# Patient Record
Sex: FEMALE | Race: BLACK OR AFRICAN AMERICAN | NOT HISPANIC OR LATINO | Employment: OTHER | URBAN - METROPOLITAN AREA
[De-identification: names, ages, dates, MRNs, and addresses within clinical notes are randomized per-mention and may not be internally consistent; named-entity substitution may affect disease eponyms.]

---

## 2017-02-01 ENCOUNTER — OFFICE VISIT (OUTPATIENT)
Dept: INTERNAL MEDICINE | Facility: CLINIC | Age: 82
End: 2017-02-01
Payer: MEDICARE

## 2017-02-01 VITALS
BODY MASS INDEX: 23.93 KG/M2 | HEART RATE: 72 BPM | SYSTOLIC BLOOD PRESSURE: 126 MMHG | RESPIRATION RATE: 16 BRPM | HEIGHT: 64 IN | WEIGHT: 140.19 LBS | DIASTOLIC BLOOD PRESSURE: 64 MMHG

## 2017-02-01 DIAGNOSIS — I10 ESSENTIAL HYPERTENSION: ICD-10-CM

## 2017-02-01 DIAGNOSIS — E11.3293 TYPE 2 DIABETES MELLITUS WITH BOTH EYES AFFECTED BY MILD NONPROLIFERATIVE RETINOPATHY WITHOUT MACULAR EDEMA, WITHOUT LONG-TERM CURRENT USE OF INSULIN: ICD-10-CM

## 2017-02-01 DIAGNOSIS — E11.3299 NONPROLIFERATIVE DIABETIC RETINOPATHY: ICD-10-CM

## 2017-02-01 DIAGNOSIS — M85.80 OSTEOPENIA: ICD-10-CM

## 2017-02-01 DIAGNOSIS — I77.1 TORTUOUS AORTA: ICD-10-CM

## 2017-02-01 DIAGNOSIS — F32.5 MAJOR DEPRESSIVE DISORDER, SINGLE EPISODE, IN FULL REMISSION: ICD-10-CM

## 2017-02-01 DIAGNOSIS — Z00.00 ENCOUNTER FOR PREVENTIVE HEALTH EXAMINATION: Primary | ICD-10-CM

## 2017-02-01 DIAGNOSIS — E78.5 HYPERLIPIDEMIA, UNSPECIFIED HYPERLIPIDEMIA TYPE: Chronic | ICD-10-CM

## 2017-02-01 PROCEDURE — G0009 ADMIN PNEUMOCOCCAL VACCINE: HCPCS | Mod: S$GLB,,, | Performed by: NURSE PRACTITIONER

## 2017-02-01 PROCEDURE — 99999 PR PBB SHADOW E&M-EST. PATIENT-LVL III: CPT | Mod: PBBFAC,,, | Performed by: NURSE PRACTITIONER

## 2017-02-01 PROCEDURE — G0439 PPPS, SUBSEQ VISIT: HCPCS | Mod: 25,S$GLB,, | Performed by: NURSE PRACTITIONER

## 2017-02-01 PROCEDURE — 99499 UNLISTED E&M SERVICE: CPT | Mod: S$GLB,,, | Performed by: NURSE PRACTITIONER

## 2017-02-01 PROCEDURE — 3078F DIAST BP <80 MM HG: CPT | Mod: S$GLB,,, | Performed by: NURSE PRACTITIONER

## 2017-02-01 PROCEDURE — 3074F SYST BP LT 130 MM HG: CPT | Mod: S$GLB,,, | Performed by: NURSE PRACTITIONER

## 2017-02-01 PROCEDURE — 90732 PPSV23 VACC 2 YRS+ SUBQ/IM: CPT | Mod: S$GLB,,, | Performed by: NURSE PRACTITIONER

## 2017-02-01 NOTE — MR AVS SNAPSHOT
[facility] - Internal Medicine  [address]  [facility] [address]  Phone: [phone]  Fax: [phone]                  Cary Middleton   2017 9:00 AM   Office Visit    Description:  Female : 1935   Provider:  CODIE CALDERON   Department:  [facility] Medicine           Reason for Visit     Health Risk Assessment           Diagnoses this Visit        Comments    Encounter for preventive health examination    -  Primary     Type 2 diabetes mellitus with both eyes affected by mild nonproliferative retinopathy without macular edema, without long-term current use of insulin                To Do List           Goals (5 Years of Data)     None      Follow-Up and Disposition     Return in about 4 months (around 2017) for Follow up with PCP, SOONER IF NEEDED, HRA VISIT IN 1 YEAR.      [facility]     [facility] -  Assistance  Registered nurses in the [facility] provide clinical advisement, health education, appointment booking, and other advisory services.  Call for this free service at [phone].             Medications           Message regarding Medications     Verify the changes and/or additions to your medication regime listed below are the same as discussed with your clinician today.  If any of these changes or additions are incorrect, please notify your healthcare provider.             Verify that the below list of medications is an accurate representation of the medications you are currently taking.  If none reported, the list may be blank. If incorrect, please contact your healthcare provider. Carry this list with you in case of emergency.           Current Medications     amlodipine (NORVASC) 10 MG tablet Take 1 tablet (10 mg total) by mouth once daily.    aspirin (ECOTRIN) 81 MG EC tablet Take 81 mg by mouth once daily.    atorvastatin (LIPITOR) 20 MG tablet Take 1 tablet (20 mg total) by mouth once daily.    cetirizine  "(ZYRTEC) 10 MG tablet Take 1 tablet (10 mg total) by mouth once daily.    cholecalciferol, vitamin D3, 1,000 unit capsule Take 1,000 Units by mouth once daily.    escitalopram oxalate (LEXAPRO) 10 MG tablet Take 1 tablet (10 mg total) by mouth once daily.    glipiZIDE (GLUCOTROL) 5 MG tablet TAKE 2 TABLETS BY MOUTH EVERY MORNING AND 1 IN THE EVENING    metformin (GLUCOPHAGE) 500 MG tablet Take 2 tablets (1,000 mg total) by mouth 2 (two) times daily.    methylcellulose (ARTIFICIAL TEARS) 1 % ophthalmic solution Place 1 drop into both eyes as needed.      CONTOUR TEST STRIPS Strp TEST twice a day           Clinical Reference Information           Vital Signs - Last Recorded  Most recent update: 2/1/2017  8:58 AM by MOOKIE Estrada    BP Pulse Resp Ht Wt BMI    126/64 (BP Location: Right arm, Patient Position: Sitting, BP Method: Manual) 72 16 5' 4" (1.626 m) 63.6 kg (140 lb 3.4 oz) 24.07 kg/m2      Blood Pressure          Most Recent Value    BP  126/64      Allergies as of 2/1/2017     Zoster Vaccine Live    Lisinopril      Immunizations Administered on Date of Encounter - 2/1/2017     Name Date Dose VIS Date Route    Pneumococcal Polysaccharide - 23 Valent  Incomplete 0.5 mL 4/24/2015 Intramuscular      Orders Placed During Today's Visit      Normal Orders This Visit    Ambulatory consult to Diabetic Education     Pneumococcal Polysaccharide Vaccine (23 Valent) (SQ/IM)       [facility] Sign-Up     Activating your [facility] account is as easy as 1-2-3!     1) Visit [email or web address], select Sign Up Now, enter this activation code and your date of birth, then select Next.  [email or web address]  Expires: 3/18/2017  9:32 AM      2) Create a username and password to use when you visit [facility] in the future and select a security question in case you lose your password and select Next.    3) Enter your e-mail address and click Sign Up!    Additional Information  If you have questions, please e-mail [email or web address] " or call [phone] to talk to our [facility] staff. Remember, [facility] is NOT to be used for urgent needs. For medical emergencies, dial 911.         Instructions      Counseling and Referral of Other Preventative  (Italic type indicates deductible and co-insurance are waived)    Patient Name: Cary Middleton  Today's Date: 2/1/2017      SERVICE LIMITATIONS RECOMMENDATION    Vaccines    · Pneumococcal (once after 65)    · Influenza (annually)    · Hepatitis B (if medium/high risk)    · Prevnar 13      Hepatitis B medium/high risk factors:       - End-stage renal disease       - Hemophiliacs who received Factor VII or         IX concentrates       - Clients of institutions for the mentally             retarded       - Persons who live in the same house as          a HepB carrier       - Homosexual men       - Illicit injectable drug abusers     Pneumococcal: Done, no repeat necessary     02/01/2017     Influenza: Done, repeat in one year     11/14/2016     Hepatitis B: N/A DEFER TO PCP RECOMMENDATIONS     Prevnar 13: 10/28/2015; DONE NO REPEAT IS NECESSARY    Mammogram (biennial age 50-74)  Annually (age 40 or over)  Last done 10/28/2015, recommend to repeat every 2  years    Pap (up to age 70 and after 70 if unknown history or abnormal study last 10 years)    N/A     The USPSTF recommends against screening for cervical cancer in women who have had a hysterectomy with removal of the cervix and who do not have a history of a high-grade precancerous lesion (cervical intraepithelial neoplasia [NOEMY] grade 2 or 3) or cervical cancer.     Colorectal cancer screening (to age 75)    · Fecal occult blood test (annual)  · Flexible sigmoidoscopy (5y)  · Screening colonoscopy (10y)  · Barium enema   N/A PATIENT DECLINES     Diabetes self-management training (no USPSTF recommendations)  Requires referral by treating physician for patient with diabetes or renal disease. 10 hours of initial DSMT sessions of no less than 30 minutes  each in a continuous 12-month period. 2 hours of follow-up DSMT in subsequent years.  Scheduled, see appointments    Bone mass measurements (age 65 & older, biennial)  Requires diagnosis related to osteoporosis or estrogen deficiency. Biennial benefit unless patient has history of long-term glucocorticoid  Last done 12/04/2015, recommend to repeat every 2-4 YEARS  years    Glaucoma screening (no USPSTF recommendation)  Diabetes mellitus, family history   , age 50 or over    American, age 65 or over  Last done 11/29/2016, recommend to repeat every 1  years    Medical nutrition therapy for diabetes or renal disease (no recommended schedule)  Requires referral by treating physician for patient with diabetes or renal disease or kidney transplant within the past 3 years.  Can be provided in same year as diabetes self-management training (DSMT), and CMS recommends medical nutrition therapy take place after DSMT. Up to 3 hours for initial year and 2 hours in subsequent years.  Scheduled, see appointments    Cardiovascular screening blood tests (every 5 years)  · Fasting lipid panel  Order as a panel if possible  Last done 03/07/2016, recommend to repeat every 1  years    Diabetes screening tests (at least every 3 years, Medicare covers annually or at 6-month intervals for prediabetic patients)  · Fasting blood sugar (FBS) or glucose tolerance test (GTT)  Patient must be diagnosed with one of the following:       - Hypertension       - Dyslipidemia       - Obesity (BMI 30kg/m2)       - Previous elevated impaired FBS or GTT       ... or any two of the following:       - Overweight (BMI 25 but <30)       - Family history of diabetes       - Age 65 or older       - History of gestational diabetes or birth of baby weighing more than 9 pounds  Last done 11/14/2016, recommend to repeat every 6  months    Abdominal aortic aneurysm screening (once)  · Sonogram   Limited to patients who meet one of the  following criteria:       - Men who are 65-75 years old and have smoked more than 100 cigarette in their lifetime       - Anyone with a family history of abdominal aortic aneurysm       - Anyone recommended for screening by the USPSTF  N/A     HIV screening (annually for increased risk patients)  · HIV-1 and HIV-2 by EIA, or SHELLEY, rapid antibody test or oral mucosa transudate  Patients must be at increased risk for HIV infection per USPSTF guidelines or pregnant. Tests covered annually for patient at increased risk or as requested by the patient. Pregnant patients may receive up to 3 tests during pregnancy.  Risks discussed, screening is not recommended    Smoking cessation counseling (up to 8 sessions per year)  Patients must be asymptomatic of tobacco-related conditions to receive as a preventative service.  NA    Subsequent annual wellness visit  At least 12 months since last AWV  Return in one year     The following information is provided to all patients.  This information is to help you find resources for any of the problems found today that may be affecting your health:                Living healthy guide: www.[facility].[place].[facility]      Understanding Diabetes: www.diabetes.org      Eating healthy: www.cdc.gov/healthyweight      CDC home safety checklist: www.cdc.gov/steadi/patient.html      Agency on Aging: www.goea.[place]      Alcoholics anonymous (AA): www.aa.org      Physical Activity: www.nevaeh.nih.gov/br2qnhu      Tobacco use: www.quitwithusla.org

## 2017-02-01 NOTE — PROGRESS NOTES
"Cary Middleton presented for a  Medicare AWV and comprehensive Health Risk Assessment today. The following components were reviewed and updated:    · Medical history  · Family History  · Social history  · Allergies and Current Medications  · Health Risk Assessment  · Health Maintenance  · Care Team     ** See Completed Assessments for Annual Wellness Visit within the encounter summary.**       The following assessments were completed:  · Living Situation    · Depression Screening  · Timed Get Up and Go  · Whisper Test  · Cognitive Function Screening      · Nutrition Screening  · ADL Screening  · PAQ Screening    Vitals:    02/01/17 0855   BP: 126/64   BP Location: Right arm   Patient Position: Sitting   BP Method: Manual   Pulse: 72   Resp: 16   Weight: 63.6 kg (140 lb 3.4 oz)   Height: 5' 4" (1.626 m)     Body mass index is 24.07 kg/(m^2).  Physical Exam   Constitutional: She is oriented to person, place, and time. She appears well-developed and well-nourished.   HENT:   Head: Normocephalic and atraumatic.   Mouth/Throat: Oropharynx is clear and moist. No oropharyngeal exudate.   Eyes: Pupils are equal, round, and reactive to light.   Neck: Normal range of motion. Neck supple. No tracheal deviation present.   Cardiovascular: Normal rate, regular rhythm, normal heart sounds and intact distal pulses.  Exam reveals no gallop and no friction rub.    No murmur heard.  Pulses:       Dorsalis pedis pulses are 2+ on the right side, and 2+ on the left side.        Posterior tibial pulses are 2+ on the right side, and 2+ on the left side.   Pulmonary/Chest: Effort normal and breath sounds normal. No respiratory distress. She has no wheezes.   Abdominal: Soft. Bowel sounds are normal. She exhibits no distension. There is no tenderness.   Musculoskeletal: Normal range of motion.        Right foot: There is normal range of motion. Deformity: small bunion noted.        Left foot: There is normal range of motion. Deformity: " bunion.   X 4 extremties   Feet:   Right Foot:   Protective Sensation: 6 sites tested. 6 sites sensed.   Skin Integrity: Negative for skin breakdown.   Left Foot:   Protective Sensation: 6 sites tested. 6 sites sensed.   Skin Integrity: Negative for skin breakdown.   Neurological: She is alert and oriented to person, place, and time.   Skin: Skin is warm and dry.   Psychiatric: She has a normal mood and affect. Her behavior is normal.   Nursing note and vitals reviewed.        Diagnoses and health risks identified today and associated recommendations/orders:    1. Encounter for preventive health examination    - Pneumococcal Polysaccharide Vaccine (23 Valent) (SQ/IM)    2. Type 2 diabetes mellitus with both eyes affected by mild nonproliferative retinopathy without macular edema, without long-term current use of insulin    - Ambulatory consult to Diabetic Education    3. Uncontrolled type 2 diabetes mellitus with complication, without long-term current use of insulin  Stable. Continue current treatment plan as previously prescribed by PCP.       4. Nonproliferative diabetic retinopathy  Stable. Continue current treatment plan as previously prescribed by PCP.       5. Tortuous aorta  Stable and controlled.   Noted on CXR imaging dated 11/19/2013.  Continue current treatment plan as previously prescribed by PCP.       6. Major depressive disorder, single episode, in full remission  Stable and controlled. Continue current treatment plan as previously prescribed by PCP.       7. Essential hypertension  Stable and controlled. Continue current treatment plan as previously prescribed by PCP.       8. Hyperlipidemia, unspecified hyperlipidemia type  Stable and controlled. Continue current treatment plan as previously prescribed by PCP.       9. Osteopenia  Stable and controlled. Continue current treatment plan as previously prescribed by PCP.         Provided Cary with a 5-10 year written screening schedule and personal  prevention plan. Recommendations were developed using the USPSTF age appropriate recommendations. Education, counseling, and referrals were provided as needed. After Visit Summary printed and given to patient which includes a list of additional screenings\tests needed.    Return in about 4 months (around 6/1/2017) for Follow up with PCP, SOONER IF NEEDED, HRA VISIT IN 1 YEAR.    Abbey Dickens, ULISESC

## 2017-02-01 NOTE — PATIENT INSTRUCTIONS
Counseling and Referral of Other Preventative  (Italic type indicates deductible and co-insurance are waived)    Patient Name: Cary Middleton  Today's Date: 2/1/2017      SERVICE LIMITATIONS RECOMMENDATION    Vaccines    · Pneumococcal (once after 65)    · Influenza (annually)    · Hepatitis B (if medium/high risk)    · Prevnar 13      Hepatitis B medium/high risk factors:       - End-stage renal disease       - Hemophiliacs who received Factor VII or         IX concentrates       - Clients of institutions for the mentally             retarded       - Persons who live in the same house as          a HepB carrier       - Homosexual men       - Illicit injectable drug abusers     Pneumococcal: Done, no repeat necessary     02/01/2017     Influenza: Done, repeat in one year     11/14/2016     Hepatitis B: N/A DEFER TO PCP RECOMMENDATIONS     Prevnar 13: 10/28/2015; DONE NO REPEAT IS NECESSARY    Mammogram (biennial age 50-74)  Annually (age 40 or over)  Last done 10/28/2015, recommend to repeat every 2  years    Pap (up to age 70 and after 70 if unknown history or abnormal study last 10 years)    N/A     The USPSTF recommends against screening for cervical cancer in women who have had a hysterectomy with removal of the cervix and who do not have a history of a high-grade precancerous lesion (cervical intraepithelial neoplasia [NOEMY] grade 2 or 3) or cervical cancer.     Colorectal cancer screening (to age 75)    · Fecal occult blood test (annual)  · Flexible sigmoidoscopy (5y)  · Screening colonoscopy (10y)  · Barium enema   N/A PATIENT DECLINES     Diabetes self-management training (no USPSTF recommendations)  Requires referral by treating physician for patient with diabetes or renal disease. 10 hours of initial DSMT sessions of no less than 30 minutes each in a continuous 12-month period. 2 hours of follow-up DSMT in subsequent years.  Scheduled, see appointments    Bone mass measurements (age 65 & older, biennial)   Requires diagnosis related to osteoporosis or estrogen deficiency. Biennial benefit unless patient has history of long-term glucocorticoid  Last done 12/04/2015, recommend to repeat every 2-4 YEARS  years    Glaucoma screening (no USPSTF recommendation)  Diabetes mellitus, family history   , age 50 or over    American, age 65 or over  Last done 11/29/2016, recommend to repeat every 1  years    Medical nutrition therapy for diabetes or renal disease (no recommended schedule)  Requires referral by treating physician for patient with diabetes or renal disease or kidney transplant within the past 3 years.  Can be provided in same year as diabetes self-management training (DSMT), and CMS recommends medical nutrition therapy take place after DSMT. Up to 3 hours for initial year and 2 hours in subsequent years.  Scheduled, see appointments    Cardiovascular screening blood tests (every 5 years)  · Fasting lipid panel  Order as a panel if possible  Last done 03/07/2016, recommend to repeat every 1  years    Diabetes screening tests (at least every 3 years, Medicare covers annually or at 6-month intervals for prediabetic patients)  · Fasting blood sugar (FBS) or glucose tolerance test (GTT)  Patient must be diagnosed with one of the following:       - Hypertension       - Dyslipidemia       - Obesity (BMI 30kg/m2)       - Previous elevated impaired FBS or GTT       ... or any two of the following:       - Overweight (BMI 25 but <30)       - Family history of diabetes       - Age 65 or older       - History of gestational diabetes or birth of baby weighing more than 9 pounds  Last done 11/14/2016, recommend to repeat every 6  months    Abdominal aortic aneurysm screening (once)  · Sonogram   Limited to patients who meet one of the following criteria:       - Men who are 65-75 years old and have smoked more than 100 cigarette in their lifetime       - Anyone with a family history of abdominal aortic  aneurysm       - Anyone recommended for screening by the USPSTF  N/A     HIV screening (annually for increased risk patients)  · HIV-1 and HIV-2 by EIA, or SHELLEY, rapid antibody test or oral mucosa transudate  Patients must be at increased risk for HIV infection per USPSTF guidelines or pregnant. Tests covered annually for patient at increased risk or as requested by the patient. Pregnant patients may receive up to 3 tests during pregnancy.  Risks discussed, screening is not recommended    Smoking cessation counseling (up to 8 sessions per year)  Patients must be asymptomatic of tobacco-related conditions to receive as a preventative service.  NA    Subsequent annual wellness visit  At least 12 months since last AWV  Return in one year     The following information is provided to all patients.  This information is to help you find resources for any of the problems found today that may be affecting your health:                Living healthy guide: www.[facility].[place]gov      Understanding Diabetes: www.diabetes.org      Eating healthy: www.cdc.gov/healthyweight      CDC home safety checklist: www.cdc.gov/steadi/patient.html      Agency on Aging: www.goea.[place].[facility]      Alcoholics anonymous (AA): www.aa.org      Physical Activity: www.nevaeh.nih.gov/lz5ynmu      Tobacco use: www.quitwithusla.org

## 2017-03-06 ENCOUNTER — LAB VISIT (OUTPATIENT)
Dept: LAB | Facility: HOSPITAL | Age: 82
End: 2017-03-06
Attending: INTERNAL MEDICINE
Payer: MEDICARE

## 2017-03-06 ENCOUNTER — CLINICAL SUPPORT (OUTPATIENT)
Dept: DIABETES | Facility: CLINIC | Age: 82
End: 2017-03-06
Payer: MEDICARE

## 2017-03-06 DIAGNOSIS — E11.3293 TYPE 2 DIABETES MELLITUS WITH BOTH EYES AFFECTED BY MILD NONPROLIFERATIVE RETINOPATHY WITHOUT MACULAR EDEMA, WITHOUT LONG-TERM CURRENT USE OF INSULIN: ICD-10-CM

## 2017-03-06 PROCEDURE — 83036 HEMOGLOBIN GLYCOSYLATED A1C: CPT

## 2017-03-06 PROCEDURE — G0109 DIAB MANAGE TRN IND/GROUP: HCPCS | Mod: S$GLB,,, | Performed by: DIETITIAN, REGISTERED

## 2017-03-06 PROCEDURE — 36415 COLL VENOUS BLD VENIPUNCTURE: CPT

## 2017-03-06 NOTE — PROGRESS NOTES
Diabetes Education  Author: Yamile Kahn RD  Date: 3/6/2017    Diabetes Education Visit  Diabetes Education Record Assessment/Progress: Initial    Diabetes Type  Diabetes Type : Type II    Diabetes History  Diabetes Diagnosis: >10 years    Nutrition  Meal Planning: 3 meals per day, artificial sweeteners, water, diet drinks (evening snack)    Monitoring   Monitoring: Contour Next EZ  Self Monitoring : SMBG BID  Blood Glucose Logs: No    Exercise   Exercise Type:  (senior citizen exercise class)  Frequency: 3-5 Times per week  Duration: 1 hour    Current Diabetes Treatment   Current Treatment: Oral Medication (Metformin 1000mg BID, Glipizide 10mg with breakfast and 5mg with dinner. Reports sometimes misses medication doses.)    Social History  Primary Support: Spouse  Educational Level: College Graduate  Occupation: retired  Smoking Status: Never a Smoker                     Barriers to Change  Barriers to Change: None  Learning Challenges : None    Readiness to Learn   Readiness to Learn : Acceptance (Verbal pre and post test completed)    Cultural Influences  Cultural Influences: No    Diabetes Education Assessment/Progress    Acute Complications (preventing, detecting, and treating acute complications): Discussion, Instructed, Class, Written Materials Provided    Chronic Complications (preventing, detecting, and treating chronic complications): Discussion, Instructed, Class, Written Materials Provided    Diabetes Disease Process (diabetes disease process and treatment options): Instructed, Discussion, Class, Written Materials Provided    Nutrition (Incorporating nutritional management into one's lifestyle): Discussion, Instructed, Class, Written Materials Provided    Physical Activity (incorporating physical activity into one's lifestyle): Discussion, Instructed, Class, Written Materials Provided    Medications (states correct name, dose, onset, peak, duration, side effects & timing of meds): Discussion,  Instructed, Class, Written Materials Provided    Monitoring (monitoring blood glucose/other parameters & using results): Discussion, Class, Instructed, Written Materials Provided    Goal Setting and Problem Solving (verbalizes behavior change strategies & sets realistic goals): Discussion, Class    Behavior Change (developing personal strategies to health & behavior change): Discussion, Class    Psychosocial Issues (developing personal srategies to address psychosocial concerns): Discussion, Class    Goals  Other:  (See attached goal sheet.)         Diabetes Care Plan/Intervention  Education Plan/Intervention: Other (Pt to get A1C after class today. Pending A1C result- if >8%, will refer pt to Empowerment. If <8%, will schedule for 3 month DE follow-up.)    Diabetes Meal Plan  Carbohydrate Per Meal: 30-45g  Carbohydrate Per Snack : 15-20g    Education Units of Time   Time Spent: 90 min              Health Maintenance Due   Topic Date Due    Urine Microalbumin  02/28/2017

## 2017-03-07 LAB
ESTIMATED AVG GLUCOSE: 177 MG/DL
HBA1C MFR BLD HPLC: 7.8 %

## 2017-03-08 ENCOUNTER — TELEPHONE (OUTPATIENT)
Dept: DIABETES | Facility: CLINIC | Age: 82
End: 2017-03-08

## 2017-03-13 DIAGNOSIS — E11.9 TYPE 2 DIABETES MELLITUS WITHOUT COMPLICATION: ICD-10-CM

## 2017-05-30 DIAGNOSIS — E11.3299 TYPE 2 DIABETES MELLITUS WITH MILD NONPROLIFERATIVE DIABETIC RETINOPATHY WITHOUT MACULAR EDEMA: ICD-10-CM

## 2017-05-31 RX ORDER — GLIPIZIDE 5 MG/1
TABLET ORAL
Qty: 270 TABLET | Refills: 1 | Status: SHIPPED | OUTPATIENT
Start: 2017-05-31 | End: 2017-11-14 | Stop reason: DRUGHIGH

## 2017-08-14 ENCOUNTER — OFFICE VISIT (OUTPATIENT)
Dept: INTERNAL MEDICINE | Facility: CLINIC | Age: 82
End: 2017-08-14
Payer: MEDICARE

## 2017-08-14 VITALS
DIASTOLIC BLOOD PRESSURE: 70 MMHG | TEMPERATURE: 99 F | SYSTOLIC BLOOD PRESSURE: 128 MMHG | HEIGHT: 64 IN | WEIGHT: 138 LBS | HEART RATE: 77 BPM | BODY MASS INDEX: 23.56 KG/M2

## 2017-08-14 DIAGNOSIS — S76.212A GROIN STRAIN, LEFT, INITIAL ENCOUNTER: Primary | ICD-10-CM

## 2017-08-14 DIAGNOSIS — E78.5 HYPERLIPIDEMIA, UNSPECIFIED HYPERLIPIDEMIA TYPE: ICD-10-CM

## 2017-08-14 DIAGNOSIS — I10 ESSENTIAL HYPERTENSION: ICD-10-CM

## 2017-08-14 DIAGNOSIS — M85.80 OSTEOPENIA, UNSPECIFIED LOCATION: ICD-10-CM

## 2017-08-14 DIAGNOSIS — E11.3293 TYPE 2 DIABETES MELLITUS WITH BOTH EYES AFFECTED BY MILD NONPROLIFERATIVE RETINOPATHY WITHOUT MACULAR EDEMA, WITHOUT LONG-TERM CURRENT USE OF INSULIN: ICD-10-CM

## 2017-08-14 PROCEDURE — 99214 OFFICE O/P EST MOD 30 MIN: CPT | Mod: S$GLB,,, | Performed by: INTERNAL MEDICINE

## 2017-08-14 PROCEDURE — 99499 UNLISTED E&M SERVICE: CPT | Mod: S$GLB,,, | Performed by: INTERNAL MEDICINE

## 2017-08-14 PROCEDURE — 1159F MED LIST DOCD IN RCRD: CPT | Mod: S$GLB,,, | Performed by: INTERNAL MEDICINE

## 2017-08-14 PROCEDURE — 3074F SYST BP LT 130 MM HG: CPT | Mod: S$GLB,,, | Performed by: INTERNAL MEDICINE

## 2017-08-14 PROCEDURE — 3078F DIAST BP <80 MM HG: CPT | Mod: S$GLB,,, | Performed by: INTERNAL MEDICINE

## 2017-08-14 PROCEDURE — 99999 PR PBB SHADOW E&M-EST. PATIENT-LVL III: CPT | Mod: PBBFAC,,, | Performed by: INTERNAL MEDICINE

## 2017-08-14 PROCEDURE — 3008F BODY MASS INDEX DOCD: CPT | Mod: S$GLB,,, | Performed by: INTERNAL MEDICINE

## 2017-08-14 PROCEDURE — 1125F AMNT PAIN NOTED PAIN PRSNT: CPT | Mod: S$GLB,,, | Performed by: INTERNAL MEDICINE

## 2017-08-15 NOTE — PROGRESS NOTES
Subjective:       Patient ID: Cary Middleton is a 81 y.o. female.    Chief Complaint: Leg Pain (left upper thigh pain for a few days)    Last seen 9 months ago. Returns urgently c/o non-traumatic left leg pain in upper inner thigh, groin area. Onset few days ago, she denies any strenuous physical activity preceding. The pain is worse with activity, with sudden stabbing pains upon movement, and relieved with rest. No pain in the hip joint, no rash or swollen glands in groin area. No radiation of pain down the leg, no leg weakness, numbness or tingling. No change in bowel or bladder function. Using Aleve 1-2 tabs once daily with relief. It is beginning to subside.     Home Glucose was 148 fasting yesterday per history, no log for review.    Past medical history: .  Hypertension. Negative stress test , EF 55%.   Hyperlipidemia. TChol 151, TG 61, HDL 52, LDL 87 .  Diabetes type 2 without complications. HbA1c 7.8% .  Osteoporosis, did not tolerate oral bisphosphonate.  Depression/anxiety.  DJD left hip.  Nephrolithiasis.  Chronic Microcytosis without anemia, H/H , MCV 66.    Past surgical history: Partial hysterectomy. Cyst removed from left wrist. Bilateral Blepharoplasty and Left Cataract extraction.     Mammogram normal 10/15. BMD stable 12/15. Eye exam 10/15. Pelvic exam 2006. Colonoscopy outside  - normal, no polyps. Flu shot . Prevnar 10/15. Pneumovax . Zostavax .      Social history: Nonsmoker, no alcohol. . 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 Retired teacher.    Family medical history: Heart disease. Mother had colon cancer. Sisters with thoracic aneurysm, CAD and pancreatic cancer.     Allergies: NKDA.    Medications: list reviewed and reconciled.                    Review of Systems   Constitutional: Negative for activity change, appetite change, chills, diaphoresis, fever and unexpected weight change.   Respiratory: Negative for cough and  shortness of breath.    Cardiovascular: Negative for chest pain, palpitations and leg swelling.   Neurological: Negative for dizziness, weakness and headaches.        Numbness in both great toes, not painful.       Objective:    /70, Pulse 77, Temp 98.5  Physical Exam   Constitutional: She appears well-developed and well-nourished.   Ambulatory with a normal gait in no distress.    HENT:   Nose: Nose normal.   Mouth/Throat: Oropharynx is clear and moist.   Cardiovascular: Normal rate, regular rhythm and normal heart sounds.    Pulmonary/Chest: Effort normal and breath sounds normal. No respiratory distress.   Musculoskeletal: Normal range of motion. She exhibits no edema or deformity.   Left hip joint is not tender, no joint pain on range of motion. Left thigh adductor muscle is spastic and tender with pain on leg movements including stretching it by abduction and rotation.   Protective Sensation (w/ 10 gram monofilament):  Right: Intact  Left: Intact    Visual Inspection:  Normal -  Bilateral    Pedal Pulses:   Right: Present  Left: Present    Posterior tibialis:   Right:Present  Left: Present         Assessment:       1. Groin strain, left, initial encounter    2. Type 2 diabetes mellitus with both eyes affected by mild nonproliferative retinopathy without macular edema, without long-term current use of insulin    3. Essential hypertension    4. Hyperlipidemia, unspecified hyperlipidemia type    5. Osteopenia, unspecified location        Plan:       Groin strain, left, initial encounter        -     She feels it is easing up, and is comfortable taking Aleve as needed.     Type 2 diabetes mellitus with both eyes affected by mild nonproliferative retinopathy without macular edema, without long-term current use of insulin  -     CBC auto differential; Future; Expected date: 08/14/2017  -     Comprehensive metabolic panel; Future; Expected date: 08/14/2017  -     Hemoglobin A1c; Future; Expected date:  08/14/2017    Essential hypertension controlled, continue same.     Hyperlipidemia, unspecified hyperlipidemia type  -     Lipid panel; Future; Expected date: 08/14/2017    Osteopenia, unspecified location  -     Vitamin D; Future; Expected date: 08/14/2017  -     Has been off bisphosphonate therapy for two years, exam does not suggest atypical hip fracture associated with these meds.

## 2017-08-29 DIAGNOSIS — I10 ESSENTIAL HYPERTENSION: ICD-10-CM

## 2017-08-29 DIAGNOSIS — E78.5 HYPERLIPIDEMIA LDL GOAL <100: ICD-10-CM

## 2017-08-29 DIAGNOSIS — F41.8 DEPRESSION WITH ANXIETY: ICD-10-CM

## 2017-08-29 DIAGNOSIS — E11.3299 TYPE 2 DIABETES MELLITUS WITH MILD NONPROLIFERATIVE DIABETIC RETINOPATHY WITHOUT MACULAR EDEMA: ICD-10-CM

## 2017-08-30 RX ORDER — ESCITALOPRAM OXALATE 10 MG/1
TABLET ORAL
Qty: 90 TABLET | Refills: 1 | Status: SHIPPED | OUTPATIENT
Start: 2017-08-30 | End: 2018-02-19 | Stop reason: SDUPTHER

## 2017-08-30 RX ORDER — AMLODIPINE BESYLATE 10 MG/1
TABLET ORAL
Qty: 90 TABLET | Refills: 1 | Status: SHIPPED | OUTPATIENT
Start: 2017-08-30 | End: 2018-02-19 | Stop reason: SDUPTHER

## 2017-08-30 RX ORDER — METFORMIN HYDROCHLORIDE 500 MG/1
TABLET ORAL
Qty: 360 TABLET | Refills: 1 | Status: SHIPPED | OUTPATIENT
Start: 2017-08-30 | End: 2018-02-19 | Stop reason: SDUPTHER

## 2017-08-30 RX ORDER — ATORVASTATIN CALCIUM 20 MG/1
TABLET, FILM COATED ORAL
Qty: 90 TABLET | Refills: 1 | Status: SHIPPED | OUTPATIENT
Start: 2017-08-30 | End: 2018-02-19 | Stop reason: SDUPTHER

## 2017-09-05 ENCOUNTER — LAB VISIT (OUTPATIENT)
Dept: LAB | Facility: HOSPITAL | Age: 82
End: 2017-09-05
Attending: INTERNAL MEDICINE
Payer: MEDICARE

## 2017-09-05 DIAGNOSIS — E11.3293 TYPE 2 DIABETES MELLITUS WITH BOTH EYES AFFECTED BY MILD NONPROLIFERATIVE RETINOPATHY WITHOUT MACULAR EDEMA, WITHOUT LONG-TERM CURRENT USE OF INSULIN: ICD-10-CM

## 2017-09-05 DIAGNOSIS — M85.80 OSTEOPENIA, UNSPECIFIED LOCATION: ICD-10-CM

## 2017-09-05 DIAGNOSIS — E78.5 HYPERLIPIDEMIA, UNSPECIFIED HYPERLIPIDEMIA TYPE: ICD-10-CM

## 2017-09-05 LAB
25(OH)D3+25(OH)D2 SERPL-MCNC: 34 NG/ML
ALBUMIN SERPL BCP-MCNC: 3.8 G/DL
ALP SERPL-CCNC: 73 U/L
ALT SERPL W/O P-5'-P-CCNC: 10 U/L
ANION GAP SERPL CALC-SCNC: 12 MMOL/L
ANISOCYTOSIS BLD QL SMEAR: SLIGHT
AST SERPL-CCNC: 14 U/L
BASOPHILS NFR BLD: 0 %
BILIRUB SERPL-MCNC: 0.6 MG/DL
BUN SERPL-MCNC: 10 MG/DL
BURR CELLS BLD QL SMEAR: ABNORMAL
CALCIUM SERPL-MCNC: 9.5 MG/DL
CHLORIDE SERPL-SCNC: 105 MMOL/L
CHOLEST SERPL-MCNC: 151 MG/DL
CHOLEST/HDLC SERPL: 2.8 {RATIO}
CO2 SERPL-SCNC: 25 MMOL/L
CREAT SERPL-MCNC: 0.7 MG/DL
DACRYOCYTES BLD QL SMEAR: ABNORMAL
DIFFERENTIAL METHOD: ABNORMAL
EOSINOPHIL NFR BLD: 1 %
ERYTHROCYTE [DISTWIDTH] IN BLOOD BY AUTOMATED COUNT: 15.5 %
EST. GFR  (AFRICAN AMERICAN): >60 ML/MIN/1.73 M^2
EST. GFR  (NON AFRICAN AMERICAN): >60 ML/MIN/1.73 M^2
ESTIMATED AVG GLUCOSE: 189 MG/DL
GLUCOSE SERPL-MCNC: 156 MG/DL
HBA1C MFR BLD HPLC: 8.2 %
HCT VFR BLD AUTO: 36.5 %
HDLC SERPL-MCNC: 53 MG/DL
HDLC SERPL: 35.1 %
HGB BLD-MCNC: 12.4 G/DL
LDLC SERPL CALC-MCNC: 82.6 MG/DL
LYMPHOCYTES NFR BLD: 52 %
MCH RBC QN AUTO: 22 PG
MCHC RBC AUTO-ENTMCNC: 34 G/DL
MCV RBC AUTO: 65 FL
MONOCYTES NFR BLD: 4 %
NEUTROPHILS NFR BLD: 43 %
NONHDLC SERPL-MCNC: 98 MG/DL
PLATELET # BLD AUTO: 230 K/UL
PLATELET BLD QL SMEAR: ABNORMAL
PMV BLD AUTO: 10.3 FL
POIKILOCYTOSIS BLD QL SMEAR: SLIGHT
POTASSIUM SERPL-SCNC: 4.2 MMOL/L
PROT SERPL-MCNC: 7.3 G/DL
RBC # BLD AUTO: 5.63 M/UL
SCHISTOCYTES BLD QL SMEAR: ABNORMAL
SODIUM SERPL-SCNC: 142 MMOL/L
TARGETS BLD QL SMEAR: ABNORMAL
TRIGL SERPL-MCNC: 77 MG/DL
WBC # BLD AUTO: 11.38 K/UL

## 2017-09-05 PROCEDURE — 85007 BL SMEAR W/DIFF WBC COUNT: CPT

## 2017-09-05 PROCEDURE — 80053 COMPREHEN METABOLIC PANEL: CPT

## 2017-09-05 PROCEDURE — 82306 VITAMIN D 25 HYDROXY: CPT

## 2017-09-05 PROCEDURE — 83036 HEMOGLOBIN GLYCOSYLATED A1C: CPT

## 2017-09-05 PROCEDURE — 36415 COLL VENOUS BLD VENIPUNCTURE: CPT

## 2017-09-05 PROCEDURE — 85027 COMPLETE CBC AUTOMATED: CPT

## 2017-09-05 PROCEDURE — 80061 LIPID PANEL: CPT

## 2017-11-14 ENCOUNTER — OFFICE VISIT (OUTPATIENT)
Dept: INTERNAL MEDICINE | Facility: CLINIC | Age: 82
End: 2017-11-14
Payer: MEDICARE

## 2017-11-14 ENCOUNTER — IMMUNIZATION (OUTPATIENT)
Dept: INTERNAL MEDICINE | Facility: CLINIC | Age: 82
End: 2017-11-14
Payer: MEDICARE

## 2017-11-14 VITALS
SYSTOLIC BLOOD PRESSURE: 138 MMHG | HEIGHT: 64 IN | DIASTOLIC BLOOD PRESSURE: 76 MMHG | BODY MASS INDEX: 23.71 KG/M2 | WEIGHT: 138.88 LBS | HEART RATE: 74 BPM

## 2017-11-14 DIAGNOSIS — Z23 INFLUENZA VACCINE NEEDED: ICD-10-CM

## 2017-11-14 DIAGNOSIS — E11.3293 TYPE 2 DIABETES MELLITUS WITH BOTH EYES AFFECTED BY MILD NONPROLIFERATIVE RETINOPATHY WITHOUT MACULAR EDEMA, WITHOUT LONG-TERM CURRENT USE OF INSULIN: Primary | ICD-10-CM

## 2017-11-14 DIAGNOSIS — I10 ESSENTIAL HYPERTENSION: ICD-10-CM

## 2017-11-14 DIAGNOSIS — R42 VERTIGO: ICD-10-CM

## 2017-11-14 DIAGNOSIS — E78.5 HYPERLIPIDEMIA, UNSPECIFIED HYPERLIPIDEMIA TYPE: ICD-10-CM

## 2017-11-14 LAB
BILIRUB UR QL STRIP: NEGATIVE
CLARITY UR REFRACT.AUTO: ABNORMAL
COLOR UR AUTO: ABNORMAL
CREAT UR-MCNC: 200 MG/DL
GLUCOSE UR QL STRIP: NEGATIVE
HGB UR QL STRIP: NEGATIVE
KETONES UR QL STRIP: NEGATIVE
LEUKOCYTE ESTERASE UR QL STRIP: NEGATIVE
MICROALBUMIN UR DL<=1MG/L-MCNC: 39 UG/ML
MICROALBUMIN/CREATININE RATIO: 19.5 UG/MG
NITRITE UR QL STRIP: NEGATIVE
PH UR STRIP: 5 [PH] (ref 5–8)
PROT UR QL STRIP: NEGATIVE
SP GR UR STRIP: 1.02 (ref 1–1.03)
URN SPEC COLLECT METH UR: ABNORMAL
UROBILINOGEN UR STRIP-ACNC: 2 EU/DL

## 2017-11-14 PROCEDURE — 99999 PR PBB SHADOW E&M-EST. PATIENT-LVL III: CPT | Mod: PBBFAC,,, | Performed by: INTERNAL MEDICINE

## 2017-11-14 PROCEDURE — 82570 ASSAY OF URINE CREATININE: CPT

## 2017-11-14 PROCEDURE — G0008 ADMIN INFLUENZA VIRUS VAC: HCPCS | Mod: S$GLB,,, | Performed by: INTERNAL MEDICINE

## 2017-11-14 PROCEDURE — 81003 URINALYSIS AUTO W/O SCOPE: CPT

## 2017-11-14 PROCEDURE — 99214 OFFICE O/P EST MOD 30 MIN: CPT | Mod: S$GLB,,, | Performed by: INTERNAL MEDICINE

## 2017-11-14 PROCEDURE — 90662 IIV NO PRSV INCREASED AG IM: CPT | Mod: S$GLB,,, | Performed by: INTERNAL MEDICINE

## 2017-11-14 PROCEDURE — 99499 UNLISTED E&M SERVICE: CPT | Mod: S$GLB,,, | Performed by: INTERNAL MEDICINE

## 2017-11-14 RX ORDER — GLIPIZIDE 5 MG/1
10 TABLET ORAL 2 TIMES DAILY WITH MEALS
Qty: 360 TABLET | Refills: 1 | Status: SHIPPED | OUTPATIENT
Start: 2017-11-14 | End: 2018-02-19 | Stop reason: SDUPTHER

## 2017-11-14 RX ORDER — MECLIZINE HCL 12.5 MG 12.5 MG/1
12.5 TABLET ORAL 2 TIMES DAILY PRN
Qty: 30 TABLET | Refills: 0 | Status: SHIPPED | OUTPATIENT
Start: 2017-11-14 | End: 2019-07-15

## 2017-11-14 NOTE — PROGRESS NOTES
Subjective:       Patient ID: Cary Middleton is a 81 y.o. female.    Chief Complaint: Diabetes    Last seen 3 months ago. Returns for f/u chronic medical conditions. C/O occasional dizziness upon rising in the morning. Thinks it's sinus-related, but has NO nasal symptoms. Left ear feels stopped up at times, no ear pain or hearing loss. Does have a history of vertigo, currently out of med.   Recent labs indicate blood sugar on the rise. Admits she has attended a lot of social events in recent months and diet is not her norm. Compliant with meds as prescribed, Glipizide 15mg daily, Metformin 2,000mg daily. Log of BID home glucose monitoring ranges near 150 fasting, near 200 before dinner over past two months, previously better. No hypoglycemia. No home BP monitoring lately.    Past medical history: .  Hypertension. Negative stress test , EF 55%.   Hyperlipidemia. TChol 151, TG 61, HDL 52, LDL 87 .  Diabetes type 2 without complications. HbA1c 7.8% .  Osteoporosis, did not tolerate oral bisphosphonate.  Depression/anxiety.  DJD left hip.  Nephrolithiasis.  Chronic Microcytosis without anemia, H/H , MCV 66.    Past surgical history: Partial hysterectomy. Cyst removed from left wrist. Bilateral Blepharoplasty and Left Cataract extraction.     Mammogram normal 10/15. BMD stable 12/15. Eye exam . Pelvic exam 2006. Colonoscopy outside  - normal, no polyps. Flu shot . Prevnar 10/15. Pneumovax . Zostavax .      Social history: Nonsmoker, no alcohol. . 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 Retired teacher.    Family medical history: Heart disease. Mother had colon cancer. Sisters with thoracic aneurysm, CAD and pancreatic cancer.     Allergies: NKDA.    Medications: list reviewed and reconciled.                    Review of Systems   Constitutional: Negative for activity change, appetite change, fatigue, fever and unexpected weight change.   HENT: Negative  for congestion, ear pain, hearing loss, rhinorrhea, sinus pressure, sneezing, sore throat, tinnitus, trouble swallowing and voice change.    Eyes: Negative for pain and visual disturbance.   Respiratory: Negative for cough, chest tightness, shortness of breath and wheezing.    Cardiovascular: Negative for chest pain, palpitations and leg swelling.   Gastrointestinal: Negative for abdominal pain, blood in stool, constipation, diarrhea, nausea and vomiting.   Genitourinary: Negative for difficulty urinating, dysuria, flank pain, frequency, hematuria, urgency, vaginal bleeding and vaginal discharge.   Musculoskeletal: Negative for arthralgias, back pain, joint swelling, myalgias and neck pain.   Skin: Negative for color change and rash.   Neurological: Positive for dizziness. Negative for syncope, facial asymmetry, speech difficulty, weakness, numbness and headaches.   Hematological: Negative for adenopathy. Does not bruise/bleed easily.   Psychiatric/Behavioral: Negative for confusion, decreased concentration, dysphoric mood and sleep disturbance. The patient is not nervous/anxious.        Objective:    /80, repeat by me 138/76, Pulse 74, Wt 139 lbs (stable), BMI=23.8  Physical Exam   Constitutional: She is oriented to person, place, and time. She appears well-developed and well-nourished. No distress.   HENT:   Head: Normocephalic and atraumatic.   Right Ear: External ear normal.   Left Ear: External ear normal.   Nose: Nose normal.   Mouth/Throat: Oropharynx is clear and moist. No oropharyngeal exudate.   Eyes: Conjunctivae and EOM are normal. Pupils are equal, round, and reactive to light. Right conjunctiva is not injected. Left conjunctiva is not injected. No scleral icterus.   Neck: Normal range of motion. Neck supple. No JVD present. Carotid bruit is not present. No thyromegaly present.   Cardiovascular: Normal rate, regular rhythm, normal heart sounds and intact distal pulses.  Exam reveals no gallop and  no friction rub.    No murmur heard.  Pulmonary/Chest: Effort normal and breath sounds normal. No respiratory distress. She has no wheezes. She has no rhonchi. She has no rales.   Abdominal: Soft. Bowel sounds are normal. She exhibits no distension and no mass. There is no hepatosplenomegaly. There is no tenderness. There is no CVA tenderness.   Musculoskeletal: Normal range of motion. She exhibits no edema, tenderness or deformity.   Lymphadenopathy:     She has no cervical adenopathy.   Neurological: She is alert and oriented to person, place, and time. She has normal strength and normal reflexes. No cranial nerve deficit. She exhibits normal muscle tone. Coordination and gait normal.   Skin: Skin is warm and dry. No lesion and no rash noted. She is not diaphoretic. No cyanosis or erythema. No pallor. Nails show no clubbing.   Psychiatric: She has a normal mood and affect. Her behavior is normal. Judgment and thought content normal.   Vitals reviewed.      9/5/17: CBC stable, H/H 12/36.5, CMP normal except Fasting Glucose 156, HbA1c 8.2%, Vit D 34, TChol 151, TG 77, HDL 53, LDL 83.     Assessment:       1. Type 2 diabetes mellitus with both eyes affected by mild nonproliferative retinopathy without macular edema, without long-term current use of insulin    2. Essential hypertension    3. Hyperlipidemia, unspecified hyperlipidemia type    4. Vertigo    5. Influenza vaccine needed        Plan:       Type 2 diabetes mellitus with both eyes affected by mild nonproliferative retinopathy without macular edema, without long-term current use of insulin  -     Urinalysis  -     Microalbumin/creatinine urine ratio  -     Increase GlipiZIDE (GLUCOTROL) 5 MG tablet; Take 2 tablets (10 mg total) by mouth 2 (two) times daily with meals.  Dispense: 360 tablet; Refill: 1  Continue Metformin the same.  Eye exam to be scheduled soon.    Essential hypertension controlled, continue same.     Hyperlipidemia, unspecified hyperlipidemia  type at goal, continue same.     Vertigo  -     meclizine (ANTIVERT) 12.5 mg tablet; Take 1 tablet (12.5 mg total) by mouth 2 (two) times daily as needed for Dizziness.  Dispense: 30 tablet; Refill: 0    Influenza vaccine needed - Flu shot today.     Return in 3 months with home glucose log.

## 2017-11-14 NOTE — PROGRESS NOTES
Two patient identifiers and allergies reviewed . High Dose Flu Vaccine ordered per Igor Benjamin ,  verified and administered to left deltoid. Pt tolerated injection well; no swelling, redness, or bruising noted at injection site. Pt advised to remain in clinic 15 minutes following injection for observation , verbalizes understanding .

## 2017-12-05 RX ORDER — BLOOD SUGAR DIAGNOSTIC
STRIP MISCELLANEOUS
Qty: 100 STRIP | Refills: 11 | Status: SHIPPED | OUTPATIENT
Start: 2017-12-05 | End: 2017-12-08 | Stop reason: SDUPTHER

## 2017-12-08 RX ORDER — CALCIUM CITRATE/VITAMIN D3 200MG-6.25
TABLET ORAL
Qty: 100 STRIP | Refills: 11 | Status: SHIPPED | OUTPATIENT
Start: 2017-12-08 | End: 2018-02-22

## 2018-01-25 ENCOUNTER — PES CALL (OUTPATIENT)
Dept: ADMINISTRATIVE | Facility: CLINIC | Age: 83
End: 2018-01-25

## 2018-02-19 ENCOUNTER — OFFICE VISIT (OUTPATIENT)
Dept: INTERNAL MEDICINE | Facility: CLINIC | Age: 83
End: 2018-02-19
Payer: MEDICARE

## 2018-02-19 ENCOUNTER — LAB VISIT (OUTPATIENT)
Dept: LAB | Facility: HOSPITAL | Age: 83
End: 2018-02-19
Attending: INTERNAL MEDICINE
Payer: MEDICARE

## 2018-02-19 ENCOUNTER — TELEPHONE (OUTPATIENT)
Dept: INTERNAL MEDICINE | Facility: CLINIC | Age: 83
End: 2018-02-19

## 2018-02-19 VITALS
HEART RATE: 82 BPM | DIASTOLIC BLOOD PRESSURE: 70 MMHG | HEIGHT: 64 IN | WEIGHT: 138.88 LBS | SYSTOLIC BLOOD PRESSURE: 130 MMHG | BODY MASS INDEX: 23.71 KG/M2

## 2018-02-19 DIAGNOSIS — E11.3293 TYPE 2 DIABETES MELLITUS WITH BOTH EYES AFFECTED BY MILD NONPROLIFERATIVE RETINOPATHY WITHOUT MACULAR EDEMA, WITHOUT LONG-TERM CURRENT USE OF INSULIN: Primary | ICD-10-CM

## 2018-02-19 DIAGNOSIS — F32.5 MAJOR DEPRESSIVE DISORDER, SINGLE EPISODE, IN FULL REMISSION: ICD-10-CM

## 2018-02-19 DIAGNOSIS — I10 ESSENTIAL HYPERTENSION: ICD-10-CM

## 2018-02-19 DIAGNOSIS — Z23 NEED FOR DIPHTHERIA-TETANUS-PERTUSSIS (TDAP) VACCINE: ICD-10-CM

## 2018-02-19 DIAGNOSIS — M85.80 OSTEOPENIA, UNSPECIFIED LOCATION: ICD-10-CM

## 2018-02-19 DIAGNOSIS — E78.5 HYPERLIPIDEMIA, UNSPECIFIED HYPERLIPIDEMIA TYPE: ICD-10-CM

## 2018-02-19 DIAGNOSIS — I77.1 TORTUOUS AORTA: ICD-10-CM

## 2018-02-19 DIAGNOSIS — E11.3293 TYPE 2 DIABETES MELLITUS WITH BOTH EYES AFFECTED BY MILD NONPROLIFERATIVE RETINOPATHY WITHOUT MACULAR EDEMA, WITHOUT LONG-TERM CURRENT USE OF INSULIN: ICD-10-CM

## 2018-02-19 LAB
ANION GAP SERPL CALC-SCNC: 11 MMOL/L
BUN SERPL-MCNC: 13 MG/DL
CALCIUM SERPL-MCNC: 9.9 MG/DL
CHLORIDE SERPL-SCNC: 103 MMOL/L
CO2 SERPL-SCNC: 25 MMOL/L
CREAT SERPL-MCNC: 0.7 MG/DL
EST. GFR  (AFRICAN AMERICAN): >60 ML/MIN/1.73 M^2
EST. GFR  (NON AFRICAN AMERICAN): >60 ML/MIN/1.73 M^2
ESTIMATED AVG GLUCOSE: 206 MG/DL
GLUCOSE SERPL-MCNC: 277 MG/DL
HBA1C MFR BLD HPLC: 8.8 %
POTASSIUM SERPL-SCNC: 4.3 MMOL/L
SODIUM SERPL-SCNC: 139 MMOL/L

## 2018-02-19 PROCEDURE — 80048 BASIC METABOLIC PNL TOTAL CA: CPT

## 2018-02-19 PROCEDURE — 3008F BODY MASS INDEX DOCD: CPT | Mod: S$GLB,,, | Performed by: INTERNAL MEDICINE

## 2018-02-19 PROCEDURE — 99499 UNLISTED E&M SERVICE: CPT | Mod: S$GLB,,, | Performed by: INTERNAL MEDICINE

## 2018-02-19 PROCEDURE — 1159F MED LIST DOCD IN RCRD: CPT | Mod: S$GLB,,, | Performed by: INTERNAL MEDICINE

## 2018-02-19 PROCEDURE — 36415 COLL VENOUS BLD VENIPUNCTURE: CPT

## 2018-02-19 PROCEDURE — 99999 PR PBB SHADOW E&M-EST. PATIENT-LVL III: CPT | Mod: PBBFAC,,, | Performed by: INTERNAL MEDICINE

## 2018-02-19 PROCEDURE — 83036 HEMOGLOBIN GLYCOSYLATED A1C: CPT

## 2018-02-19 PROCEDURE — 99214 OFFICE O/P EST MOD 30 MIN: CPT | Mod: S$GLB,,, | Performed by: INTERNAL MEDICINE

## 2018-02-19 PROCEDURE — 1126F AMNT PAIN NOTED NONE PRSNT: CPT | Mod: S$GLB,,, | Performed by: INTERNAL MEDICINE

## 2018-02-19 RX ORDER — ESCITALOPRAM OXALATE 10 MG/1
10 TABLET ORAL DAILY
Qty: 90 TABLET | Refills: 1 | Status: SHIPPED | OUTPATIENT
Start: 2018-02-19 | End: 2018-08-16 | Stop reason: SDUPTHER

## 2018-02-19 RX ORDER — GLIPIZIDE 5 MG/1
10 TABLET ORAL 2 TIMES DAILY WITH MEALS
Qty: 360 TABLET | Refills: 1 | Status: SHIPPED | OUTPATIENT
Start: 2018-02-19 | End: 2018-02-22

## 2018-02-19 RX ORDER — ATORVASTATIN CALCIUM 20 MG/1
20 TABLET, FILM COATED ORAL DAILY
Qty: 90 TABLET | Refills: 1 | Status: SHIPPED | OUTPATIENT
Start: 2018-02-19 | End: 2018-05-23 | Stop reason: SDUPTHER

## 2018-02-19 RX ORDER — METFORMIN HYDROCHLORIDE 500 MG/1
1000 TABLET ORAL 2 TIMES DAILY
Qty: 360 TABLET | Refills: 1 | Status: SHIPPED | OUTPATIENT
Start: 2018-02-19 | End: 2018-05-23 | Stop reason: SDUPTHER

## 2018-02-19 RX ORDER — AMLODIPINE BESYLATE 10 MG/1
10 TABLET ORAL DAILY
Qty: 90 TABLET | Refills: 1 | Status: SHIPPED | OUTPATIENT
Start: 2018-02-19 | End: 2018-08-16 | Stop reason: SDUPTHER

## 2018-02-19 NOTE — PROGRESS NOTES
Subjective:       Patient ID: Cary Middleton is a 82 y.o. female.    Chief Complaint: Diabetes    Last seen 3 months ago. Diabetes was not well controlled. Increased Glipizide. She returns for f/u compliant with all meds as prescribed, no adverse effects. Fasting glucose ranges 140-160 per history, no written log for review. No hypoglycemia. Treated at the [facility] early this month for Influenza, improved rapidly with Tamiflu.     Past medical history: .  Hypertension. Negative stress test , EF 55%.   Hyperlipidemia. TChol 151, TG 77, HDL 53, LDL 82.6 Sep. '17.  Diabetes type 2 without complications. HbA1c 8.2% Sep. '17.  Osteoporosis, did not tolerate oral bisphosphonate.  Depression/anxiety.  DJD left hip.  Nephrolithiasis.  Chronic Microcytosis without anemia, H/H , MCV 66.    Past surgical history: Partial hysterectomy. Cyst removed from left wrist. Bilateral Blepharoplasty and Left Cataract extraction.     Mammogram normal 10/15. BMD stable 12/15. Eye exam . Pelvic exam 2006. Colonoscopy outside  - normal, no polyps. Flu shot . Prevnar 10/15. Pneumovax . Zostavax .      Social history: Nonsmoker, no alcohol. . 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 Retired teacher.    Family medical history: Heart disease. Mother had colon cancer. Sisters with thoracic aneurysm, CAD and pancreatic cancer.     Allergies: NKDA.    Medications: list reviewed and reconciled.                    Review of Systems   Constitutional: Negative for chills, diaphoresis, fatigue, fever and unexpected weight change.   HENT: Negative for congestion, ear pain, rhinorrhea, sore throat and trouble swallowing.    Eyes: Negative for pain and visual disturbance.   Respiratory: Negative for cough, chest tightness and shortness of breath.    Cardiovascular: Negative for chest pain, palpitations and leg swelling.   Gastrointestinal: Negative for abdominal pain, diarrhea, nausea and  "vomiting.   Genitourinary: Negative for dysuria and frequency.   Musculoskeletal: Negative for arthralgias and myalgias.   Skin: Negative for rash and wound.   Neurological: Negative for dizziness, syncope, weakness and headaches.   Psychiatric/Behavioral: Negative for dysphoric mood. The patient is not nervous/anxious.        Objective:    /70, Pulse 82, Ht 5' 4", Wt 139 lbs (unchanged), BMI=23.8  Physical Exam   Constitutional: She is oriented to person, place, and time. She appears well-developed and well-nourished. No distress.   HENT:   Nose: Nose normal.   Mouth/Throat: Oropharynx is clear and moist.   Eyes: Conjunctivae are normal. No scleral icterus.   Neck: Normal range of motion. Neck supple. No JVD present.   Cardiovascular: Normal rate, regular rhythm and normal heart sounds.    Pulmonary/Chest: Effort normal and breath sounds normal. No respiratory distress. She has no wheezes. She has no rales.   Musculoskeletal: Normal range of motion. She exhibits no edema.   Lymphadenopathy:     She has no cervical adenopathy.   Neurological: She is alert and oriented to person, place, and time.   Skin: Skin is warm and dry. She is not diaphoretic.   Psychiatric: She has a normal mood and affect. Her behavior is normal.       Assessment:       1. Type 2 diabetes mellitus with both eyes affected by mild nonproliferative retinopathy without macular edema, without long-term current use of insulin    2. Essential hypertension    3. Hyperlipidemia, unspecified hyperlipidemia type    4. Tortuous aorta    5. Osteopenia, unspecified location    6. Major depressive disorder, single episode, in full remission    7. Need for diphtheria-tetanus-pertussis (Tdap) vaccine        Plan:       Type 2 diabetes mellitus with both eyes affected by mild nonproliferative retinopathy without macular edema, without long-term current use of insulin  -     Hemoglobin A1c; Future; Expected date: 02/19/2018  -     glipiZIDE (GLUCOTROL) 5 " MG tablet; Take 2 tablets (10 mg total) by mouth 2 (two) times daily with meals.  Dispense: 360 tablet; Refill: 1  -     metFORMIN (GLUCOPHAGE) 500 MG tablet; Take 2 tablets (1,000 mg total) by mouth 2 (two) times daily.  Dispense: 360 tablet; Refill: 1    Essential hypertension controlled, continue same.   -     Basic metabolic panel; Future; Expected date: 02/19/2018  -     amLODIPine (NORVASC) 10 MG tablet; Take 1 tablet (10 mg total) by mouth once daily.  Dispense: 90 tablet; Refill: 1    Hyperlipidemia, unspecified hyperlipidemia type  -     atorvastatin (LIPITOR) 20 MG tablet; Take 1 tablet (20 mg total) by mouth once daily.  Dispense: 90 tablet; Refill: 1    Tortuous aorta - medical management as above.     Osteopenia, unspecified location - recheck 12/18.    Major depressive disorder, single episode, in full remission  -     escitalopram oxalate (LEXAPRO) 10 MG tablet; Take 1 tablet (10 mg total) by mouth once daily.  Dispense: 90 tablet; Refill: 1    Need for diphtheria-tetanus-pertussis (Tdap) vaccine  -     diphth,pertus,acell,,tetanus (BOOSTRIX) 2.5-8-5 Lf-mcg-Lf/0.5mL Susp; Inject 0.5 mLs into the muscle once.  Dispense: 0.5 mL; Refill: 0

## 2018-02-20 NOTE — TELEPHONE ENCOUNTER
Labs show blood sugar is higher, averaging near 200 on maximum doses of Glipizide and Metformin. Diabetes Education recommended.

## 2018-02-22 ENCOUNTER — CLINICAL SUPPORT (OUTPATIENT)
Dept: DIABETES | Facility: CLINIC | Age: 83
End: 2018-02-22
Payer: MEDICARE

## 2018-02-22 ENCOUNTER — OFFICE VISIT (OUTPATIENT)
Dept: ENDOCRINOLOGY | Facility: CLINIC | Age: 83
End: 2018-02-22
Payer: MEDICARE

## 2018-02-22 VITALS
BODY MASS INDEX: 23.86 KG/M2 | RESPIRATION RATE: 18 BRPM | DIASTOLIC BLOOD PRESSURE: 78 MMHG | HEART RATE: 82 BPM | HEIGHT: 64 IN | SYSTOLIC BLOOD PRESSURE: 132 MMHG | WEIGHT: 139.75 LBS

## 2018-02-22 DIAGNOSIS — M85.80 OSTEOPENIA, UNSPECIFIED LOCATION: ICD-10-CM

## 2018-02-22 DIAGNOSIS — I10 ESSENTIAL HYPERTENSION: ICD-10-CM

## 2018-02-22 DIAGNOSIS — E11.3293 TYPE 2 DIABETES MELLITUS WITH BOTH EYES AFFECTED BY MILD NONPROLIFERATIVE RETINOPATHY WITHOUT MACULAR EDEMA, WITHOUT LONG-TERM CURRENT USE OF INSULIN: Primary | ICD-10-CM

## 2018-02-22 DIAGNOSIS — F32.5 MAJOR DEPRESSIVE DISORDER, SINGLE EPISODE, IN FULL REMISSION: ICD-10-CM

## 2018-02-22 PROCEDURE — 99999 PR PBB SHADOW E&M-EST. PATIENT-LVL IV: CPT | Mod: PBBFAC,,, | Performed by: NURSE PRACTITIONER

## 2018-02-22 PROCEDURE — G0108 DIAB MANAGE TRN  PER INDIV: HCPCS | Mod: S$GLB,,, | Performed by: INTERNAL MEDICINE

## 2018-02-22 PROCEDURE — 3008F BODY MASS INDEX DOCD: CPT | Mod: S$GLB,,, | Performed by: NURSE PRACTITIONER

## 2018-02-22 PROCEDURE — 1126F AMNT PAIN NOTED NONE PRSNT: CPT | Mod: S$GLB,,, | Performed by: NURSE PRACTITIONER

## 2018-02-22 PROCEDURE — 1159F MED LIST DOCD IN RCRD: CPT | Mod: S$GLB,,, | Performed by: NURSE PRACTITIONER

## 2018-02-22 PROCEDURE — 99204 OFFICE O/P NEW MOD 45 MIN: CPT | Mod: S$GLB,,, | Performed by: NURSE PRACTITIONER

## 2018-02-22 PROCEDURE — 99999 PR PBB SHADOW E&M-EST. PATIENT-LVL II: CPT | Mod: PBBFAC,,,

## 2018-02-22 RX ORDER — INSULIN PUMP SYRINGE, 3 ML
EACH MISCELLANEOUS
Qty: 1 EACH | Refills: 0 | Status: SHIPPED | OUTPATIENT
Start: 2018-02-22 | End: 2021-01-01

## 2018-02-22 RX ORDER — LANCETS
EACH MISCELLANEOUS
Qty: 100 EACH | Refills: 12 | Status: SHIPPED | OUTPATIENT
Start: 2018-02-22 | End: 2020-01-28 | Stop reason: SDUPTHER

## 2018-02-22 RX ORDER — GLIMEPIRIDE 4 MG/1
4 TABLET ORAL
Qty: 90 TABLET | Refills: 3 | Status: SHIPPED | OUTPATIENT
Start: 2018-02-22 | End: 2018-11-26 | Stop reason: SDUPTHER

## 2018-02-22 NOTE — PROGRESS NOTES
Diabetes Education  Author: Char Welsh RD, CDE  Date: 2/22/2018    Diabetes Education Visit  Diabetes Education Record Assessment/Progress: Initial (Last seen for DE March 2017)    Diabetes Type  Diabetes Type : Type II    Nutrition  Meal Planning: 3 meals per day, snacks between meal, water, eats out seldom, artificial sweeteners, diet drinks  What type of sweetener do you use?: Splenda  What type of beverages do you drink?: other (see comments) (Coffee)    Monitoring   Self Monitoring :  (Checks 2 times a day)  Blood Glucose Logs: No ( this AM)  In the last month, how often have you had a low blood sugar reaction?: never  What are your symptoms of low blood sugar?:  (Never had a low BG)  Can you tell when your blood sugar is too high?: no  How do you treat high blood sugar?:  (Drinks water)    Exercise   Exercise Type:  (2 times a week attends a senior citizen exercise class - 1 hour class)    Current Diabetes Treatment   Current Treatment: Oral Medication    Social History  Preferred Learning Method: Face to Face  Primary Support: Self  Smoking Status: Never a Smoker  Alcohol Use: Never    DDS-2 Score  ( > 3 = SIGNIFICANT DISTRESS): 2    Barriers to Change  Barriers to Change: None  Learning Challenges : None    Readiness to Learn   Readiness to Learn : Acceptance    Cultural Influences  Cultural Influences: No    Diabetes Education Assessment/Progress  Diabetes Disease Process (diabetes disease process and treatment options): Instructed, Discussion, Individual Session, Written Materials Provided  Nutrition (Incorporating nutritional management into one's lifestyle): Instructed, Discussion, Individual Session, Written Materials Provided (Reviewed nutrition guidelines; plate method reviewed)  Physical Activity (incorporating physical activity into one's lifestyle): Instructed, Discussion, Individual Session, Written Materials Provided (Reviewed goals and benefits)  Medications (states correct name, dose,  onset, peak, duration, side effects & timing of meds): Instructed, Discussion, Individual Session, Written Materials Provided (Reviewed medication regimen)  Monitoring (monitoring blood glucose/other parameters & using results): Instructed, Discussion, Individual Session, Written Materials Provided (Reviewed SMBG schedule and BG goals; advised to bring meter to ENDO visit)  Acute Complications (preventing, detecting, and treating acute complications): Instructed, Discussion, Individual Session, Written Materials Provided (Reviewed s/s and treatment of hypoglycemia)  Chronic Complications (preventing, detecting, and treating chronic complications): Instructed, Discussion, Individual Session, Written Materials Provided (Patient due for eye exam - scheduled for 3/8)  Clinical (diabetes, other pertinent medical history, and relevant comorbidities reviewed during visit): Instructed, Discussion, Individual Session  Cognitive (knowledge of self-management skills, functional health literacy): Instructed, Discussion, Individual Session  Psychosocial (emotional response to diabetes): Instructed, Discussion, Individual Session  Diabetes Distress and Support Systems: Instructed, Discussion, Individual Session  Behavioral (readiness for change, lifestyle practices, self-care behaviors): Instructed, Discussion, Individual Session    Goals  Patient has selected/evaluated goals during today's session: Yes, selected  Reducing Risks: Set (Keep eye exam appt)    Diabetes Care Plan/Intervention  Education Plan/Intervention: Individual Follow-Up DSMT    Education Units of Time   Time Spent: 45 min    Health Maintenance was reviewed today with patient. Discussed with patient importance of routine eye exams, foot exams/foot care, blood work (i.e.: A1c, microalbumin, and lipid), dental visits, yearly flu vaccine, and pneumonia vaccine as indicated by PCP. Patient verbalized understanding.     Health Maintenance Topics with due status: Not  Due       Topic Last Completion Date    DEXA SCAN 12/04/2015    Foot Exam 08/14/2017    Lipid Panel 09/05/2017    Urine Microalbumin 11/14/2017    TETANUS VACCINE 02/19/2018    Hemoglobin A1c 02/19/2018     Health Maintenance Due   Topic Date Due    Eye Exam  11/29/2017

## 2018-02-22 NOTE — PROGRESS NOTES
CC: This 82 y.o. female presents for management of diabetes mellitus  and chronic conditions pending review including HTN, HLP    HPI: She was diagnosed with T2DM in since age 65. Has never been hospitalized r/t DM.  Family hx of DM: father  Denies missing doses of DM medication.   hypoglycemia at home- rare bg 86 today- missed her snack - did not feel well- ate lunch and felt fine    monitoring BG at home: fasting mostly but not daily  Brings meter to clinic for review          Diet: Eats 3 Meals a day, snacks occasionally on fruit- ie apple  Exercise: twice a week 1hr [employer]  Also works in her yard and flower beds  CURRENT DM MEDS: metformin 1000 mg bid; glipizide 10 mg bid  Glucometer type:  TeleDNA Contour Next > 3 yrs old    Standards of Care:  Eye exam: > 1 yr; she has an appt o 3/8/18 +retinopathy- no treatment required    ROS:   Gen: Appetite good, no weight gain or loss, denies fatigue and weakness.  Skin: Skin is intact and heals well, no rashes, no hair changes  Eyes: Denies visual disturbances  Resp: no SOB or MORELOS, no cough  Cardiac: No palpitations, chest pain, no edema   GI: No nausea or vomiting, diarrhea, constipation, or abdominal pain.  /GYN: No nocturia, burning or pain.   MS/Neuro: + numbness/of big toes occassionally; Gait steady, speech clear  Psych: Denies drug/ETOH abuse,+ depression.  Other systems: negative.    PE:  GENERAL: Well developed, well nourished.  PSYCH: AAOx3, appropriate mood and affect, pleasant expression, conversant, appears relaxed, well groomed.   EYES: Conjunctiva, corneas clear  NECK: Supple, trachea midline   NEURO: Gait steady  SKIN: Skin warm and dry no acanthosis nigracans.        Hemoglobin A1C   Date Value Ref Range Status   02/19/2018 8.8 (H) 4.0 - 5.6 % Final     Comment:     According to ADA guidelines, hemoglobin A1c <7.0% represents  optimal control in non-pregnant diabetic patients. Different  metrics may apply to specific patient populations.    Standards of Medical Care in Diabetes-2016.  For the purpose of screening for the presence of diabetes:  <5.7%     Consistent with the absence of diabetes  5.7-6.4%  Consistent with increasing risk for diabetes   (prediabetes)  >or=6.5%  Consistent with diabetes  Currently, no consensus exists for use of hemoglobin A1c  for diagnosis of diabetes for children.  This Hemoglobin A1c assay has significant interference with fetal   hemoglobin   (HbF). The results are invalid for patients with abnormal amounts of   HbF,   including those with known Hereditary Persistence   of Fetal Hemoglobin. Heterozygous hemoglobin variants (HbAS, HbAC,   HbAD, HbAE, HbA2) do not significantly interfere with this assay;   however, presence of multiple variants in a sample may impact the %   interference.     09/05/2017 8.2 (H) 4.0 - 5.6 % Final     Comment:     According to ADA guidelines, hemoglobin A1c <7.0% represents  optimal control in non-pregnant diabetic patients. Different  metrics may apply to specific patient populations.   Standards of Medical Care in Diabetes-2016.  For the purpose of screening for the presence of diabetes:  <5.7%     Consistent with the absence of diabetes  5.7-6.4%  Consistent with increasing risk for diabetes   (prediabetes)  >or=6.5%  Consistent with diabetes  Currently, no consensus exists for use of hemoglobin A1c  for diagnosis of diabetes for children.  This Hemoglobin A1c assay has significant interference with fetal   hemoglobin   (HbF). The results are invalid for patients with abnormal amounts of   HbF,   including those with known Hereditary Persistence   of Fetal Hemoglobin. Heterozygous hemoglobin variants (HbAS, HbAC,   HbAD, HbAE, HbA2) do not significantly interfere with this assay;   however, presence of multiple variants in a sample may impact the %   interference.     03/06/2017 7.8 (H) 4.5 - 6.2 % Final     Comment:     According to ADA guidelines, hemoglobin A1C <7.0%  represents  optimal control in non-pregnant diabetic patients.  Different  metrics may apply to specific populations.   Standards of Medical Care in Diabetes - 2016.  For the purpose of screening for the presence of diabetes:  <5.7%     Consistent with the absence of diabetes  5.7-6.4%  Consistent with increasing risk for diabetes   (prediabetes)  >or=6.5%  Consistent with diabetes  Currently no consensus exists for use of hemoglobin A1C  for diagnosis of diabetes for children.          ASSESSMENT and PLAN:    1. T2DM with hyperglycemia, polyneuropathy      Discussed A1c and BG goals. A1C 7.5% ideal for age Bg 100-140 fasting, 140-180 pre-meal  D/c glipizde  Start januvia 100 mg qam, Glimiperide 4 mg qam  Continue metformin 1000 mg bid  Check bg bid and send in log in 1-2 weeks for review or sooner for issues     - takes ASA,  statin    2. HTN - controlled, continue meds as previously prescribed and monitor.     3. HLP - at goal, on statin therapy, LFTs WNL    4. Depression- stable, chronic on lexapro      Follow-up: in 3 months with lab prior

## 2018-02-28 ENCOUNTER — OFFICE VISIT (OUTPATIENT)
Dept: INTERNAL MEDICINE | Facility: CLINIC | Age: 83
End: 2018-02-28
Payer: MEDICARE

## 2018-02-28 VITALS
DIASTOLIC BLOOD PRESSURE: 70 MMHG | SYSTOLIC BLOOD PRESSURE: 110 MMHG | HEIGHT: 64 IN | WEIGHT: 137.38 LBS | HEART RATE: 80 BPM | OXYGEN SATURATION: 96 % | BODY MASS INDEX: 23.45 KG/M2 | TEMPERATURE: 98 F

## 2018-02-28 DIAGNOSIS — Z00.00 ENCOUNTER FOR PREVENTIVE HEALTH EXAMINATION: Primary | ICD-10-CM

## 2018-02-28 DIAGNOSIS — E11.3299 NONPROLIFERATIVE DIABETIC RETINOPATHY: ICD-10-CM

## 2018-02-28 DIAGNOSIS — E78.5 HYPERLIPIDEMIA, UNSPECIFIED HYPERLIPIDEMIA TYPE: Chronic | ICD-10-CM

## 2018-02-28 DIAGNOSIS — I10 ESSENTIAL HYPERTENSION: ICD-10-CM

## 2018-02-28 DIAGNOSIS — E11.3293 TYPE 2 DIABETES MELLITUS WITH BOTH EYES AFFECTED BY MILD NONPROLIFERATIVE RETINOPATHY WITHOUT MACULAR EDEMA, WITHOUT LONG-TERM CURRENT USE OF INSULIN: ICD-10-CM

## 2018-02-28 DIAGNOSIS — M85.80 OSTEOPENIA, UNSPECIFIED LOCATION: ICD-10-CM

## 2018-02-28 DIAGNOSIS — I77.1 TORTUOUS AORTA: ICD-10-CM

## 2018-02-28 DIAGNOSIS — F32.5 MAJOR DEPRESSIVE DISORDER, SINGLE EPISODE, IN FULL REMISSION: ICD-10-CM

## 2018-02-28 PROCEDURE — 99499 UNLISTED E&M SERVICE: CPT | Mod: S$GLB,,, | Performed by: NURSE PRACTITIONER

## 2018-02-28 PROCEDURE — 99999 PR PBB SHADOW E&M-EST. PATIENT-LVL V: CPT | Mod: PBBFAC,,, | Performed by: NURSE PRACTITIONER

## 2018-02-28 PROCEDURE — G0439 PPPS, SUBSEQ VISIT: HCPCS | Mod: S$GLB,,, | Performed by: NURSE PRACTITIONER

## 2018-02-28 NOTE — PROGRESS NOTES
"Cary Middleton presented for a  Medicare AWV and comprehensive Health Risk Assessment today. The following components were reviewed and updated:    · Medical history  · Family History  · Social history  · Allergies and Current Medications  · Health Risk Assessment  · Health Maintenance  · Care Team     ** See Completed Assessments for Annual Wellness Visit within the encounter summary.**       The following assessments were completed:  · Living Situation  · CAGE  · Depression Screening  · Timed Get Up and Go  · Whisper Test  · Cognitive Function Screening  ·   ·   · Nutrition Screening  · ADL Screening  · PAQ Screening    Vitals:    02/28/18 1105   BP: 110/70   Pulse: 80   Temp: 98.1 °F (36.7 °C)   TempSrc: Oral   SpO2: 96%   Weight: 62.3 kg (137 lb 5.6 oz)   Height: 5' 4" (1.626 m)     Body mass index is 23.58 kg/m².  Physical Exam   Constitutional: She is oriented to person, place, and time. She appears well-developed and well-nourished.   HENT:   Head: Normocephalic and atraumatic.   Nose: Nose normal.   Eyes: Conjunctivae and EOM are normal.   Neck: Normal range of motion. Neck supple.   Cardiovascular: Normal rate, regular rhythm, normal heart sounds and intact distal pulses.    No murmur heard.  Pulmonary/Chest: Effort normal and breath sounds normal.   Neurological: She is alert and oriented to person, place, and time.   Skin: Skin is warm and dry.   Psychiatric: She has a normal mood and affect. Her behavior is normal. Judgment and thought content normal.   Nursing note and vitals reviewed.        Diagnoses and health risks identified today and associated recommendations/orders:    1. Encounter for preventive health examination  Assessment performed. Health maintenance updated. Chart review completed.    2. Major depressive disorder, single episode, in full remission  Stable with current regimen. Chronic. Followed by PCP.    3. Tortuous aorta  Stable on imaging. Controlled with statin therapy. Followed by " PCP.    4. Type 2 diabetes mellitus with both eyes affected by mild nonproliferative retinopathy without macular edema, without long-term current use of insulin  Last A1C 8.8.   Not at goal. Followed by Diabetes Management    5. Nonproliferative diabetic retinopathy  Chronic. A1C not at goal. Followed by Opthalmology.    6. Essential hypertension  Chronic. Stable on current regimen. Followed by PCP.    7. Hyperlipidemia, unspecified hyperlipidemia type  Chronic. Stable on current regimen. Followed by PCP.    8. Osteopenia, unspecified location  Chronic. Stable on current regimen. Followed by PCP.      Provided Cary with a 5-10 year written screening schedule and personal prevention plan. Recommendations were developed using the USPSTF age appropriate recommendations. Education, counseling, and referrals were provided as needed. After Visit Summary printed and given to patient which includes a list of additional screenings\tests needed.    Follow-up for follow up with Primary Care Provider as instructed, ;sooner if problems, HRA in 1 year.    ANNAMARIA Sainz

## 2018-02-28 NOTE — PATIENT INSTRUCTIONS
Counseling and Referral of Other Preventative  (Italic type indicates deductible and co-insurance are waived)    Patient Name: Cary Middleton  Today's Date: 2/28/2018    Health Maintenance       Date Due Completion Date    Eye Exam 03/08/2018 (Originally 11/29/2017) 11/29/2016    Foot Exam 08/14/2018 8/14/2017    Override on 2/1/2017: Done    Hemoglobin A1c 08/19/2018 2/19/2018    Lipid Panel 09/05/2018 9/5/2017    Urine Microalbumin 11/14/2018 11/14/2017    DEXA SCAN 12/04/2018 12/4/2015    TETANUS VACCINE 02/19/2028 2/19/2018        No orders of the defined types were placed in this encounter.    The following information is provided to all patients.  This information is to help you find resources for any of the problems found today that may be affecting your health:                Living healthy guide: www.[facility].[place].gov      Understanding Diabetes: www.diabetes.org      Eating healthy: www.cdc.gov/healthyweight      CDC home safety checklist: www.cdc.gov/steadi/patient.html      Agency on Aging: www.goea.[place].[facility]      Alcoholics anonymous (AA): www.aa.org      Physical Activity: www.nevaeh.nih.gov/zi5qkjz      Tobacco use: www.quitwithusla.org

## 2018-03-08 ENCOUNTER — OFFICE VISIT (OUTPATIENT)
Dept: OPTOMETRY | Facility: CLINIC | Age: 83
End: 2018-03-08
Payer: COMMERCIAL

## 2018-03-08 DIAGNOSIS — E11.3293 TYPE 2 DIABETES MELLITUS WITH BOTH EYES AFFECTED BY MILD NONPROLIFERATIVE RETINOPATHY WITHOUT MACULAR EDEMA, WITHOUT LONG-TERM CURRENT USE OF INSULIN: ICD-10-CM

## 2018-03-08 DIAGNOSIS — H52.13 MYOPIA OF BOTH EYES: ICD-10-CM

## 2018-03-08 DIAGNOSIS — Z01.00 EYE EXAM, ROUTINE: Primary | ICD-10-CM

## 2018-03-08 PROCEDURE — 99999 PR PBB SHADOW E&M-EST. PATIENT-LVL II: CPT | Mod: PBBFAC,,, | Performed by: OPTOMETRIST

## 2018-03-08 PROCEDURE — 92014 COMPRE OPH EXAM EST PT 1/>: CPT | Mod: S$GLB,,, | Performed by: OPTOMETRIST

## 2018-03-08 PROCEDURE — 92015 DETERMINE REFRACTIVE STATE: CPT | Mod: S$GLB,,, | Performed by: OPTOMETRIST

## 2018-03-08 PROCEDURE — 99499 UNLISTED E&M SERVICE: CPT | Mod: S$GLB,,, | Performed by: OPTOMETRIST

## 2018-03-08 NOTE — LETTER
March 8, 2018      Caity Hawkins MD  [address]  [facility] [address]           [facility]  [address]  [facility] [address]  Phone: [phone]  Fax: [phone]          Patient: Cary Middleton   MR Number: [id number]   YOB: 1935   Date of Visit: 3/8/2018       Dear Dr. Caity Hawkins:    Thank you for referring Cary Middleton to me for evaluation. Attached you will find relevant portions of my assessment and plan of care.    If you have questions, please do not hesitate to call me. I look forward to following Cary Middleton along with you.    Sincerely,    Lawrence Finch, OD    Enclosure  CC:  No Recipients    If you would like to receive this communication electronically, please contact [email or web address] or [phone] to request more information on [employer] access.    For providers and/or their staff who would like to refer a patient to [facility], please contact us through our one-stop-shop provider referral line, [facility], at [phone].    If you feel you have received this communication in error or would no longer like to receive these types of communications, please e-mail [email or web address]

## 2018-03-08 NOTE — PROGRESS NOTES
HPI     Ms. Cary Middleton for annual eyemed vision exam  She reports satisfactory distance vision without glasses and near vision   with +2.50 OTC readers.    Would patient like a refraction today? declines    (+)drops Systane BID+  (-)flashes  (-)floaters  (-)diplopia    Diabetic 138 am  Hemoglobin A1C       Date                     Value               Ref Range             Status                02/19/2018               8.8 (H)             4.0 - 5.6 %           Final                 09/05/2017               8.2 (H)             4.0 - 5.6 %           Final                 OCULAR HISTORY  Last Eye Exam 2016  (+)eye surgery   (+)diagnosed or treated for any eye conditions or diseases Cataract OD     FAMILY HISTORY  (-)Glaucoma -        Last edited by Lawrence Finch, OD on 3/8/2018 10:07 AM. (History)            Assessment /Plan     For exam results, see Encounter Report.    Eye exam, routine  -Eyemed vision exam    Myopia of both eyes  -OTC +2.25 Ok  Eyeglass Final Rx     Eyeglass Final Rx       Sphere Cylinder Dist VA Add    Right -0.25 Sphere 20/25- +2.50    Left -0.25 Sphere 20/25- +2.50    Expiration Date:  3/9/2019                Type 2 diabetes mellitus with both eyes affected by mild nonproliferative retinopathy without macular edema, without long-term current use of insulin  -Retinopathy noted today, no ocular treatment necessary.  Continued blood sugar control with primary care physician and monitor at 12 mo dilated fundus exam.      RTC 12 mo

## 2018-04-18 ENCOUNTER — TELEPHONE (OUTPATIENT)
Dept: ENDOCRINOLOGY | Facility: CLINIC | Age: 83
End: 2018-04-18

## 2018-05-16 ENCOUNTER — LAB VISIT (OUTPATIENT)
Dept: LAB | Facility: HOSPITAL | Age: 83
End: 2018-05-16
Attending: NURSE PRACTITIONER
Payer: MEDICARE

## 2018-05-16 DIAGNOSIS — E11.3293 TYPE 2 DIABETES MELLITUS WITH BOTH EYES AFFECTED BY MILD NONPROLIFERATIVE RETINOPATHY WITHOUT MACULAR EDEMA, WITHOUT LONG-TERM CURRENT USE OF INSULIN: ICD-10-CM

## 2018-05-16 LAB
ESTIMATED AVG GLUCOSE: 148 MG/DL
HBA1C MFR BLD HPLC: 6.8 %
TSH SERPL DL<=0.005 MIU/L-ACNC: 1.19 UIU/ML

## 2018-05-16 PROCEDURE — 84443 ASSAY THYROID STIM HORMONE: CPT

## 2018-05-16 PROCEDURE — 83036 HEMOGLOBIN GLYCOSYLATED A1C: CPT

## 2018-05-16 PROCEDURE — 36415 COLL VENOUS BLD VENIPUNCTURE: CPT | Mod: PO

## 2018-05-22 NOTE — PROGRESS NOTES
CC: This 82 y.o. female presents for management of diabetes mellitus  and chronic conditions pending review including HTN, HLP    HPI: She was diagnosed with T2DM in since age 65. Has never been hospitalized r/t DM.  Family hx of DM: father  Denies missing doses of DM medication.   hypoglycemia at home-none    monitoring BG at home: fasting   Brings meter to clinic for review        Diet: Eats 3 Meals a day, snacks occasionally on fruit- ie apple  Exercise: twice a week 1hr [employer], Also works in her yard and flower beds  CURRENT DM MEDS: metformin 1000 mg bid; glimepiride 4mg qam, januvia 100 mg qday  Glucometer type:  True Metrix 2018    Standards of Care:  Eye exam:   3/8/18 +retinopathy- no treatment required    ROS:   Gen: Appetite good, no weight gain or loss, denies fatigue and weakness.  Skin: Skin is intact and heals well, no rashes, no hair changes  Eyes: Denies visual disturbances  Resp: no SOB or MORELOS, no cough  Cardiac: No palpitations, chest pain, no edema   GI: No nausea or vomiting, diarrhea, constipation, or abdominal pain.  /GYN: No nocturia, burning or pain.   MS/Neuro:no numbness/buring/tingling in BLE; Gait steady, speech clear  Psych: Denies drug/ETOH abuse,+ depression.  Other systems: negative.    PE:  GENERAL: Well developed, well nourished.  PSYCH: AAOx3, appropriate mood and affect, pleasant expression, conversant, appears relaxed, well groomed.   EYES: Conjunctiva, corneas clear  NECK: Supple, trachea midline   NEURO: Gait steady  SKIN: Skin warm and dry no acanthosis nigracans.        Hemoglobin A1C   Date Value Ref Range Status   05/16/2018 6.8 (H) 4.0 - 5.6 % Final     Comment:     According to ADA guidelines, hemoglobin A1c <7.0% represents  optimal control in non-pregnant diabetic patients. Different  metrics may apply to specific patient populations.   Standards of Medical Care in Diabetes-2016.  For the purpose of screening for the presence of diabetes:  <5.7%     Consistent  with the absence of diabetes  5.7-6.4%  Consistent with increasing risk for diabetes   (prediabetes)  >or=6.5%  Consistent with diabetes  Currently, no consensus exists for use of hemoglobin A1c  for diagnosis of diabetes for children.  This Hemoglobin A1c assay has significant interference with fetal   hemoglobin   (HbF). The results are invalid for patients with abnormal amounts of   HbF,   including those with known Hereditary Persistence   of Fetal Hemoglobin. Heterozygous hemoglobin variants (HbAS, HbAC,   HbAD, HbAE, HbA2) do not significantly interfere with this assay;   however, presence of multiple variants in a sample may impact the %   interference.     02/19/2018 8.8 (H) 4.0 - 5.6 % Final     Comment:     According to ADA guidelines, hemoglobin A1c <7.0% represents  optimal control in non-pregnant diabetic patients. Different  metrics may apply to specific patient populations.   Standards of Medical Care in Diabetes-2016.  For the purpose of screening for the presence of diabetes:  <5.7%     Consistent with the absence of diabetes  5.7-6.4%  Consistent with increasing risk for diabetes   (prediabetes)  >or=6.5%  Consistent with diabetes  Currently, no consensus exists for use of hemoglobin A1c  for diagnosis of diabetes for children.  This Hemoglobin A1c assay has significant interference with fetal   hemoglobin   (HbF). The results are invalid for patients with abnormal amounts of   HbF,   including those with known Hereditary Persistence   of Fetal Hemoglobin. Heterozygous hemoglobin variants (HbAS, HbAC,   HbAD, HbAE, HbA2) do not significantly interfere with this assay;   however, presence of multiple variants in a sample may impact the %   interference.     09/05/2017 8.2 (H) 4.0 - 5.6 % Final     Comment:     According to ADA guidelines, hemoglobin A1c <7.0% represents  optimal control in non-pregnant diabetic patients. Different  metrics may apply to specific patient populations.   Standards of  Medical Care in Diabetes-2016.  For the purpose of screening for the presence of diabetes:  <5.7%     Consistent with the absence of diabetes  5.7-6.4%  Consistent with increasing risk for diabetes   (prediabetes)  >or=6.5%  Consistent with diabetes  Currently, no consensus exists for use of hemoglobin A1c  for diagnosis of diabetes for children.  This Hemoglobin A1c assay has significant interference with fetal   hemoglobin   (HbF). The results are invalid for patients with abnormal amounts of   HbF,   including those with known Hereditary Persistence   of Fetal Hemoglobin. Heterozygous hemoglobin variants (HbAS, HbAC,   HbAD, HbAE, HbA2) do not significantly interfere with this assay;   however, presence of multiple variants in a sample may impact the %   interference.          ASSESSMENT and PLAN:    1. T2DM with hyperglycemia, polyneuropathy      Discussed A1c and BG goals  Continue januvia 100 mg qam, Glimiperide 4 mg qam, metformin 1000 mg bid  Check bg bid       - takes ASA,  statin    2. HTN - controlled, continue meds as previously prescribed and monitor.     3. HLP - on statin therapy, LFTs WNL. Recheck w RTC    4. Depression- stable, chronic on lexapro      Follow-up: in 6 months with lab prior

## 2018-05-23 ENCOUNTER — OFFICE VISIT (OUTPATIENT)
Dept: ENDOCRINOLOGY | Facility: CLINIC | Age: 83
End: 2018-05-23
Payer: MEDICARE

## 2018-05-23 VITALS
HEIGHT: 64 IN | DIASTOLIC BLOOD PRESSURE: 74 MMHG | WEIGHT: 137.81 LBS | SYSTOLIC BLOOD PRESSURE: 137 MMHG | BODY MASS INDEX: 23.53 KG/M2 | HEART RATE: 78 BPM

## 2018-05-23 DIAGNOSIS — I10 ESSENTIAL HYPERTENSION: ICD-10-CM

## 2018-05-23 DIAGNOSIS — E78.5 HYPERLIPIDEMIA, UNSPECIFIED HYPERLIPIDEMIA TYPE: Chronic | ICD-10-CM

## 2018-05-23 DIAGNOSIS — M85.80 OSTEOPENIA, UNSPECIFIED LOCATION: ICD-10-CM

## 2018-05-23 DIAGNOSIS — E11.3293 TYPE 2 DIABETES MELLITUS WITH BOTH EYES AFFECTED BY MILD NONPROLIFERATIVE RETINOPATHY WITHOUT MACULAR EDEMA, WITHOUT LONG-TERM CURRENT USE OF INSULIN: Primary | ICD-10-CM

## 2018-05-23 DIAGNOSIS — F32.5 MAJOR DEPRESSIVE DISORDER, SINGLE EPISODE, IN FULL REMISSION: ICD-10-CM

## 2018-05-23 PROCEDURE — 3075F SYST BP GE 130 - 139MM HG: CPT | Mod: CPTII,S$GLB,, | Performed by: NURSE PRACTITIONER

## 2018-05-23 PROCEDURE — 99213 OFFICE O/P EST LOW 20 MIN: CPT | Mod: S$GLB,,, | Performed by: NURSE PRACTITIONER

## 2018-05-23 PROCEDURE — 3078F DIAST BP <80 MM HG: CPT | Mod: CPTII,S$GLB,, | Performed by: NURSE PRACTITIONER

## 2018-05-23 PROCEDURE — 99999 PR PBB SHADOW E&M-EST. PATIENT-LVL II: CPT | Mod: PBBFAC,,, | Performed by: NURSE PRACTITIONER

## 2018-05-23 RX ORDER — ATORVASTATIN CALCIUM 20 MG/1
20 TABLET, FILM COATED ORAL DAILY
Qty: 90 TABLET | Refills: 3 | Status: SHIPPED | OUTPATIENT
Start: 2018-05-23 | End: 2019-03-30 | Stop reason: SDUPTHER

## 2018-05-23 RX ORDER — METFORMIN HYDROCHLORIDE 500 MG/1
1000 TABLET ORAL 2 TIMES DAILY WITH MEALS
Qty: 360 TABLET | Refills: 3 | Status: SHIPPED | OUTPATIENT
Start: 2018-05-23 | End: 2018-11-26 | Stop reason: SDUPTHER

## 2018-07-05 ENCOUNTER — TELEPHONE (OUTPATIENT)
Dept: INTERNAL MEDICINE | Facility: CLINIC | Age: 83
End: 2018-07-05

## 2018-07-05 NOTE — TELEPHONE ENCOUNTER
----- Message from Susan Giraldo sent at 7/5/2018 11:05 AM CDT -----  Contact: Fazal with [employer]   Like to follow up on fax sent over 05/03 &07/3 to verified pt has hearing loss     Please advise     Fax

## 2018-08-11 ENCOUNTER — HOSPITAL ENCOUNTER (EMERGENCY)
Facility: HOSPITAL | Age: 83
Discharge: HOME | End: 2018-08-11
Attending: EMERGENCY MEDICINE
Payer: MEDICARE

## 2018-08-11 VITALS
WEIGHT: 137 LBS | RESPIRATION RATE: 16 BRPM | OXYGEN SATURATION: 98 % | BODY MASS INDEX: 23.52 KG/M2 | TEMPERATURE: 98 F | DIASTOLIC BLOOD PRESSURE: 83 MMHG | SYSTOLIC BLOOD PRESSURE: 187 MMHG | HEART RATE: 66 BPM

## 2018-08-11 DIAGNOSIS — K59.00 CONSTIPATION, UNSPECIFIED CONSTIPATION TYPE: ICD-10-CM

## 2018-08-11 DIAGNOSIS — R10.9 ACUTE LEFT FLANK PAIN: Primary | ICD-10-CM

## 2018-08-11 LAB
ANION GAP SERPL CALC-SCNC: 11 MMOL/L
BACTERIA #/AREA URNS AUTO: ABNORMAL /HPF
BASOPHILS # BLD AUTO: 0.07 K/UL
BASOPHILS NFR BLD: 0.4 %
BILIRUB UR QL STRIP: ABNORMAL
BUN SERPL-MCNC: 12 MG/DL
CALCIUM SERPL-MCNC: 10 MG/DL
CHLORIDE SERPL-SCNC: 102 MMOL/L
CLARITY UR REFRACT.AUTO: ABNORMAL
CO2 SERPL-SCNC: 25 MMOL/L
COLOR UR AUTO: ABNORMAL
CREAT SERPL-MCNC: 0.6 MG/DL
DIFFERENTIAL METHOD: ABNORMAL
EOSINOPHIL # BLD AUTO: 0.1 K/UL
EOSINOPHIL NFR BLD: 0.8 %
ERYTHROCYTE [DISTWIDTH] IN BLOOD BY AUTOMATED COUNT: 16 %
EST. GFR  (AFRICAN AMERICAN): >60 ML/MIN/1.73 M^2
EST. GFR  (NON AFRICAN AMERICAN): >60 ML/MIN/1.73 M^2
GLUCOSE SERPL-MCNC: 193 MG/DL
GLUCOSE UR QL STRIP: ABNORMAL
HCT VFR BLD AUTO: 38.4 %
HGB BLD-MCNC: 12.5 G/DL
HGB UR QL STRIP: ABNORMAL
HYALINE CASTS UR QL AUTO: 0 /LPF
IMM GRANULOCYTES # BLD AUTO: 0.07 K/UL
IMM GRANULOCYTES NFR BLD AUTO: 0.4 %
KETONES UR QL STRIP: ABNORMAL
LEUKOCYTE ESTERASE UR QL STRIP: ABNORMAL
LYMPHOCYTES # BLD AUTO: 6.9 K/UL
LYMPHOCYTES NFR BLD: 42.2 %
MCH RBC QN AUTO: 22.2 PG
MCHC RBC AUTO-ENTMCNC: 32.6 G/DL
MCV RBC AUTO: 68 FL
MICROSCOPIC COMMENT: ABNORMAL
MONOCYTES # BLD AUTO: 0.7 K/UL
MONOCYTES NFR BLD: 4.1 %
NEUTROPHILS # BLD AUTO: 8.6 K/UL
NEUTROPHILS NFR BLD: 52.1 %
NITRITE UR QL STRIP: ABNORMAL
NON-SQ EPI CELLS #/AREA URNS AUTO: <1 /HPF
NRBC BLD-RTO: 0 /100 WBC
PH UR STRIP: 5 [PH] (ref 5–8)
PLATELET # BLD AUTO: 218 K/UL
PMV BLD AUTO: 11 FL
POTASSIUM SERPL-SCNC: 3.8 MMOL/L
PROT UR QL STRIP: ABNORMAL
RBC # BLD AUTO: 5.62 M/UL
RBC #/AREA URNS AUTO: 2 /HPF (ref 0–4)
SODIUM SERPL-SCNC: 138 MMOL/L
SP GR UR STRIP: 1.02 (ref 1–1.03)
SQUAMOUS #/AREA URNS AUTO: 22 /HPF
URN SPEC COLLECT METH UR: ABNORMAL
UROBILINOGEN UR STRIP-ACNC: ABNORMAL EU/DL
WBC # BLD AUTO: 16.43 K/UL
WBC #/AREA URNS AUTO: 5 /HPF (ref 0–5)

## 2018-08-11 PROCEDURE — 85025 COMPLETE CBC W/AUTO DIFF WBC: CPT

## 2018-08-11 PROCEDURE — 99284 EMERGENCY DEPT VISIT MOD MDM: CPT | Mod: 25

## 2018-08-11 PROCEDURE — 96374 THER/PROPH/DIAG INJ IV PUSH: CPT

## 2018-08-11 PROCEDURE — 25000003 PHARM REV CODE 250: Performed by: PHYSICIAN ASSISTANT

## 2018-08-11 PROCEDURE — 96361 HYDRATE IV INFUSION ADD-ON: CPT

## 2018-08-11 PROCEDURE — 63600175 PHARM REV CODE 636 W HCPCS: Performed by: PHYSICIAN ASSISTANT

## 2018-08-11 PROCEDURE — 80048 BASIC METABOLIC PNL TOTAL CA: CPT

## 2018-08-11 PROCEDURE — 81001 URINALYSIS AUTO W/SCOPE: CPT

## 2018-08-11 PROCEDURE — 96372 THER/PROPH/DIAG INJ SC/IM: CPT | Mod: 59

## 2018-08-11 PROCEDURE — 99284 EMERGENCY DEPT VISIT MOD MDM: CPT | Mod: ,,, | Performed by: PHYSICIAN ASSISTANT

## 2018-08-11 RX ORDER — NAPROXEN 500 MG/1
500 TABLET ORAL 2 TIMES DAILY WITH MEALS
Qty: 14 TABLET | Refills: 0 | Status: SHIPPED | OUTPATIENT
Start: 2018-08-11 | End: 2019-06-06

## 2018-08-11 RX ORDER — HYDROCODONE BITARTRATE AND ACETAMINOPHEN 5; 325 MG/1; MG/1
1 TABLET ORAL
Status: COMPLETED | OUTPATIENT
Start: 2018-08-11 | End: 2018-08-11

## 2018-08-11 RX ORDER — METHOCARBAMOL 500 MG/1
1000 TABLET, FILM COATED ORAL 3 TIMES DAILY
Qty: 30 TABLET | Refills: 0 | Status: SHIPPED | OUTPATIENT
Start: 2018-08-11 | End: 2018-08-16

## 2018-08-11 RX ORDER — ONDANSETRON 4 MG/1
4 TABLET, ORALLY DISINTEGRATING ORAL
Status: COMPLETED | OUTPATIENT
Start: 2018-08-11 | End: 2018-08-11

## 2018-08-11 RX ORDER — DOCUSATE SODIUM 100 MG/1
100 CAPSULE, LIQUID FILLED ORAL 2 TIMES DAILY
Qty: 60 CAPSULE | Refills: 0 | Status: SHIPPED | OUTPATIENT
Start: 2018-08-11 | End: 2019-01-16

## 2018-08-11 RX ORDER — METOCLOPRAMIDE HYDROCHLORIDE 5 MG/ML
10 INJECTION INTRAMUSCULAR; INTRAVENOUS
Status: COMPLETED | OUTPATIENT
Start: 2018-08-11 | End: 2018-08-11

## 2018-08-11 RX ORDER — ONDANSETRON 4 MG/1
4 TABLET, ORALLY DISINTEGRATING ORAL EVERY 8 HOURS PRN
Qty: 12 TABLET | Refills: 0 | Status: SHIPPED | OUTPATIENT
Start: 2018-08-11 | End: 2018-08-14

## 2018-08-11 RX ORDER — KETOROLAC TROMETHAMINE 30 MG/ML
10 INJECTION, SOLUTION INTRAMUSCULAR; INTRAVENOUS
Status: COMPLETED | OUTPATIENT
Start: 2018-08-11 | End: 2018-08-11

## 2018-08-11 RX ADMIN — METOCLOPRAMIDE 10 MG: 5 INJECTION, SOLUTION INTRAMUSCULAR; INTRAVENOUS at 01:08

## 2018-08-11 RX ADMIN — KETOROLAC TROMETHAMINE 10 MG: 30 INJECTION, SOLUTION INTRAMUSCULAR at 11:08

## 2018-08-11 RX ADMIN — SODIUM CHLORIDE 1000 ML: 0.9 INJECTION, SOLUTION INTRAVENOUS at 11:08

## 2018-08-11 RX ADMIN — ONDANSETRON 4 MG: 4 TABLET, ORALLY DISINTEGRATING ORAL at 11:08

## 2018-08-11 RX ADMIN — HYDROCODONE BITARTRATE AND ACETAMINOPHEN 1 TABLET: 5; 325 TABLET ORAL at 02:08

## 2018-08-11 NOTE — ED TRIAGE NOTES
Patient states pain in left lower back x 1 week, states currently pain to left groin onset last night, emesis 7-8 times this am after taking medications. States pain in back worse with lifting left leg, Denies fever, denies nausea. Bm this am, diarrhea. Advil 1 at 0700

## 2018-08-11 NOTE — ED PROVIDER NOTES
Encounter Date: 8/11/2018       History     Chief Complaint   Patient presents with    Flank Pain     left sided- 1 week, thinks she has kidney stones. Pain severe last night with nausea and vomiting. Hx of stones     82-year-old female multiple medical comorbidities significant for HTN, DM, HLD, osteoarthritis of the left hip, recurrent nephrolithiasis presents to the ED with a chief complaint of flank pain. Patient reports mild, intermittent pain to the left flank over the past week.  Pain became severe and constant this morning.  Pain radiates down to the left groin.  She reports exacerbation of the pain with flexion of the left hip.  She reports associated nausea and vomiting. Patient reports that her symptoms are similar to previous kidney stones.  Patient denies fever or chills, dysuria, hematuria.          Review of patient's allergies indicates:   Allergen Reactions    Zoster vaccine live Rash    Lisinopril Swelling     Past Medical History:   Diagnosis Date    Anemia     Anxiety     Cataract     Depression     Diabetes mellitus     Hyperlipidemia     Hypertension     Kidney stone     Macular degeneration     Osteoarthritis of left hip     Osteoporosis     Recurrent nephrolithiasis     Type 2 diabetes mellitus     Type 2 diabetes mellitus with ophthalmic manifestations      Past Surgical History:   Procedure Laterality Date    BLEPHAROPLASTY, QUAD      GANGLION CYST EXCISION      HYSTERECTOMY      INTRACAPSULAR CATARACT EXTRACTION      left eye     Family History   Problem Relation Age of Onset    Colon cancer Mother     Cancer Mother     Heart disease Father     Cataracts Father     Diabetes Father     Pancreatic cancer Sister     Strabismus Daughter     Hypertension Daughter     Transient ischemic attack Daughter         x 3    Diabetes Brother     Hypertension Brother     Cancer Son         lung cancer    Hypertension Daughter     Arthritis Daughter     No Known  Problems Daughter     Aneurysm Sister     Hypertension Brother     Glaucoma Neg Hx     Blindness Neg Hx      Social History   Substance Use Topics    Smoking status: Never Smoker    Smokeless tobacco: Never Used    Alcohol use No     Review of Systems   Constitutional: Negative for chills and fever.   HENT: Negative for sore throat.    Respiratory: Negative for shortness of breath.    Cardiovascular: Negative for chest pain.   Gastrointestinal: Positive for nausea and vomiting.   Genitourinary: Positive for flank pain. Negative for dysuria.   Musculoskeletal: Negative for back pain.        L hip pain   Skin: Negative for rash.   Neurological: Negative for weakness.   Hematological: Does not bruise/bleed easily.       Physical Exam     Initial Vitals [08/11/18 1057]   BP Pulse Resp Temp SpO2   (!) 175/78 69 18 98.3 °F (36.8 °C) 96 %      MAP       --         Physical Exam    Nursing note and vitals reviewed.  Constitutional: She appears well-developed and well-nourished. She is not diaphoretic.  Non-toxic appearance. She does not appear ill. No distress.   Patient appears uncomfortable secondary to pain.   HENT:   Head: Normocephalic and atraumatic.   Neck: Neck supple.   Cardiovascular: Normal rate and regular rhythm. Exam reveals no gallop and no friction rub.    No murmur heard.  Pulmonary/Chest: Effort normal and breath sounds normal. No accessory muscle usage. No tachypnea. No respiratory distress. She has no decreased breath sounds. She has no wheezes. She has no rhonchi. She has no rales.   Abdominal: Soft. Normal appearance. She exhibits no distension. There is tenderness in the left lower quadrant. There is CVA tenderness (on L).   TTP of the L flank area.Tenderness with deep palpation of the lower left pelvic area.  No bony tenderness to the pelvis or left hip.  There is pain with flexion and ABduction of the L hip. DP pulse intact.    Musculoskeletal: Normal range of motion.   Neurological: She is  alert.   Skin: Skin is warm and dry. No rash noted. No pallor.   Psychiatric: She has a normal mood and affect. Her behavior is normal.         ED Course   Procedures  Labs Reviewed   CBC W/ AUTO DIFFERENTIAL - Abnormal; Notable for the following:        Result Value    WBC 16.43 (*)     RBC 5.62 (*)     MCV 68 (*)     MCH 22.2 (*)     RDW 16.0 (*)     Gran # (ANC) 8.6 (*)     Immature Grans (Abs) 0.07 (*)     Lymph # 6.9 (*)     All other components within normal limits   URINALYSIS, REFLEX TO URINE CULTURE - Abnormal; Notable for the following:     Color, UA Orange (*)     Appearance, UA Hazy (*)     Glucose, UA 1+ (*)     Occult Blood UA 1+ (*)     All other components within normal limits    Narrative:     Preferred Collection Type->Urine, Clean Catch   BASIC METABOLIC PANEL - Abnormal; Notable for the following:     Glucose 193 (*)     All other components within normal limits   URINALYSIS MICROSCOPIC - Abnormal; Notable for the following:     Bacteria, UA Moderate (*)     All other components within normal limits    Narrative:     Preferred Collection Type->Urine, Clean Catch          Imaging Results          CT Renal Stone Study ABD Pelvis WO (Final result)  Result time 08/11/18 13:06:44    Final result by Micha Leung MD (08/11/18 13:06:44)                 Impression:      1. No findings to suggest obstructive uropathy.  2. Gaseous distention of the rectum, without wall thickening.  3. Several additional findings above.      Electronically signed by: Micha Leung MD  Date:    08/11/2018  Time:    13:06             Narrative:    EXAMINATION:  CT RENAL STONE STUDY ABD PELVIS WO    CLINICAL HISTORY:  Flank pain, stone disease suspected;    TECHNIQUE:  Low dose axial images, sagittal and coronal reformations were obtained from the lung bases to the pubic symphysis.  Contrast was not administered.    COMPARISON:  02/26/2004    FINDINGS:  Images of the lower thorax are remarkable for dependent  atelectasis.    The liver, spleen, gallbladder, and adrenal glands are grossly unremarkable.  There are scattered calcifications of the pancreas, could reflect calcification related to previous pancreatitis or vascular in nature.  There is no biliary dilation.  The pancreatic duct is not dilated.  There is a small hiatal hernia.  There are scattered shotty periaortic and paracaval lymph nodes.    The kidneys have a grossly unremarkable noncontrast appearance without hydronephrosis or nephrolithiasis.  The bilateral ureters are unremarkable without calculi seen.  The urinary bladder is grossly unremarkable.  The uterus is absent the adnexa is grossly unremarkable.  There is a high attenuating focus involving the cranial aspect of the vaginal cuff, nonspecific, unchanged since the previous examination.    There is gaseous distention of the rectum without wall thickening.  There is moderate stool in the distal colon.  No significant colonic wall thickening allowing for segmental decompression.  The terminal ileum is grossly unremarkable.  The appendix is not identified.  The small bowel is grossly unremarkable.  No focal organized pelvic fluid collection.  There is atherosclerotic calcification of the aorta and its branches.  There are scattered shotty mesenteric lymph nodes.    There is osteopenia.  No focal osseous destructive process.  No significant inguinal lymphadenopathy.                                 Medical Decision Making:   History:   Old Medical Records: I decided to obtain old medical records.  Differential Diagnosis:   My differential diagnosis includes but is not limited to:  Renal stone, UTI, pyelonephritis, muscle strain, arthritis, malignancy  Clinical Tests:   Lab Tests: Ordered and Reviewed  Radiological Study: Ordered and Reviewed       APC / Resident Notes:   80-year-old female with a history of kidney stones presents with left flank and groin pain over the past week that worsened today.   Associated nausea and vomiting today.  She is hypertensive at 175/78.  Afebrile.  Nontoxic appearing.  Appears uncomfortable secondary to pain. Other physical exam findings noted above.    CBC reveals leukocytosis of 16.43.  UA reveals moderate bacteria, however the specimen was not a clean catch.  Five WBCs noted in the urine.  CT renal stone study reveals no evidence of obstructive uropathy.  There is a moderate amount of stool in the colon.  Patient was given Toradol for her pain and antiemetics in the ED.  Patient reports persistent pain and nausea. I will give a dose of Reglan and hydrocodone.  I have advised patient to take stool softener and laxatives for constipation.  I will discharge with NSAIDs and muscle relaxant for pain. Patient advised to follow up with the primary care doctor on Monday.  Return precautions given. I have reviewed the patient's records and discussed this case with my supervising physician.                         Clinical Impression:   The primary encounter diagnosis was Acute left flank pain. A diagnosis of Constipation, unspecified constipation type was also pertinent to this visit.      Disposition:   Disposition: Discharged  Condition: Stable                        Shobha Wong PA-C  08/11/18 5741

## 2018-08-11 NOTE — ED NOTES
Patient identifiers verified and correct for Ms Middleton  C/C: Left lower back apin currently in left lower pelvis, emesis, diarrhea  APPEARANCE: awake and alert in NAD.  SKIN: warm, dry and intact. No breakdown or bruising.  MUSCULOSKELETAL: Patient moving all extremities spontaneously, no obvious swelling or deformities noted. Ambulates independently.  RESPIRATORY: Denies shortness of breath.Respirations unlabored.   CARDIAC: Denies CP, 2+ distal pulses; no peripheral edema  ABDOMEN: Abdomen soft, pain to left lower groin/pelvis. Positive nausea, emesis x 7 today.  : voids spontaneously, denies difficulty, no fever  Neurologic: AAO x 4; follows commands equal strength in all extremities; denies numbness/tingling. Denies dizziness Positive gen weakness

## 2018-08-16 ENCOUNTER — HOSPITAL ENCOUNTER (OUTPATIENT)
Dept: RADIOLOGY | Facility: HOSPITAL | Age: 83
Discharge: HOME | End: 2018-08-16
Attending: INTERNAL MEDICINE
Payer: MEDICARE

## 2018-08-16 ENCOUNTER — OFFICE VISIT (OUTPATIENT)
Dept: INTERNAL MEDICINE | Facility: CLINIC | Age: 83
End: 2018-08-16
Payer: MEDICARE

## 2018-08-16 VITALS
DIASTOLIC BLOOD PRESSURE: 70 MMHG | HEART RATE: 77 BPM | BODY MASS INDEX: 22.59 KG/M2 | HEIGHT: 64 IN | WEIGHT: 132.31 LBS | SYSTOLIC BLOOD PRESSURE: 122 MMHG

## 2018-08-16 DIAGNOSIS — M54.42 ACUTE LEFT-SIDED LOW BACK PAIN WITH LEFT-SIDED SCIATICA: ICD-10-CM

## 2018-08-16 DIAGNOSIS — M54.50 LOW BACK PAIN, NON-SPECIFIC: ICD-10-CM

## 2018-08-16 DIAGNOSIS — E11.3293 TYPE 2 DIABETES MELLITUS WITH BOTH EYES AFFECTED BY MILD NONPROLIFERATIVE RETINOPATHY WITHOUT MACULAR EDEMA, WITHOUT LONG-TERM CURRENT USE OF INSULIN: ICD-10-CM

## 2018-08-16 DIAGNOSIS — M25.552 ACUTE HIP PAIN, LEFT: ICD-10-CM

## 2018-08-16 DIAGNOSIS — D72.829 LEUKOCYTOSIS, UNSPECIFIED TYPE: ICD-10-CM

## 2018-08-16 DIAGNOSIS — M54.42 ACUTE LEFT-SIDED LOW BACK PAIN WITH LEFT-SIDED SCIATICA: Primary | ICD-10-CM

## 2018-08-16 DIAGNOSIS — F32.5 MAJOR DEPRESSIVE DISORDER, SINGLE EPISODE, IN FULL REMISSION: ICD-10-CM

## 2018-08-16 DIAGNOSIS — I10 ESSENTIAL HYPERTENSION: ICD-10-CM

## 2018-08-16 PROCEDURE — 99214 OFFICE O/P EST MOD 30 MIN: CPT | Mod: S$GLB,,, | Performed by: INTERNAL MEDICINE

## 2018-08-16 PROCEDURE — 72100 X-RAY EXAM L-S SPINE 2/3 VWS: CPT | Mod: 26,,, | Performed by: RADIOLOGY

## 2018-08-16 PROCEDURE — 99999 PR PBB SHADOW E&M-EST. PATIENT-LVL III: CPT | Mod: PBBFAC,,, | Performed by: INTERNAL MEDICINE

## 2018-08-16 PROCEDURE — 72100 X-RAY EXAM L-S SPINE 2/3 VWS: CPT | Mod: TC

## 2018-08-16 PROCEDURE — 3074F SYST BP LT 130 MM HG: CPT | Mod: CPTII,S$GLB,, | Performed by: INTERNAL MEDICINE

## 2018-08-16 PROCEDURE — 73502 X-RAY EXAM HIP UNI 2-3 VIEWS: CPT | Mod: TC,LT

## 2018-08-16 PROCEDURE — 73502 X-RAY EXAM HIP UNI 2-3 VIEWS: CPT | Mod: 26,LT,, | Performed by: RADIOLOGY

## 2018-08-16 PROCEDURE — 3078F DIAST BP <80 MM HG: CPT | Mod: CPTII,S$GLB,, | Performed by: INTERNAL MEDICINE

## 2018-08-16 RX ORDER — ESCITALOPRAM OXALATE 10 MG/1
10 TABLET ORAL DAILY
Qty: 90 TABLET | Refills: 1 | Status: SHIPPED | OUTPATIENT
Start: 2018-08-16 | End: 2019-01-04 | Stop reason: SDUPTHER

## 2018-08-16 RX ORDER — AMLODIPINE BESYLATE 10 MG/1
10 TABLET ORAL DAILY
Qty: 90 TABLET | Refills: 1 | Status: SHIPPED | OUTPATIENT
Start: 2018-08-16 | End: 2019-01-04 | Stop reason: SDUPTHER

## 2018-08-16 RX ORDER — HYDROCODONE BITARTRATE AND ACETAMINOPHEN 5; 325 MG/1; MG/1
1 TABLET ORAL EVERY 8 HOURS PRN
Qty: 24 TABLET | Refills: 0 | Status: SHIPPED | OUTPATIENT
Start: 2018-08-16 | End: 2018-08-22 | Stop reason: SDUPTHER

## 2018-08-16 NOTE — PROGRESS NOTES
Subjective:       Patient ID: Cary Middleton is a 82 y.o. female.    Chief Complaint: Hospital Follow Up    Last seen 6 months ago. Diabetes was not well controlled. She was referred to Endocrinology, meds adjusted - addition of Januvia and changed Glipizide to Glimepiride with improvement. Returns urgently today for f/u ER visit five days ago for left flank pain radiating to left groin. It had been going on for a week and was escalating, unrelieved with two tabs of Aleve. She was afebrile, /78, vital signs otherwise normal. Tender left flank on exam. Urine showed mod bacteria but only 2 RBC and 5 WBC. CBC normal except WBC 16K. BMP normal except Glucose 193. CT scan Renal Stone protocol showed no evidence of current or recent nephrolithiasis. Just a moderate amount of stool in the distal colon and aortic atherosclerosis. She was discharged with meds for musculoskeletal pain including Naproxen 500mg and Methocarbamol 500mg with Zofran and Docusate, none of which has helped her. She still has pain which is now radiating down the left leg with leg numbness. No leg weakness, bowel or bladder incontinence. She still has nausea and appetite is poor. Glucose is ranging 110-115 fasting at home.     Past medical history: .  Hypertension. Negative stress test , EF 55%.   Hyperlipidemia. TChol 151, TG 77, HDL 53, LDL 82.6 Sep. '17.  Diabetes type 2 without complications. HbA1c 6.8% May '18.  Osteoporosis, did not tolerate oral bisphosphonate.  Depression/anxiety.  DJD left hip.  Nephrolithiasis.  Chronic Microcytosis without anemia, H/H 12/37, MCV 66.    Past surgical history: Partial hysterectomy. Cyst removed from left wrist. Bilateral Blepharoplasty and Left Cataract extraction.     Mammogram normal 10/15. BMD stable 12/15. Eye exam 3/18. Pelvic exam 2006. Colonoscopy outside  - normal, no polyps. Flu shot . Prevnar 10/15. Pneumovax . Zostavax .      Social history: Nonsmoker, no  "alcohol. . 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 Retired teacher.    Family medical history: Heart disease. Mother had colon cancer. Sisters with thoracic aneurysm, CAD and pancreatic cancer.     Allergies: NKDA.    Medications: list reviewed and reconciled.                    Review of Systems   Constitutional: Negative for chills, diaphoresis and fever.   Respiratory: Negative for cough and shortness of breath.    Cardiovascular: Negative for chest pain, palpitations and leg swelling.   Gastrointestinal: Negative for abdominal pain, blood in stool, diarrhea and vomiting.   Genitourinary: Negative for dysuria, frequency and hematuria.   Skin: Negative for color change and rash.   Neurological: Negative for dizziness, syncope and headaches.       Objective:    /70, Pulse 77, Ht 5' 4", Wt 132 lbs, BMI=22.7  Physical Exam   Constitutional:   Mildly ill-appearing, in exam room alone.    HENT:   Nose: Nose normal.   Mouth/Throat: Oropharynx is clear and moist.   Eyes: Conjunctivae are normal. No scleral icterus.   Cardiovascular: Normal rate, regular rhythm and normal heart sounds.   Pulmonary/Chest: Effort normal and breath sounds normal. No respiratory distress. She has no decreased breath sounds. She has no wheezes. She has no rhonchi. She has no rales.   Abdominal: Soft. Bowel sounds are normal. She exhibits no distension. There is no tenderness.   Musculoskeletal:   No point tenderness along the spine, no spinal deformity, no muscle spasm in the back. Not tender over left acetabular joint, but left hip movements increase her pain in left gluteus and thigh. No soft tissue tenderness in the leg, no edema or ischemia - distal pulses intact. No rash in the area of her pain.       Assessment:       1. Acute left-sided low back pain with left-sided sciatica    2. Acute hip pain, left    3. Leukocytosis, unspecified type    4. Essential hypertension    5. 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 Low back pain, non-specific        Plan:       Acute left-sided low back pain with left-sided sciatica  -     X-Ray Lumbar Spine Ap And Lateral; Future; Expected date: 08/16/2018  -     HYDROcodone-acetaminophen (NORCO) 5-325 mg per tablet; Take 1 tablet by mouth every 8 (eight) hours as needed for Pain.  Dispense: 24 tablet; Refill: 0    Acute hip pain, left  -     X-Ray Hip 2 View Left; Future; Expected date: 08/16/2018  -     HYDROcodone-acetaminophen (NORCO) 5-325 mg per tablet; Take 1 tablet by mouth every 8 (eight) hours as needed for Pain.  Dispense: 24 tablet; Refill: 0    Leukocytosis, unspecified type  -     CBC auto differential; Future; Expected date: 08/16/2018    Essential hypertension  -     Basic metabolic panel; Future; Expected date: 08/16/2018    Type 2 diabetes mellitus with both eyes affected by mild nonproliferative retinopathy without macular edema, without long-term current use of insulin - controlled.

## 2018-08-17 ENCOUNTER — TELEPHONE (OUTPATIENT)
Dept: INTERNAL MEDICINE | Facility: CLINIC | Age: 83
End: 2018-08-17

## 2018-08-17 NOTE — TELEPHONE ENCOUNTER
Spoke with patient and informed labs all stable and x-rays show only arthritis. She feels much better with the pain medication - discomfort is consistent with musculoskeletal problem. Continue present care and call if any new developments. Patient verbalizes understanding and agrees.

## 2018-08-17 NOTE — TELEPHONE ENCOUNTER
----- Message from Rosa Garber sent at 8/17/2018  2:03 PM CDT -----  Contact: self / [phone]  Pt is calling to speak with someone in the office to get the results from her non-fasting labs as well as her urine labs. Pt states that she was told that if she didn't hear back from anyone today, that she is to give the doctor a call. Please advise.      Thanks

## 2018-08-22 ENCOUNTER — TELEPHONE (OUTPATIENT)
Dept: INTERNAL MEDICINE | Facility: CLINIC | Age: 83
End: 2018-08-22

## 2018-08-22 DIAGNOSIS — M25.552 ACUTE HIP PAIN, LEFT: ICD-10-CM

## 2018-08-22 DIAGNOSIS — M54.16 LEFT LUMBAR RADICULITIS: Primary | ICD-10-CM

## 2018-08-22 DIAGNOSIS — M54.42 ACUTE LEFT-SIDED LOW BACK PAIN WITH LEFT-SIDED SCIATICA: ICD-10-CM

## 2018-08-22 DIAGNOSIS — M54.50 LOW BACK PAIN, NON-SPECIFIC: ICD-10-CM

## 2018-08-22 RX ORDER — HYDROCODONE BITARTRATE AND ACETAMINOPHEN 5; 325 MG/1; MG/1
1 TABLET ORAL EVERY 8 HOURS PRN
Qty: 30 TABLET | Refills: 0 | Status: SHIPPED | OUTPATIENT
Start: 2018-08-22 | End: 2018-09-04 | Stop reason: SDUPTHER

## 2018-08-22 RX ORDER — GABAPENTIN 300 MG/1
CAPSULE ORAL
Qty: 60 CAPSULE | Refills: 1 | Status: SHIPPED | OUTPATIENT
Start: 2018-08-22 | End: 2018-09-19 | Stop reason: SDUPTHER

## 2018-08-22 NOTE — TELEPHONE ENCOUNTER
----- Message from India Merlos sent at 8/22/2018  2:03 PM CDT -----  Contact: Self/[phone]  Patient spoke to nurse regarding nerve pain, and states she is still experiencing symptoms and would like to know what next steps should be. Please advise.

## 2018-08-22 NOTE — TELEPHONE ENCOUNTER
Spoke to patient, still with pain in area of left hip radiating down left leg, severe, only temporary relief with Hydrocodone.   Start Gabapentin - explained to patient.   May  refill Hydrocodone.   Schedule Lumbar spine MRI, and [facility] consultation for possible epidural injection.

## 2018-08-22 NOTE — TELEPHONE ENCOUNTER
Pt called to report the hip pain started again she is taking the pain med can something else be done to relieve the pain

## 2018-08-23 NOTE — TELEPHONE ENCOUNTER
Spoke with pt, MRI scheduled for August 27th and back and spine consult scheduled for September 7th.

## 2018-08-27 ENCOUNTER — HOSPITAL ENCOUNTER (OUTPATIENT)
Dept: RADIOLOGY | Facility: HOSPITAL | Age: 83
Discharge: HOME | End: 2018-08-27
Attending: INTERNAL MEDICINE
Payer: MEDICARE

## 2018-08-27 DIAGNOSIS — M54.50 LOW BACK PAIN, NON-SPECIFIC: ICD-10-CM

## 2018-08-27 DIAGNOSIS — M54.16 LEFT LUMBAR RADICULITIS: ICD-10-CM

## 2018-08-27 PROCEDURE — 72148 MRI LUMBAR SPINE W/O DYE: CPT | Mod: 26,,, | Performed by: RADIOLOGY

## 2018-08-27 PROCEDURE — 72148 MRI LUMBAR SPINE W/O DYE: CPT | Mod: TC

## 2018-08-28 ENCOUNTER — TELEPHONE (OUTPATIENT)
Dept: INTERNAL MEDICINE | Facility: CLINIC | Age: 83
End: 2018-08-28

## 2018-08-28 NOTE — TELEPHONE ENCOUNTER
Lumbar spine shows a large herniated disc protruding to the left side where her symptoms are. DO NOT MISS the Spine Clinic appointment scheduled on Friday 9/7/18. May continue pain medication, call if refill needed.

## 2018-09-04 DIAGNOSIS — M54.16 LEFT LUMBAR RADICULITIS: ICD-10-CM

## 2018-09-04 RX ORDER — HYDROCODONE BITARTRATE AND ACETAMINOPHEN 5; 325 MG/1; MG/1
1 TABLET ORAL EVERY 8 HOURS PRN
Qty: 40 TABLET | Refills: 0 | Status: SHIPPED | OUTPATIENT
Start: 2018-09-04 | End: 2019-07-15

## 2018-09-04 NOTE — TELEPHONE ENCOUNTER
----- Message from Josette Leon sent at 9/4/2018 10:55 AM CDT -----  Contact: patient  Pt needs a refill on HYDROcodone-acetaminophen (NORCO) 5-325 mg per tablet     Pt can be reached at [phone]    Thanks  KB

## 2018-09-06 ENCOUNTER — TELEPHONE (OUTPATIENT)
Dept: SPINE | Facility: CLINIC | Age: 83
End: 2018-09-06

## 2018-09-07 ENCOUNTER — OFFICE VISIT (OUTPATIENT)
Dept: SPINE | Facility: CLINIC | Age: 83
End: 2018-09-07
Payer: MEDICARE

## 2018-09-07 VITALS
HEIGHT: 64 IN | WEIGHT: 136 LBS | HEART RATE: 76 BPM | SYSTOLIC BLOOD PRESSURE: 121 MMHG | BODY MASS INDEX: 23.22 KG/M2 | DIASTOLIC BLOOD PRESSURE: 62 MMHG

## 2018-09-07 DIAGNOSIS — M47.26 OTHER SPONDYLOSIS WITH RADICULOPATHY, LUMBAR REGION: ICD-10-CM

## 2018-09-07 DIAGNOSIS — M51.26 HNP (HERNIATED NUCLEUS PULPOSUS), LUMBAR: ICD-10-CM

## 2018-09-07 DIAGNOSIS — M54.42 ACUTE BILATERAL LOW BACK PAIN WITH LEFT-SIDED SCIATICA: Primary | ICD-10-CM

## 2018-09-07 DIAGNOSIS — M51.36 DDD (DEGENERATIVE DISC DISEASE), LUMBAR: ICD-10-CM

## 2018-09-07 PROCEDURE — 3078F DIAST BP <80 MM HG: CPT | Mod: CPTII,,, | Performed by: PHYSICIAN ASSISTANT

## 2018-09-07 PROCEDURE — 99214 OFFICE O/P EST MOD 30 MIN: CPT | Mod: PBBFAC | Performed by: PHYSICIAN ASSISTANT

## 2018-09-07 PROCEDURE — 1101F PT FALLS ASSESS-DOCD LE1/YR: CPT | Mod: CPTII,,, | Performed by: PHYSICIAN ASSISTANT

## 2018-09-07 PROCEDURE — 3074F SYST BP LT 130 MM HG: CPT | Mod: CPTII,,, | Performed by: PHYSICIAN ASSISTANT

## 2018-09-07 PROCEDURE — 99204 OFFICE O/P NEW MOD 45 MIN: CPT | Mod: S$PBB,,, | Performed by: PHYSICIAN ASSISTANT

## 2018-09-07 PROCEDURE — 99999 PR PBB SHADOW E&M-EST. PATIENT-LVL IV: CPT | Mod: PBBFAC,,, | Performed by: PHYSICIAN ASSISTANT

## 2018-09-07 NOTE — LETTER
September 7, 2018      Arlene Costa MD  [address]  [facility] [address]           [facility]  [address], [address]  [facility] [address]  Phone: [phone]  Fax: [phone]          Patient: Cary Middleton   MR Number: [id number]   YOB: 1935   Date of Visit: 9/7/2018       Dear Dr. Arlene Costa:    Thank you for referring Cary Middleton to me for evaluation. Attached you will find relevant portions of my assessment and plan of care.    If you have questions, please do not hesitate to call me. I look forward to following Cary Middleton along with you.    Sincerely,    Isidra Morales PA-C    Enclosure  CC:  No Recipients    If you would like to receive this communication electronically, please contact [email or web address] or [phone] to request more information on [facility] access.    For providers and/or their staff who would like to refer a patient to [facility], please contact us through our one-stop-shop provider referral line, [facility] , at [phone].    If you feel you have received this communication in error or would no longer like to receive these types of communications, please e-mail [email or web address]

## 2018-09-07 NOTE — PROGRESS NOTES
Subjective:     Patient ID:  Cary Middleton is a 82 y.o. female.    [facility]    Chief Complaint: Acute back and left groin and leg pain    HPI    Cary Middleton is a 82 y.o. female who presents with the above CC.  Patient states about 3 weeks she started to have severe pain in the left low back with radiation to the left groin and some numbness and tingling in the left anterior and lateral leg to the knee.  Pain is worse with walking and better with laying on his right side.  No right leg pain.    Patient has not had PT or ESIs.  No spine surgery.  Patient is currently taking Neurontin BID, Norco PRN, and Naproxen.    Patient denies any recent accidents or trauma, no saddle anesthesias, and no bowel or bladder incontinence.      Review of Systems:    Please review to page three of the spine center intake form for a complete review of systems.    ROS  Past Medical History:   Diagnosis Date    Anemia     Anxiety     Cataract     Depression     Diabetes mellitus     Hyperlipidemia     Hypertension     Kidney stone     Macular degeneration     Osteoarthritis of left hip     Osteoporosis     Recurrent nephrolithiasis     Type 2 diabetes mellitus     Type 2 diabetes mellitus with ophthalmic manifestations      Past Surgical History:   Procedure Laterality Date    BLEPHAROPLASTY, QUAD      GANGLION CYST EXCISION      HYSTERECTOMY      INTRACAPSULAR CATARACT EXTRACTION      left eye     Current Outpatient Medications on File Prior to Visit   Medication Sig Dispense Refill    amLODIPine (NORVASC) 10 MG tablet Take 1 tablet (10 mg total) by mouth once daily. 90 tablet 1    aspirin (ECOTRIN) 81 MG EC tablet Take 81 mg by mouth once daily.      atorvastatin (LIPITOR) 20 MG tablet Take 1 tablet (20 mg total) by mouth once daily. 90 tablet 3    blood sugar diagnostic Strp To check BG 2 times daily, to use with True Metrix meter 100 strip 12    blood-glucose meter kit To check BG 1 times daily,  to use with insurance preferred meter 1 each 0    cholecalciferol, vitamin D3, 1,000 unit capsule Take 1,000 Units by mouth once daily.      escitalopram oxalate (LEXAPRO) 10 MG tablet Take 1 tablet (10 mg total) by mouth once daily. 90 tablet 1    gabapentin (NEURONTIN) 300 MG capsule Take one capsule po nightly for three days, then one capsule po BID. 60 capsule 1    glimepiride (AMARYL) 4 MG tablet Take 1 tablet (4 mg total) by mouth before breakfast. 90 tablet 3    HYDROcodone-acetaminophen (NORCO) 5-325 mg per tablet Take 1 tablet by mouth every 8 (eight) hours as needed for Pain. 40 tablet 0    lancets Misc To check BG 2 times daily, to use with insurance preferred meter 100 each 12    meclizine (ANTIVERT) 12.5 mg tablet Take 1 tablet (12.5 mg total) by mouth 2 (two) times daily as needed for Dizziness. 30 tablet 0    metFORMIN (GLUCOPHAGE) 500 MG tablet Take 2 tablets (1,000 mg total) by mouth 2 (two) times daily with meals. 360 tablet 3    methylcellulose (ARTIFICIAL TEARS) 1 % ophthalmic solution Place 1 drop into both eyes as needed.        naproxen (NAPROSYN) 500 MG tablet Take 1 tablet (500 mg total) by mouth 2 (two) times daily with meals. Take with food 14 tablet 0    SITagliptin (JANUVIA) 100 MG Tab Take 1 tablet (100 mg total) by mouth once daily. 30 tablet 12    docusate sodium (COLACE) 100 MG capsule Take 1 capsule (100 mg total) by mouth 2 (two) times daily. 60 capsule 0     No current facility-administered medications on file prior to visit.      Review of patient's allergies indicates:   Allergen Reactions    Zoster vaccine live Rash    Lisinopril Swelling     Social History     Socioeconomic History    Marital status:      Spouse name: Not on file    Number of children: Not on file    Years of education: Not on file    Highest education level: Not on file   Social Needs    Financial resource strain: Not on file    Food insecurity - worry: Not on file    Food  "insecurity - inability: Not on file    Transportation needs - medical: Not on file    Transportation needs - non-medical: Not on file   Occupational History    Not on file   Tobacco Use    Smoking status: Never Smoker    Smokeless tobacco: Never Used   Substance and Sexual Activity    Alcohol use: No    Drug use: No    Sexual activity: No     Partners: Male   Other Topics Concern    Not on file   Social History Narrative    Not on file     Family History   Problem Relation Age of Onset    Colon cancer Mother     Cancer Mother     Heart disease Father     Cataracts Father     Diabetes Father     Pancreatic cancer Sister     Strabismus Daughter     Hypertension Daughter     Transient ischemic attack Daughter         x 3    Diabetes Brother     Hypertension Brother     Cancer Son         lung cancer    Hypertension Daughter     Arthritis Daughter     No Known Problems Daughter     Aneurysm Sister     Hypertension Brother     Glaucoma Neg Hx     Blindness Neg Hx        Objective:      Vitals:    09/07/18 0823   BP: 121/62   Pulse: 76   Weight: 61.7 kg (136 lb)   Height: 5' 4" (1.626 m)   PainSc:   7   PainLoc: Back         Physical Exam:    General:  Cary Middleton is well-developed, well-nourished, appears stated age, in no acute distress, alert and oriented to person, place, and time.    Pulmonary/Chest:  Respiratory effort normal  Abdominal: Exhibits no distension  Psychiatric:  Normal mood and affect.  Behavior is normal.  Judgement and thought content normal    Musculoskeletal:    Patient arises from a sitting to standing position without difficulty.  Patient walks to the door without evidence of limp, pain, or abnormality of gait. Patient is able to walk on heels and toes without difficulty.    Lumbar ROM:   Mild pain in lumbar flexion, extension, right lateral bending, and left lateral bending.    Lumbar Spine Inspection:  Normal with no surgical scars and no visible " rashes.    Lumbar Spine Palpation:  No tenderness to low back palpation.    SI Joint Palpation:  No tenderness to SI Joint palpation.    Straight Leg Raise:  Negative right and positive left SLR.    Neurological: Alert and oriented to person, place, and time    Muscle strength against resistance:     Right Left   Hip flexion  5 / 5 5 / 5   Hip extension 5 / 5 5 / 5   Hip abduction 5 / 5 5 / 5   Hip adduction  5 / 5 5 / 5   Knee extension  5 / 5 5 / 5   Knee flexion 5 / 5 5 / 5   Dorsiflexion  5 / 5 5 / 5   EHL  5 / 5 5 / 5   Plantar flexion  5 / 5 5 / 5   Inversion of the feet 5 / 5 5 / 5   Eversion of the feet  5 / 5 5 / 5     Reflexes:     Right Left   Patellar 1+ 2+   Achilles 2+ 2+     Clonus:  Negative bilaterally  Decreased sensation to light touch in the left L2/3 distribution    On gross examination of the bilateral upper extremities, patient has full painfree ROM with no signs of clubbing, cyanosis, edema, or weakness.     XRAY/MRI Interpretation:     Lumbar spine ap/lateral xrays was personally reviewed.  No fractures.    Lumbar spine MRI was personally reviewed today.  Multilevel DDD and spondylosis.  Probable left L1-2 HNP in the NFS.      Assessment:          1. Acute bilateral low back pain with left-sided sciatica    2. DDD (degenerative disc disease), lumbar    3. Other spondylosis with radiculopathy, lumbar region    4. HNP (herniated nucleus pulposus), lumbar            Plan:          Orders Placed This Encounter    Procedure Order to [facility]    MRI Lumbar Spine W WO Cont     Left L1-2 NFS most likely from HNP but will get MRI lumbar spine with contrast to evaluate this further for potentially mass versus nerve tumor.    I will review this with Dr. Alvarez once it has been done.    Continue Neurontin, Norco, and Naproxen from another provider.      Follow-Up:  Follow-up if symptoms worsen or fail to improve. If there are any questions prior to this, the patient was instructed to  contact the office.       ABIMAEL Holden PA-C  Neurosurgery  [facility]  [facility]    Addendum:  09/17/18    I reviewed everything with Dr. Mariscal.  Most likely a far lateral disc herniation to the left at L1-2.  He recommends RONDA and another MRI with and without contrast of the lumbar spine in three months.    Left L1 TESI ordered with the pain clinic.    FU in two months    ABIMAEL Holden, JUSTIN  Neurosurgery  [facility]  [facility]

## 2018-09-07 NOTE — H&P (VIEW-ONLY)
Subjective:     Patient ID:  Cary Middleton is a 82 y.o. female.    [facility]    Chief Complaint: Acute back and left groin and leg pain    HPI    Cary Middleton is a 82 y.o. female who presents with the above CC.  Patient states about 3 weeks she started to have severe pain in the left low back with radiation to the left groin and some numbness and tingling in the left anterior and lateral leg to the knee.  Pain is worse with walking and better with laying on his right side.  No right leg pain.    Patient has not had PT or ESIs.  No spine surgery.  Patient is currently taking Neurontin BID, Norco PRN, and Naproxen.    Patient denies any recent accidents or trauma, no saddle anesthesias, and no bowel or bladder incontinence.      Review of Systems:    Please review to page three of the spine center intake form for a complete review of systems.    ROS  Past Medical History:   Diagnosis Date    Anemia     Anxiety     Cataract     Depression     Diabetes mellitus     Hyperlipidemia     Hypertension     Kidney stone     Macular degeneration     Osteoarthritis of left hip     Osteoporosis     Recurrent nephrolithiasis     Type 2 diabetes mellitus     Type 2 diabetes mellitus with ophthalmic manifestations      Past Surgical History:   Procedure Laterality Date    BLEPHAROPLASTY, QUAD      GANGLION CYST EXCISION      HYSTERECTOMY      INTRACAPSULAR CATARACT EXTRACTION      left eye     Current Outpatient Medications on File Prior to Visit   Medication Sig Dispense Refill    amLODIPine (NORVASC) 10 MG tablet Take 1 tablet (10 mg total) by mouth once daily. 90 tablet 1    aspirin (ECOTRIN) 81 MG EC tablet Take 81 mg by mouth once daily.      atorvastatin (LIPITOR) 20 MG tablet Take 1 tablet (20 mg total) by mouth once daily. 90 tablet 3    blood sugar diagnostic Strp To check BG 2 times daily, to use with True Metrix meter 100 strip 12    blood-glucose meter kit To check BG 1 times daily,  to use with insurance preferred meter 1 each 0    cholecalciferol, vitamin D3, 1,000 unit capsule Take 1,000 Units by mouth once daily.      escitalopram oxalate (LEXAPRO) 10 MG tablet Take 1 tablet (10 mg total) by mouth once daily. 90 tablet 1    gabapentin (NEURONTIN) 300 MG capsule Take one capsule po nightly for three days, then one capsule po BID. 60 capsule 1    glimepiride (AMARYL) 4 MG tablet Take 1 tablet (4 mg total) by mouth before breakfast. 90 tablet 3    HYDROcodone-acetaminophen (NORCO) 5-325 mg per tablet Take 1 tablet by mouth every 8 (eight) hours as needed for Pain. 40 tablet 0    lancets Misc To check BG 2 times daily, to use with insurance preferred meter 100 each 12    meclizine (ANTIVERT) 12.5 mg tablet Take 1 tablet (12.5 mg total) by mouth 2 (two) times daily as needed for Dizziness. 30 tablet 0    metFORMIN (GLUCOPHAGE) 500 MG tablet Take 2 tablets (1,000 mg total) by mouth 2 (two) times daily with meals. 360 tablet 3    methylcellulose (ARTIFICIAL TEARS) 1 % ophthalmic solution Place 1 drop into both eyes as needed.        naproxen (NAPROSYN) 500 MG tablet Take 1 tablet (500 mg total) by mouth 2 (two) times daily with meals. Take with food 14 tablet 0    SITagliptin (JANUVIA) 100 MG Tab Take 1 tablet (100 mg total) by mouth once daily. 30 tablet 12    docusate sodium (COLACE) 100 MG capsule Take 1 capsule (100 mg total) by mouth 2 (two) times daily. 60 capsule 0     No current facility-administered medications on file prior to visit.      Review of patient's allergies indicates:   Allergen Reactions    Zoster vaccine live Rash    Lisinopril Swelling     Social History     Socioeconomic History    Marital status:      Spouse name: Not on file    Number of children: Not on file    Years of education: Not on file    Highest education level: Not on file   Social Needs    Financial resource strain: Not on file    Food insecurity - worry: Not on file    Food  "insecurity - inability: Not on file    Transportation needs - medical: Not on file    Transportation needs - non-medical: Not on file   Occupational History    Not on file   Tobacco Use    Smoking status: Never Smoker    Smokeless tobacco: Never Used   Substance and Sexual Activity    Alcohol use: No    Drug use: No    Sexual activity: No     Partners: Male   Other Topics Concern    Not on file   Social History Narrative    Not on file     Family History   Problem Relation Age of Onset    Colon cancer Mother     Cancer Mother     Heart disease Father     Cataracts Father     Diabetes Father     Pancreatic cancer Sister     Strabismus Daughter     Hypertension Daughter     Transient ischemic attack Daughter         x 3    Diabetes Brother     Hypertension Brother     Cancer Son         lung cancer    Hypertension Daughter     Arthritis Daughter     No Known Problems Daughter     Aneurysm Sister     Hypertension Brother     Glaucoma Neg Hx     Blindness Neg Hx        Objective:      Vitals:    09/07/18 0823   BP: 121/62   Pulse: 76   Weight: 61.7 kg (136 lb)   Height: 5' 4" (1.626 m)   PainSc:   7   PainLoc: Back         Physical Exam:    General:  Cary Middleton is well-developed, well-nourished, appears stated age, in no acute distress, alert and oriented to person, place, and time.    Pulmonary/Chest:  Respiratory effort normal  Abdominal: Exhibits no distension  Psychiatric:  Normal mood and affect.  Behavior is normal.  Judgement and thought content normal    Musculoskeletal:    Patient arises from a sitting to standing position without difficulty.  Patient walks to the door without evidence of limp, pain, or abnormality of gait. Patient is able to walk on heels and toes without difficulty.    Lumbar ROM:   Mild pain in lumbar flexion, extension, right lateral bending, and left lateral bending.    Lumbar Spine Inspection:  Normal with no surgical scars and no visible " rashes.    Lumbar Spine Palpation:  No tenderness to low back palpation.    SI Joint Palpation:  No tenderness to SI Joint palpation.    Straight Leg Raise:  Negative right and positive left SLR.    Neurological: Alert and oriented to person, place, and time    Muscle strength against resistance:     Right Left   Hip flexion  5 / 5 5 / 5   Hip extension 5 / 5 5 / 5   Hip abduction 5 / 5 5 / 5   Hip adduction  5 / 5 5 / 5   Knee extension  5 / 5 5 / 5   Knee flexion 5 / 5 5 / 5   Dorsiflexion  5 / 5 5 / 5   EHL  5 / 5 5 / 5   Plantar flexion  5 / 5 5 / 5   Inversion of the feet 5 / 5 5 / 5   Eversion of the feet  5 / 5 5 / 5     Reflexes:     Right Left   Patellar 1+ 2+   Achilles 2+ 2+     Clonus:  Negative bilaterally  Decreased sensation to light touch in the left L2/3 distribution    On gross examination of the bilateral upper extremities, patient has full painfree ROM with no signs of clubbing, cyanosis, edema, or weakness.     XRAY/MRI Interpretation:     Lumbar spine ap/lateral xrays was personally reviewed.  No fractures.    Lumbar spine MRI was personally reviewed today.  Multilevel DDD and spondylosis.  Probable left L1-2 HNP in the NFS.      Assessment:          1. Acute bilateral low back pain with left-sided sciatica    2. DDD (degenerative disc disease), lumbar    3. Other spondylosis with radiculopathy, lumbar region    4. HNP (herniated nucleus pulposus), lumbar            Plan:          Orders Placed This Encounter    Procedure Order to [facility]    MRI Lumbar Spine W WO Cont     Left L1-2 NFS most likely from HNP but will get MRI lumbar spine with contrast to evaluate this further for potentially mass versus nerve tumor.    I will review this with Dr. Alvarez once it has been done.    Continue Neurontin, Norco, and Naproxen from another provider.      Follow-Up:  Follow-up if symptoms worsen or fail to improve. If there are any questions prior to this, the patient was instructed to  contact the office.       ABIMAEL Holden PA-C  Neurosurgery  [facility]  [facility]    Addendum:  09/17/18    I reviewed everything with Dr. Mariscal.  Most likely a far lateral disc herniation to the left at L1-2.  He recommends RONDA and another MRI with and without contrast of the lumbar spine in three months.    Left L1 TESI ordered with the pain clinic.    FU in two months    ABIMAEL Holden, JUSTIN  Neurosurgery  [facility]  [facility]

## 2018-09-12 ENCOUNTER — HOSPITAL ENCOUNTER (OUTPATIENT)
Dept: RADIOLOGY | Facility: HOSPITAL | Age: 83
Discharge: HOME | End: 2018-09-12
Attending: PHYSICIAN ASSISTANT
Payer: MEDICARE

## 2018-09-12 DIAGNOSIS — M51.26 HNP (HERNIATED NUCLEUS PULPOSUS), LUMBAR: ICD-10-CM

## 2018-09-12 DIAGNOSIS — M54.42 ACUTE BILATERAL LOW BACK PAIN WITH LEFT-SIDED SCIATICA: ICD-10-CM

## 2018-09-12 DIAGNOSIS — M47.26 OTHER SPONDYLOSIS WITH RADICULOPATHY, LUMBAR REGION: ICD-10-CM

## 2018-09-12 DIAGNOSIS — M51.36 DDD (DEGENERATIVE DISC DISEASE), LUMBAR: ICD-10-CM

## 2018-09-12 PROCEDURE — 25500020 PHARM REV CODE 255: Performed by: PHYSICIAN ASSISTANT

## 2018-09-12 PROCEDURE — A9585 GADOBUTROL INJECTION: HCPCS | Performed by: PHYSICIAN ASSISTANT

## 2018-09-12 PROCEDURE — 72158 MRI LUMBAR SPINE W/O & W/DYE: CPT | Mod: 26,,, | Performed by: RADIOLOGY

## 2018-09-12 PROCEDURE — 72158 MRI LUMBAR SPINE W/O & W/DYE: CPT | Mod: TC

## 2018-09-12 RX ORDER — GADOBUTROL 604.72 MG/ML
7 INJECTION INTRAVENOUS
Status: COMPLETED | OUTPATIENT
Start: 2018-09-12 | End: 2018-09-12

## 2018-09-12 RX ADMIN — GADOBUTROL 7 ML: 604.72 INJECTION INTRAVENOUS at 10:09

## 2018-09-17 ENCOUNTER — TELEPHONE (OUTPATIENT)
Dept: SPINE | Facility: CLINIC | Age: 83
End: 2018-09-17

## 2018-09-17 NOTE — TELEPHONE ENCOUNTER
----- Message from Jake Mariscal MD sent at 9/17/2018  9:16 AM CDT -----  Probably a far lateral disc. I would try injection and repeat MRI +/- jelani in 3 months    ----- Message -----  From: Isidra Morales PA-C  Sent: 9/14/2018   1:16 PM  To: Jake Mariscal MD    Hey Dr. Mariscal!    Can you look at her MRI lumbar spine.  She has acute left L1/2 radiculopathy and her first MRI shows mass in the left L1-2 NF so I got MRI with contrast and the radiologist is saying most likely disc but may be nerve tumor or soft tissue neoplasm.    What do you think?    Should I try an epidural or no?    Thanks,  Daphney

## 2018-09-17 NOTE — TELEPHONE ENCOUNTER
Please call patient.    Tell her that I reviewed her MRI with Dr. Mariscal and he says it is most likely a disc herniation and to try an RONDA and to get another MRI lumbar spine with and without contrast in three months.    Let me know if she wants to do the injection.    Isidra Morales, [facility], PA-C  Neurosurgery  [facility]  [facility]

## 2018-09-17 NOTE — TELEPHONE ENCOUNTER
Left L1 TESI ordered with Dr. Polanco.    Make her a fu in 2 months.    Isidra Morales, ABIMAEL, PA-C  Neurosurgery  [facility]  [facility]

## 2018-09-18 ENCOUNTER — TELEPHONE (OUTPATIENT)
Dept: SPINE | Facility: CLINIC | Age: 83
End: 2018-09-18

## 2018-09-18 DIAGNOSIS — M47.26 OTHER SPONDYLOSIS WITH RADICULOPATHY, LUMBAR REGION: ICD-10-CM

## 2018-09-18 DIAGNOSIS — M54.42 ACUTE BILATERAL LOW BACK PAIN WITH LEFT-SIDED SCIATICA: ICD-10-CM

## 2018-09-18 DIAGNOSIS — M51.36 DDD (DEGENERATIVE DISC DISEASE), LUMBAR: Primary | ICD-10-CM

## 2018-09-18 DIAGNOSIS — M51.26 HNP (HERNIATED NUCLEUS PULPOSUS), LUMBAR: ICD-10-CM

## 2018-09-18 NOTE — TELEPHONE ENCOUNTER
----- Message from Lila Gill LPN sent at 9/18/2018  8:59 AM CDT -----  Patient has been scheduled for Left L1 TF RONDA on 9-26-18 with Dr. Polanco.          Thank You,  Lila

## 2018-09-19 DIAGNOSIS — M54.16 LEFT LUMBAR RADICULITIS: ICD-10-CM

## 2018-09-19 RX ORDER — GABAPENTIN 300 MG/1
300 CAPSULE ORAL 2 TIMES DAILY
Qty: 60 CAPSULE | Refills: 2 | Status: SHIPPED | OUTPATIENT
Start: 2018-09-19 | End: 2019-07-15

## 2018-09-26 ENCOUNTER — HOSPITAL ENCOUNTER (OUTPATIENT)
Facility: OTHER | Age: 83
Discharge: HOME | End: 2018-09-26
Attending: ANESTHESIOLOGY | Admitting: ANESTHESIOLOGY
Payer: MEDICARE

## 2018-09-26 VITALS
TEMPERATURE: 98 F | HEART RATE: 64 BPM | BODY MASS INDEX: 23.22 KG/M2 | DIASTOLIC BLOOD PRESSURE: 70 MMHG | HEIGHT: 64 IN | OXYGEN SATURATION: 99 % | WEIGHT: 136 LBS | SYSTOLIC BLOOD PRESSURE: 145 MMHG | RESPIRATION RATE: 18 BRPM

## 2018-09-26 DIAGNOSIS — M54.16 LUMBAR RADICULOPATHY: Primary | ICD-10-CM

## 2018-09-26 DIAGNOSIS — G89.29 CHRONIC PAIN: ICD-10-CM

## 2018-09-26 DIAGNOSIS — M51.36 DDD (DEGENERATIVE DISC DISEASE), LUMBAR: ICD-10-CM

## 2018-09-26 LAB — POCT GLUCOSE: 150 MG/DL (ref 70–110)

## 2018-09-26 PROCEDURE — 64483 NJX AA&/STRD TFRM EPI L/S 1: CPT | Performed by: ANESTHESIOLOGY

## 2018-09-26 PROCEDURE — 64483 NJX AA&/STRD TFRM EPI L/S 1: CPT | Mod: LT,,, | Performed by: ANESTHESIOLOGY

## 2018-09-26 PROCEDURE — 25000003 PHARM REV CODE 250: Performed by: ANESTHESIOLOGY

## 2018-09-26 PROCEDURE — 25500020 PHARM REV CODE 255: Performed by: ANESTHESIOLOGY

## 2018-09-26 PROCEDURE — 99152 MOD SED SAME PHYS/QHP 5/>YRS: CPT | Mod: ,,, | Performed by: ANESTHESIOLOGY

## 2018-09-26 PROCEDURE — 63600175 PHARM REV CODE 636 W HCPCS: Performed by: ANESTHESIOLOGY

## 2018-09-26 RX ORDER — LIDOCAINE HYDROCHLORIDE 10 MG/ML
INJECTION INFILTRATION; PERINEURAL
Status: DISCONTINUED | OUTPATIENT
Start: 2018-09-26 | End: 2018-09-26 | Stop reason: HOSPADM

## 2018-09-26 RX ORDER — LIDOCAINE HYDROCHLORIDE 5 MG/ML
INJECTION, SOLUTION INFILTRATION; INTRAVENOUS
Status: DISCONTINUED | OUTPATIENT
Start: 2018-09-26 | End: 2018-09-26 | Stop reason: HOSPADM

## 2018-09-26 RX ORDER — SODIUM CHLORIDE 9 MG/ML
500 INJECTION, SOLUTION INTRAVENOUS CONTINUOUS
Status: ACTIVE | OUTPATIENT
Start: 2018-09-26

## 2018-09-26 RX ORDER — MIDAZOLAM HYDROCHLORIDE 1 MG/ML
INJECTION INTRAMUSCULAR; INTRAVENOUS
Status: DISCONTINUED | OUTPATIENT
Start: 2018-09-26 | End: 2018-09-26 | Stop reason: HOSPADM

## 2018-09-26 RX ORDER — DEXAMETHASONE SODIUM PHOSPHATE 10 MG/ML
INJECTION INTRAMUSCULAR; INTRAVENOUS
Status: DISCONTINUED | OUTPATIENT
Start: 2018-09-26 | End: 2018-09-26 | Stop reason: HOSPADM

## 2018-09-26 RX ORDER — FENTANYL CITRATE 50 UG/ML
INJECTION, SOLUTION INTRAMUSCULAR; INTRAVENOUS
Status: DISCONTINUED | OUTPATIENT
Start: 2018-09-26 | End: 2018-09-26 | Stop reason: HOSPADM

## 2018-09-26 NOTE — DISCHARGE INSTRUCTIONS
Adult Procedural Sedation Instructions    Recovery After Procedural Sedation (Adult)  You have been given medicine by vein to make you sleep during your surgery. This may have included both a pain medicine and sleeping medicine. Most of the effects have worn off. But you may still have some drowsiness for the next 6 to 8 hours.  Home care  Follow these guidelines when you get home:  · For the next 8 hours, you should be watched by a responsible adult. This person should make sure your condition is not getting worse.  · Don't drink any alcohol for the next 24 hours.  · Don't drive, operate dangerous machinery, or make important business or personal decisions during the next 24 hours.  Note: Your healthcare provider may tell you not to take any medicine by mouth for pain or sleep in the next 4 hours. These medicines may react with the medicines you were given in the hospital. This could cause a much stronger response than usual.  Follow-up care  Follow up with your healthcare provider if you are not alert and back to your usual level of activity within 12 hours.  When to seek medical advice  Call your healthcare provider right away if any of these occur:  · Drowsiness gets worse  · Weakness or dizziness gets worse  · Repeated vomiting  · You can't be awakened   Date Last Reviewed: 10/18/2016  © 9398-6393 [employer]. [address] [place] [address], [place] [address]. All rights reserved. This information is not intended as a substitute for professional medical care. Always follow your healthcare professional's instructions.       Thank you for allowing us to care for you today. You may receive a survey about the care we provided. Your feedback is valuable and helps us provide excellent care throughout the community.     Home Care Instructions for Pain Management:    1. DIET:   You may resume your normal diet today.   2. BATHING:   You may shower with luke warm water. No tub baths or anything that will soak  injection sites under water for the next 24 hours.  3. DRESSING:   You may remove your bandage today.   4. ACTIVITY LEVEL:   You may resume your normal activities 24 hrs after your procedure. Nothing strenuous today.  5. MEDICATIONS:   You may resume your normal medications today. To restart blood thinners, ask your doctor.  6. DRIVING    If you have received any sedatives by mouth today, you may not drive for 12 hours.    If you have received any sedation through your IV, you may not drive for 24 hrs.   7. SPECIAL INSTRUCTIONS:   No heat to the injection site for 24 hrs including, hot bath or shower, heating pad, moist heat, or hot tubs.    Use ice pack to injection site for any pain or discomfort.  Apply ice packs for 20 minute intervals as needed.    IF you have diabetes, be sure to monitor your blood sugar more closely. IF your injection contained steroids your blood sugar levels may become higher than normal.    If you are still having pain upon discharge:  Your pain may improve over the next 48 hours. The anesthetic (numbing medication) works immediately to 48 hours. IF your injection contained a steroid (anti-inflammatory medication), it takes approximately 3 days to start feeling relief and 7-10 days to see your greatest results from the medication. It is possible you may need subsequent injections. This would be discussed at your follow up appointment with pain management or your referring doctor.      PLEASE CALL YOUR DOCTOR IF:  1. Redness or swelling around the injection site.  2. Fever of 101 degrees or more  3. Drainage (pus) from the injection site.  4. For any continuous bleeding (some dried blood over the incision is normal.)    FOR EMERGENCIES:   If any unusual problems or difficulties occur during clinic hours, call [phone] or [phone].

## 2018-09-26 NOTE — OP NOTE
Patient Name: Cary Middleton  MRN: [id number]    INFORMED CONSENT: The procedure, risks, benefits and options were discussed with patient. There are no contraindications to the procedure. The patient expressed understanding and agreed to proceed. The personnel performing the procedure was discussed. I verify that I personally obtained Cary's consent prior to the start of the procedure and the signed consent can be found on the patient's chart.    Procedure Date: 09/26/2018    Anesthesia: Topical    Pre Procedure diagnosis: DDD (degenerative disc disease), lumbar [M51.36]  HNP (herniated nucleus pulposus), lumbar [M51.26]  Other spondylosis with radiculopathy, lumbar region [M47.26]  Acute bilateral low back pain with left-sided sciatica [M54.42]  1. Lumbar radiculopathy    2. DDD (degenerative disc disease), lumbar    3. Chronic pain      Post-Procedure diagnosis: SAME      Sedation: Yes - Fentanyl 50 mcg and Midazolam 2 mg    PROCEDURE:Left L1-2   TRANSFORAMINAL EPIDURAL STEROID INJECTION        DESCRIPTION OF PROCEDURE: The patient was brought to the procedure room. After performing time out IV access was obtained prior to the procedure. The patient was positioned prone on the fluoroscopy table. Continuous hemodynamic monitoring was initiated including blood pressure, EKG, and pulse oximetry. . The skin was prepped with chlorhexidine three times and draped in a sterile fashion. Skin anesthesia was achieved using 3 mL of lidocaine 1% over the respective injection site.     An oblique fluoroscopic view was obtained, with the superior articular process of the inferior vertebral body aligned with the pedicle. The tip of a 22-gauge 3.5-inch Quincke-type spinal needle was advanced toward the 6 oclock position of the pedicle under intermittent fluoroscopic guidance. Confirmation of proper needle position was made with AP, oblique, and lateral fluoroscopic views. Negative aspiration for blood or CSF was confirmed. 2  mL of Omnipaque 300 was injected. Live fluoroscopic imaging revealed a clear outline of the spinal nerve with proximal spread of agent through the neural foramen into the anterior epidural space. A total combination of 3 mL of Lidocaine 0.5% and 10 mg decadron was injected at each level. Contrast spread was noted from L2 to T11 level. There was no pain on injection. The needle was removed and bleeding was nil.  A sterile dressing was applied. Cary was taken back to the recovery room for further observation.     Blood Loss: Nill  Specimen: None  Elif Lares MD PGY-5  [facility]   Dr. Bhavesh Polanco MD was present during the entire procedure.        Elif Lares MD

## 2018-09-26 NOTE — INTERVAL H&P NOTE
The patient has been examined and the H&P has been reviewed:    I concur with the findings and no changes have occurred since H&P was written. Ms. Middleton presents for a left trasnforaminal epidural for left lower extremity radiculopathy and lumbar DDD. She denies changes in her health history.     Anesthesia/Surgery risks, benefits and alternative options discussed and understood by patient/family.          Active Hospital Problems    Diagnosis  POA    Chronic pain [G89.29]  Yes      Resolved Hospital Problems   No resolved problems to display.

## 2018-09-26 NOTE — DISCHARGE SUMMARY
Discharge Note  Short Stay      SUMMARY     Admit Date: 9/26/2018    Attending Physician: Elif Lares      Discharge Physician: Elif Lares      Discharge Date: 9/26/2018 2:06 PM    Procedure(s) (LRB):  Injection,steroid,epidural,transforaminal approach  Left L1 (Left)    Final Diagnosis: DDD (degenerative disc disease), lumbar [M51.36]  HNP (herniated nucleus pulposus), lumbar [M51.26]  Other spondylosis with radiculopathy, lumbar region [M47.26]  Acute bilateral low back pain with left-sided sciatica [M54.42]    Disposition: Home or self care    Patient Instructions:   Current Discharge Medication List      CONTINUE these medications which have NOT CHANGED    Details   amLODIPine (NORVASC) 10 MG tablet Take 1 tablet (10 mg total) by mouth once daily.  Qty: 90 tablet, Refills: 1    Associated Diagnoses: Essential hypertension      aspirin (ECOTRIN) 81 MG EC tablet Take 81 mg by mouth once daily.      atorvastatin (LIPITOR) 20 MG tablet Take 1 tablet (20 mg total) by mouth once daily.  Qty: 90 tablet, Refills: 3    Associated Diagnoses: Hyperlipidemia, unspecified hyperlipidemia type      blood sugar diagnostic Strp To check BG 2 times daily, to use with True Metrix meter  Qty: 100 strip, Refills: 12      blood-glucose meter kit To check BG 1 times daily, to use with insurance preferred meter  Qty: 1 each, Refills: 0      cholecalciferol, vitamin D3, 1,000 unit capsule Take 1,000 Units by mouth once daily.      docusate sodium (COLACE) 100 MG capsule Take 1 capsule (100 mg total) by mouth 2 (two) times daily.  Qty: 60 capsule, Refills: 0      escitalopram oxalate (LEXAPRO) 10 MG tablet Take 1 tablet (10 mg total) by mouth once daily.  Qty: 90 tablet, Refills: 1    Associated Diagnoses: Major depressive disorder, single episode, in full remission      gabapentin (NEURONTIN) 300 MG capsule Take 1 capsule (300 mg total) by mouth 2 (two) times daily.  Qty: 60 capsule, Refills: 2    Associated Diagnoses: Left lumbar  radiculitis      glimepiride (AMARYL) 4 MG tablet Take 1 tablet (4 mg total) by mouth before breakfast.  Qty: 90 tablet, Refills: 3      HYDROcodone-acetaminophen (NORCO) 5-325 mg per tablet Take 1 tablet by mouth every 8 (eight) hours as needed for Pain.  Qty: 40 tablet, Refills: 0    Associated Diagnoses: Left lumbar radiculitis      lancets Misc To check BG 2 times daily, to use with insurance preferred meter  Qty: 100 each, Refills: 12      meclizine (ANTIVERT) 12.5 mg tablet Take 1 tablet (12.5 mg total) by mouth 2 (two) times daily as needed for Dizziness.  Qty: 30 tablet, Refills: 0    Associated Diagnoses: Vertigo      metFORMIN (GLUCOPHAGE) 500 MG tablet Take 2 tablets (1,000 mg total) by mouth 2 (two) times daily with meals.  Qty: 360 tablet, Refills: 3    Associated Diagnoses: Type 2 diabetes mellitus with both eyes affected by mild nonproliferative retinopathy without macular edema, without long-term current use of insulin      methylcellulose (ARTIFICIAL TEARS) 1 % ophthalmic solution Place 1 drop into both eyes as needed.        naproxen (NAPROSYN) 500 MG tablet Take 1 tablet (500 mg total) by mouth 2 (two) times daily with meals. Take with food  Qty: 14 tablet, Refills: 0      SITagliptin (JANUVIA) 100 MG Tab Take 1 tablet (100 mg total) by mouth once daily.  Qty: 30 tablet, Refills: 12                 Discharge Diagnosis: DDD (degenerative disc disease), lumbar [M51.36]  HNP (herniated nucleus pulposus), lumbar [M51.26]  Other spondylosis with radiculopathy, lumbar region [M47.26]  Acute bilateral low back pain with left-sided sciatica [M54.42]  Condition on Discharge: Stable with no complications to procedure   Diet on Discharge: Same as before.  Activity: as per instruction sheet.  Discharge to: Home with a responsible adult.  Follow up: 2-4 weeks

## 2018-11-16 ENCOUNTER — OFFICE VISIT (OUTPATIENT)
Dept: SPINE | Facility: CLINIC | Age: 83
End: 2018-11-16
Payer: MEDICARE

## 2018-11-16 VITALS
HEART RATE: 73 BPM | BODY MASS INDEX: 22.88 KG/M2 | DIASTOLIC BLOOD PRESSURE: 64 MMHG | HEIGHT: 64 IN | WEIGHT: 134 LBS | SYSTOLIC BLOOD PRESSURE: 134 MMHG

## 2018-11-16 DIAGNOSIS — M51.26 HNP (HERNIATED NUCLEUS PULPOSUS), LUMBAR: Primary | ICD-10-CM

## 2018-11-16 PROCEDURE — 99214 OFFICE O/P EST MOD 30 MIN: CPT | Mod: HCWC,S$GLB,, | Performed by: PHYSICIAN ASSISTANT

## 2018-11-16 PROCEDURE — 3075F SYST BP GE 130 - 139MM HG: CPT | Mod: CPTII,HCWC,S$GLB, | Performed by: PHYSICIAN ASSISTANT

## 2018-11-16 PROCEDURE — 1101F PT FALLS ASSESS-DOCD LE1/YR: CPT | Mod: CPTII,HCWC,S$GLB, | Performed by: PHYSICIAN ASSISTANT

## 2018-11-16 PROCEDURE — 99999 PR PBB SHADOW E&M-EST. PATIENT-LVL IV: CPT | Mod: PBBFAC,HCWC,, | Performed by: PHYSICIAN ASSISTANT

## 2018-11-16 PROCEDURE — 3078F DIAST BP <80 MM HG: CPT | Mod: CPTII,HCWC,S$GLB, | Performed by: PHYSICIAN ASSISTANT

## 2018-11-16 NOTE — PROGRESS NOTES
"Subjective:     Patient ID:  Cary Middleton is a 82 y.o. female.    [facility]    Chief Complaint: Left low back pain and left anterior thigh paresthesias    HPI    Cary Middleton is a 82 y.o. female who presents for follow up.  She had left L1 TESI which has helped the pain 100% that she was having in her left low back and left groin.  She occasionally has some left low back pain if she is lifting something heavy.  The left anterior thigh paresthesias comes and goes.  No right leg pain or paresthesias.    Patient denies any recent accidents or trauma, no saddle anesthesias, and no bowel or bladder incontinence.      Review of Systems:  Constitution: Negative for chills, fever, night sweats and weight loss.   Musculoskeletal: Negative for falls.   Gastrointestinal: Negative for bowel incontinence, nausea and vomiting.   Genitourinary: Negative for bladder incontinence.   Neurological: Negative for disturbances in coordination and loss of balance.      Objective:      Vitals:    11/16/18 1456   BP: 134/64   Pulse: 73   Weight: 60.8 kg (134 lb)   Height: 5' 4" (1.626 m)   PainSc:   1   PainLoc: Back         Physical Exam:    General:  Cary Middleton is well-developed, well-nourished, appears stated age, in no acute distress, alert and oriented to person, place, and time.    Patient sits comfortably in the exam room and answers questions appropriately. Grossly patient is able to move bilateral lower extremities without difficulty.     MRI Interpretation:     Lumbar spine MRI was personally reviewed today.  Far lateral disc herniation to the left at L1-2.    Assessment:          1. HNP (herniated nucleus pulposus), lumbar            Plan:          Orders Placed This Encounter    MRI Lumbar Spine W WO Cont    Creatinine, serum    BUN     Left L1-2 far lateral disc herniation    -She has had complete relief after the left L1-2 TESI  -Would consider repeat left L1-2 TESI if symptoms reoccur  -Will get " repeat MRI lumbar spine with and without contrast to make sure the left L1-2 HNP is not something else like a mass.  I had previously reviewed this with Dr. Mariscal and he said it was most likely HNP but to get fu MRI with contrast in three months to make sure  -BUN and creatinine two weeks before the MRI      Follow-Up:  Follow-up in about 2 months (around 1/16/2019). If there are any questions prior to this, the patient was instructed to contact the office.       ABIMAEL Holden, PA-C  Neurosurgery  [facility]  [facility]

## 2018-11-19 ENCOUNTER — TELEPHONE (OUTPATIENT)
Dept: ENDOCRINOLOGY | Facility: CLINIC | Age: 83
End: 2018-11-19

## 2018-11-19 ENCOUNTER — LAB VISIT (OUTPATIENT)
Dept: LAB | Facility: HOSPITAL | Age: 83
End: 2018-11-19
Attending: INTERNAL MEDICINE
Payer: MEDICARE

## 2018-11-19 DIAGNOSIS — E11.3293 TYPE 2 DIABETES MELLITUS WITH BOTH EYES AFFECTED BY MILD NONPROLIFERATIVE RETINOPATHY WITHOUT MACULAR EDEMA, WITHOUT LONG-TERM CURRENT USE OF INSULIN: Primary | ICD-10-CM

## 2018-11-19 DIAGNOSIS — M85.80 OSTEOPENIA, UNSPECIFIED LOCATION: ICD-10-CM

## 2018-11-19 DIAGNOSIS — E11.3293 TYPE 2 DIABETES MELLITUS WITH BOTH EYES AFFECTED BY MILD NONPROLIFERATIVE RETINOPATHY WITHOUT MACULAR EDEMA, WITHOUT LONG-TERM CURRENT USE OF INSULIN: ICD-10-CM

## 2018-11-19 LAB
25(OH)D3+25(OH)D2 SERPL-MCNC: 43 NG/ML
ALBUMIN SERPL BCP-MCNC: 4 G/DL
ALP SERPL-CCNC: 71 U/L
ALT SERPL W/O P-5'-P-CCNC: 8 U/L
ANION GAP SERPL CALC-SCNC: 14 MMOL/L
AST SERPL-CCNC: 20 U/L
BILIRUB SERPL-MCNC: 0.4 MG/DL
BUN SERPL-MCNC: 10 MG/DL
CALCIUM SERPL-MCNC: 10.3 MG/DL
CHLORIDE SERPL-SCNC: 104 MMOL/L
CHOLEST SERPL-MCNC: 140 MG/DL
CHOLEST/HDLC SERPL: 2.6 {RATIO}
CO2 SERPL-SCNC: 24 MMOL/L
CREAT SERPL-MCNC: 0.6 MG/DL
EST. GFR  (AFRICAN AMERICAN): >60 ML/MIN/1.73 M^2
EST. GFR  (NON AFRICAN AMERICAN): >60 ML/MIN/1.73 M^2
ESTIMATED AVG GLUCOSE: 157 MG/DL
GLUCOSE SERPL-MCNC: 51 MG/DL
HBA1C MFR BLD HPLC: 7.1 %
HDLC SERPL-MCNC: 54 MG/DL
HDLC SERPL: 38.6 %
LDLC SERPL CALC-MCNC: 68.8 MG/DL
NONHDLC SERPL-MCNC: 86 MG/DL
POTASSIUM SERPL-SCNC: 4 MMOL/L
PROT SERPL-MCNC: 7.6 G/DL
SODIUM SERPL-SCNC: 142 MMOL/L
TRIGL SERPL-MCNC: 86 MG/DL
TSH SERPL DL<=0.005 MIU/L-ACNC: 1.17 UIU/ML

## 2018-11-19 PROCEDURE — 36415 COLL VENOUS BLD VENIPUNCTURE: CPT | Mod: HCWC,PO

## 2018-11-19 PROCEDURE — 83036 HEMOGLOBIN GLYCOSYLATED A1C: CPT | Mod: HCWC

## 2018-11-19 PROCEDURE — 82306 VITAMIN D 25 HYDROXY: CPT | Mod: HCWC

## 2018-11-19 PROCEDURE — 84443 ASSAY THYROID STIM HORMONE: CPT | Mod: HCWC

## 2018-11-19 PROCEDURE — 80061 LIPID PANEL: CPT | Mod: HCWC

## 2018-11-19 PROCEDURE — 80053 COMPREHEN METABOLIC PANEL: CPT | Mod: HCWC

## 2018-11-20 ENCOUNTER — TELEPHONE (OUTPATIENT)
Dept: INTERNAL MEDICINE | Facility: CLINIC | Age: 83
End: 2018-11-20

## 2018-11-20 NOTE — TELEPHONE ENCOUNTER
Okay, not changing meds today. Follow up with Endocrinology as scheduled on Monday 11/26 and bring your list of home glucose measurements.

## 2018-11-20 NOTE — TELEPHONE ENCOUNTER
Labs yesterday were all normal except Glucose low at 51. Please ask if she has had frequent low blood sugar so I can adjust her medication.

## 2018-11-26 ENCOUNTER — OFFICE VISIT (OUTPATIENT)
Dept: ENDOCRINOLOGY | Facility: CLINIC | Age: 83
End: 2018-11-26
Payer: MEDICARE

## 2018-11-26 VITALS
DIASTOLIC BLOOD PRESSURE: 70 MMHG | SYSTOLIC BLOOD PRESSURE: 124 MMHG | BODY MASS INDEX: 23.73 KG/M2 | HEIGHT: 64 IN | RESPIRATION RATE: 18 BRPM | HEART RATE: 80 BPM | WEIGHT: 139 LBS

## 2018-11-26 DIAGNOSIS — F32.5 MAJOR DEPRESSIVE DISORDER, SINGLE EPISODE, IN FULL REMISSION: ICD-10-CM

## 2018-11-26 DIAGNOSIS — E11.42 TYPE 2 DIABETES MELLITUS WITH DIABETIC POLYNEUROPATHY, WITHOUT LONG-TERM CURRENT USE OF INSULIN: ICD-10-CM

## 2018-11-26 DIAGNOSIS — E78.5 HYPERLIPIDEMIA, UNSPECIFIED HYPERLIPIDEMIA TYPE: Chronic | ICD-10-CM

## 2018-11-26 DIAGNOSIS — E11.3293 TYPE 2 DIABETES MELLITUS WITH BOTH EYES AFFECTED BY MILD NONPROLIFERATIVE RETINOPATHY WITHOUT MACULAR EDEMA, WITHOUT LONG-TERM CURRENT USE OF INSULIN: Primary | ICD-10-CM

## 2018-11-26 DIAGNOSIS — I10 ESSENTIAL HYPERTENSION: ICD-10-CM

## 2018-11-26 PROCEDURE — 99999 PR PBB SHADOW E&M-EST. PATIENT-LVL IV: CPT | Mod: PBBFAC,HCWC,, | Performed by: NURSE PRACTITIONER

## 2018-11-26 PROCEDURE — 99213 OFFICE O/P EST LOW 20 MIN: CPT | Mod: HCWC,S$GLB,, | Performed by: NURSE PRACTITIONER

## 2018-11-26 RX ORDER — GLIMEPIRIDE 4 MG/1
4 TABLET ORAL
Qty: 90 TABLET | Refills: 3 | Status: SHIPPED | OUTPATIENT
Start: 2018-11-26 | End: 2019-05-29

## 2018-11-26 RX ORDER — METFORMIN HYDROCHLORIDE 500 MG/1
1000 TABLET ORAL 2 TIMES DAILY WITH MEALS
Qty: 360 TABLET | Refills: 3 | Status: SHIPPED | OUTPATIENT
Start: 2018-11-26 | End: 2019-11-20 | Stop reason: SDUPTHER

## 2018-11-26 NOTE — PROGRESS NOTES
CC: This 82 y.o. female presents for management of diabetes mellitus  and chronic conditions pending review including HTN, HLP    HPI: She was diagnosed with T2DM in since age 65. Has never been hospitalized r/t DM.  Family hx of DM: father  Denies missing doses of DM medication.   hypoglycemia at home-has had a few episodes when she does not eat enough breakfast, is more active and then feels bad- treats with coke    Since her last visit she was having back and groin pain- received steroid injection in October 2018    monitoring BG at home: fasting   Brings meter to clinic for review  77-140s mostly  BG was in low 200s after steroid injection, now resolved    Diet: Eats 3 Meals a day, snacks occasionally on fruit- ie apple  Exercise: twice a week [facility]   CURRENT DM MEDS: metformin 1000 mg bid; glimepiride 4mg qam, januvia 100 mg qday  Glucometer type:  True Metrix 2018    Standards of Care:  Eye exam:   3/8/18 +retinopathy- no treatment required    ROS:   Gen: Appetite good, no weight gain or loss, denies fatigue and weakness.  Skin: Skin is intact and heals well, no rashes, no hair changes  Eyes: Denies visual disturbances  Resp: no SOB or MORELOS, no cough  Cardiac: No palpitations, chest pain, no edema   GI: No nausea or vomiting, diarrhea, constipation, or abdominal pain.  /GYN: 2+ nocturia,no burning or pain.   MS/Neuro:no numbness/buring/tingling in BLE; Gait steady, speech clear  Psych: Denies drug/ETOH abuse,+ depression.  Other systems: negative.    PE:  GENERAL: Well developed, well nourished.  PSYCH: AAOx3, appropriate mood and affect, pleasant expression, conversant, appears relaxed, well groomed.   EYES: Conjunctiva, corneas clear  NECK: Supple, trachea midline   NEURO: Gait steady  SKIN: Skin warm and dry no acanthosis nigracans.   FOOT EXAMINATION: 11/26/18  No foot deformity, corns or callus formation,  nails in good condiiton and well trimmed, no interspace maceration or ulceration noted.   Decreased hair growth present over toes/feet.  Protective sensation intact with 10 gram monofilament.  +2 dorsalis pedis and posterior pulses noted.    Hemoglobin A1C   Date Value Ref Range Status   11/19/2018 7.1 (H) 4.0 - 5.6 % Final     Comment:     ADA Screening Guidelines:  5.7-6.4%  Consistent with prediabetes  >or=6.5%  Consistent with diabetes  High levels of fetal hemoglobin interfere with the HbA1C  assay. Heterozygous hemoglobin variants (HbS, HgC, etc)do  not significantly interfere with this assay.   However, presence of multiple variants may affect accuracy.     08/16/2018 6.6 (H) 4.0 - 5.6 % Final     Comment:     ADA Screening Guidelines:  5.7-6.4%  Consistent with prediabetes  >or=6.5%  Consistent with diabetes  High levels of fetal hemoglobin interfere with the HbA1C  assay. Heterozygous hemoglobin variants (HbS, HgC, etc)do  not significantly interfere with this assay.   However, presence of multiple variants may affect accuracy.     05/16/2018 6.8 (H) 4.0 - 5.6 % Final     Comment:     According to ADA guidelines, hemoglobin A1c <7.0% represents  optimal control in non-pregnant diabetic patients. Different  metrics may apply to specific patient populations.   Standards of Medical Care in Diabetes-2016.  For the purpose of screening for the presence of diabetes:  <5.7%     Consistent with the absence of diabetes  5.7-6.4%  Consistent with increasing risk for diabetes   (prediabetes)  >or=6.5%  Consistent with diabetes  Currently, no consensus exists for use of hemoglobin A1c  for diagnosis of diabetes for children.  This Hemoglobin A1c assay has significant interference with fetal   hemoglobin   (HbF). The results are invalid for patients with abnormal amounts of   HbF,   including those with known Hereditary Persistence   of Fetal Hemoglobin. Heterozygous hemoglobin variants (HbAS, HbAC,   HbAD, HbAE, HbA2) do not significantly interfere with this assay;   however, presence of multiple variants  in a sample may impact the %   interference.          ASSESSMENT and PLAN:    1. T2DM with hyperglycemia, retinopathy      Discussed A1c and BG goals   Continue januvia 100 mg qam, Glimiperide 4 mg qam, metformin 1000 mg bid  Check bg bid       - takes ASA, statin    2. HTN - controlled, continue meds as previously prescribed and monitor.     3. HLP - on statin therapy, LFTs WNL. Recheck w RTC    4. Depression- stable, chronic on lexapro     5. Osteopenia- Vitamin d level at goal- continue D3 1000 mg qd, Dexa due - schedule prior to RTC     Follow-up: in 6 months with lab prior

## 2019-01-03 ENCOUNTER — LAB VISIT (OUTPATIENT)
Dept: LAB | Facility: HOSPITAL | Age: 84
End: 2019-01-03
Payer: MEDICARE

## 2019-01-03 DIAGNOSIS — M51.26 HNP (HERNIATED NUCLEUS PULPOSUS), LUMBAR: ICD-10-CM

## 2019-01-04 DIAGNOSIS — F32.5 MAJOR DEPRESSIVE DISORDER, SINGLE EPISODE, IN FULL REMISSION: ICD-10-CM

## 2019-01-04 DIAGNOSIS — I10 ESSENTIAL HYPERTENSION: ICD-10-CM

## 2019-01-04 RX ORDER — AMLODIPINE BESYLATE 10 MG/1
TABLET ORAL
Qty: 90 TABLET | Refills: 1 | Status: SHIPPED | OUTPATIENT
Start: 2019-01-04 | End: 2019-07-03 | Stop reason: SDUPTHER

## 2019-01-04 RX ORDER — ESCITALOPRAM OXALATE 10 MG/1
TABLET ORAL
Qty: 90 TABLET | Refills: 1 | Status: SHIPPED | OUTPATIENT
Start: 2019-01-04 | End: 2019-07-03 | Stop reason: SDUPTHER

## 2019-01-09 ENCOUNTER — HOSPITAL ENCOUNTER (OUTPATIENT)
Dept: RADIOLOGY | Facility: HOSPITAL | Age: 84
Discharge: HOME | End: 2019-01-09
Attending: PHYSICIAN ASSISTANT
Payer: MEDICARE

## 2019-01-09 DIAGNOSIS — M51.26 HNP (HERNIATED NUCLEUS PULPOSUS), LUMBAR: ICD-10-CM

## 2019-01-09 PROCEDURE — A9585 GADOBUTROL INJECTION: HCPCS | Performed by: PHYSICIAN ASSISTANT

## 2019-01-09 PROCEDURE — 25500020 PHARM REV CODE 255: Performed by: PHYSICIAN ASSISTANT

## 2019-01-09 PROCEDURE — 72158 MRI LUMBAR SPINE W/O & W/DYE: CPT | Mod: 26,,, | Performed by: RADIOLOGY

## 2019-01-09 PROCEDURE — 72158 MRI LUMBAR SPINE W/O & W/DYE: CPT | Mod: TC

## 2019-01-09 PROCEDURE — 72158 MRI LUMBAR SPINE W WO CONTRAST: ICD-10-PCS | Mod: 26,,, | Performed by: RADIOLOGY

## 2019-01-09 RX ORDER — GADOBUTROL 604.72 MG/ML
7 INJECTION INTRAVENOUS
Status: COMPLETED | OUTPATIENT
Start: 2019-01-09 | End: 2019-01-09

## 2019-01-09 RX ADMIN — GADOBUTROL 7 ML: 604.72 INJECTION INTRAVENOUS at 11:01

## 2019-01-10 LAB
CREAT SERPL-MCNC: 0.7 MG/DL (ref 0.5–1.4)
SAMPLE: NORMAL

## 2019-01-16 ENCOUNTER — OFFICE VISIT (OUTPATIENT)
Dept: SPINE | Facility: CLINIC | Age: 84
End: 2019-01-16
Payer: MEDICARE

## 2019-01-16 VITALS
HEIGHT: 64 IN | BODY MASS INDEX: 23.73 KG/M2 | WEIGHT: 139 LBS | SYSTOLIC BLOOD PRESSURE: 114 MMHG | HEART RATE: 75 BPM | DIASTOLIC BLOOD PRESSURE: 56 MMHG

## 2019-01-16 DIAGNOSIS — M51.26 HNP (HERNIATED NUCLEUS PULPOSUS), LUMBAR: Primary | ICD-10-CM

## 2019-01-16 PROCEDURE — 99999 PR PBB SHADOW E&M-EST. PATIENT-LVL IV: ICD-10-PCS | Mod: PBBFAC,,, | Performed by: PHYSICIAN ASSISTANT

## 2019-01-16 PROCEDURE — 3078F PR MOST RECENT DIASTOLIC BLOOD PRESSURE < 80 MM HG: ICD-10-PCS | Mod: CPTII,S$GLB,, | Performed by: PHYSICIAN ASSISTANT

## 2019-01-16 PROCEDURE — 99214 PR OFFICE/OUTPT VISIT, EST, LEVL IV, 30-39 MIN: ICD-10-PCS | Mod: S$GLB,,, | Performed by: PHYSICIAN ASSISTANT

## 2019-01-16 PROCEDURE — 3078F DIAST BP <80 MM HG: CPT | Mod: CPTII,S$GLB,, | Performed by: PHYSICIAN ASSISTANT

## 2019-01-16 PROCEDURE — 3074F SYST BP LT 130 MM HG: CPT | Mod: CPTII,S$GLB,, | Performed by: PHYSICIAN ASSISTANT

## 2019-01-16 PROCEDURE — 3074F PR MOST RECENT SYSTOLIC BLOOD PRESSURE < 130 MM HG: ICD-10-PCS | Mod: CPTII,S$GLB,, | Performed by: PHYSICIAN ASSISTANT

## 2019-01-16 PROCEDURE — 99999 PR PBB SHADOW E&M-EST. PATIENT-LVL IV: CPT | Mod: PBBFAC,,, | Performed by: PHYSICIAN ASSISTANT

## 2019-01-16 PROCEDURE — 99214 OFFICE O/P EST MOD 30 MIN: CPT | Mod: S$GLB,,, | Performed by: PHYSICIAN ASSISTANT

## 2019-01-16 PROCEDURE — 1101F PR PT FALLS ASSESS DOC 0-1 FALLS W/OUT INJ PAST YR: ICD-10-PCS | Mod: CPTII,S$GLB,, | Performed by: PHYSICIAN ASSISTANT

## 2019-01-16 PROCEDURE — 1101F PT FALLS ASSESS-DOCD LE1/YR: CPT | Mod: CPTII,S$GLB,, | Performed by: PHYSICIAN ASSISTANT

## 2019-01-16 NOTE — PROGRESS NOTES
"Subjective:     Patient ID:  Cary Middleton is a 83 y.o. female.    [facility]    Chief Complaint: Low back pain    HPI    Cary Middleton is a 83 y.o. female who presents for MRI follow up.  She is here with an updated MRI to assess if the HNP is a HNP or mass.  She has some low back pain when doing laundry in the middle low back but overall no chronic low back pain.  No groin pain.  No leg pain.    Patient denies any recent accidents or trauma, no saddle anesthesias, and no bowel or bladder incontinence.      Review of Systems:  Constitution: Negative for chills, fever, night sweats and weight loss.   Musculoskeletal: Negative for falls.   Gastrointestinal: Negative for bowel incontinence, nausea and vomiting.   Genitourinary: Negative for bladder incontinence.   Neurological: Negative for disturbances in coordination and loss of balance.      Objective:      Vitals:    01/16/19 1104   BP: (!) 114/56   Pulse: 75   Weight: 63 kg (139 lb)   Height: 5' 4" (1.626 m)   PainSc: 0-No pain         Physical Exam:    General:  Cary Middleton is well-developed, well-nourished, appears stated age, in no acute distress, alert and oriented to person, place, and time.    Patient sits comfortably in the exam room and answers questions appropriately. Grossly patient is able to move bilateral lower extremities without difficulty.     MRI Interpretation:     Lumbar spine MRI was personally reviewed today.  Mild lumbar DDD and spondylosis.  I do not see the left L1-2 HNP that was present before.    Assessment:          1. HNP (herniated nucleus pulposus), lumbar            Plan:               Left L1-2 HNP has resolved.  It is not a mass or tumor.      She has some mild low back pain that comes and goes with housework.    No further follow up needed.    Follow-Up:  Follow-up if symptoms worsen or fail to improve. If there are any questions prior to this, the patient was instructed to contact the office.       Isidra ANTONIO" ABIMAEL Morales, PA-C  Neurosurgery  [facility]  [facility]

## 2019-02-27 NOTE — TELEPHONE ENCOUNTER
Called patient to notify her of A1c results. She has requested a 3 month follow up with repeat A1c.  
No significant past surgical history

## 2019-03-30 DIAGNOSIS — E78.5 HYPERLIPIDEMIA, UNSPECIFIED HYPERLIPIDEMIA TYPE: Chronic | ICD-10-CM

## 2019-04-01 RX ORDER — ATORVASTATIN CALCIUM 20 MG/1
TABLET, FILM COATED ORAL
Qty: 90 TABLET | Refills: 3 | Status: SHIPPED | OUTPATIENT
Start: 2019-04-01 | End: 2020-04-20

## 2019-05-20 ENCOUNTER — LAB VISIT (OUTPATIENT)
Dept: LAB | Facility: HOSPITAL | Age: 84
End: 2019-05-20
Payer: MEDICARE

## 2019-05-20 DIAGNOSIS — E11.3293 TYPE 2 DIABETES MELLITUS WITH BOTH EYES AFFECTED BY MILD NONPROLIFERATIVE RETINOPATHY WITHOUT MACULAR EDEMA, WITHOUT LONG-TERM CURRENT USE OF INSULIN: ICD-10-CM

## 2019-05-20 LAB
ALBUMIN SERPL BCP-MCNC: 3.7 G/DL (ref 3.5–5.2)
ALP SERPL-CCNC: 70 U/L (ref 55–135)
ALT SERPL W/O P-5'-P-CCNC: 8 U/L (ref 10–44)
ANION GAP SERPL CALC-SCNC: 10 MMOL/L (ref 8–16)
AST SERPL-CCNC: 15 U/L (ref 10–40)
BILIRUB SERPL-MCNC: 0.4 MG/DL (ref 0.1–1)
BUN SERPL-MCNC: 13 MG/DL (ref 8–23)
CALCIUM SERPL-MCNC: 9.5 MG/DL (ref 8.7–10.5)
CHLORIDE SERPL-SCNC: 107 MMOL/L (ref 95–110)
CO2 SERPL-SCNC: 24 MMOL/L (ref 23–29)
CREAT SERPL-MCNC: 0.7 MG/DL (ref 0.5–1.4)
EST. GFR  (AFRICAN AMERICAN): >60 ML/MIN/1.73 M^2
EST. GFR  (NON AFRICAN AMERICAN): >60 ML/MIN/1.73 M^2
ESTIMATED AVG GLUCOSE: 143 MG/DL (ref 68–131)
GLUCOSE SERPL-MCNC: 116 MG/DL (ref 70–110)
HBA1C MFR BLD HPLC: 6.6 % (ref 4–5.6)
POTASSIUM SERPL-SCNC: 3.7 MMOL/L (ref 3.5–5.1)
PROT SERPL-MCNC: 7.1 G/DL (ref 6–8.4)
SODIUM SERPL-SCNC: 141 MMOL/L (ref 136–145)

## 2019-05-20 PROCEDURE — 83036 HEMOGLOBIN GLYCOSYLATED A1C: CPT | Mod: HCNC

## 2019-05-20 PROCEDURE — 36415 COLL VENOUS BLD VENIPUNCTURE: CPT | Mod: HCNC

## 2019-05-20 PROCEDURE — 80053 COMPREHEN METABOLIC PANEL: CPT | Mod: HCNC

## 2019-05-28 NOTE — PROGRESS NOTES
CC: This 83 y.o. female presents for management of diabetes mellitus  and chronic conditions pending review including HTN, HLP, vitamin d deficiency, osteopenia, depression     HPI: She was diagnosed with T2DM in since age 65. Has never been hospitalized r/t DM.  Family hx of DM: father  Denies missing doses of DM medication.   Just returned from [place] 2 granddaughters graduated      Her sister is also currently in the hospital w PNA  hypoglycemia at home- none    monitoring BG at home: fasting           Diet: Eats 3 Meals a day, snacks occasionally on fruit- ie apple  Exercise: twice a week 1hr [facility]   CURRENT DM MEDS: metformin 1000 mg bid; glimepiride 4mg qam, januvia 100 mg qday  Glucometer type:  True Metrix 2018    Standards of Care:  Eye exam:   3/8/18 +retinopathy- no treatment required- due- she's aware Dr Escobar    ROS:   Gen: Appetite good, no weight gain or loss, denies fatigue and weakness.  Skin: Skin is intact and heals well, no rashes, no hair changes  Eyes: Denies visual disturbances  Resp: no SOB or MORELOS, no cough  Cardiac: No palpitations, chest pain, no edema   GI: + nausea, no vomiting, diarrhea, constipation, or abdominal pain.  /GYN: 2+ nocturia,no burning or pain.   MS/Neuro:no numbness/buring/tingling in BLE; Gait steady, speech clear  Psych: Denies drug/ETOH abuse,+ depression.  Other systems: negative.    PE:  GENERAL: Well developed, well nourished.  PSYCH: AAOx3, appropriate mood and affect, pleasant expression, conversant, appears relaxed, well groomed.   EYES: Conjunctiva, corneas clear  NECK: Supple, trachea midline   NEURO: Gait steady  SKIN: Skin warm and dry no acanthosis nigracans.   FOOT EXAMINATION: 11/26/18  No foot deformity, corns or callus formation,  nails in good condiiton and well trimmed, no interspace maceration or ulceration noted.  Decreased hair growth present over toes/feet.  Protective sensation intact with 10 gram monofilament.  +2 dorsalis pedis and  posterior pulses noted.    Hemoglobin A1C   Date Value Ref Range Status   05/20/2019 6.6 (H) 4.0 - 5.6 % Final     Comment:     ADA Screening Guidelines:  5.7-6.4%  Consistent with prediabetes  >or=6.5%  Consistent with diabetes  High levels of fetal hemoglobin interfere with the HbA1C  assay. Heterozygous hemoglobin variants (HbS, HgC, etc)do  not significantly interfere with this assay.   However, presence of multiple variants may affect accuracy.     11/19/2018 7.1 (H) 4.0 - 5.6 % Final     Comment:     ADA Screening Guidelines:  5.7-6.4%  Consistent with prediabetes  >or=6.5%  Consistent with diabetes  High levels of fetal hemoglobin interfere with the HbA1C  assay. Heterozygous hemoglobin variants (HbS, HgC, etc)do  not significantly interfere with this assay.   However, presence of multiple variants may affect accuracy.     08/16/2018 6.6 (H) 4.0 - 5.6 % Final     Comment:     ADA Screening Guidelines:  5.7-6.4%  Consistent with prediabetes  >or=6.5%  Consistent with diabetes  High levels of fetal hemoglobin interfere with the HbA1C  assay. Heterozygous hemoglobin variants (HbS, HgC, etc)do  not significantly interfere with this assay.   However, presence of multiple variants may affect accuracy.          ASSESSMENT and PLAN:    1. T2DM with hyperglycemia, retinopathy  - A1c below goal for age, will drink glucenera mid day to avoid hypo event if lunch will be late   Decrease Glimiperide 2 mg qam, Continue januvia 100 mg qam,  , metformin 1000 mg bid  Discussed A1c and BG goals (7-7.5%)  Notify me if bg > 180 consistently    Check bg bid       - takes ASA, statin    2. HTN - controlled, continue meds as previously prescribed and monitor.     3. HLP - on statin therapy, LFTs WNL. Recheck w RTC    4. Depression- stable, chronic on lexapro     5. Osteopenia- Vitamin d level at goal- continue D3 1000 IU qd, managed by PCP      Follow-up: in 6 months with lab prior

## 2019-05-29 ENCOUNTER — OFFICE VISIT (OUTPATIENT)
Dept: ENDOCRINOLOGY | Facility: CLINIC | Age: 84
End: 2019-05-29
Payer: MEDICARE

## 2019-05-29 VITALS
SYSTOLIC BLOOD PRESSURE: 120 MMHG | BODY MASS INDEX: 22.81 KG/M2 | HEART RATE: 102 BPM | HEIGHT: 64 IN | DIASTOLIC BLOOD PRESSURE: 74 MMHG | RESPIRATION RATE: 16 BRPM | WEIGHT: 133.63 LBS

## 2019-05-29 DIAGNOSIS — E11.3299 NONPROLIFERATIVE DIABETIC RETINOPATHY: ICD-10-CM

## 2019-05-29 DIAGNOSIS — E55.9 VITAMIN D DEFICIENCY: ICD-10-CM

## 2019-05-29 DIAGNOSIS — E78.5 HYPERLIPIDEMIA, UNSPECIFIED HYPERLIPIDEMIA TYPE: Chronic | ICD-10-CM

## 2019-05-29 DIAGNOSIS — E11.3293 TYPE 2 DIABETES MELLITUS WITH BOTH EYES AFFECTED BY MILD NONPROLIFERATIVE RETINOPATHY WITHOUT MACULAR EDEMA, WITHOUT LONG-TERM CURRENT USE OF INSULIN: Primary | ICD-10-CM

## 2019-05-29 DIAGNOSIS — F32.5 MAJOR DEPRESSIVE DISORDER, SINGLE EPISODE, IN FULL REMISSION: ICD-10-CM

## 2019-05-29 DIAGNOSIS — I10 ESSENTIAL HYPERTENSION: ICD-10-CM

## 2019-05-29 PROCEDURE — 99999 PR PBB SHADOW E&M-EST. PATIENT-LVL IV: CPT | Mod: PBBFAC,HCNC,, | Performed by: NURSE PRACTITIONER

## 2019-05-29 PROCEDURE — 99214 OFFICE O/P EST MOD 30 MIN: CPT | Mod: HCNC,S$GLB,, | Performed by: NURSE PRACTITIONER

## 2019-05-29 PROCEDURE — 99999 PR PBB SHADOW E&M-EST. PATIENT-LVL IV: ICD-10-PCS | Mod: PBBFAC,HCNC,, | Performed by: NURSE PRACTITIONER

## 2019-05-29 PROCEDURE — 99214 PR OFFICE/OUTPT VISIT, EST, LEVL IV, 30-39 MIN: ICD-10-PCS | Mod: HCNC,S$GLB,, | Performed by: NURSE PRACTITIONER

## 2019-05-29 RX ORDER — GLIMEPIRIDE 2 MG/1
2 TABLET ORAL
Qty: 90 TABLET | Refills: 3 | Status: SHIPPED | OUTPATIENT
Start: 2019-05-29 | End: 2019-11-20 | Stop reason: SDUPTHER

## 2019-06-06 ENCOUNTER — CLINICAL SUPPORT (OUTPATIENT)
Dept: OPTOMETRY | Facility: CLINIC | Age: 84
End: 2019-06-06
Attending: NURSE PRACTITIONER
Payer: MEDICARE

## 2019-06-06 ENCOUNTER — OFFICE VISIT (OUTPATIENT)
Dept: INTERNAL MEDICINE | Facility: CLINIC | Age: 84
End: 2019-06-06
Payer: MEDICARE

## 2019-06-06 VITALS
DIASTOLIC BLOOD PRESSURE: 66 MMHG | SYSTOLIC BLOOD PRESSURE: 118 MMHG | BODY MASS INDEX: 22.85 KG/M2 | WEIGHT: 133.81 LBS | HEART RATE: 70 BPM | HEIGHT: 64 IN

## 2019-06-06 DIAGNOSIS — F32.5 MAJOR DEPRESSIVE DISORDER, SINGLE EPISODE, IN FULL REMISSION: ICD-10-CM

## 2019-06-06 DIAGNOSIS — I70.0 AORTIC ATHEROSCLEROSIS: ICD-10-CM

## 2019-06-06 DIAGNOSIS — E78.5 HYPERLIPIDEMIA, UNSPECIFIED HYPERLIPIDEMIA TYPE: Chronic | ICD-10-CM

## 2019-06-06 DIAGNOSIS — E11.3293 TYPE 2 DIABETES MELLITUS WITH BOTH EYES AFFECTED BY MILD NONPROLIFERATIVE RETINOPATHY WITHOUT MACULAR EDEMA, WITHOUT LONG-TERM CURRENT USE OF INSULIN: ICD-10-CM

## 2019-06-06 DIAGNOSIS — Z78.0 POST-MENOPAUSAL: ICD-10-CM

## 2019-06-06 DIAGNOSIS — G89.4 CHRONIC PAIN SYNDROME: ICD-10-CM

## 2019-06-06 DIAGNOSIS — I10 ESSENTIAL HYPERTENSION: ICD-10-CM

## 2019-06-06 DIAGNOSIS — Z00.00 ENCOUNTER FOR PREVENTIVE HEALTH EXAMINATION: Primary | ICD-10-CM

## 2019-06-06 DIAGNOSIS — M85.80 OSTEOPENIA, UNSPECIFIED LOCATION: ICD-10-CM

## 2019-06-06 DIAGNOSIS — I77.1 TORTUOUS AORTA: ICD-10-CM

## 2019-06-06 DIAGNOSIS — E11.3292 MILD NONPROLIFERATIVE DIABETIC RETINOPATHY OF LEFT EYE WITHOUT MACULAR EDEMA ASSOCIATED WITH TYPE 2 DIABETES MELLITUS: Primary | ICD-10-CM

## 2019-06-06 PROCEDURE — 99499 RISK ADDL DX/OHS AUDIT: ICD-10-PCS | Mod: HCNC,S$GLB,, | Performed by: NURSE PRACTITIONER

## 2019-06-06 PROCEDURE — 99999 PR PBB SHADOW E&M-EST. PATIENT-LVL V: CPT | Mod: PBBFAC,HCNC,, | Performed by: NURSE PRACTITIONER

## 2019-06-06 PROCEDURE — 3074F PR MOST RECENT SYSTOLIC BLOOD PRESSURE < 130 MM HG: ICD-10-PCS | Mod: HCNC,CPTII,S$GLB, | Performed by: NURSE PRACTITIONER

## 2019-06-06 PROCEDURE — 92250 FUNDUS PHOTOGRAPHY W/I&R: CPT | Mod: HCNC,S$GLB,, | Performed by: OPHTHALMOLOGY

## 2019-06-06 PROCEDURE — 3074F SYST BP LT 130 MM HG: CPT | Mod: HCNC,CPTII,S$GLB, | Performed by: NURSE PRACTITIONER

## 2019-06-06 PROCEDURE — G0439 PPPS, SUBSEQ VISIT: HCPCS | Mod: HCNC,S$GLB,, | Performed by: NURSE PRACTITIONER

## 2019-06-06 PROCEDURE — 92250 DIABETIC EYE SCREENING PHOTO: ICD-10-PCS | Mod: HCNC,S$GLB,, | Performed by: OPHTHALMOLOGY

## 2019-06-06 PROCEDURE — 99999 PR PBB SHADOW E&M-EST. PATIENT-LVL I: CPT | Mod: PBBFAC,HCNC,,

## 2019-06-06 PROCEDURE — 99999 PR PBB SHADOW E&M-EST. PATIENT-LVL I: ICD-10-PCS | Mod: PBBFAC,HCNC,,

## 2019-06-06 PROCEDURE — 3078F PR MOST RECENT DIASTOLIC BLOOD PRESSURE < 80 MM HG: ICD-10-PCS | Mod: HCNC,CPTII,S$GLB, | Performed by: NURSE PRACTITIONER

## 2019-06-06 PROCEDURE — 99999 PR PBB SHADOW E&M-EST. PATIENT-LVL V: ICD-10-PCS | Mod: PBBFAC,HCNC,, | Performed by: NURSE PRACTITIONER

## 2019-06-06 PROCEDURE — 99499 UNLISTED E&M SERVICE: CPT | Mod: HCNC,S$GLB,, | Performed by: NURSE PRACTITIONER

## 2019-06-06 PROCEDURE — G0439 PR MEDICARE ANNUAL WELLNESS SUBSEQUENT VISIT: ICD-10-PCS | Mod: HCNC,S$GLB,, | Performed by: NURSE PRACTITIONER

## 2019-06-06 PROCEDURE — 3078F DIAST BP <80 MM HG: CPT | Mod: HCNC,CPTII,S$GLB, | Performed by: NURSE PRACTITIONER

## 2019-06-06 NOTE — PROGRESS NOTES
HPI     Diabetic Eye Exam      Additional comments: photos              Comments     Screening photos          Last edited by Shavon Echols MA on 6/6/2019 10:23 AM. (History)            Assessment /Plan     For exam results, see Encounter Report.    Type 2 diabetes mellitus with both eyes affected by mild nonproliferative retinopathy without macular edema, without long-term current use of insulin  -     Diabetic Eye Screening Photo      83 y.o. y/o here for screening for Diabetic Renopathy with non-dilated fundus photos per Arlene Costa MD

## 2019-06-06 NOTE — PROGRESS NOTES
"Cary Middleton presented for a  Medicare AWV and comprehensive Health Risk Assessment today. The following components were reviewed and updated:    · Medical history  · Family History  · Social history  · Allergies and Current Medications  · Health Risk Assessment  · Health Maintenance  · Care Team     ** See Completed Assessments for Annual Wellness Visit within the encounter summary.**       The following assessments were completed:  · Living Situation  · CAGE  · Depression Screening  · Timed Get Up and Go  · Whisper Test  · Cognitive Function Screening  ·   ·   ·   · Nutrition Screening  · ADL Screening  · PAQ Screening    Vitals:    06/06/19 0927   BP: 118/66   BP Location: Left arm   Pulse: 70   Weight: 60.7 kg (133 lb 13.1 oz)   Height: 5' 4" (1.626 m)     Body mass index is 22.97 kg/m².  Physical Exam   Constitutional: She is oriented to person, place, and time. She appears well-developed and well-nourished.   HENT:   Head: Normocephalic.   Cardiovascular: Normal rate and regular rhythm.   Pulmonary/Chest: Effort normal and breath sounds normal.   Abdominal: Soft. Bowel sounds are normal.   Musculoskeletal: Normal range of motion. She exhibits no edema.   Neurological: She is alert and oriented to person, place, and time.   Skin: Skin is warm and dry.   Psychiatric: She has a normal mood and affect.   Nursing note and vitals reviewed.        Diagnoses and health risks identified today and associated recommendations/orders:    1. Encounter for preventive health examination  Here for Health Risk Assessment/Annual Wellness Visit.  Health maintenance reviewed and updated. Follow up in one year.    2. Post-menopausal  - DXA Bone Density Spine And Hip; Future    3. Essential hypertension  Chronic, stable on current medications. Followed by PCP.    4. Aortic atherosclerosis  Chronic, stable on current medications. Noted CT Renal Stone Study 8/11/18. Followed by PCP.    5. Tortuous aorta  Chronic, stable on current " medications. Followed by PCP.    6. Hyperlipidemia, unspecified hyperlipidemia type  Chronic, stable on current medication. Followed by PCP.    7. Type 2 diabetes mellitus with both eyes affected by mild nonproliferative retinopathy without macular edema, without long-term current use of insulin  Chronic, stable on current medications. Followed by PCP.  - Diabetic Eye Screening Photo; Future    8. Osteopenia, unspecified location  Chronic, stable on current medications. Followed by PCP.    9. Major depressive disorder, single episode, in full remission  Chronic, stable on current medication. PHQ-2 score 0. Followed by PCP.    10. Chronic pain syndrome  Chronic, stable on current medications. Followed by Pain Management, PCP.      Provided Cary with a 5-10 year written screening schedule and personal prevention plan. Recommendations were developed using the USPSTF age appropriate recommendations. Education, counseling, and referrals were provided as needed. After Visit Summary printed and given to patient which includes a list of additional screenings\tests needed.    Follow up in 6 weeks (on 7/15/2019).with PCP    Elif Unger NP

## 2019-06-06 NOTE — PATIENT INSTRUCTIONS
Counseling and Referral of Other Preventative  (Italic type indicates deductible and co-insurance are waived)    Patient Name: Cary Middleton  Today's Date: 6/6/2019    Health Maintenance       Date Due Completion Date    DEXA SCAN 12/04/2018 12/4/2015 - order placed    Eye Exam 03/08/2019 3/8/2018 - order placed    Shingles Vaccine (2 of 3) 09/30/2020 (Originally 8/24/2016) 6/29/2016    Influenza Vaccine 08/01/2019 11/14/2017    Lipid Panel 11/19/2019 11/19/2018    Hemoglobin A1c 11/20/2019 5/20/2019    Foot Exam 11/26/2019 11/26/2018 (Done)    Override on 11/26/2018: Done    Override on 2/1/2017: Done    Urine Microalbumin 05/20/2020 5/20/2019    TETANUS VACCINE 02/19/2028 2/19/2018        No orders of the defined types were placed in this encounter.    The following information is provided to all patients.  This information is to help you find resources for any of the problems found today that may be affecting your health:                Living healthy guide: www.[facility].[place].gov      Understanding Diabetes: www.diabetes.org      Eating healthy: www.cdc.gov/healthyweight      CDC home safety checklist: www.cdc.gov/steadi/patient.html      Agency on Aging: www.goea.[place].gov      Alcoholics anonymous (AA): www.aa.org      Physical Activity: www.nevaeh.nih.gov/lx6apik      Tobacco use: www.quitwithusla.org

## 2019-07-01 ENCOUNTER — PATIENT OUTREACH (OUTPATIENT)
Dept: ADMINISTRATIVE | Facility: HOSPITAL | Age: 84
End: 2019-07-01

## 2019-07-03 DIAGNOSIS — F32.5 MAJOR DEPRESSIVE DISORDER, SINGLE EPISODE, IN FULL REMISSION: ICD-10-CM

## 2019-07-03 DIAGNOSIS — I10 ESSENTIAL HYPERTENSION: ICD-10-CM

## 2019-07-03 RX ORDER — ESCITALOPRAM OXALATE 10 MG/1
TABLET ORAL
Qty: 90 TABLET | Refills: 1 | Status: SHIPPED | OUTPATIENT
Start: 2019-07-03 | End: 2020-01-30 | Stop reason: SDUPTHER

## 2019-07-03 RX ORDER — AMLODIPINE BESYLATE 10 MG/1
TABLET ORAL
Qty: 90 TABLET | Refills: 1 | Status: SHIPPED | OUTPATIENT
Start: 2019-07-03 | End: 2020-01-30 | Stop reason: SDUPTHER

## 2019-07-15 ENCOUNTER — IMMUNIZATION (OUTPATIENT)
Dept: PHARMACY | Facility: CLINIC | Age: 84
End: 2019-07-15
Payer: MEDICARE

## 2019-07-15 ENCOUNTER — OFFICE VISIT (OUTPATIENT)
Dept: PRIMARY CARE CLINIC | Facility: CLINIC | Age: 84
End: 2019-07-15
Payer: MEDICARE

## 2019-07-15 ENCOUNTER — LAB VISIT (OUTPATIENT)
Dept: LAB | Facility: HOSPITAL | Age: 84
End: 2019-07-15
Attending: INTERNAL MEDICINE
Payer: MEDICARE

## 2019-07-15 VITALS
BODY MASS INDEX: 23.82 KG/M2 | HEIGHT: 64 IN | TEMPERATURE: 98 F | SYSTOLIC BLOOD PRESSURE: 131 MMHG | WEIGHT: 139.56 LBS | HEART RATE: 80 BPM | DIASTOLIC BLOOD PRESSURE: 68 MMHG

## 2019-07-15 DIAGNOSIS — I10 ESSENTIAL HYPERTENSION: Primary | ICD-10-CM

## 2019-07-15 DIAGNOSIS — E11.3293 TYPE 2 DIABETES MELLITUS WITH BOTH EYES AFFECTED BY MILD NONPROLIFERATIVE RETINOPATHY WITHOUT MACULAR EDEMA, WITHOUT LONG-TERM CURRENT USE OF INSULIN: ICD-10-CM

## 2019-07-15 DIAGNOSIS — I70.0 AORTIC ATHEROSCLEROSIS: ICD-10-CM

## 2019-07-15 DIAGNOSIS — E78.5 HYPERLIPIDEMIA, UNSPECIFIED HYPERLIPIDEMIA TYPE: ICD-10-CM

## 2019-07-15 DIAGNOSIS — I10 ESSENTIAL HYPERTENSION: ICD-10-CM

## 2019-07-15 DIAGNOSIS — F32.5 MAJOR DEPRESSIVE DISORDER, SINGLE EPISODE, IN FULL REMISSION: ICD-10-CM

## 2019-07-15 DIAGNOSIS — M81.0 AGE-RELATED OSTEOPOROSIS WITHOUT CURRENT PATHOLOGICAL FRACTURE: ICD-10-CM

## 2019-07-15 LAB
ANION GAP SERPL CALC-SCNC: 13 MMOL/L (ref 8–16)
ANISOCYTOSIS BLD QL SMEAR: SLIGHT
BASOPHILS # BLD AUTO: 0.04 K/UL (ref 0–0.2)
BASOPHILS NFR BLD: 0.3 % (ref 0–1.9)
BUN SERPL-MCNC: 15 MG/DL (ref 8–23)
CALCIUM SERPL-MCNC: 10.5 MG/DL (ref 8.7–10.5)
CHLORIDE SERPL-SCNC: 103 MMOL/L (ref 95–110)
CO2 SERPL-SCNC: 24 MMOL/L (ref 23–29)
CREAT SERPL-MCNC: 0.7 MG/DL (ref 0.5–1.4)
DIFFERENTIAL METHOD: ABNORMAL
EOSINOPHIL # BLD AUTO: 0.5 K/UL (ref 0–0.5)
EOSINOPHIL NFR BLD: 3.7 % (ref 0–8)
ERYTHROCYTE [DISTWIDTH] IN BLOOD BY AUTOMATED COUNT: 16.4 % (ref 11.5–14.5)
EST. GFR  (AFRICAN AMERICAN): >60 ML/MIN/1.73 M^2
EST. GFR  (NON AFRICAN AMERICAN): >60 ML/MIN/1.73 M^2
GLUCOSE SERPL-MCNC: 91 MG/DL (ref 70–110)
HCT VFR BLD AUTO: 36.9 % (ref 37–48.5)
HGB BLD-MCNC: 11.6 G/DL (ref 12–16)
IMM GRANULOCYTES # BLD AUTO: 0.04 K/UL (ref 0–0.04)
IMM GRANULOCYTES NFR BLD AUTO: 0.3 % (ref 0–0.5)
LYMPHOCYTES # BLD AUTO: 5.9 K/UL (ref 1–4.8)
LYMPHOCYTES NFR BLD: 45.9 % (ref 18–48)
MCH RBC QN AUTO: 22.1 PG (ref 27–31)
MCHC RBC AUTO-ENTMCNC: 31.4 G/DL (ref 32–36)
MCV RBC AUTO: 70 FL (ref 82–98)
MONOCYTES # BLD AUTO: 0.6 K/UL (ref 0.3–1)
MONOCYTES NFR BLD: 4.3 % (ref 4–15)
NEUTROPHILS # BLD AUTO: 5.8 K/UL (ref 1.8–7.7)
NEUTROPHILS NFR BLD: 45.5 % (ref 38–73)
NRBC BLD-RTO: 0 /100 WBC
PLATELET # BLD AUTO: 258 K/UL (ref 150–350)
PLATELET BLD QL SMEAR: ABNORMAL
PMV BLD AUTO: 10.6 FL (ref 9.2–12.9)
POIKILOCYTOSIS BLD QL SMEAR: SLIGHT
POTASSIUM SERPL-SCNC: 4 MMOL/L (ref 3.5–5.1)
RBC # BLD AUTO: 5.26 M/UL (ref 4–5.4)
SODIUM SERPL-SCNC: 140 MMOL/L (ref 136–145)
WBC # BLD AUTO: 12.83 K/UL (ref 3.9–12.7)

## 2019-07-15 PROCEDURE — 99499 UNLISTED E&M SERVICE: CPT | Mod: HCNC,S$GLB,, | Performed by: INTERNAL MEDICINE

## 2019-07-15 PROCEDURE — 85025 COMPLETE CBC W/AUTO DIFF WBC: CPT | Mod: HCNC

## 2019-07-15 PROCEDURE — 99214 PR OFFICE/OUTPT VISIT, EST, LEVL IV, 30-39 MIN: ICD-10-PCS | Mod: HCNC,S$GLB,, | Performed by: INTERNAL MEDICINE

## 2019-07-15 PROCEDURE — 99214 OFFICE O/P EST MOD 30 MIN: CPT | Mod: HCNC,S$GLB,, | Performed by: INTERNAL MEDICINE

## 2019-07-15 PROCEDURE — 3078F DIAST BP <80 MM HG: CPT | Mod: HCNC,CPTII,S$GLB, | Performed by: INTERNAL MEDICINE

## 2019-07-15 PROCEDURE — 99999 PR PBB SHADOW E&M-EST. PATIENT-LVL III: CPT | Mod: PBBFAC,HCNC,, | Performed by: INTERNAL MEDICINE

## 2019-07-15 PROCEDURE — 1101F PT FALLS ASSESS-DOCD LE1/YR: CPT | Mod: HCNC,CPTII,S$GLB, | Performed by: INTERNAL MEDICINE

## 2019-07-15 PROCEDURE — 3075F SYST BP GE 130 - 139MM HG: CPT | Mod: HCNC,CPTII,S$GLB, | Performed by: INTERNAL MEDICINE

## 2019-07-15 PROCEDURE — 3075F PR MOST RECENT SYSTOLIC BLOOD PRESS GE 130-139MM HG: ICD-10-PCS | Mod: HCNC,CPTII,S$GLB, | Performed by: INTERNAL MEDICINE

## 2019-07-15 PROCEDURE — 99999 PR PBB SHADOW E&M-EST. PATIENT-LVL III: ICD-10-PCS | Mod: PBBFAC,HCNC,, | Performed by: INTERNAL MEDICINE

## 2019-07-15 PROCEDURE — 99499 RISK ADDL DX/OHS AUDIT: ICD-10-PCS | Mod: HCNC,S$GLB,, | Performed by: INTERNAL MEDICINE

## 2019-07-15 PROCEDURE — 80048 BASIC METABOLIC PNL TOTAL CA: CPT | Mod: HCNC

## 2019-07-15 PROCEDURE — 3078F PR MOST RECENT DIASTOLIC BLOOD PRESSURE < 80 MM HG: ICD-10-PCS | Mod: HCNC,CPTII,S$GLB, | Performed by: INTERNAL MEDICINE

## 2019-07-15 PROCEDURE — 36415 COLL VENOUS BLD VENIPUNCTURE: CPT | Mod: HCNC,PN

## 2019-07-15 PROCEDURE — 1101F PR PT FALLS ASSESS DOC 0-1 FALLS W/OUT INJ PAST YR: ICD-10-PCS | Mod: HCNC,CPTII,S$GLB, | Performed by: INTERNAL MEDICINE

## 2019-07-15 NOTE — PROGRESS NOTES
Subjective:       Patient ID: Cary Middleton is a 83 y.o. female.    Chief Complaint: Annual Exam    Last seen 11 months ago. Has been followed by Endocrinology for Diabetes, doing better. Had [facility] fall for severe back pain - this is resolved. Returns for annual exam with no acute complaints. Appetite is better, gained some weight. Fasting glucose ranges 115, 118 per history. Compliant with daily meds as prescribed, no adverse effects.     Past medical history: .  Hypertension. Negative stress test , EF 55%.   Hyperlipidemia. LDL 69 .  Diabetes type 2 without complications. HbA1c 6.6% May '19, Urine Microalbumin normal.   Osteoporosis, did not tolerate oral bisphosphonate.  Depression/anxiety.  DJD left hip.  Nephrolithiasis.  Chronic Microcytosis without anemia, H/H , MCV 66.  Leukocytosis.    Past surgical history: Partial hysterectomy. Cyst removed from left wrist. Bilateral Blepharoplasty and Left Cataract extraction.     Mammogram normal 10/15. BMD stable 12/15. Eye exam . Pelvic exam 2006. Colonoscopy outside  - normal, no polyps. Flu shot . Prevnar 10/15. Pneumovax . Zostavax . Tdap .    Social history: Nonsmoker, no alcohol. . 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 Retired teacher.    Family medical history: Heart disease. Mother had colon cancer. Sisters with thoracic aneurysm, CAD and pancreatic cancer.     Allergies: NKDA.    Medications: list reviewed and reconciled.                  Review of Systems   Constitutional: Negative for activity change, appetite change, fatigue, fever and unexpected weight change.   HENT: Negative for congestion, hearing loss, rhinorrhea, sneezing, sore throat, trouble swallowing and voice change.    Eyes: Negative for pain and visual disturbance.   Respiratory: Negative for cough, chest tightness, shortness of breath and wheezing.    Cardiovascular: Negative for chest pain,  "palpitations and leg swelling.   Gastrointestinal: Negative for abdominal pain, blood in stool, constipation, diarrhea, nausea and vomiting.   Genitourinary: Negative for difficulty urinating, dysuria, flank pain, frequency, hematuria, urgency and vaginal bleeding.   Musculoskeletal: Negative for arthralgias, back pain, joint swelling, myalgias and neck pain.   Skin: Negative for color change and rash.   Neurological: Negative for dizziness, syncope, facial asymmetry, speech difficulty, weakness, numbness and headaches.   Hematological: Negative for adenopathy. Does not bruise/bleed easily.   Psychiatric/Behavioral: Negative for agitation, dysphoric mood and sleep disturbance. The patient is not nervous/anxious.        Objective:    /68, Pulse 80, Temp 97.7, Ht 5' 4", Wt 139.6 lbs, BMI=23.95  Physical Exam   Constitutional: She is oriented to person, place, and time. She appears well-developed and well-nourished. No distress.   Appropriately groomed, ambulatory with a normal gait using no mobility aid, here alone.    HENT:   Head: Normocephalic and atraumatic.   Right Ear: External ear normal.   Left Ear: External ear normal.   Nose: Nose normal.   Mouth/Throat: Oropharynx is clear and moist. No oropharyngeal exudate.   Eyes: Pupils are equal, round, and reactive to light. Conjunctivae and EOM are normal. Right conjunctiva is not injected. Left conjunctiva is not injected. No scleral icterus.   Neck: Normal range of motion. Neck supple. No JVD present. Carotid bruit is not present. No thyromegaly present.   Cardiovascular: Normal rate, regular rhythm, normal heart sounds and intact distal pulses. Exam reveals no gallop and no friction rub.   No murmur heard.  Pulmonary/Chest: Effort normal and breath sounds normal. No respiratory distress. She has no wheezes. She has no rhonchi. She has no rales.   Abdominal: Soft. Bowel sounds are normal. She exhibits no distension and no mass. There is no hepatosplenomegaly. " There is no tenderness. There is no CVA tenderness.   Musculoskeletal: Normal range of motion. She exhibits no edema, tenderness or deformity.   Lymphadenopathy:     She has no cervical adenopathy.   Neurological: She is alert and oriented to person, place, and time. She has normal strength and normal reflexes. No cranial nerve deficit. She exhibits normal muscle tone. Coordination and gait normal.   Skin: Skin is warm and dry. No lesion and no rash noted. She is not diaphoretic. No cyanosis or erythema. No pallor. Nails show no clubbing.   Psychiatric: She has a normal mood and affect. Her behavior is normal. Judgment and thought content normal.   Vitals reviewed.      Assessment:       1. Essential hypertension    2. Hyperlipidemia, unspecified hyperlipidemia type    3. Type 2 diabetes mellitus with both eyes affected by mild nonproliferative retinopathy without macular edema, without long-term current use of insulin    4. Aortic atherosclerosis    5. Age-related osteoporosis without current pathological fracture    6. Major depressive disorder, single episode, in full remission        Plan:       Essential hypertension controlled, continue same.   -     CBC auto differential  -     Basic metabolic panel today.    Hyperlipidemia, unspecified hyperlipidemia type - controlled, continue Atorvastatin.     Type 2 diabetes mellitus with both eyes affected by mild nonproliferative retinopathy without macular edema, without long-term current use of insulin - controlled.    Aortic atherosclerosis - asymptomatic, continue Atorvastatin.     Age-related osteoporosis without current pathological fracture  -     DXA Bone Density Spine And Hip; Future; Expected date: 07/15/2019    Major depressive disorder, single episode, in full remission - stable on Lexapro.     Shingrix recommended at the pharmacy.

## 2019-09-24 ENCOUNTER — IMMUNIZATION (OUTPATIENT)
Dept: PHARMACY | Facility: CLINIC | Age: 84
End: 2019-09-24

## 2019-09-24 ENCOUNTER — IMMUNIZATION (OUTPATIENT)
Dept: PHARMACY | Facility: CLINIC | Age: 84
End: 2019-09-24
Payer: MEDICARE

## 2019-11-13 ENCOUNTER — LAB VISIT (OUTPATIENT)
Dept: LAB | Facility: HOSPITAL | Age: 84
End: 2019-11-13
Attending: NURSE PRACTITIONER
Payer: MEDICARE

## 2019-11-13 DIAGNOSIS — E55.9 VITAMIN D DEFICIENCY: ICD-10-CM

## 2019-11-13 DIAGNOSIS — E11.3293 TYPE 2 DIABETES MELLITUS WITH BOTH EYES AFFECTED BY MILD NONPROLIFERATIVE RETINOPATHY WITHOUT MACULAR EDEMA, WITHOUT LONG-TERM CURRENT USE OF INSULIN: ICD-10-CM

## 2019-11-13 LAB
25(OH)D3+25(OH)D2 SERPL-MCNC: 42 NG/ML (ref 30–96)
ALBUMIN SERPL BCP-MCNC: 3.9 G/DL (ref 3.5–5.2)
ALP SERPL-CCNC: 76 U/L (ref 55–135)
ALT SERPL W/O P-5'-P-CCNC: 6 U/L (ref 10–44)
ANION GAP SERPL CALC-SCNC: 10 MMOL/L (ref 8–16)
AST SERPL-CCNC: 13 U/L (ref 10–40)
BILIRUB SERPL-MCNC: 0.4 MG/DL (ref 0.1–1)
BUN SERPL-MCNC: 14 MG/DL (ref 8–23)
CALCIUM SERPL-MCNC: 9.4 MG/DL (ref 8.7–10.5)
CHLORIDE SERPL-SCNC: 107 MMOL/L (ref 95–110)
CO2 SERPL-SCNC: 26 MMOL/L (ref 23–29)
CREAT SERPL-MCNC: 0.7 MG/DL (ref 0.5–1.4)
EST. GFR  (AFRICAN AMERICAN): >60 ML/MIN/1.73 M^2
EST. GFR  (NON AFRICAN AMERICAN): >60 ML/MIN/1.73 M^2
ESTIMATED AVG GLUCOSE: 151 MG/DL (ref 68–131)
GLUCOSE SERPL-MCNC: 132 MG/DL (ref 70–110)
HBA1C MFR BLD HPLC: 6.9 % (ref 4–5.6)
POTASSIUM SERPL-SCNC: 4.2 MMOL/L (ref 3.5–5.1)
PROT SERPL-MCNC: 7.4 G/DL (ref 6–8.4)
SODIUM SERPL-SCNC: 143 MMOL/L (ref 136–145)
TSH SERPL DL<=0.005 MIU/L-ACNC: 0.74 UIU/ML (ref 0.4–4)

## 2019-11-13 PROCEDURE — 84443 ASSAY THYROID STIM HORMONE: CPT | Mod: HCNC

## 2019-11-13 PROCEDURE — 36415 COLL VENOUS BLD VENIPUNCTURE: CPT | Mod: HCNC,PO

## 2019-11-13 PROCEDURE — 82306 VITAMIN D 25 HYDROXY: CPT | Mod: HCNC

## 2019-11-13 PROCEDURE — 80053 COMPREHEN METABOLIC PANEL: CPT | Mod: HCNC

## 2019-11-13 PROCEDURE — 83036 HEMOGLOBIN GLYCOSYLATED A1C: CPT | Mod: HCNC

## 2019-11-20 ENCOUNTER — OFFICE VISIT (OUTPATIENT)
Dept: ENDOCRINOLOGY | Facility: CLINIC | Age: 84
End: 2019-11-20
Payer: MEDICARE

## 2019-11-20 VITALS
HEART RATE: 68 BPM | BODY MASS INDEX: 23.14 KG/M2 | DIASTOLIC BLOOD PRESSURE: 74 MMHG | TEMPERATURE: 98 F | WEIGHT: 135.56 LBS | HEIGHT: 64 IN | SYSTOLIC BLOOD PRESSURE: 118 MMHG

## 2019-11-20 DIAGNOSIS — M85.80 OSTEOPENIA, UNSPECIFIED LOCATION: ICD-10-CM

## 2019-11-20 DIAGNOSIS — I10 ESSENTIAL HYPERTENSION: ICD-10-CM

## 2019-11-20 DIAGNOSIS — E78.5 HYPERLIPIDEMIA, UNSPECIFIED HYPERLIPIDEMIA TYPE: Chronic | ICD-10-CM

## 2019-11-20 DIAGNOSIS — E11.3293 TYPE 2 DIABETES MELLITUS WITH BOTH EYES AFFECTED BY MILD NONPROLIFERATIVE RETINOPATHY WITHOUT MACULAR EDEMA, WITHOUT LONG-TERM CURRENT USE OF INSULIN: Primary | ICD-10-CM

## 2019-11-20 PROCEDURE — 99999 PR PBB SHADOW E&M-EST. PATIENT-LVL III: CPT | Mod: PBBFAC,HCNC,, | Performed by: NURSE PRACTITIONER

## 2019-11-20 PROCEDURE — 99999 PR PBB SHADOW E&M-EST. PATIENT-LVL III: ICD-10-PCS | Mod: PBBFAC,HCNC,, | Performed by: NURSE PRACTITIONER

## 2019-11-20 PROCEDURE — 99213 PR OFFICE/OUTPT VISIT, EST, LEVL III, 20-29 MIN: ICD-10-PCS | Mod: HCNC,S$GLB,, | Performed by: NURSE PRACTITIONER

## 2019-11-20 PROCEDURE — 99213 OFFICE O/P EST LOW 20 MIN: CPT | Mod: HCNC,S$GLB,, | Performed by: NURSE PRACTITIONER

## 2019-11-20 RX ORDER — GLIMEPIRIDE 2 MG/1
2 TABLET ORAL
Qty: 90 TABLET | Refills: 3 | Status: SHIPPED | OUTPATIENT
Start: 2019-11-20 | End: 2020-11-04

## 2019-11-20 RX ORDER — METFORMIN HYDROCHLORIDE 500 MG/1
1000 TABLET ORAL 2 TIMES DAILY WITH MEALS
Qty: 360 TABLET | Refills: 3 | Status: SHIPPED | OUTPATIENT
Start: 2019-11-20 | End: 2020-11-04 | Stop reason: SDUPTHER

## 2019-11-20 NOTE — PROGRESS NOTES
CC: This 83 y.o. female presents for management of diabetes mellitus  and chronic conditions pending review including HTN, HLP, vitamin d deficiency, osteopenia, depression     HPI: She was diagnosed with T2DM in since age 65. Has never been hospitalized r/t DM.  Family hx of DM: father  Denies missing doses of DM medication.     Leaving Saturday for a cruise- will return on Thanksgiving day- whole family is going!    hypoglycemia at home- none    monitoring BG at home: fasting           Diet: Eats 3 Meals a day, snacks occasionally on fruit- ie apple  Exercise: twice a week 1hr [facility]   CURRENT DM MEDS: metformin 1000 mg bid; glimepiride 2mg qam, januvia 100 mg qday  Glucometer type:  True Metrix 2018    Standards of Care:  Eye exam:   6/2019 + h/o retinopathy     ROS:   Gen: Appetite good, no weight gain or loss, denies fatigue and weakness.  Skin: Skin is intact and heals well, no rashes, no hair changes  Eyes: Denies visual disturbances  Resp: no SOB or MORELOS, no cough  Cardiac: No palpitations, chest pain, no edema   GI: + nausea, no vomiting, diarrhea, constipation, or abdominal pain.  /GYN: 2+ nocturia,no burning or pain.   MS/Neuro:no numbness/buring/tingling in BLE; Gait steady, speech clear  Psych: Denies drug/ETOH abuse,+ depression.  Other systems: negative.    PE:  GENERAL: Well developed, well nourished.  PSYCH: AAOx3, appropriate mood and affect, pleasant expression, conversant, appears relaxed, well groomed.   EYES: Conjunctiva, corneas clear  NECK: Supple, trachea midline   NEURO: Gait steady  SKIN: Skin warm and dry no acanthosis nigracans.   FOOT EXAMINATION: 11/20/19  No foot deformity, corns or callus formation,  nails in good condiiton and well trimmed, no interspace maceration or ulceration noted.  Decreased hair growth present over toes/feet.  Protective sensation intact with 10 gram monofilament.  +2 dorsalis pedis and posterior pulses noted.    Hemoglobin A1C   Date Value Ref Range  Status   11/13/2019 6.9 (H) 4.0 - 5.6 % Final     Comment:     ADA Screening Guidelines:  5.7-6.4%  Consistent with prediabetes  >or=6.5%  Consistent with diabetes  High levels of fetal hemoglobin interfere with the HbA1C  assay. Heterozygous hemoglobin variants (HbS, HgC, etc)do  not significantly interfere with this assay.   However, presence of multiple variants may affect accuracy.     05/20/2019 6.6 (H) 4.0 - 5.6 % Final     Comment:     ADA Screening Guidelines:  5.7-6.4%  Consistent with prediabetes  >or=6.5%  Consistent with diabetes  High levels of fetal hemoglobin interfere with the HbA1C  assay. Heterozygous hemoglobin variants (HbS, HgC, etc)do  not significantly interfere with this assay.   However, presence of multiple variants may affect accuracy.     11/19/2018 7.1 (H) 4.0 - 5.6 % Final     Comment:     ADA Screening Guidelines:  5.7-6.4%  Consistent with prediabetes  >or=6.5%  Consistent with diabetes  High levels of fetal hemoglobin interfere with the HbA1C  assay. Heterozygous hemoglobin variants (HbS, HgC, etc)do  not significantly interfere with this assay.   However, presence of multiple variants may affect accuracy.          ASSESSMENT and PLAN:    1. T2DM with  retinopathy-   Continue Glimiperide 2 mg qam, januvia 100 mg qam, metformin 1000 mg bid  Discussed A1c and BG goals (7-7.5%)   Notify me if bg > 180 consistently    Check bg bid       - takes ASA, statin    2. HTN - controlled, continue meds as previously prescribed and monitor.     3. HLP - on statin therapy, LFTs WNL. Recheck w RTC    4. Depression- stable, chronic on lexapro     5. Osteopenia- Vitamin d level at goal- continue D3 1000 IU qd, managed by PCP      Follow-up: in 6 months with lab prior

## 2019-12-17 RX ORDER — SITAGLIPTIN 100 MG/1
TABLET, FILM COATED ORAL
Qty: 90 TABLET | Refills: 3 | Status: SHIPPED | OUTPATIENT
Start: 2019-12-17 | End: 2020-11-04 | Stop reason: SDUPTHER

## 2020-01-28 NOTE — TELEPHONE ENCOUNTER
----- Message from Angela Baez sent at 1/28/2020  2:33 PM CST -----  Contact: [facility] [phone]  Pt needs a refill on her test strips and lancets please call back to discuss

## 2020-01-29 RX ORDER — LANCETS
EACH MISCELLANEOUS
Qty: 100 EACH | Refills: 12 | Status: SHIPPED | OUTPATIENT
Start: 2020-01-29

## 2020-01-30 ENCOUNTER — OFFICE VISIT (OUTPATIENT)
Dept: PRIMARY CARE CLINIC | Facility: CLINIC | Age: 85
End: 2020-01-30
Payer: MEDICARE

## 2020-01-30 VITALS
BODY MASS INDEX: 23.22 KG/M2 | DIASTOLIC BLOOD PRESSURE: 65 MMHG | SYSTOLIC BLOOD PRESSURE: 123 MMHG | WEIGHT: 136 LBS | HEART RATE: 85 BPM | RESPIRATION RATE: 19 BRPM | TEMPERATURE: 98 F | HEIGHT: 64 IN | OXYGEN SATURATION: 96 %

## 2020-01-30 DIAGNOSIS — S46.112A STRAIN OF MUSCLE, FASCIA AND TENDON OF LONG HEAD OF BICEPS, LEFT ARM, INITIAL ENCOUNTER: ICD-10-CM

## 2020-01-30 DIAGNOSIS — D50.9 MICROCYTIC ANEMIA: ICD-10-CM

## 2020-01-30 DIAGNOSIS — E11.3293 TYPE 2 DIABETES MELLITUS WITH BOTH EYES AFFECTED BY MILD NONPROLIFERATIVE RETINOPATHY WITHOUT MACULAR EDEMA, WITHOUT LONG-TERM CURRENT USE OF INSULIN: Primary | ICD-10-CM

## 2020-01-30 DIAGNOSIS — F32.5 MAJOR DEPRESSIVE DISORDER, SINGLE EPISODE, IN FULL REMISSION: ICD-10-CM

## 2020-01-30 DIAGNOSIS — I10 ESSENTIAL HYPERTENSION: ICD-10-CM

## 2020-01-30 DIAGNOSIS — M81.0 AGE-RELATED OSTEOPOROSIS WITHOUT CURRENT PATHOLOGICAL FRACTURE: ICD-10-CM

## 2020-01-30 DIAGNOSIS — I70.0 AORTIC ATHEROSCLEROSIS: ICD-10-CM

## 2020-01-30 DIAGNOSIS — E78.5 HYPERLIPIDEMIA, UNSPECIFIED HYPERLIPIDEMIA TYPE: ICD-10-CM

## 2020-01-30 PROCEDURE — 99499 UNLISTED E&M SERVICE: CPT | Mod: HCNC,S$GLB,, | Performed by: INTERNAL MEDICINE

## 2020-01-30 PROCEDURE — 3074F PR MOST RECENT SYSTOLIC BLOOD PRESSURE < 130 MM HG: ICD-10-PCS | Mod: HCNC,CPTII,S$GLB, | Performed by: INTERNAL MEDICINE

## 2020-01-30 PROCEDURE — 1126F AMNT PAIN NOTED NONE PRSNT: CPT | Mod: HCNC,S$GLB,, | Performed by: INTERNAL MEDICINE

## 2020-01-30 PROCEDURE — 3078F PR MOST RECENT DIASTOLIC BLOOD PRESSURE < 80 MM HG: ICD-10-PCS | Mod: HCNC,CPTII,S$GLB, | Performed by: INTERNAL MEDICINE

## 2020-01-30 PROCEDURE — 1126F PR PAIN SEVERITY QUANTIFIED, NO PAIN PRESENT: ICD-10-PCS | Mod: HCNC,S$GLB,, | Performed by: INTERNAL MEDICINE

## 2020-01-30 PROCEDURE — 99999 PR PBB SHADOW E&M-EST. PATIENT-LVL III: CPT | Mod: PBBFAC,HCNC,, | Performed by: INTERNAL MEDICINE

## 2020-01-30 PROCEDURE — 3074F SYST BP LT 130 MM HG: CPT | Mod: HCNC,CPTII,S$GLB, | Performed by: INTERNAL MEDICINE

## 2020-01-30 PROCEDURE — 99499 RISK ADDL DX/OHS AUDIT: ICD-10-PCS | Mod: HCNC,S$GLB,, | Performed by: INTERNAL MEDICINE

## 2020-01-30 PROCEDURE — 1159F MED LIST DOCD IN RCRD: CPT | Mod: HCNC,S$GLB,, | Performed by: INTERNAL MEDICINE

## 2020-01-30 PROCEDURE — 99214 PR OFFICE/OUTPT VISIT, EST, LEVL IV, 30-39 MIN: ICD-10-PCS | Mod: HCNC,S$GLB,, | Performed by: INTERNAL MEDICINE

## 2020-01-30 PROCEDURE — 1159F PR MEDICATION LIST DOCUMENTED IN MEDICAL RECORD: ICD-10-PCS | Mod: HCNC,S$GLB,, | Performed by: INTERNAL MEDICINE

## 2020-01-30 PROCEDURE — 99999 PR PBB SHADOW E&M-EST. PATIENT-LVL III: ICD-10-PCS | Mod: PBBFAC,HCNC,, | Performed by: INTERNAL MEDICINE

## 2020-01-30 PROCEDURE — 1101F PT FALLS ASSESS-DOCD LE1/YR: CPT | Mod: HCNC,CPTII,S$GLB, | Performed by: INTERNAL MEDICINE

## 2020-01-30 PROCEDURE — 3078F DIAST BP <80 MM HG: CPT | Mod: HCNC,CPTII,S$GLB, | Performed by: INTERNAL MEDICINE

## 2020-01-30 PROCEDURE — 1101F PR PT FALLS ASSESS DOC 0-1 FALLS W/OUT INJ PAST YR: ICD-10-PCS | Mod: HCNC,CPTII,S$GLB, | Performed by: INTERNAL MEDICINE

## 2020-01-30 PROCEDURE — 99214 OFFICE O/P EST MOD 30 MIN: CPT | Mod: HCNC,S$GLB,, | Performed by: INTERNAL MEDICINE

## 2020-01-30 RX ORDER — ESCITALOPRAM OXALATE 10 MG/1
10 TABLET ORAL DAILY
Qty: 90 TABLET | Refills: 1 | Status: SHIPPED | OUTPATIENT
Start: 2020-01-30 | End: 2020-10-02 | Stop reason: SDUPTHER

## 2020-01-30 RX ORDER — AMLODIPINE BESYLATE 10 MG/1
10 TABLET ORAL DAILY
Qty: 90 TABLET | Refills: 1 | Status: SHIPPED | OUTPATIENT
Start: 2020-01-30 | End: 2020-10-02 | Stop reason: SDUPTHER

## 2020-01-30 NOTE — PROGRESS NOTES
Subjective:       Patient ID: Cary Middleton is a 84 y.o. female.    Chief Complaint: Follow-up    Last seen 6 months ago. Has had f/u with Endo since then, Glimepiride decreased from 4 to 2mg daily. Diabetes is adequately controlled. No home monitoring reported today.     PMH: .  Hypertension. Negative stress test , EF 55%.   Hyperlipidemia. LDL 69 .  Diabetes type 2 without complications. HbA1c 6.9% .   Osteoporosis, did not tolerate oral bisphosphonate.  Depression/anxiety.  DJD left hip.  Nephrolithiasis.  Chronic Microcytosis without anemia, H/H , MCV 66.  Leukocytosis.    PSH: Partial hysterectomy. Cyst removed from left wrist. Bilateral Blepharoplasty and Left Cataract extraction.     Mammogram normal 10/15. BMD stable 12/15. Eye exam 3/18, Eye screening photo . Pelvic exam 2006. Colonoscopy outside  - normal, no polyps. Prevnar 10/15. Pneumovax . Zostavax . Tdap . Shingrix , . Flu shot .     Social history: Nonsmoker, no alcohol. . 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 Retired teacher. Still drives.     FMH: Heart disease. Mother had colon cancer. Sisters with thoracic aneurysm, CAD and pancreatic cancer. Leukemia in a cousin as an adult.     Allergies: NKDA.    Medications: list reviewed and reconciled.                  Review of Systems   Constitutional: Negative for activity change, appetite change, fatigue, fever and unexpected weight change.   HENT: Negative for congestion, hearing loss, rhinorrhea, sneezing, sore throat, trouble swallowing and voice change.    Eyes: Negative for pain and visual disturbance.   Respiratory: Negative for cough, chest tightness, shortness of breath and wheezing.    Cardiovascular: Negative for chest pain, palpitations and leg swelling.   Gastrointestinal: Negative for abdominal pain, blood in stool, constipation, diarrhea, nausea and vomiting.   Genitourinary: Negative for difficulty urinating,  "dysuria, flank pain, frequency, hematuria, urgency and vaginal bleeding.   Musculoskeletal: Negative for arthralgias, back pain, joint swelling, myalgias and neck pain.        Non-traumatic pain in left upper arm - at biceps near deltoid. No arm swelling, mass, discoloration, weakness.    Skin: Negative for color change and rash.   Neurological: Negative for dizziness, syncope, facial asymmetry, speech difficulty, weakness, numbness and headaches.   Hematological: Negative for adenopathy. Does not bruise/bleed easily.   Psychiatric/Behavioral: Negative for agitation, dysphoric mood and sleep disturbance. The patient is not nervous/anxious.        Objective:    /65, Pulse 85, Temp 98.2, O2 Sat 96%, Ht 5' 4", Wt 136 lbs (from 139), BMI=23.35  Physical Exam   Constitutional: She is oriented to person, place, and time. She appears well-developed and well-nourished. No distress.   HENT:   Head: Normocephalic and atraumatic.   Right Ear: External ear normal.   Left Ear: External ear normal.   Nose: Nose normal.   Mouth/Throat: Oropharynx is clear and moist. No oropharyngeal exudate.   Eyes: Pupils are equal, round, and reactive to light. Conjunctivae and EOM are normal. Right conjunctiva is not injected. Left conjunctiva is not injected. No scleral icterus.   Neck: Normal range of motion. Neck supple. No JVD present. Carotid bruit is not present. No thyromegaly present.   Cardiovascular: Normal rate, regular rhythm, normal heart sounds and intact distal pulses. Exam reveals no gallop and no friction rub.   No murmur heard.  Pulmonary/Chest: Effort normal and breath sounds normal. No respiratory distress. She has no wheezes. She has no rhonchi. She has no rales.   Abdominal: Soft. Bowel sounds are normal. She exhibits no distension and no mass. There is no hepatosplenomegaly. There is no tenderness. There is no CVA tenderness.   Musculoskeletal: Normal range of motion. She exhibits no edema, tenderness or deformity. "   Left arm grossly normal in appearance with no edema or ischemia, no deformity, mass, joint effusion, non-tender, pain in biceps reproduced with movement.    Lymphadenopathy:     She has no cervical adenopathy.   Neurological: She is alert and oriented to person, place, and time. She has normal strength and normal reflexes. No cranial nerve deficit. She exhibits normal muscle tone. Coordination and gait normal.   Skin: Skin is warm and dry. No lesion and no rash noted. She is not diaphoretic. No cyanosis or erythema. No pallor. Nails show no clubbing.   Psychiatric: She has a normal mood and affect. Her behavior is normal. Judgment and thought content normal.   Vitals reviewed.      Assessment:       1. Type 2 diabetes mellitus with both eyes affected by mild nonproliferative retinopathy without macular edema, without long-term current use of insulin    2. Essential hypertension    3. Hyperlipidemia, unspecified hyperlipidemia type    4. Aortic atherosclerosis    5. Major depressive disorder, single episode, in full remission    6. Age-related osteoporosis without current pathological fracture    7. Microcytic anemia        Plan:       Type 2 diabetes mellitus with both eyes affected by mild nonproliferative retinopathy without macular edema, without long-term current use of insulin        -     Controlled, continue same. Eye exam to be scheduled.     Essential hypertension controlled, continue same.   -     amLODIPine (NORVASC) 10 MG tablet; Take 1 tablet (10 mg total) by mouth once daily.  Dispense: 90 tablet; Refill: 1    Hyperlipidemia, unspecified hyperlipidemia type  -     Lipid panel; Future; Expected date: 01/30/2020    Aortic atherosclerosis - continue Atorvastatin.     Major depressive disorder, single episode, in full remission  -     escitalopram oxalate (LEXAPRO) 10 MG tablet; Take 1 tablet (10 mg total) by mouth once daily.  Dispense: 90 tablet; Refill: 1    Age-related osteoporosis without current  pathological fracture        -     Bone Density ordered last visit to be scheduled.    Microcytic anemia  -     CBC auto differential; Future; Expected date: 01/30/2020  -     Iron and TIBC; Future; Expected date: 01/30/2020  -     Ferritin; Future; Expected date: 01/30/2020    Left upper arm pain consistent with muscle strain - Tylenol prn.

## 2020-02-06 ENCOUNTER — LAB VISIT (OUTPATIENT)
Dept: LAB | Facility: HOSPITAL | Age: 85
End: 2020-02-06
Attending: INTERNAL MEDICINE
Payer: MEDICARE

## 2020-02-06 DIAGNOSIS — D50.9 MICROCYTIC ANEMIA: ICD-10-CM

## 2020-02-06 DIAGNOSIS — E78.5 HYPERLIPIDEMIA, UNSPECIFIED HYPERLIPIDEMIA TYPE: ICD-10-CM

## 2020-02-06 LAB
BASOPHILS # BLD AUTO: 0.05 K/UL (ref 0–0.2)
BASOPHILS NFR BLD: 0.5 % (ref 0–1.9)
CHOLEST SERPL-MCNC: 133 MG/DL (ref 120–199)
CHOLEST/HDLC SERPL: 2.6 {RATIO} (ref 2–5)
DIFFERENTIAL METHOD: ABNORMAL
EOSINOPHIL # BLD AUTO: 0.2 K/UL (ref 0–0.5)
EOSINOPHIL NFR BLD: 1.4 % (ref 0–8)
ERYTHROCYTE [DISTWIDTH] IN BLOOD BY AUTOMATED COUNT: 16.4 % (ref 11.5–14.5)
FERRITIN SERPL-MCNC: 54 NG/ML (ref 20–300)
HCT VFR BLD AUTO: 38.7 % (ref 37–48.5)
HDLC SERPL-MCNC: 52 MG/DL (ref 40–75)
HDLC SERPL: 39.1 % (ref 20–50)
HGB BLD-MCNC: 11.7 G/DL (ref 12–16)
IMM GRANULOCYTES # BLD AUTO: 0.02 K/UL (ref 0–0.04)
IMM GRANULOCYTES NFR BLD AUTO: 0.2 % (ref 0–0.5)
IRON SERPL-MCNC: 64 UG/DL (ref 30–160)
LDLC SERPL CALC-MCNC: 69.4 MG/DL (ref 63–159)
LYMPHOCYTES # BLD AUTO: 5.9 K/UL (ref 1–4.8)
LYMPHOCYTES NFR BLD: 53.2 % (ref 18–48)
MCH RBC QN AUTO: 21.7 PG (ref 27–31)
MCHC RBC AUTO-ENTMCNC: 30.2 G/DL (ref 32–36)
MCV RBC AUTO: 72 FL (ref 82–98)
MONOCYTES # BLD AUTO: 0.5 K/UL (ref 0.3–1)
MONOCYTES NFR BLD: 4.4 % (ref 4–15)
NEUTROPHILS # BLD AUTO: 4.5 K/UL (ref 1.8–7.7)
NEUTROPHILS NFR BLD: 40.3 % (ref 38–73)
NONHDLC SERPL-MCNC: 81 MG/DL
NRBC BLD-RTO: 0 /100 WBC
PLATELET # BLD AUTO: 215 K/UL (ref 150–350)
PMV BLD AUTO: 10.9 FL (ref 9.2–12.9)
RBC # BLD AUTO: 5.39 M/UL (ref 4–5.4)
SATURATED IRON: 16 % (ref 20–50)
TOTAL IRON BINDING CAPACITY: 406 UG/DL (ref 250–450)
TRANSFERRIN SERPL-MCNC: 274 MG/DL (ref 200–375)
TRIGL SERPL-MCNC: 58 MG/DL (ref 30–150)
WBC # BLD AUTO: 11.1 K/UL (ref 3.9–12.7)

## 2020-02-06 PROCEDURE — 36415 COLL VENOUS BLD VENIPUNCTURE: CPT | Mod: HCNC

## 2020-02-06 PROCEDURE — 82728 ASSAY OF FERRITIN: CPT | Mod: HCNC

## 2020-02-06 PROCEDURE — 80061 LIPID PANEL: CPT | Mod: HCNC

## 2020-02-06 PROCEDURE — 85025 COMPLETE CBC W/AUTO DIFF WBC: CPT | Mod: HCNC

## 2020-02-06 PROCEDURE — 83540 ASSAY OF IRON: CPT | Mod: HCNC

## 2020-02-28 ENCOUNTER — HOSPITAL ENCOUNTER (OUTPATIENT)
Dept: RADIOLOGY | Facility: CLINIC | Age: 85
Discharge: HOME | End: 2020-02-28
Attending: INTERNAL MEDICINE
Payer: MEDICARE

## 2020-02-28 ENCOUNTER — PES CALL (OUTPATIENT)
Dept: ADMINISTRATIVE | Facility: CLINIC | Age: 85
End: 2020-02-28

## 2020-02-28 DIAGNOSIS — M81.0 AGE-RELATED OSTEOPOROSIS WITHOUT CURRENT PATHOLOGICAL FRACTURE: ICD-10-CM

## 2020-02-28 PROCEDURE — 77080 DXA BONE DENSITY AXIAL: CPT | Mod: TC,HCNC

## 2020-02-28 PROCEDURE — 77080 DXA BONE DENSITY AXIAL: CPT | Mod: 26,HCNC,, | Performed by: INTERNAL MEDICINE

## 2020-02-28 PROCEDURE — 77080 DEXA BONE DENSITY SPINE HIP: ICD-10-PCS | Mod: 26,HCNC,, | Performed by: INTERNAL MEDICINE

## 2020-04-20 DIAGNOSIS — E78.5 HYPERLIPIDEMIA, UNSPECIFIED HYPERLIPIDEMIA TYPE: Chronic | ICD-10-CM

## 2020-04-20 RX ORDER — ATORVASTATIN CALCIUM 20 MG/1
20 TABLET, FILM COATED ORAL DAILY
Qty: 90 TABLET | Refills: 3 | Status: SHIPPED | OUTPATIENT
Start: 2020-04-20 | End: 2021-04-05 | Stop reason: SDUPTHER

## 2020-04-20 RX ORDER — ATORVASTATIN CALCIUM 20 MG/1
TABLET, FILM COATED ORAL
Qty: 90 TABLET | Refills: 3 | Status: SHIPPED | OUTPATIENT
Start: 2020-04-20 | End: 2020-04-20 | Stop reason: SDUPTHER

## 2020-04-20 NOTE — TELEPHONE ENCOUNTER
----- Message from Micha Simpson sent at 4/20/2020  3:29 PM CDT -----  Contact[facility]  Type:  RX Refill Request    Who Called:  [facility]  Refill or New Rx:  refill  RX Name and Strength:  atorvastatin (LIPITOR) 20 MG tablet  How is the patient currently taking it? (ex. 1XDay):  1 x day  Is this a 30 day or 90 day RX:  90  Preferred Pharmacy with phone number:      [facility] - [place], [place] - [address] [facility]  [address]  [facility] [address]  Phone: [phone] Fax: [phone]    Local or Mail Order:    Ordering Provider:    Best Call Back Number:  [phone]  Additional Information:

## 2020-04-29 ENCOUNTER — OFFICE VISIT (OUTPATIENT)
Dept: ENDOCRINOLOGY | Facility: CLINIC | Age: 85
End: 2020-04-29
Payer: MEDICARE

## 2020-04-29 DIAGNOSIS — I10 ESSENTIAL HYPERTENSION: ICD-10-CM

## 2020-04-29 DIAGNOSIS — E78.5 HYPERLIPIDEMIA, UNSPECIFIED HYPERLIPIDEMIA TYPE: Chronic | ICD-10-CM

## 2020-04-29 DIAGNOSIS — E11.3293 TYPE 2 DIABETES MELLITUS WITH BOTH EYES AFFECTED BY MILD NONPROLIFERATIVE RETINOPATHY WITHOUT MACULAR EDEMA, WITHOUT LONG-TERM CURRENT USE OF INSULIN: Primary | ICD-10-CM

## 2020-04-29 DIAGNOSIS — M85.80 OSTEOPENIA, UNSPECIFIED LOCATION: ICD-10-CM

## 2020-04-29 PROCEDURE — 99442 PR PHYSICIAN TELEPHONE EVALUATION 11-20 MIN: CPT | Mod: HCNC,95,, | Performed by: NURSE PRACTITIONER

## 2020-04-29 PROCEDURE — 99442 PR PHYSICIAN TELEPHONE EVALUATION 11-20 MIN: ICD-10-PCS | Mod: HCNC,95,, | Performed by: NURSE PRACTITIONER

## 2020-04-29 NOTE — PROGRESS NOTES
Established Patient - Audio Only Telehealth Visit     The patient location is: home  The chief complaint leading to consultation is: 15 minutes  Visit type: Virtual visit with audio only (telephone)     The reason for the audio only service rather than synchronous audio and video virtual visit was related to technical difficulties or patient preference/necessity.     Each patient to whom I provide medical services by telemedicine is:  (1) informed of the relationship between the physician and patient and the respective role of any other health care provider with respect to management of the patient; and (2) notified that they may decline to receive medical services by telemedicine and may withdraw from such care at any time. Patient verbally consented to receive this service via voice-only telephone call.     CC: This 84 y.o. female presents for management of diabetes mellitus  and chronic conditions pending review including HTN, HLP, vitamin d deficiency, osteopenia, depression     HPI: She was diagnosed with T2DM in since age 65. Has never been hospitalized r/t DM.  Family hx of DM: father  Denies missing doses of DM medication.   Staying home since March 18th. Her family has been bringing her food and groceries        hypoglycemia at home- none    monitoring BG at home: fasting   116-140s    Diet: Eats 3 Meals a day, snacks occasionally on fruit- ie apple  Exercise: doing stretches at home ( has not been able to participate in her [employer] exercise)  CURRENT DM MEDS: metformin 1000 mg bid; glimepiride 2mg qam, januvia 100 mg qday  Glucometer type:  True Metrix 2018    Standards of Care:  Eye exam:   6/2019 + h/o retinopathy     ROS:   Gen: Appetite good, no weight gain or loss, denies fatigue and weakness.  Skin: Skin is intact and heals well, no rashes, no hair changes  Eyes: Denies visual disturbances  Resp: no SOB or MORELOS, no cough  Cardiac: No palpitations, chest pain, no edema   GI: + nausea- on and off-  after taking meds, no vomiting, diarrhea, constipation, or abdominal pain.  /GYN: 1-2+ nocturia,no burning or pain.   MS/Neuro:no numbness/buring/tingling in BLE; mspeech clear  Psych: Denies drug/ETOH abuse,+  depression.  Other systems: negative.    PE: deferred   FOOT EXAMINATION: 11/20/19  No foot deformity, corns or callus formation,  nails in good condiiton and well trimmed, no interspace maceration or ulceration noted.  Decreased hair growth present over toes/feet.  Protective sensation intact with 10 gram monofilament.  +2 dorsalis pedis and posterior pulses noted.    Hemoglobin A1C   Date Value Ref Range Status   11/13/2019 6.9 (H) 4.0 - 5.6 % Final     Comment:     ADA Screening Guidelines:  5.7-6.4%  Consistent with prediabetes  >or=6.5%  Consistent with diabetes  High levels of fetal hemoglobin interfere with the HbA1C  assay. Heterozygous hemoglobin variants (HbS, HgC, etc)do  not significantly interfere with this assay.   However, presence of multiple variants may affect accuracy.     05/20/2019 6.6 (H) 4.0 - 5.6 % Final     Comment:     ADA Screening Guidelines:  5.7-6.4%  Consistent with prediabetes  >or=6.5%  Consistent with diabetes  High levels of fetal hemoglobin interfere with the HbA1C  assay. Heterozygous hemoglobin variants (HbS, HgC, etc)do  not significantly interfere with this assay.   However, presence of multiple variants may affect accuracy.     11/19/2018 7.1 (H) 4.0 - 5.6 % Final     Comment:     ADA Screening Guidelines:  5.7-6.4%  Consistent with prediabetes  >or=6.5%  Consistent with diabetes  High levels of fetal hemoglobin interfere with the HbA1C  assay. Heterozygous hemoglobin variants (HbS, HgC, etc)do  not significantly interfere with this assay.   However, presence of multiple variants may affect accuracy.          ASSESSMENT and PLAN:    1. T2DM with retinopathy-   Continue Glimiperide 2 mg qam, januvia 100 mg qam, metformin 1000 mg bid  Discussed A1c and BG goals  (7-7.5%)   Notify me if bg > 180 consistently    Check bg qd      - takes ASA, statin    2. HTN -  continue meds as previously prescribed and monitor.     3. HLP - on statin therapy, LFTs WNL. Recheck w RTC    4. Depression- stable, chronic on lexapro     5. Osteopenia- Vitamin d level at goal- continue D3 1000 IU qd, managed by PCP      Follow-up: in 6 months with lab prior                     This service was not originating from a related E/M service provided within the previous 7 days nor will  to an E/M service or procedure within the next 24 hours or my soonest available appointment.  Prevailing standard of care was able to be met in this audio-only visit.

## 2020-09-04 ENCOUNTER — PES CALL (OUTPATIENT)
Dept: ADMINISTRATIVE | Facility: CLINIC | Age: 85
End: 2020-09-04

## 2020-09-09 ENCOUNTER — IMMUNIZATION (OUTPATIENT)
Dept: INTERNAL MEDICINE | Facility: CLINIC | Age: 85
End: 2020-09-09
Payer: MEDICARE

## 2020-09-09 ENCOUNTER — CLINICAL SUPPORT (OUTPATIENT)
Dept: OPTOMETRY | Facility: CLINIC | Age: 85
End: 2020-09-09
Attending: NURSE PRACTITIONER
Payer: MEDICARE

## 2020-09-09 ENCOUNTER — OFFICE VISIT (OUTPATIENT)
Dept: INTERNAL MEDICINE | Facility: CLINIC | Age: 85
End: 2020-09-09
Payer: MEDICARE

## 2020-09-09 VITALS
WEIGHT: 132 LBS | DIASTOLIC BLOOD PRESSURE: 64 MMHG | HEIGHT: 64 IN | SYSTOLIC BLOOD PRESSURE: 112 MMHG | BODY MASS INDEX: 22.53 KG/M2 | OXYGEN SATURATION: 97 % | HEART RATE: 77 BPM

## 2020-09-09 DIAGNOSIS — E11.3292 MILD NONPROLIFERATIVE DIABETIC RETINOPATHY OF LEFT EYE WITHOUT MACULAR EDEMA ASSOCIATED WITH TYPE 2 DIABETES MELLITUS: Primary | ICD-10-CM

## 2020-09-09 DIAGNOSIS — Z00.00 ENCOUNTER FOR PREVENTIVE HEALTH EXAMINATION: Primary | ICD-10-CM

## 2020-09-09 DIAGNOSIS — Z23 NEED FOR INFLUENZA VACCINATION: ICD-10-CM

## 2020-09-09 DIAGNOSIS — Z01.00 DIABETIC EYE EXAM: ICD-10-CM

## 2020-09-09 DIAGNOSIS — I70.0 AORTIC ATHEROSCLEROSIS: ICD-10-CM

## 2020-09-09 DIAGNOSIS — I77.1 TORTUOUS AORTA: ICD-10-CM

## 2020-09-09 DIAGNOSIS — E11.3293 TYPE 2 DIABETES MELLITUS WITH BOTH EYES AFFECTED BY MILD NONPROLIFERATIVE RETINOPATHY WITHOUT MACULAR EDEMA, WITHOUT LONG-TERM CURRENT USE OF INSULIN: ICD-10-CM

## 2020-09-09 DIAGNOSIS — F32.5 MAJOR DEPRESSIVE DISORDER, SINGLE EPISODE, IN FULL REMISSION: ICD-10-CM

## 2020-09-09 DIAGNOSIS — E11.3292 MILD NONPROLIFERATIVE DIABETIC RETINOPATHY OF LEFT EYE WITHOUT MACULAR EDEMA ASSOCIATED WITH TYPE 2 DIABETES MELLITUS: ICD-10-CM

## 2020-09-09 DIAGNOSIS — E78.2 MIXED HYPERLIPIDEMIA: Chronic | ICD-10-CM

## 2020-09-09 DIAGNOSIS — E11.9 DIABETIC EYE EXAM: ICD-10-CM

## 2020-09-09 DIAGNOSIS — I10 ESSENTIAL HYPERTENSION: ICD-10-CM

## 2020-09-09 PROCEDURE — G0008 ADMIN INFLUENZA VIRUS VAC: HCPCS | Mod: HCNC,S$GLB,, | Performed by: INTERNAL MEDICINE

## 2020-09-09 PROCEDURE — 3078F PR MOST RECENT DIASTOLIC BLOOD PRESSURE < 80 MM HG: ICD-10-PCS | Mod: HCNC,CPTII,S$GLB, | Performed by: NURSE PRACTITIONER

## 2020-09-09 PROCEDURE — 3074F SYST BP LT 130 MM HG: CPT | Mod: HCNC,CPTII,S$GLB, | Performed by: NURSE PRACTITIONER

## 2020-09-09 PROCEDURE — 92250 DIABETIC EYE SCREENING PHOTO: ICD-10-PCS | Mod: 26,HCNC,S$GLB, | Performed by: OPHTHALMOLOGY

## 2020-09-09 PROCEDURE — 99999 PR PBB SHADOW E&M-EST. PATIENT-LVL IV: CPT | Mod: PBBFAC,HCNC,, | Performed by: NURSE PRACTITIONER

## 2020-09-09 PROCEDURE — 3074F PR MOST RECENT SYSTOLIC BLOOD PRESSURE < 130 MM HG: ICD-10-PCS | Mod: HCNC,CPTII,S$GLB, | Performed by: NURSE PRACTITIONER

## 2020-09-09 PROCEDURE — 90694 FLU VACCINE - QUADRIVALENT - ADJUVANTED: ICD-10-PCS | Mod: HCNC,S$GLB,, | Performed by: INTERNAL MEDICINE

## 2020-09-09 PROCEDURE — 92250 FUNDUS PHOTOGRAPHY W/I&R: CPT | Mod: 26,HCNC,S$GLB, | Performed by: OPHTHALMOLOGY

## 2020-09-09 PROCEDURE — G0008 PR ADMIN INFLUENZA VIRUS VAC: ICD-10-PCS | Mod: HCNC,S$GLB,, | Performed by: INTERNAL MEDICINE

## 2020-09-09 PROCEDURE — G0439 PR MEDICARE ANNUAL WELLNESS SUBSEQUENT VISIT: ICD-10-PCS | Mod: HCNC,S$GLB,, | Performed by: NURSE PRACTITIONER

## 2020-09-09 PROCEDURE — 99999 PR PBB SHADOW E&M-EST. PATIENT-LVL I: ICD-10-PCS | Mod: PBBFAC,HCNC,,

## 2020-09-09 PROCEDURE — 90694 VACC AIIV4 NO PRSRV 0.5ML IM: CPT | Mod: HCNC,S$GLB,, | Performed by: INTERNAL MEDICINE

## 2020-09-09 PROCEDURE — 99999 PR PBB SHADOW E&M-EST. PATIENT-LVL IV: ICD-10-PCS | Mod: PBBFAC,HCNC,, | Performed by: NURSE PRACTITIONER

## 2020-09-09 PROCEDURE — 99999 PR PBB SHADOW E&M-EST. PATIENT-LVL I: CPT | Mod: PBBFAC,HCNC,,

## 2020-09-09 PROCEDURE — 3078F DIAST BP <80 MM HG: CPT | Mod: HCNC,CPTII,S$GLB, | Performed by: NURSE PRACTITIONER

## 2020-09-09 PROCEDURE — G0439 PPPS, SUBSEQ VISIT: HCPCS | Mod: HCNC,S$GLB,, | Performed by: NURSE PRACTITIONER

## 2020-09-09 NOTE — PROGRESS NOTES
"  Cary Middleton presented for a  Medicare AWV and comprehensive Health Risk Assessment today. The following components were reviewed and updated:    · Medical history  · Family History  · Social history  · Allergies and Current Medications  · Health Risk Assessment  · Health Maintenance  · Care Team     ** See Completed Assessments for Annual Wellness Visit within the encounter summary.**         The following assessments were completed:  · Living Situation  · CAGE  · Depression Screening  · Timed Get Up and Go  · Whisper Test  · Cognitive Function Screening        · Nutrition Screening  · ADL Screening  · PAQ Screening        Vitals:    09/09/20 1015   BP: 112/64   BP Location: Left arm   Patient Position: Sitting   BP Method: Medium (Manual)   Pulse: 77   SpO2: 97%   Weight: 59.9 kg (132 lb)   Height: 5' 4" (1.626 m)     Body mass index is 22.66 kg/m².  Physical Exam  Vitals signs and nursing note reviewed.   Constitutional:       Appearance: Normal appearance. She is normal weight.   HENT:      Head: Normocephalic.      Right Ear: Tympanic membrane, ear canal and external ear normal.      Left Ear: Tympanic membrane, ear canal and external ear normal.      Nose: Nose normal.      Mouth/Throat:      Mouth: Mucous membranes are moist.      Pharynx: Oropharynx is clear.   Eyes:      Extraocular Movements: Extraocular movements intact.      Conjunctiva/sclera: Conjunctivae normal.      Pupils: Pupils are equal, round, and reactive to light.   Neck:      Musculoskeletal: Normal range of motion.   Cardiovascular:      Rate and Rhythm: Normal rate and regular rhythm.      Pulses: Normal pulses.           Dorsalis pedis pulses are 2+ on the right side and 2+ on the left side.        Posterior tibial pulses are 2+ on the right side and 2+ on the left side.      Heart sounds: Normal heart sounds.   Pulmonary:      Effort: Pulmonary effort is normal.      Breath sounds: Normal breath sounds.   Abdominal:      General: " Abdomen is flat. Bowel sounds are normal.      Palpations: Abdomen is soft.   Musculoskeletal: Normal range of motion.         General: No swelling or tenderness.      Right lower leg: No edema.      Left lower leg: No edema.   Feet:      Right foot:      Protective Sensation: 4 sites tested. 4 sites sensed.      Skin integrity: Skin integrity normal.      Toenail Condition: Right toenails are normal.      Left foot:      Protective Sensation: 4 sites tested. 4 sites sensed.      Skin integrity: Skin integrity normal.      Toenail Condition: Left toenails are normal.   Skin:     General: Skin is warm and dry.      Capillary Refill: Capillary refill takes less than 2 seconds.   Neurological:      General: No focal deficit present.      Mental Status: She is alert and oriented to person, place, and time.      Sensory: No sensory deficit.      Motor: Weakness present.      Gait: Gait normal.      Comments: Generalized weakness   Psychiatric:         Mood and Affect: Mood normal.         Behavior: Behavior normal.         Thought Content: Thought content normal.         Judgment: Judgment normal.               Diagnoses and health risks identified today and associated recommendations/orders:    1. Encounter for preventive health examination  Exam done    Health Maintenance updated    Records reviewed    2. Major depressive disorder, single episode, in full remission  Chronic, followed by PCP    3. Type 2 diabetes mellitus with both eyes affected by mild nonproliferative retinopathy without macular edema, without long-term current use of insulin  Chronic, followed by PCP    4. Mild nonproliferative diabetic retinopathy of left eye without macular edema associated with type 2 diabetes mellitus  Chronic, followed by PCP    5. Tortuous aorta  Chronic, followed by PCP    6. Essential hypertension  Stable, followed by PCP    7. Mixed hyperlipidemia  Chronic, followed by PCP    8. Aortic atherosclerosis  Chronic, followed by  PCP    9. Need for influenza vaccination  Scheduled to be given in pharmacy today    10. Diabetic eye exam    - Diabetic Eye Screening Photo; Future    11. BMI 22.0-22.9, adult  BMI reviewed.          Provided Cary with a 5-10 year written screening schedule and personal prevention plan. Recommendations were developed using the USPSTF age appropriate recommendations. Education, counseling, and referrals were provided as needed. After Visit Summary printed and given to patient which includes a list of additional screenings\tests needed.    Follow up in about 7 weeks (around 10/27/2020) for with PCP Dr. Costa as scheduled.    [employer], ERIS  I offered to discuss end of life issues, including information on how to make advance directives that the patient could use to name someone who would make medical decisions on their behalf if they became too ill to make themselves.    ___Patient declined  _X_Patient is interested, I provided paper work and offered to discuss.    Follow up in about 7 weeks (around 10/27/2020) for with PCP Dr. Costa as scheduled.

## 2020-09-09 NOTE — PATIENT INSTRUCTIONS
Counseling and Referral of Other Preventative  (Italic type indicates deductible and co-insurance are waived)    Patient Name: Cary Middleton  Today's Date: 9/9/2020    Health Maintenance       Date Due Completion Date    Hemoglobin A1c 05/13/2020--ordered for October already by PCP 11/13/2019    Urine Microalbumin 05/20/2020--ordered for October already by PCP 5/20/2019    Eye Exam 06/06/2020--ordered today 6/6/2019    Influenza Vaccine (1) 08/01/2020--ordered today 9/24/2019    Foot Exam 11/20/2020 11/20/2019 (Done)    Override on 11/20/2019: Done    Override on 11/26/2018: Done    Override on 2/1/2017: Done    Lipid Panel 02/06/2021 2/6/2020    DEXA SCAN 02/28/2023 2/28/2020    TETANUS VACCINE 02/19/2028 2/19/2018        No orders of the defined types were placed in this encounter.    The following information is provided to all patients.  This information is to help you find resources for any of the problems found today that may be affecting your health:                Living healthy guide: www.[facility].[place].gov      Understanding Diabetes: www.diabetes.org      Eating healthy: www.cdc.gov/healthyweight      CDC home safety checklist: www.cdc.gov/steadi/patient.html      Agency on Aging: www.goea.[place].[facility]      Alcoholics anonymous (AA): www.aa.org      Physical Activity: www.nevaeh.nih.gov/fl8yhmc      Tobacco use: www.quitwithusla.org

## 2020-09-09 NOTE — PROGRESS NOTES
HPI     Diabetic Eye Exam      Additional comments: photos              Comments     Screening photos           Last edited by Shavon Echols MA on 9/9/2020 11:19 AM. (History)            Assessment /Plan     For exam results, see Encounter Report.    Diabetic eye exam  -     Diabetic Eye Screening Photo      84 y.o. y/o here for screening for Diabetic Renopathy with non-dilated fundus photos per Arlene Costa MD                      Complex Repair And W Plasty Text: The defect edges were debeveled with a #15 scalpel blade.  The primary defect was closed partially with a complex linear closure.  Given the location of the remaining defect, shape of the defect and the proximity to free margins a W plasty was deemed most appropriate for complete closure of the defect.  Using a sterile surgical marker, an appropriate advancement flap was drawn incorporating the defect and placing the expected incisions within the relaxed skin tension lines where possible.    The area thus outlined was incised deep to adipose tissue with a #15 scalpel blade.  The skin margins were undermined to an appropriate distance in all directions utilizing iris scissors.

## 2020-09-16 ENCOUNTER — TELEPHONE (OUTPATIENT)
Dept: INTERNAL MEDICINE | Facility: CLINIC | Age: 85
End: 2020-09-16

## 2020-09-16 DIAGNOSIS — E08.3292: Primary | ICD-10-CM

## 2020-09-16 NOTE — TELEPHONE ENCOUNTER
----- Message from [employer] DNP sent at 9/16/2020  8:17 AM CDT -----  Diabetic eye screening shows Mild Background Diabetic Retinopathy of the left eye. Recommendation is to f/u in 2-4 months with an ophthalmologist. I am placing the referral, have pt scheduled.

## 2020-09-16 NOTE — PROGRESS NOTES
Diabetic eye screening shows Mild Background Diabetic Retinopathy of the left eye. Recommendation is to f/u in 2-4 months with an ophthalmologist. I am placing the referral, have pt scheduled.

## 2020-10-02 ENCOUNTER — OFFICE VISIT (OUTPATIENT)
Dept: PRIMARY CARE CLINIC | Facility: CLINIC | Age: 85
End: 2020-10-02
Payer: MEDICARE

## 2020-10-02 VITALS
RESPIRATION RATE: 18 BRPM | SYSTOLIC BLOOD PRESSURE: 124 MMHG | BODY MASS INDEX: 23.45 KG/M2 | TEMPERATURE: 98 F | DIASTOLIC BLOOD PRESSURE: 64 MMHG | OXYGEN SATURATION: 99 % | WEIGHT: 137.38 LBS | HEART RATE: 75 BPM | HEIGHT: 64 IN

## 2020-10-02 DIAGNOSIS — E78.5 HYPERLIPIDEMIA, UNSPECIFIED HYPERLIPIDEMIA TYPE: ICD-10-CM

## 2020-10-02 DIAGNOSIS — F32.5 MAJOR DEPRESSIVE DISORDER, SINGLE EPISODE, IN FULL REMISSION: ICD-10-CM

## 2020-10-02 DIAGNOSIS — I70.0 AORTIC ATHEROSCLEROSIS: ICD-10-CM

## 2020-10-02 DIAGNOSIS — E11.3293 TYPE 2 DIABETES MELLITUS WITH BOTH EYES AFFECTED BY MILD NONPROLIFERATIVE RETINOPATHY WITHOUT MACULAR EDEMA, WITHOUT LONG-TERM CURRENT USE OF INSULIN: ICD-10-CM

## 2020-10-02 DIAGNOSIS — I10 ESSENTIAL HYPERTENSION: Primary | ICD-10-CM

## 2020-10-02 PROCEDURE — 1159F PR MEDICATION LIST DOCUMENTED IN MEDICAL RECORD: ICD-10-PCS | Mod: HCNC,S$GLB,, | Performed by: INTERNAL MEDICINE

## 2020-10-02 PROCEDURE — 1159F MED LIST DOCD IN RCRD: CPT | Mod: HCNC,S$GLB,, | Performed by: INTERNAL MEDICINE

## 2020-10-02 PROCEDURE — 1101F PT FALLS ASSESS-DOCD LE1/YR: CPT | Mod: HCNC,CPTII,S$GLB, | Performed by: INTERNAL MEDICINE

## 2020-10-02 PROCEDURE — 1101F PR PT FALLS ASSESS DOC 0-1 FALLS W/OUT INJ PAST YR: ICD-10-PCS | Mod: HCNC,CPTII,S$GLB, | Performed by: INTERNAL MEDICINE

## 2020-10-02 PROCEDURE — 3078F PR MOST RECENT DIASTOLIC BLOOD PRESSURE < 80 MM HG: ICD-10-PCS | Mod: HCNC,CPTII,S$GLB, | Performed by: INTERNAL MEDICINE

## 2020-10-02 PROCEDURE — 1126F PR PAIN SEVERITY QUANTIFIED, NO PAIN PRESENT: ICD-10-PCS | Mod: HCNC,S$GLB,, | Performed by: INTERNAL MEDICINE

## 2020-10-02 PROCEDURE — 1126F AMNT PAIN NOTED NONE PRSNT: CPT | Mod: HCNC,S$GLB,, | Performed by: INTERNAL MEDICINE

## 2020-10-02 PROCEDURE — 3074F PR MOST RECENT SYSTOLIC BLOOD PRESSURE < 130 MM HG: ICD-10-PCS | Mod: HCNC,CPTII,S$GLB, | Performed by: INTERNAL MEDICINE

## 2020-10-02 PROCEDURE — 99999 PR PBB SHADOW E&M-EST. PATIENT-LVL IV: ICD-10-PCS | Mod: PBBFAC,HCNC,, | Performed by: INTERNAL MEDICINE

## 2020-10-02 PROCEDURE — 99999 PR PBB SHADOW E&M-EST. PATIENT-LVL IV: CPT | Mod: PBBFAC,HCNC,, | Performed by: INTERNAL MEDICINE

## 2020-10-02 PROCEDURE — 3074F SYST BP LT 130 MM HG: CPT | Mod: HCNC,CPTII,S$GLB, | Performed by: INTERNAL MEDICINE

## 2020-10-02 PROCEDURE — 3078F DIAST BP <80 MM HG: CPT | Mod: HCNC,CPTII,S$GLB, | Performed by: INTERNAL MEDICINE

## 2020-10-02 PROCEDURE — 99214 OFFICE O/P EST MOD 30 MIN: CPT | Mod: HCNC,S$GLB,, | Performed by: INTERNAL MEDICINE

## 2020-10-02 PROCEDURE — 99214 PR OFFICE/OUTPT VISIT, EST, LEVL IV, 30-39 MIN: ICD-10-PCS | Mod: HCNC,S$GLB,, | Performed by: INTERNAL MEDICINE

## 2020-10-02 RX ORDER — ESCITALOPRAM OXALATE 10 MG/1
10 TABLET ORAL DAILY
Qty: 90 TABLET | Refills: 3 | Status: SHIPPED | OUTPATIENT
Start: 2020-10-02 | End: 2021-01-01 | Stop reason: SDUPTHER

## 2020-10-02 RX ORDER — AMLODIPINE BESYLATE 10 MG/1
10 TABLET ORAL DAILY
Qty: 90 TABLET | Refills: 3 | Status: SHIPPED | OUTPATIENT
Start: 2020-10-02 | End: 2021-01-01 | Stop reason: SDUPTHER

## 2020-10-03 NOTE — PROGRESS NOTES
Subjective:       Patient ID: Cary Middleton is a 84 y.o. female.    Chief Complaint: Follow-up    Last seen 8 months ago. Has had f/u with Endo since then. Diabetes is adequately controlled. No home monitoring reported today. Feeling well, no complaints. No COVID concerns.     PMH: .  Hypertension. Negative stress test , EF 55%.   Hyperlipidemia. LDL 69 .  Diabetes type 2 with retinopathy, HbA1c 6.9% , GFR >60..  Osteopenia.  Depression/anxiety.  DJD left hip.  Nephrolithiasis.  Chronic Microcytosis without anemia, H/H 11.7/38.7, MCV 72, adequate iron levels.    PSH: Partial hysterectomy. Cyst removed from left wrist. Bilateral Blepharoplasty and Left Cataract extraction.     Mammogram normal 10/15. BMD stable . Eye exam 3/18, Eye screening photo  abnormal. Pelvic exam 2006. Colonoscopy outside  - normal, no polyps. Prevnar 10/15. Pneumovax . Zostavax . Tdap . Shingrix , . Flu shot .     Social history: Nonsmoker, no alcohol. . 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 Retired teacher. Still drives.     FMH: Heart disease. Mother had colon cancer. Sisters with thoracic aneurysm, CAD and pancreatic cancer. Leukemia in a cousin as an adult.     Allergies: NKDA.    Medications: list reviewed and reconciled.                  Review of Systems   Constitutional: Negative for activity change, appetite change, chills, diaphoresis, fatigue, fever and unexpected weight change.   HENT: Negative for nasal congestion, ear pain, hearing loss, rhinorrhea, sneezing, sore throat, trouble swallowing and voice change.    Eyes: Negative for pain and visual disturbance.   Respiratory: Negative for cough, chest tightness, shortness of breath and wheezing.    Cardiovascular: Negative for chest pain, palpitations and leg swelling.   Gastrointestinal: Negative for abdominal pain, blood in stool, constipation, diarrhea, nausea and vomiting.   Genitourinary: Negative  "for dysuria, frequency, pelvic pain and vaginal bleeding.   Musculoskeletal: Negative for arthralgias, gait problem, joint swelling and myalgias.   Integumentary:  Negative for color change and rash.   Neurological: Negative for dizziness, syncope, facial asymmetry, speech difficulty, weakness, numbness and headaches.   Hematological: Negative for adenopathy. Does not bruise/bleed easily.   Psychiatric/Behavioral: Negative for agitation, confusion, dysphoric mood and sleep disturbance. The patient is not nervous/anxious.          Objective:    /64, Pulse 75, Temp 98, O2 Sat 99%, Ht 5' 4", Wt 137.3 lbs (stable), BMI=23.6  Physical Exam  Vitals signs reviewed.   Constitutional:       General: She is not in acute distress.     Appearance: Normal appearance. She is well-developed. She is not diaphoretic.      Comments: Well groomed, ambulatory with a normal gait, here alone.   HENT:      Head: Normocephalic and atraumatic.      Right Ear: Tympanic membrane and ear canal normal.      Left Ear: Tympanic membrane and ear canal normal.      Nose: Nose normal. No congestion.   Eyes:      General: No scleral icterus.     Extraocular Movements: Extraocular movements intact.      Conjunctiva/sclera: Conjunctivae normal.      Right eye: Right conjunctiva is not injected.      Left eye: Left conjunctiva is not injected.   Neck:      Musculoskeletal: Normal range of motion and neck supple.      Thyroid: No thyromegaly.      Vascular: No carotid bruit or JVD.   Cardiovascular:      Rate and Rhythm: Normal rate and regular rhythm.      Pulses: Normal pulses.      Heart sounds: Normal heart sounds. No murmur. No friction rub. No gallop.    Pulmonary:      Effort: Pulmonary effort is normal. No respiratory distress.      Breath sounds: Normal breath sounds. No wheezing, rhonchi or rales.   Abdominal:      General: Bowel sounds are normal. There is no distension.      Palpations: Abdomen is soft. There is no mass.      " Tenderness: There is no abdominal tenderness.      Hernia: No hernia is present.   Musculoskeletal: Normal range of motion.         General: No tenderness or deformity.      Right lower leg: No edema.      Left lower leg: No edema.      Comments: Protective Sensation:  Right: Intact  Left: Intact    Visual Inspection:  Normal -  Bilateral    Pedal Pulses:   Right: Present  Left: Present    Posterior tibialis:   Right:Present  Left: Present     Lymphadenopathy:      Cervical: No cervical adenopathy.   Skin:     General: Skin is warm and dry.      Coloration: Skin is not pale.      Findings: No erythema or rash.      Nails: There is no clubbing.     Neurological:      General: No focal deficit present.      Mental Status: She is alert and oriented to person, place, and time.      Cranial Nerves: No cranial nerve deficit.      Motor: No abnormal muscle tone.      Coordination: Coordination normal.      Gait: Gait normal.      Deep Tendon Reflexes: Reflexes are normal and symmetric.   Psychiatric:         Mood and Affect: Mood normal.         Behavior: Behavior normal.         Thought Content: Thought content normal.         Judgment: Judgment normal.         Assessment:       1. Essential hypertension    2. Type 2 diabetes mellitus with both eyes affected by mild nonproliferative retinopathy without macular edema, without long-term current use of insulin    3. Hyperlipidemia, unspecified hyperlipidemia type    4. Aortic atherosclerosis    5. Major depressive disorder, single episode, in full remission        Plan:       Essential hypertension  -     amLODIPine (NORVASC) 10 MG tablet; Take 1 tablet (10 mg total) by mouth once daily.  Dispense: 90 tablet; Refill: 3    Type 2 diabetes mellitus with both eyes affected by mild nonproliferative retinopathy without macular edema, without long-term current use of insulin        -     Needs complete Eye exam - schedule with Ophthalmology, referral on chart.         -     Labs  and Endocrinology f/u as scheduled later this month.    Hyperlipidemia, unspecified hyperlipidemia type - at goal.     Aortic atherosclerosis - continue Atorvastatin 20mg daily.     Major depressive disorder, single episode, in full remission  -     escitalopram oxalate (LEXAPRO) 10 MG tablet; Take 1 tablet (10 mg total) by mouth once daily.  Dispense: 90 tablet; Refill: 3

## 2020-10-05 ENCOUNTER — TELEPHONE (OUTPATIENT)
Dept: PRIMARY CARE CLINIC | Facility: CLINIC | Age: 85
End: 2020-10-05

## 2020-10-05 NOTE — TELEPHONE ENCOUNTER
----- Message from Arlene Costa MD sent at 10/3/2020 10:01 AM CDT -----  Please ask referral coordinator to assist in scheduling with Ophthalmology for diabetic eye disease.

## 2020-10-31 ENCOUNTER — LAB VISIT (OUTPATIENT)
Dept: LAB | Facility: HOSPITAL | Age: 85
End: 2020-10-31
Attending: NURSE PRACTITIONER
Payer: MEDICARE

## 2020-10-31 DIAGNOSIS — M85.80 OSTEOPENIA, UNSPECIFIED LOCATION: ICD-10-CM

## 2020-10-31 DIAGNOSIS — E11.3293 TYPE 2 DIABETES MELLITUS WITH BOTH EYES AFFECTED BY MILD NONPROLIFERATIVE RETINOPATHY WITHOUT MACULAR EDEMA, WITHOUT LONG-TERM CURRENT USE OF INSULIN: ICD-10-CM

## 2020-10-31 LAB
25(OH)D3+25(OH)D2 SERPL-MCNC: 44 NG/ML (ref 30–96)
ALBUMIN SERPL BCP-MCNC: 3.9 G/DL (ref 3.5–5.2)
ALP SERPL-CCNC: 77 U/L (ref 55–135)
ALT SERPL W/O P-5'-P-CCNC: 8 U/L (ref 10–44)
ANION GAP SERPL CALC-SCNC: 9 MMOL/L (ref 8–16)
AST SERPL-CCNC: 18 U/L (ref 10–40)
BILIRUB SERPL-MCNC: 0.4 MG/DL (ref 0.1–1)
BUN SERPL-MCNC: 12 MG/DL (ref 8–23)
CALCIUM SERPL-MCNC: 9.4 MG/DL (ref 8.7–10.5)
CHLORIDE SERPL-SCNC: 103 MMOL/L (ref 95–110)
CO2 SERPL-SCNC: 26 MMOL/L (ref 23–29)
CREAT SERPL-MCNC: 0.7 MG/DL (ref 0.5–1.4)
EST. GFR  (AFRICAN AMERICAN): >60 ML/MIN/1.73 M^2
EST. GFR  (NON AFRICAN AMERICAN): >60 ML/MIN/1.73 M^2
ESTIMATED AVG GLUCOSE: 146 MG/DL (ref 68–131)
GLUCOSE SERPL-MCNC: 131 MG/DL (ref 70–110)
HBA1C MFR BLD HPLC: 6.7 % (ref 4–5.6)
POTASSIUM SERPL-SCNC: 4.6 MMOL/L (ref 3.5–5.1)
PROT SERPL-MCNC: 7.5 G/DL (ref 6–8.4)
SODIUM SERPL-SCNC: 138 MMOL/L (ref 136–145)

## 2020-10-31 PROCEDURE — 82306 VITAMIN D 25 HYDROXY: CPT | Mod: HCNC

## 2020-10-31 PROCEDURE — 83036 HEMOGLOBIN GLYCOSYLATED A1C: CPT | Mod: HCNC

## 2020-10-31 PROCEDURE — 80053 COMPREHEN METABOLIC PANEL: CPT | Mod: HCNC

## 2020-10-31 PROCEDURE — 36415 COLL VENOUS BLD VENIPUNCTURE: CPT | Mod: HCNC,PO

## 2020-11-04 ENCOUNTER — OFFICE VISIT (OUTPATIENT)
Dept: ENDOCRINOLOGY | Facility: CLINIC | Age: 85
End: 2020-11-04
Payer: MEDICARE

## 2020-11-04 VITALS
OXYGEN SATURATION: 97 % | DIASTOLIC BLOOD PRESSURE: 70 MMHG | TEMPERATURE: 97 F | WEIGHT: 137.25 LBS | BODY MASS INDEX: 23.43 KG/M2 | HEART RATE: 80 BPM | HEIGHT: 64 IN | SYSTOLIC BLOOD PRESSURE: 124 MMHG

## 2020-11-04 DIAGNOSIS — E11.3293 TYPE 2 DIABETES MELLITUS WITH BOTH EYES AFFECTED BY MILD NONPROLIFERATIVE RETINOPATHY WITHOUT MACULAR EDEMA, WITHOUT LONG-TERM CURRENT USE OF INSULIN: Primary | ICD-10-CM

## 2020-11-04 DIAGNOSIS — I10 ESSENTIAL HYPERTENSION: ICD-10-CM

## 2020-11-04 DIAGNOSIS — E78.2 MIXED HYPERLIPIDEMIA: Chronic | ICD-10-CM

## 2020-11-04 DIAGNOSIS — F32.5 MAJOR DEPRESSIVE DISORDER, SINGLE EPISODE, IN FULL REMISSION: ICD-10-CM

## 2020-11-04 PROCEDURE — 99214 OFFICE O/P EST MOD 30 MIN: CPT | Mod: HCNC,S$GLB,, | Performed by: NURSE PRACTITIONER

## 2020-11-04 PROCEDURE — 99999 PR PBB SHADOW E&M-EST. PATIENT-LVL IV: CPT | Mod: PBBFAC,HCNC,, | Performed by: NURSE PRACTITIONER

## 2020-11-04 PROCEDURE — 99999 PR PBB SHADOW E&M-EST. PATIENT-LVL IV: ICD-10-PCS | Mod: PBBFAC,HCNC,, | Performed by: NURSE PRACTITIONER

## 2020-11-04 PROCEDURE — 99214 PR OFFICE/OUTPT VISIT, EST, LEVL IV, 30-39 MIN: ICD-10-PCS | Mod: HCNC,S$GLB,, | Performed by: NURSE PRACTITIONER

## 2020-11-04 RX ORDER — METFORMIN HYDROCHLORIDE 500 MG/1
1000 TABLET ORAL 2 TIMES DAILY WITH MEALS
Qty: 360 TABLET | Refills: 3 | Status: SHIPPED | OUTPATIENT
Start: 2020-11-04 | End: 2021-04-07

## 2020-11-04 RX ORDER — GLIMEPIRIDE 1 MG/1
1 TABLET ORAL
Qty: 90 TABLET | Refills: 3 | Status: SHIPPED | OUTPATIENT
Start: 2020-11-04 | End: 2021-04-07 | Stop reason: SDUPTHER

## 2020-11-04 NOTE — PROGRESS NOTES
CC: This 84 y.o. female presents for management of diabetes mellitus  and chronic conditions pending review including HTN, HLP, vitamin d deficiency, osteopenia, depression     HPI: She was diagnosed with T2DM in since age 65. Has never been hospitalized r/t DM.  Family hx of DM: father  Denies missing doses of DM medication.   Staying home since March 18th. Her family has been bringing her food and groceries      hypoglycemia at home- none  but feels bad w bg in the 80s and 90s  monitoring BG at home: fasting - brings logs               Diet: Eats 3 Meals a day, snacks- rarely   Exercise: doing stretches at home ( has not been able to participate in her [employer] exercise)  CURRENT DM MEDS: metformin 1000 mg bid; glimepiride 2mg qam, januvia 100 mg qday  Glucometer type:  True Metrix 2018    Standards of Care:  Eye exam:   6/2019 + h/o retinopathy - Has apt next week w Dr Finch    ROS:   Gen: Appetite good, no weight gain or loss, +fatigue   Skin: Skin is intact and heals well, no rashes, no hair changes  Eyes: Denies visual disturbances  Resp: no SOB or MORELOS, no cough  Cardiac: No palpitations, chest pain, no edema   GI: + nausea- on and off- after taking meds, no vomiting, diarrhea, constipation, or abdominal pain.  /GYN: 2-3+ nocturia,no burning or pain.   MS/Neuro: no numbness/buring/tingling in BLE; speech clear  Psych: Denies drug/ETOH abuse,+  depression.  Other systems: negative.      Hemoglobin A1C   Date Value Ref Range Status   10/31/2020 6.7 (H) 4.0 - 5.6 % Final     Comment:     ADA Screening Guidelines:  5.7-6.4%  Consistent with prediabetes  >or=6.5%  Consistent with diabetes  High levels of fetal hemoglobin interfere with the HbA1C  assay. Heterozygous hemoglobin variants (HbS, HgC, etc)do  not significantly interfere with this assay.   However, presence of multiple variants may affect accuracy.     11/13/2019 6.9 (H) 4.0 - 5.6 % Final     Comment:     ADA Screening Guidelines:  5.7-6.4%   Consistent with prediabetes  >or=6.5%  Consistent with diabetes  High levels of fetal hemoglobin interfere with the HbA1C  assay. Heterozygous hemoglobin variants (HbS, HgC, etc)do  not significantly interfere with this assay.   However, presence of multiple variants may affect accuracy.     05/20/2019 6.6 (H) 4.0 - 5.6 % Final     Comment:     ADA Screening Guidelines:  5.7-6.4%  Consistent with prediabetes  >or=6.5%  Consistent with diabetes  High levels of fetal hemoglobin interfere with the HbA1C  assay. Heterozygous hemoglobin variants (HbS, HgC, etc)do  not significantly interfere with this assay.   However, presence of multiple variants may affect accuracy.          ASSESSMENT and PLAN:    1. T2DM with retinopathy-   Decrease Glimepiride to 1 mg qam, januvia 100 mg qam, metformin 1000 mg bid  Discussed A1c and BG goals (7-7.5%)   Notify me if bg > 180 consistently    Check bg qd      - takes ASA, statin    2. HTN -  continue meds as previously prescribed and monitor.     3. HLP - on statin therapy, LFTs WNL. Recheck w RTC    4. Depression- stable, chronic on lexapro     5. Osteopenia- Vitamin d level at goal- continue D3 1000 IU qd, managed by PCP      Follow-up: in 6 months with lab prior

## 2020-11-12 ENCOUNTER — OFFICE VISIT (OUTPATIENT)
Dept: OPTOMETRY | Facility: CLINIC | Age: 85
End: 2020-11-12
Payer: COMMERCIAL

## 2020-11-12 DIAGNOSIS — H52.4 MYOPIA WITH PRESBYOPIA OF BOTH EYES: Primary | ICD-10-CM

## 2020-11-12 DIAGNOSIS — H52.13 MYOPIA WITH PRESBYOPIA OF BOTH EYES: Primary | ICD-10-CM

## 2020-11-12 DIAGNOSIS — Z01.00 ENCOUNTER FOR COMPLETE EYE EXAM: ICD-10-CM

## 2020-11-12 PROCEDURE — 92014 PR EYE EXAM, EST PATIENT,COMPREHESV: ICD-10-PCS | Mod: S$GLB,,, | Performed by: OPTOMETRIST

## 2020-11-12 PROCEDURE — 99999 PR PBB SHADOW E&M-EST. PATIENT-LVL III: CPT | Mod: PBBFAC,,, | Performed by: OPTOMETRIST

## 2020-11-12 PROCEDURE — 99499 RISK ADDL DX/OHS AUDIT: ICD-10-PCS | Mod: S$GLB,,, | Performed by: OPTOMETRIST

## 2020-11-12 PROCEDURE — 99499 UNLISTED E&M SERVICE: CPT | Mod: S$GLB,,, | Performed by: OPTOMETRIST

## 2020-11-12 PROCEDURE — 92014 COMPRE OPH EXAM EST PT 1/>: CPT | Mod: S$GLB,,, | Performed by: OPTOMETRIST

## 2020-11-12 PROCEDURE — 99999 PR PBB SHADOW E&M-EST. PATIENT-LVL III: ICD-10-PCS | Mod: PBBFAC,,, | Performed by: OPTOMETRIST

## 2020-11-12 NOTE — LETTER
November 12, 2020      Arlene Costa MD  [address]  [facility] [address]           [facility] 1st Fl  [address]  [facility] [address]  Phone: [phone]  Fax: [phone]          Patient: Cary Middleton   MR Number: [id number]   YOB: 1935   Date of Visit: 11/12/2020       Dear Dr. Arlene Costa:    Thank you for referring Cary Middleton to me for evaluation. Attached you will find relevant portions of my assessment and plan of care.    If you have questions, please do not hesitate to call me. I look forward to following Cary Middleton along with you.    Sincerely,    Lawrence Finch, OD    Enclosure  CC:  No Recipients    If you would like to receive this communication electronically, please contact [email or web address] or [phone] to request more information on [facility] Link access.    For providers and/or their staff who would like to refer a patient to [facility], please contact us through our one-stop-shop provider referral line, [facility] , at [phone].    If you feel you have received this communication in error or would no longer like to receive these types of communications, please e-mail [email or web address]

## 2020-11-12 NOTE — PROGRESS NOTES
HPI     Annual diabetic eyemed vision exam  Hemoglobin A1C       Date                     Value               Ref Range             Status                10/31/2020               6.7 (H)             4.0 - 5.6 %           Final                   11/13/2019               6.9 (H)             4.0 - 5.6 %           Final                  05/20/2019               6.6 (H)             4.0 - 5.6 %           Final              (-)Flashes (-)Floaters  (-)Itch, (-)tear, (-)burn, (-)Dryness. (-+) OTC Drops   (-)Photophobia  (-)Glare (-)diplopia (-) headaches    Hemoglobin A1C       Date                     Value               Ref Range             Status                10/31/2020               6.7 (H)             4.0 - 5.6 %           Final                  11/13/2019               6.9 (H)             4.0 - 5.6 %           Final                  05/20/2019               6.6 (H)             4.0 - 5.6 %           Final                        Last edited by Lawrence Finch, OD on 11/12/2020  9:56 AM. (History)            Assessment /Plan     For exam results, see Encounter Report.    Myopia with presbyopia of both eyes  -Optional sRx  -Eyemed    Encounter for complete eye exam  -     Ambulatory referral/consult to Ophthalmology  -No retinopathy noted today, continued BS control  -removed NPDR from problem list      RTC 1 yr

## 2020-12-28 RX ORDER — SITAGLIPTIN 100 MG/1
TABLET, FILM COATED ORAL
Qty: 90 TABLET | Refills: 3 | Status: SHIPPED | OUTPATIENT
Start: 2020-12-28 | End: 2021-04-07 | Stop reason: SDUPTHER

## 2021-01-01 ENCOUNTER — OFFICE VISIT (OUTPATIENT)
Dept: ENDOCRINOLOGY | Facility: CLINIC | Age: 86
End: 2021-01-01
Payer: MEDICARE

## 2021-01-01 ENCOUNTER — OFFICE VISIT (OUTPATIENT)
Dept: PRIMARY CARE CLINIC | Facility: CLINIC | Age: 86
End: 2021-01-01
Payer: MEDICARE

## 2021-01-01 ENCOUNTER — OFFICE VISIT (OUTPATIENT)
Dept: INTERNAL MEDICINE | Facility: CLINIC | Age: 86
End: 2021-01-01
Payer: MEDICARE

## 2021-01-01 ENCOUNTER — LAB VISIT (OUTPATIENT)
Dept: LAB | Facility: HOSPITAL | Age: 86
End: 2021-01-01
Attending: NURSE PRACTITIONER
Payer: MEDICARE

## 2021-01-01 ENCOUNTER — PATIENT OUTREACH (OUTPATIENT)
Dept: ADMINISTRATIVE | Facility: OTHER | Age: 86
End: 2021-01-01

## 2021-01-01 VITALS
BODY MASS INDEX: 22.26 KG/M2 | HEIGHT: 65 IN | HEIGHT: 65 IN | OXYGEN SATURATION: 98 % | OXYGEN SATURATION: 99 % | TEMPERATURE: 99 F | SYSTOLIC BLOOD PRESSURE: 118 MMHG | DIASTOLIC BLOOD PRESSURE: 72 MMHG | HEART RATE: 68 BPM | RESPIRATION RATE: 18 BRPM | DIASTOLIC BLOOD PRESSURE: 70 MMHG | HEART RATE: 84 BPM | WEIGHT: 131.81 LBS | SYSTOLIC BLOOD PRESSURE: 110 MMHG | TEMPERATURE: 98 F | WEIGHT: 133.63 LBS | BODY MASS INDEX: 21.96 KG/M2

## 2021-01-01 VITALS
HEIGHT: 65 IN | DIASTOLIC BLOOD PRESSURE: 76 MMHG | SYSTOLIC BLOOD PRESSURE: 136 MMHG | WEIGHT: 134.94 LBS | HEART RATE: 64 BPM | BODY MASS INDEX: 22.48 KG/M2

## 2021-01-01 DIAGNOSIS — Z23 INFLUENZA VACCINE NEEDED: ICD-10-CM

## 2021-01-01 DIAGNOSIS — E55.9 VITAMIN D DEFICIENCY, UNSPECIFIED: ICD-10-CM

## 2021-01-01 DIAGNOSIS — E78.2 MIXED HYPERLIPIDEMIA: Chronic | ICD-10-CM

## 2021-01-01 DIAGNOSIS — E11.9 TYPE 2 DIABETES MELLITUS WITHOUT COMPLICATION, WITHOUT LONG-TERM CURRENT USE OF INSULIN: ICD-10-CM

## 2021-01-01 DIAGNOSIS — E78.5 HYPERLIPIDEMIA, UNSPECIFIED HYPERLIPIDEMIA TYPE: ICD-10-CM

## 2021-01-01 DIAGNOSIS — I10 ESSENTIAL HYPERTENSION: ICD-10-CM

## 2021-01-01 DIAGNOSIS — I70.0 AORTIC ATHEROSCLEROSIS: ICD-10-CM

## 2021-01-01 DIAGNOSIS — M85.80 OSTEOPENIA, UNSPECIFIED LOCATION: ICD-10-CM

## 2021-01-01 DIAGNOSIS — Z23 HIGH PRIORITY FOR COVID-19 VACCINATION: ICD-10-CM

## 2021-01-01 DIAGNOSIS — E11.9 TYPE 2 DIABETES MELLITUS WITHOUT COMPLICATION, WITHOUT LONG-TERM CURRENT USE OF INSULIN: Primary | ICD-10-CM

## 2021-01-01 DIAGNOSIS — F32.5 MAJOR DEPRESSIVE DISORDER, SINGLE EPISODE, IN FULL REMISSION: ICD-10-CM

## 2021-01-01 DIAGNOSIS — I10 ESSENTIAL HYPERTENSION: Primary | ICD-10-CM

## 2021-01-01 DIAGNOSIS — Z00.00 ENCOUNTER FOR PREVENTIVE HEALTH EXAMINATION: Primary | ICD-10-CM

## 2021-01-01 LAB
25(OH)D3+25(OH)D2 SERPL-MCNC: 47 NG/ML (ref 30–96)
ALBUMIN SERPL BCP-MCNC: 4 G/DL (ref 3.5–5.2)
ALP SERPL-CCNC: 74 U/L (ref 55–135)
ALT SERPL W/O P-5'-P-CCNC: 9 U/L (ref 10–44)
ANION GAP SERPL CALC-SCNC: 18 MMOL/L (ref 8–16)
AST SERPL-CCNC: 15 U/L (ref 10–40)
BILIRUB SERPL-MCNC: 0.5 MG/DL (ref 0.1–1)
BUN SERPL-MCNC: 12 MG/DL (ref 8–23)
CALCIUM SERPL-MCNC: 9.8 MG/DL (ref 8.7–10.5)
CHLORIDE SERPL-SCNC: 104 MMOL/L (ref 95–110)
CHOLEST SERPL-MCNC: 146 MG/DL (ref 120–199)
CHOLEST/HDLC SERPL: 2.8 {RATIO} (ref 2–5)
CO2 SERPL-SCNC: 19 MMOL/L (ref 23–29)
CREAT SERPL-MCNC: 0.8 MG/DL (ref 0.5–1.4)
EST. GFR  (AFRICAN AMERICAN): >60 ML/MIN/1.73 M^2
EST. GFR  (NON AFRICAN AMERICAN): >60 ML/MIN/1.73 M^2
ESTIMATED AVG GLUCOSE: 154 MG/DL (ref 68–131)
GLUCOSE SERPL-MCNC: 121 MG/DL (ref 70–110)
HBA1C MFR BLD: 7 % (ref 4–5.6)
HDLC SERPL-MCNC: 52 MG/DL (ref 40–75)
HDLC SERPL: 35.6 % (ref 20–50)
LDLC SERPL CALC-MCNC: 77.8 MG/DL (ref 63–159)
NONHDLC SERPL-MCNC: 94 MG/DL
POTASSIUM SERPL-SCNC: 3.7 MMOL/L (ref 3.5–5.1)
PROT SERPL-MCNC: 7.4 G/DL (ref 6–8.4)
SODIUM SERPL-SCNC: 141 MMOL/L (ref 136–145)
TRIGL SERPL-MCNC: 81 MG/DL (ref 30–150)
TSH SERPL DL<=0.005 MIU/L-ACNC: 1.44 UIU/ML (ref 0.4–4)

## 2021-01-01 PROCEDURE — 3074F PR MOST RECENT SYSTOLIC BLOOD PRESSURE < 130 MM HG: ICD-10-PCS | Mod: HCNC,CPTII,S$GLB, | Performed by: NURSE PRACTITIONER

## 2021-01-01 PROCEDURE — 3078F DIAST BP <80 MM HG: CPT | Mod: HCNC,CPTII,S$GLB, | Performed by: INTERNAL MEDICINE

## 2021-01-01 PROCEDURE — 1101F PT FALLS ASSESS-DOCD LE1/YR: CPT | Mod: HCNC,CPTII,S$GLB, | Performed by: INTERNAL MEDICINE

## 2021-01-01 PROCEDURE — 3072F LOW RISK FOR RETINOPATHY: CPT | Mod: S$GLB,,, | Performed by: NURSE PRACTITIONER

## 2021-01-01 PROCEDURE — 3288F PR FALLS RISK ASSESSMENT DOCUMENTED: ICD-10-PCS | Mod: HCNC,CPTII,S$GLB, | Performed by: INTERNAL MEDICINE

## 2021-01-01 PROCEDURE — 1126F PR PAIN SEVERITY QUANTIFIED, NO PAIN PRESENT: ICD-10-PCS | Mod: S$GLB,,, | Performed by: NURSE PRACTITIONER

## 2021-01-01 PROCEDURE — 3051F HG A1C>EQUAL 7.0%<8.0%: CPT | Mod: HCNC,CPTII,S$GLB, | Performed by: NURSE PRACTITIONER

## 2021-01-01 PROCEDURE — 99213 OFFICE O/P EST LOW 20 MIN: CPT | Mod: HCNC,S$GLB,, | Performed by: NURSE PRACTITIONER

## 2021-01-01 PROCEDURE — 99499 UNLISTED E&M SERVICE: CPT | Mod: HCNC,S$GLB,, | Performed by: INTERNAL MEDICINE

## 2021-01-01 PROCEDURE — 3051F PR MOST RECENT HEMOGLOBIN A1C LEVEL 7.0 - < 8.0%: ICD-10-PCS | Mod: HCNC,CPTII,S$GLB, | Performed by: INTERNAL MEDICINE

## 2021-01-01 PROCEDURE — 1101F PT FALLS ASSESS-DOCD LE1/YR: CPT | Mod: CPTII,S$GLB,, | Performed by: NURSE PRACTITIONER

## 2021-01-01 PROCEDURE — 3078F PR MOST RECENT DIASTOLIC BLOOD PRESSURE < 80 MM HG: ICD-10-PCS | Mod: HCNC,CPTII,S$GLB, | Performed by: INTERNAL MEDICINE

## 2021-01-01 PROCEDURE — 3074F PR MOST RECENT SYSTOLIC BLOOD PRESSURE < 130 MM HG: ICD-10-PCS | Mod: HCNC,CPTII,S$GLB, | Performed by: INTERNAL MEDICINE

## 2021-01-01 PROCEDURE — 1160F RVW MEDS BY RX/DR IN RCRD: CPT | Mod: HCNC,CPTII,S$GLB, | Performed by: INTERNAL MEDICINE

## 2021-01-01 PROCEDURE — 1159F PR MEDICATION LIST DOCUMENTED IN MEDICAL RECORD: ICD-10-PCS | Mod: HCNC,CPTII,S$GLB, | Performed by: NURSE PRACTITIONER

## 2021-01-01 PROCEDURE — 99214 PR OFFICE/OUTPT VISIT, EST, LEVL IV, 30-39 MIN: ICD-10-PCS | Mod: HCNC,S$GLB,, | Performed by: INTERNAL MEDICINE

## 2021-01-01 PROCEDURE — 82306 VITAMIN D 25 HYDROXY: CPT | Mod: HCNC | Performed by: NURSE PRACTITIONER

## 2021-01-01 PROCEDURE — 3051F PR MOST RECENT HEMOGLOBIN A1C LEVEL 7.0 - < 8.0%: ICD-10-PCS | Mod: HCNC,CPTII,S$GLB, | Performed by: NURSE PRACTITIONER

## 2021-01-01 PROCEDURE — 83036 HEMOGLOBIN GLYCOSYLATED A1C: CPT | Mod: HCNC | Performed by: NURSE PRACTITIONER

## 2021-01-01 PROCEDURE — 1159F MED LIST DOCD IN RCRD: CPT | Mod: HCNC,CPTII,S$GLB, | Performed by: INTERNAL MEDICINE

## 2021-01-01 PROCEDURE — 1101F PR PT FALLS ASSESS DOC 0-1 FALLS W/OUT INJ PAST YR: ICD-10-PCS | Mod: HCNC,CPTII,S$GLB, | Performed by: INTERNAL MEDICINE

## 2021-01-01 PROCEDURE — 3072F PR LOW RISK FOR RETINOPATHY: ICD-10-PCS | Mod: HCNC,CPTII,S$GLB, | Performed by: NURSE PRACTITIONER

## 2021-01-01 PROCEDURE — 1159F PR MEDICATION LIST DOCUMENTED IN MEDICAL RECORD: ICD-10-PCS | Mod: HCNC,CPTII,S$GLB, | Performed by: INTERNAL MEDICINE

## 2021-01-01 PROCEDURE — 99213 PR OFFICE/OUTPT VISIT, EST, LEVL III, 20-29 MIN: ICD-10-PCS | Mod: HCNC,S$GLB,, | Performed by: NURSE PRACTITIONER

## 2021-01-01 PROCEDURE — 3072F LOW RISK FOR RETINOPATHY: CPT | Mod: HCNC,CPTII,S$GLB, | Performed by: INTERNAL MEDICINE

## 2021-01-01 PROCEDURE — 99999 PR PBB SHADOW E&M-EST. PATIENT-LVL IV: CPT | Mod: PBBFAC,HCNC,, | Performed by: INTERNAL MEDICINE

## 2021-01-01 PROCEDURE — 1126F PR PAIN SEVERITY QUANTIFIED, NO PAIN PRESENT: ICD-10-PCS | Mod: HCNC,CPTII,S$GLB, | Performed by: INTERNAL MEDICINE

## 2021-01-01 PROCEDURE — 99499 RISK ADDL DX/OHS AUDIT: ICD-10-PCS | Mod: HCNC,S$GLB,, | Performed by: INTERNAL MEDICINE

## 2021-01-01 PROCEDURE — 3288F FALL RISK ASSESSMENT DOCD: CPT | Mod: CPTII,S$GLB,, | Performed by: NURSE PRACTITIONER

## 2021-01-01 PROCEDURE — 1101F PR PT FALLS ASSESS DOC 0-1 FALLS W/OUT INJ PAST YR: ICD-10-PCS | Mod: CPTII,S$GLB,, | Performed by: NURSE PRACTITIONER

## 2021-01-01 PROCEDURE — 3072F LOW RISK FOR RETINOPATHY: CPT | Mod: HCNC,CPTII,S$GLB, | Performed by: NURSE PRACTITIONER

## 2021-01-01 PROCEDURE — 3072F PR LOW RISK FOR RETINOPATHY: ICD-10-PCS | Mod: S$GLB,,, | Performed by: NURSE PRACTITIONER

## 2021-01-01 PROCEDURE — 99999 PR PBB SHADOW E&M-EST. PATIENT-LVL IV: ICD-10-PCS | Mod: PBBFAC,,, | Performed by: NURSE PRACTITIONER

## 2021-01-01 PROCEDURE — 3288F FALL RISK ASSESSMENT DOCD: CPT | Mod: HCNC,CPTII,S$GLB, | Performed by: NURSE PRACTITIONER

## 2021-01-01 PROCEDURE — 80053 COMPREHEN METABOLIC PANEL: CPT | Mod: HCNC | Performed by: NURSE PRACTITIONER

## 2021-01-01 PROCEDURE — 80061 LIPID PANEL: CPT | Mod: HCNC | Performed by: NURSE PRACTITIONER

## 2021-01-01 PROCEDURE — 99999 PR PBB SHADOW E&M-EST. PATIENT-LVL IV: CPT | Mod: PBBFAC,HCNC,, | Performed by: NURSE PRACTITIONER

## 2021-01-01 PROCEDURE — 1126F AMNT PAIN NOTED NONE PRSNT: CPT | Mod: HCNC,CPTII,S$GLB, | Performed by: NURSE PRACTITIONER

## 2021-01-01 PROCEDURE — 99999 PR PBB SHADOW E&M-EST. PATIENT-LVL IV: ICD-10-PCS | Mod: PBBFAC,HCNC,, | Performed by: INTERNAL MEDICINE

## 2021-01-01 PROCEDURE — 1126F PR PAIN SEVERITY QUANTIFIED, NO PAIN PRESENT: ICD-10-PCS | Mod: HCNC,CPTII,S$GLB, | Performed by: NURSE PRACTITIONER

## 2021-01-01 PROCEDURE — 99214 OFFICE O/P EST MOD 30 MIN: CPT | Mod: HCNC,S$GLB,, | Performed by: INTERNAL MEDICINE

## 2021-01-01 PROCEDURE — 3072F PR LOW RISK FOR RETINOPATHY: ICD-10-PCS | Mod: HCNC,CPTII,S$GLB, | Performed by: INTERNAL MEDICINE

## 2021-01-01 PROCEDURE — 84443 ASSAY THYROID STIM HORMONE: CPT | Mod: HCNC | Performed by: NURSE PRACTITIONER

## 2021-01-01 PROCEDURE — 3288F PR FALLS RISK ASSESSMENT DOCUMENTED: ICD-10-PCS | Mod: CPTII,S$GLB,, | Performed by: NURSE PRACTITIONER

## 2021-01-01 PROCEDURE — 99499 RISK ADDL DX/OHS AUDIT: ICD-10-PCS | Mod: S$GLB,,, | Performed by: NURSE PRACTITIONER

## 2021-01-01 PROCEDURE — 1126F AMNT PAIN NOTED NONE PRSNT: CPT | Mod: HCNC,CPTII,S$GLB, | Performed by: INTERNAL MEDICINE

## 2021-01-01 PROCEDURE — 99999 PR PBB SHADOW E&M-EST. PATIENT-LVL IV: CPT | Mod: PBBFAC,,, | Performed by: NURSE PRACTITIONER

## 2021-01-01 PROCEDURE — 3074F SYST BP LT 130 MM HG: CPT | Mod: HCNC,CPTII,S$GLB, | Performed by: NURSE PRACTITIONER

## 2021-01-01 PROCEDURE — 1124F ACP DISCUSS-NO DSCNMKR DOCD: CPT | Mod: S$GLB,,, | Performed by: NURSE PRACTITIONER

## 2021-01-01 PROCEDURE — 1101F PR PT FALLS ASSESS DOC 0-1 FALLS W/OUT INJ PAST YR: ICD-10-PCS | Mod: HCNC,CPTII,S$GLB, | Performed by: NURSE PRACTITIONER

## 2021-01-01 PROCEDURE — 1126F AMNT PAIN NOTED NONE PRSNT: CPT | Mod: S$GLB,,, | Performed by: NURSE PRACTITIONER

## 2021-01-01 PROCEDURE — 1101F PT FALLS ASSESS-DOCD LE1/YR: CPT | Mod: HCNC,CPTII,S$GLB, | Performed by: NURSE PRACTITIONER

## 2021-01-01 PROCEDURE — 99999 PR PBB SHADOW E&M-EST. PATIENT-LVL IV: ICD-10-PCS | Mod: PBBFAC,HCNC,, | Performed by: NURSE PRACTITIONER

## 2021-01-01 PROCEDURE — 3078F DIAST BP <80 MM HG: CPT | Mod: HCNC,CPTII,S$GLB, | Performed by: NURSE PRACTITIONER

## 2021-01-01 PROCEDURE — 3288F FALL RISK ASSESSMENT DOCD: CPT | Mod: HCNC,CPTII,S$GLB, | Performed by: INTERNAL MEDICINE

## 2021-01-01 PROCEDURE — 1124F PR ADV CARE PLAN DISCUSSED, UNABLE/UNWILL DOC PLAN OR SURROGATE: ICD-10-PCS | Mod: S$GLB,,, | Performed by: NURSE PRACTITIONER

## 2021-01-01 PROCEDURE — 3288F PR FALLS RISK ASSESSMENT DOCUMENTED: ICD-10-PCS | Mod: HCNC,CPTII,S$GLB, | Performed by: NURSE PRACTITIONER

## 2021-01-01 PROCEDURE — 1159F MED LIST DOCD IN RCRD: CPT | Mod: HCNC,CPTII,S$GLB, | Performed by: NURSE PRACTITIONER

## 2021-01-01 PROCEDURE — 99499 UNLISTED E&M SERVICE: CPT | Mod: S$GLB,,, | Performed by: NURSE PRACTITIONER

## 2021-01-01 PROCEDURE — G0439 PPPS, SUBSEQ VISIT: HCPCS | Mod: S$GLB,,, | Performed by: NURSE PRACTITIONER

## 2021-01-01 PROCEDURE — 3051F HG A1C>EQUAL 7.0%<8.0%: CPT | Mod: HCNC,CPTII,S$GLB, | Performed by: INTERNAL MEDICINE

## 2021-01-01 PROCEDURE — 3078F PR MOST RECENT DIASTOLIC BLOOD PRESSURE < 80 MM HG: ICD-10-PCS | Mod: HCNC,CPTII,S$GLB, | Performed by: NURSE PRACTITIONER

## 2021-01-01 PROCEDURE — 36415 COLL VENOUS BLD VENIPUNCTURE: CPT | Mod: HCNC,PO | Performed by: NURSE PRACTITIONER

## 2021-01-01 PROCEDURE — 3074F SYST BP LT 130 MM HG: CPT | Mod: HCNC,CPTII,S$GLB, | Performed by: INTERNAL MEDICINE

## 2021-01-01 PROCEDURE — 1160F PR REVIEW ALL MEDS BY PRESCRIBER/CLIN PHARMACIST DOCUMENTED: ICD-10-PCS | Mod: HCNC,CPTII,S$GLB, | Performed by: INTERNAL MEDICINE

## 2021-01-01 PROCEDURE — G0439 PR MEDICARE ANNUAL WELLNESS SUBSEQUENT VISIT: ICD-10-PCS | Mod: S$GLB,,, | Performed by: NURSE PRACTITIONER

## 2021-01-01 RX ORDER — AMLODIPINE BESYLATE 10 MG/1
10 TABLET ORAL DAILY
Qty: 90 TABLET | Refills: 3 | Status: SHIPPED | OUTPATIENT
Start: 2021-01-01

## 2021-01-01 RX ORDER — ESCITALOPRAM OXALATE 10 MG/1
10 TABLET ORAL DAILY
Qty: 90 TABLET | Refills: 3 | Status: SHIPPED | OUTPATIENT
Start: 2021-01-01

## 2021-01-07 ENCOUNTER — TELEPHONE (OUTPATIENT)
Dept: OPHTHALMOLOGY | Facility: CLINIC | Age: 86
End: 2021-01-07

## 2021-03-31 ENCOUNTER — LAB VISIT (OUTPATIENT)
Dept: LAB | Facility: HOSPITAL | Age: 86
End: 2021-03-31
Attending: NURSE PRACTITIONER
Payer: MEDICARE

## 2021-03-31 DIAGNOSIS — E11.3293 TYPE 2 DIABETES MELLITUS WITH BOTH EYES AFFECTED BY MILD NONPROLIFERATIVE RETINOPATHY WITHOUT MACULAR EDEMA, WITHOUT LONG-TERM CURRENT USE OF INSULIN: ICD-10-CM

## 2021-03-31 LAB
ALBUMIN SERPL BCP-MCNC: 3.9 G/DL (ref 3.5–5.2)
ALP SERPL-CCNC: 74 U/L (ref 55–135)
ALT SERPL W/O P-5'-P-CCNC: 5 U/L (ref 10–44)
ANION GAP SERPL CALC-SCNC: 14 MMOL/L (ref 8–16)
AST SERPL-CCNC: 14 U/L (ref 10–40)
BILIRUB SERPL-MCNC: 0.5 MG/DL (ref 0.1–1)
BUN SERPL-MCNC: 13 MG/DL (ref 8–23)
CALCIUM SERPL-MCNC: 9.4 MG/DL (ref 8.7–10.5)
CHLORIDE SERPL-SCNC: 104 MMOL/L (ref 95–110)
CO2 SERPL-SCNC: 22 MMOL/L (ref 23–29)
CREAT SERPL-MCNC: 0.7 MG/DL (ref 0.5–1.4)
EST. GFR  (AFRICAN AMERICAN): >60 ML/MIN/1.73 M^2
EST. GFR  (NON AFRICAN AMERICAN): >60 ML/MIN/1.73 M^2
ESTIMATED AVG GLUCOSE: 146 MG/DL (ref 68–131)
GLUCOSE SERPL-MCNC: 119 MG/DL (ref 70–110)
HBA1C MFR BLD: 6.7 % (ref 4–5.6)
POTASSIUM SERPL-SCNC: 3.8 MMOL/L (ref 3.5–5.1)
PROT SERPL-MCNC: 7.5 G/DL (ref 6–8.4)
SODIUM SERPL-SCNC: 140 MMOL/L (ref 136–145)

## 2021-03-31 PROCEDURE — 83036 HEMOGLOBIN GLYCOSYLATED A1C: CPT | Performed by: NURSE PRACTITIONER

## 2021-03-31 PROCEDURE — 80053 COMPREHEN METABOLIC PANEL: CPT | Performed by: NURSE PRACTITIONER

## 2021-03-31 PROCEDURE — 36415 COLL VENOUS BLD VENIPUNCTURE: CPT | Mod: PO | Performed by: NURSE PRACTITIONER

## 2021-04-05 ENCOUNTER — OFFICE VISIT (OUTPATIENT)
Dept: PRIMARY CARE CLINIC | Facility: CLINIC | Age: 86
End: 2021-04-05
Payer: MEDICARE

## 2021-04-05 VITALS
HEART RATE: 80 BPM | BODY MASS INDEX: 23.37 KG/M2 | HEIGHT: 64 IN | SYSTOLIC BLOOD PRESSURE: 132 MMHG | WEIGHT: 136.88 LBS | OXYGEN SATURATION: 98 % | DIASTOLIC BLOOD PRESSURE: 80 MMHG

## 2021-04-05 DIAGNOSIS — F32.5 MAJOR DEPRESSIVE DISORDER, SINGLE EPISODE, IN FULL REMISSION: ICD-10-CM

## 2021-04-05 DIAGNOSIS — M81.0 AGE-RELATED OSTEOPOROSIS WITHOUT CURRENT PATHOLOGICAL FRACTURE: ICD-10-CM

## 2021-04-05 DIAGNOSIS — E78.5 HYPERLIPIDEMIA, UNSPECIFIED HYPERLIPIDEMIA TYPE: ICD-10-CM

## 2021-04-05 DIAGNOSIS — E11.9 CONTROLLED TYPE 2 DIABETES MELLITUS WITHOUT COMPLICATION, WITHOUT LONG-TERM CURRENT USE OF INSULIN: Primary | ICD-10-CM

## 2021-04-05 DIAGNOSIS — R10.13 DYSPEPSIA: ICD-10-CM

## 2021-04-05 DIAGNOSIS — I70.0 AORTIC ATHEROSCLEROSIS: ICD-10-CM

## 2021-04-05 DIAGNOSIS — I10 ESSENTIAL HYPERTENSION: ICD-10-CM

## 2021-04-05 PROCEDURE — 3079F DIAST BP 80-89 MM HG: CPT | Mod: CPTII,S$GLB,, | Performed by: INTERNAL MEDICINE

## 2021-04-05 PROCEDURE — 1126F PR PAIN SEVERITY QUANTIFIED, NO PAIN PRESENT: ICD-10-PCS | Mod: S$GLB,,, | Performed by: INTERNAL MEDICINE

## 2021-04-05 PROCEDURE — 99214 OFFICE O/P EST MOD 30 MIN: CPT | Mod: S$GLB,,, | Performed by: INTERNAL MEDICINE

## 2021-04-05 PROCEDURE — 99214 PR OFFICE/OUTPT VISIT, EST, LEVL IV, 30-39 MIN: ICD-10-PCS | Mod: S$GLB,,, | Performed by: INTERNAL MEDICINE

## 2021-04-05 PROCEDURE — 3072F LOW RISK FOR RETINOPATHY: CPT | Mod: S$GLB,,, | Performed by: INTERNAL MEDICINE

## 2021-04-05 PROCEDURE — 3079F PR MOST RECENT DIASTOLIC BLOOD PRESSURE 80-89 MM HG: ICD-10-PCS | Mod: CPTII,S$GLB,, | Performed by: INTERNAL MEDICINE

## 2021-04-05 PROCEDURE — 99499 RISK ADDL DX/OHS AUDIT: ICD-10-PCS | Mod: S$GLB,,, | Performed by: INTERNAL MEDICINE

## 2021-04-05 PROCEDURE — 1101F PT FALLS ASSESS-DOCD LE1/YR: CPT | Mod: CPTII,S$GLB,, | Performed by: INTERNAL MEDICINE

## 2021-04-05 PROCEDURE — 1126F AMNT PAIN NOTED NONE PRSNT: CPT | Mod: S$GLB,,, | Performed by: INTERNAL MEDICINE

## 2021-04-05 PROCEDURE — 1101F PR PT FALLS ASSESS DOC 0-1 FALLS W/OUT INJ PAST YR: ICD-10-PCS | Mod: CPTII,S$GLB,, | Performed by: INTERNAL MEDICINE

## 2021-04-05 PROCEDURE — 99999 PR PBB SHADOW E&M-EST. PATIENT-LVL IV: CPT | Mod: PBBFAC,,, | Performed by: INTERNAL MEDICINE

## 2021-04-05 PROCEDURE — 3072F PR LOW RISK FOR RETINOPATHY: ICD-10-PCS | Mod: S$GLB,,, | Performed by: INTERNAL MEDICINE

## 2021-04-05 PROCEDURE — 1159F MED LIST DOCD IN RCRD: CPT | Mod: S$GLB,,, | Performed by: INTERNAL MEDICINE

## 2021-04-05 PROCEDURE — 3288F PR FALLS RISK ASSESSMENT DOCUMENTED: ICD-10-PCS | Mod: CPTII,S$GLB,, | Performed by: INTERNAL MEDICINE

## 2021-04-05 PROCEDURE — 99999 PR PBB SHADOW E&M-EST. PATIENT-LVL IV: ICD-10-PCS | Mod: PBBFAC,,, | Performed by: INTERNAL MEDICINE

## 2021-04-05 PROCEDURE — 3288F FALL RISK ASSESSMENT DOCD: CPT | Mod: CPTII,S$GLB,, | Performed by: INTERNAL MEDICINE

## 2021-04-05 PROCEDURE — 3075F PR MOST RECENT SYSTOLIC BLOOD PRESS GE 130-139MM HG: ICD-10-PCS | Mod: CPTII,S$GLB,, | Performed by: INTERNAL MEDICINE

## 2021-04-05 PROCEDURE — 1159F PR MEDICATION LIST DOCUMENTED IN MEDICAL RECORD: ICD-10-PCS | Mod: S$GLB,,, | Performed by: INTERNAL MEDICINE

## 2021-04-05 PROCEDURE — 99499 UNLISTED E&M SERVICE: CPT | Mod: S$GLB,,, | Performed by: INTERNAL MEDICINE

## 2021-04-05 PROCEDURE — 3075F SYST BP GE 130 - 139MM HG: CPT | Mod: CPTII,S$GLB,, | Performed by: INTERNAL MEDICINE

## 2021-04-05 RX ORDER — FAMOTIDINE 20 MG/1
20 TABLET, FILM COATED ORAL NIGHTLY
Qty: 90 TABLET | Refills: 0 | Status: SHIPPED | OUTPATIENT
Start: 2021-04-05 | End: 2021-01-01 | Stop reason: ALTCHOICE

## 2021-04-05 RX ORDER — ATORVASTATIN CALCIUM 20 MG/1
20 TABLET, FILM COATED ORAL DAILY
Qty: 90 TABLET | Refills: 3 | Status: ON HOLD | OUTPATIENT
Start: 2021-04-05 | End: 2022-01-01 | Stop reason: HOSPADM

## 2021-04-07 ENCOUNTER — OFFICE VISIT (OUTPATIENT)
Dept: ENDOCRINOLOGY | Facility: CLINIC | Age: 86
End: 2021-04-07
Payer: MEDICARE

## 2021-04-07 VITALS
HEIGHT: 64 IN | OXYGEN SATURATION: 98 % | BODY MASS INDEX: 23.04 KG/M2 | HEART RATE: 90 BPM | WEIGHT: 134.94 LBS | DIASTOLIC BLOOD PRESSURE: 70 MMHG | SYSTOLIC BLOOD PRESSURE: 116 MMHG | TEMPERATURE: 97 F

## 2021-04-07 DIAGNOSIS — I10 ESSENTIAL HYPERTENSION: ICD-10-CM

## 2021-04-07 DIAGNOSIS — M85.80 OSTEOPENIA, UNSPECIFIED LOCATION: ICD-10-CM

## 2021-04-07 DIAGNOSIS — E55.9 VITAMIN D DEFICIENCY, UNSPECIFIED: ICD-10-CM

## 2021-04-07 DIAGNOSIS — E78.2 MIXED HYPERLIPIDEMIA: Chronic | ICD-10-CM

## 2021-04-07 DIAGNOSIS — E11.9 TYPE 2 DIABETES MELLITUS WITHOUT COMPLICATION, WITHOUT LONG-TERM CURRENT USE OF INSULIN: Primary | ICD-10-CM

## 2021-04-07 PROCEDURE — 3288F PR FALLS RISK ASSESSMENT DOCUMENTED: ICD-10-PCS | Mod: CPTII,S$GLB,, | Performed by: NURSE PRACTITIONER

## 2021-04-07 PROCEDURE — 3072F LOW RISK FOR RETINOPATHY: CPT | Mod: S$GLB,,, | Performed by: NURSE PRACTITIONER

## 2021-04-07 PROCEDURE — 1126F AMNT PAIN NOTED NONE PRSNT: CPT | Mod: S$GLB,,, | Performed by: NURSE PRACTITIONER

## 2021-04-07 PROCEDURE — 3288F FALL RISK ASSESSMENT DOCD: CPT | Mod: CPTII,S$GLB,, | Performed by: NURSE PRACTITIONER

## 2021-04-07 PROCEDURE — 1101F PT FALLS ASSESS-DOCD LE1/YR: CPT | Mod: CPTII,S$GLB,, | Performed by: NURSE PRACTITIONER

## 2021-04-07 PROCEDURE — 1101F PR PT FALLS ASSESS DOC 0-1 FALLS W/OUT INJ PAST YR: ICD-10-PCS | Mod: CPTII,S$GLB,, | Performed by: NURSE PRACTITIONER

## 2021-04-07 PROCEDURE — 99999 PR PBB SHADOW E&M-EST. PATIENT-LVL IV: ICD-10-PCS | Mod: PBBFAC,,, | Performed by: NURSE PRACTITIONER

## 2021-04-07 PROCEDURE — 99214 OFFICE O/P EST MOD 30 MIN: CPT | Mod: S$GLB,,, | Performed by: NURSE PRACTITIONER

## 2021-04-07 PROCEDURE — 1126F PR PAIN SEVERITY QUANTIFIED, NO PAIN PRESENT: ICD-10-PCS | Mod: S$GLB,,, | Performed by: NURSE PRACTITIONER

## 2021-04-07 PROCEDURE — 3072F PR LOW RISK FOR RETINOPATHY: ICD-10-PCS | Mod: S$GLB,,, | Performed by: NURSE PRACTITIONER

## 2021-04-07 PROCEDURE — 99999 PR PBB SHADOW E&M-EST. PATIENT-LVL IV: CPT | Mod: PBBFAC,,, | Performed by: NURSE PRACTITIONER

## 2021-04-07 PROCEDURE — 99214 PR OFFICE/OUTPT VISIT, EST, LEVL IV, 30-39 MIN: ICD-10-PCS | Mod: S$GLB,,, | Performed by: NURSE PRACTITIONER

## 2021-04-07 RX ORDER — GLIMEPIRIDE 1 MG/1
1 TABLET ORAL
Qty: 90 TABLET | Refills: 3 | Status: ON HOLD | OUTPATIENT
Start: 2021-04-07 | End: 2022-01-01 | Stop reason: HOSPADM

## 2021-04-07 RX ORDER — METFORMIN HYDROCHLORIDE 1000 MG/1
1000 TABLET ORAL 2 TIMES DAILY WITH MEALS
Qty: 180 TABLET | Refills: 3 | Status: ON HOLD | OUTPATIENT
Start: 2021-04-07 | End: 2022-01-01 | Stop reason: HOSPADM

## 2021-04-22 ENCOUNTER — LAB VISIT (OUTPATIENT)
Dept: LAB | Facility: HOSPITAL | Age: 86
End: 2021-04-22
Attending: INTERNAL MEDICINE
Payer: MEDICARE

## 2021-04-22 DIAGNOSIS — E78.5 HYPERLIPIDEMIA, UNSPECIFIED HYPERLIPIDEMIA TYPE: ICD-10-CM

## 2021-04-22 DIAGNOSIS — M81.0 AGE-RELATED OSTEOPOROSIS WITHOUT CURRENT PATHOLOGICAL FRACTURE: ICD-10-CM

## 2021-04-22 DIAGNOSIS — E11.9 CONTROLLED TYPE 2 DIABETES MELLITUS WITHOUT COMPLICATION, WITHOUT LONG-TERM CURRENT USE OF INSULIN: ICD-10-CM

## 2021-04-22 LAB
25(OH)D3+25(OH)D2 SERPL-MCNC: 44 NG/ML (ref 30–96)
CHOLEST SERPL-MCNC: 148 MG/DL (ref 120–199)
CHOLEST/HDLC SERPL: 2.9 {RATIO} (ref 2–5)
ERYTHROCYTE [DISTWIDTH] IN BLOOD BY AUTOMATED COUNT: 18.1 % (ref 11.5–14.5)
HCT VFR BLD AUTO: 38.3 % (ref 37–48.5)
HDLC SERPL-MCNC: 51 MG/DL (ref 40–75)
HDLC SERPL: 34.5 % (ref 20–50)
HGB BLD-MCNC: 12.2 G/DL (ref 12–16)
LDLC SERPL CALC-MCNC: 80 MG/DL (ref 63–159)
MCH RBC QN AUTO: 21.5 PG (ref 27–31)
MCHC RBC AUTO-ENTMCNC: 31.9 G/DL (ref 32–36)
MCV RBC AUTO: 67 FL (ref 82–98)
NONHDLC SERPL-MCNC: 97 MG/DL
PLATELET # BLD AUTO: 272 K/UL (ref 150–450)
PMV BLD AUTO: 10.4 FL (ref 9.2–12.9)
RBC # BLD AUTO: 5.68 M/UL (ref 4–5.4)
TRIGL SERPL-MCNC: 85 MG/DL (ref 30–150)
WBC # BLD AUTO: 13.37 K/UL (ref 3.9–12.7)

## 2021-04-22 PROCEDURE — 85027 COMPLETE CBC AUTOMATED: CPT | Performed by: INTERNAL MEDICINE

## 2021-04-22 PROCEDURE — 82306 VITAMIN D 25 HYDROXY: CPT | Performed by: INTERNAL MEDICINE

## 2021-04-22 PROCEDURE — 80061 LIPID PANEL: CPT | Performed by: INTERNAL MEDICINE

## 2021-04-22 PROCEDURE — 36415 COLL VENOUS BLD VENIPUNCTURE: CPT | Mod: PN | Performed by: INTERNAL MEDICINE

## 2021-06-09 ENCOUNTER — PES CALL (OUTPATIENT)
Dept: ADMINISTRATIVE | Facility: CLINIC | Age: 86
End: 2021-06-09

## 2022-01-01 ENCOUNTER — TELEPHONE (OUTPATIENT)
Dept: ENDOCRINOLOGY | Facility: CLINIC | Age: 87
End: 2022-01-01
Payer: MEDICARE

## 2022-01-01 ENCOUNTER — PATIENT MESSAGE (OUTPATIENT)
Dept: HEMATOLOGY/ONCOLOGY | Facility: CLINIC | Age: 87
End: 2022-01-01
Payer: MEDICARE

## 2022-01-01 ENCOUNTER — HOSPITAL ENCOUNTER (EMERGENCY)
Facility: HOSPITAL | Age: 87
Discharge: HOME | End: 2022-02-16
Attending: EMERGENCY MEDICINE
Payer: MEDICARE

## 2022-01-01 ENCOUNTER — TELEPHONE (OUTPATIENT)
Dept: HEMATOLOGY/ONCOLOGY | Facility: CLINIC | Age: 87
End: 2022-01-01
Payer: MEDICARE

## 2022-01-01 ENCOUNTER — OFFICE VISIT (OUTPATIENT)
Dept: INTERNAL MEDICINE | Facility: CLINIC | Age: 87
End: 2022-01-01
Payer: MEDICARE

## 2022-01-01 ENCOUNTER — PATIENT OUTREACH (OUTPATIENT)
Dept: DIABETES | Facility: CLINIC | Age: 87
End: 2022-01-01
Payer: MEDICARE

## 2022-01-01 ENCOUNTER — SPECIALTY PHARMACY (OUTPATIENT)
Dept: PHARMACY | Facility: CLINIC | Age: 87
End: 2022-01-01
Payer: MEDICARE

## 2022-01-01 ENCOUNTER — PATIENT OUTREACH (OUTPATIENT)
Dept: ADMINISTRATIVE | Facility: CLINIC | Age: 87
End: 2022-01-01
Payer: MEDICARE

## 2022-01-01 ENCOUNTER — HOSPITAL ENCOUNTER (OUTPATIENT)
Dept: RADIOLOGY | Facility: OTHER | Age: 87
Discharge: HOME | End: 2022-01-13
Attending: INTERNAL MEDICINE
Payer: MEDICARE

## 2022-01-01 ENCOUNTER — OFFICE VISIT (OUTPATIENT)
Dept: HEMATOLOGY/ONCOLOGY | Facility: CLINIC | Age: 87
End: 2022-01-01
Payer: MEDICARE

## 2022-01-01 ENCOUNTER — OFFICE VISIT (OUTPATIENT)
Dept: ENDOCRINOLOGY | Facility: CLINIC | Age: 87
End: 2022-01-01
Payer: MEDICARE

## 2022-01-01 ENCOUNTER — TELEPHONE (OUTPATIENT)
Dept: INTERNAL MEDICINE | Facility: CLINIC | Age: 87
End: 2022-01-01
Payer: MEDICARE

## 2022-01-01 ENCOUNTER — HOSPITAL ENCOUNTER (OUTPATIENT)
Dept: RADIOLOGY | Facility: HOSPITAL | Age: 87
Discharge: HOME | End: 2022-04-27
Attending: INTERNAL MEDICINE
Payer: MEDICARE

## 2022-01-01 ENCOUNTER — LAB VISIT (OUTPATIENT)
Dept: LAB | Facility: HOSPITAL | Age: 87
End: 2022-01-01
Attending: INTERNAL MEDICINE
Payer: MEDICARE

## 2022-01-01 ENCOUNTER — HOSPITAL ENCOUNTER (INPATIENT)
Facility: HOSPITAL | Age: 87
LOS: 2 days | Discharge: HOSPICE | DRG: 186 | End: 2022-05-27
Attending: EMERGENCY MEDICINE | Admitting: EMERGENCY MEDICINE
Payer: MEDICARE

## 2022-01-01 ENCOUNTER — PATIENT MESSAGE (OUTPATIENT)
Dept: PRIMARY CARE CLINIC | Facility: CLINIC | Age: 87
End: 2022-01-01
Payer: MEDICARE

## 2022-01-01 ENCOUNTER — OFFICE VISIT (OUTPATIENT)
Dept: PALLIATIVE MEDICINE | Facility: CLINIC | Age: 87
End: 2022-01-01
Payer: MEDICARE

## 2022-01-01 ENCOUNTER — HOSPITAL ENCOUNTER (EMERGENCY)
Facility: HOSPITAL | Age: 87
Discharge: HOME | End: 2022-03-19
Attending: EMERGENCY MEDICINE
Payer: MEDICARE

## 2022-01-01 ENCOUNTER — LAB VISIT (OUTPATIENT)
Dept: LAB | Facility: HOSPITAL | Age: 87
End: 2022-01-01
Payer: MEDICARE

## 2022-01-01 ENCOUNTER — HOSPITAL ENCOUNTER (OUTPATIENT)
Facility: OTHER | Age: 87
Discharge: HOME | End: 2022-05-17
Attending: RADIOLOGY | Admitting: RADIOLOGY
Payer: MEDICARE

## 2022-01-01 ENCOUNTER — TELEPHONE (OUTPATIENT)
Dept: PALLIATIVE MEDICINE | Facility: CLINIC | Age: 87
End: 2022-01-01
Payer: MEDICARE

## 2022-01-01 ENCOUNTER — TELEPHONE (OUTPATIENT)
Dept: PRIMARY CARE CLINIC | Facility: CLINIC | Age: 87
End: 2022-01-01
Payer: MEDICARE

## 2022-01-01 ENCOUNTER — DOCUMENTATION ONLY (OUTPATIENT)
Dept: HEMATOLOGY/ONCOLOGY | Facility: CLINIC | Age: 87
End: 2022-01-01
Payer: MEDICARE

## 2022-01-01 ENCOUNTER — TELEPHONE (OUTPATIENT)
Dept: EMERGENCY MEDICINE | Facility: HOSPITAL | Age: 87
End: 2022-01-01
Payer: MEDICARE

## 2022-01-01 ENCOUNTER — TELEPHONE (OUTPATIENT)
Dept: SURGERY | Facility: CLINIC | Age: 87
End: 2022-01-01
Payer: MEDICARE

## 2022-01-01 ENCOUNTER — PATIENT MESSAGE (OUTPATIENT)
Dept: ADMINISTRATIVE | Facility: OTHER | Age: 87
End: 2022-01-01
Payer: MEDICARE

## 2022-01-01 ENCOUNTER — PATIENT MESSAGE (OUTPATIENT)
Dept: PALLIATIVE MEDICINE | Facility: CLINIC | Age: 87
End: 2022-01-01
Payer: MEDICARE

## 2022-01-01 ENCOUNTER — PATIENT MESSAGE (OUTPATIENT)
Dept: ENDOCRINOLOGY | Facility: CLINIC | Age: 87
End: 2022-01-01

## 2022-01-01 ENCOUNTER — PATIENT MESSAGE (OUTPATIENT)
Dept: HEMATOLOGY/ONCOLOGY | Facility: CLINIC | Age: 87
End: 2022-01-01

## 2022-01-01 ENCOUNTER — INFUSION (OUTPATIENT)
Dept: INFUSION THERAPY | Facility: HOSPITAL | Age: 87
End: 2022-01-01
Payer: MEDICARE

## 2022-01-01 ENCOUNTER — HOSPITAL ENCOUNTER (OUTPATIENT)
Dept: RADIOLOGY | Facility: HOSPITAL | Age: 87
Discharge: HOME | End: 2022-02-10
Attending: INTERNAL MEDICINE
Payer: MEDICARE

## 2022-01-01 ENCOUNTER — TELEPHONE (OUTPATIENT)
Dept: PRIMARY CARE CLINIC | Facility: CLINIC | Age: 87
End: 2022-01-01

## 2022-01-01 ENCOUNTER — CLINICAL SUPPORT (OUTPATIENT)
Dept: INTERVENTIONAL RADIOLOGY/VASCULAR | Facility: CLINIC | Age: 87
End: 2022-01-01
Payer: MEDICARE

## 2022-01-01 ENCOUNTER — PATIENT MESSAGE (OUTPATIENT)
Dept: ENDOCRINOLOGY | Facility: CLINIC | Age: 87
End: 2022-01-01
Payer: MEDICARE

## 2022-01-01 ENCOUNTER — CLINICAL SUPPORT (OUTPATIENT)
Dept: HEMATOLOGY/ONCOLOGY | Facility: CLINIC | Age: 87
End: 2022-01-01
Payer: MEDICARE

## 2022-01-01 ENCOUNTER — NURSE TRIAGE (OUTPATIENT)
Dept: ADMINISTRATIVE | Facility: CLINIC | Age: 87
End: 2022-01-01
Payer: MEDICARE

## 2022-01-01 ENCOUNTER — CLINICAL SUPPORT (OUTPATIENT)
Dept: DIABETES | Facility: CLINIC | Age: 87
End: 2022-01-01
Payer: MEDICARE

## 2022-01-01 ENCOUNTER — TELEPHONE (OUTPATIENT)
Dept: PHARMACY | Facility: CLINIC | Age: 87
End: 2022-01-01
Payer: MEDICARE

## 2022-01-01 ENCOUNTER — TELEPHONE (OUTPATIENT)
Dept: ADMINISTRATIVE | Facility: CLINIC | Age: 87
End: 2022-01-01
Payer: MEDICARE

## 2022-01-01 ENCOUNTER — HOSPITAL ENCOUNTER (OUTPATIENT)
Dept: RADIOLOGY | Facility: OTHER | Age: 87
Discharge: HOME | End: 2022-01-06
Attending: INTERNAL MEDICINE
Payer: MEDICARE

## 2022-01-01 ENCOUNTER — HOSPITAL ENCOUNTER (INPATIENT)
Facility: HOSPITAL | Age: 87
LOS: 2 days | Discharge: HOME | DRG: 948 | End: 2022-02-07
Attending: EMERGENCY MEDICINE | Admitting: HOSPITALIST
Payer: MEDICARE

## 2022-01-01 VITALS
SYSTOLIC BLOOD PRESSURE: 139 MMHG | BODY MASS INDEX: 20.79 KG/M2 | HEART RATE: 92 BPM | TEMPERATURE: 98 F | OXYGEN SATURATION: 94 % | DIASTOLIC BLOOD PRESSURE: 76 MMHG | HEIGHT: 64 IN | WEIGHT: 121.81 LBS | RESPIRATION RATE: 18 BRPM

## 2022-01-01 VITALS
BODY MASS INDEX: 21.46 KG/M2 | SYSTOLIC BLOOD PRESSURE: 135 MMHG | OXYGEN SATURATION: 96 % | DIASTOLIC BLOOD PRESSURE: 68 MMHG | HEART RATE: 84 BPM | TEMPERATURE: 98 F | RESPIRATION RATE: 16 BRPM | WEIGHT: 125 LBS

## 2022-01-01 VITALS
OXYGEN SATURATION: 94 % | WEIGHT: 121.69 LBS | BODY MASS INDEX: 20.78 KG/M2 | RESPIRATION RATE: 16 BRPM | SYSTOLIC BLOOD PRESSURE: 106 MMHG | HEIGHT: 64 IN | DIASTOLIC BLOOD PRESSURE: 50 MMHG | HEART RATE: 99 BPM

## 2022-01-01 VITALS
HEART RATE: 83 BPM | BODY MASS INDEX: 21 KG/M2 | DIASTOLIC BLOOD PRESSURE: 60 MMHG | WEIGHT: 123 LBS | HEIGHT: 64 IN | TEMPERATURE: 98 F | OXYGEN SATURATION: 95 % | SYSTOLIC BLOOD PRESSURE: 100 MMHG

## 2022-01-01 VITALS
OXYGEN SATURATION: 98 % | HEIGHT: 64 IN | BODY MASS INDEX: 21.96 KG/M2 | TEMPERATURE: 98 F | DIASTOLIC BLOOD PRESSURE: 73 MMHG | HEART RATE: 84 BPM | WEIGHT: 128.63 LBS | RESPIRATION RATE: 16 BRPM | SYSTOLIC BLOOD PRESSURE: 142 MMHG

## 2022-01-01 VITALS
BODY MASS INDEX: 21.17 KG/M2 | DIASTOLIC BLOOD PRESSURE: 58 MMHG | RESPIRATION RATE: 16 BRPM | HEART RATE: 87 BPM | HEIGHT: 64 IN | WEIGHT: 124 LBS | SYSTOLIC BLOOD PRESSURE: 105 MMHG | DIASTOLIC BLOOD PRESSURE: 62 MMHG | SYSTOLIC BLOOD PRESSURE: 120 MMHG | OXYGEN SATURATION: 97 % | HEART RATE: 88 BPM | TEMPERATURE: 99 F

## 2022-01-01 VITALS
TEMPERATURE: 99 F | DIASTOLIC BLOOD PRESSURE: 57 MMHG | HEIGHT: 64 IN | HEIGHT: 64 IN | HEART RATE: 100 BPM | TEMPERATURE: 98 F | WEIGHT: 123.44 LBS | OXYGEN SATURATION: 97 % | SYSTOLIC BLOOD PRESSURE: 100 MMHG | RESPIRATION RATE: 16 BRPM | SYSTOLIC BLOOD PRESSURE: 101 MMHG | RESPIRATION RATE: 17 BRPM | BODY MASS INDEX: 21.07 KG/M2 | OXYGEN SATURATION: 97 % | DIASTOLIC BLOOD PRESSURE: 59 MMHG | WEIGHT: 123.56 LBS | BODY MASS INDEX: 21.1 KG/M2 | HEART RATE: 100 BPM

## 2022-01-01 VITALS
OXYGEN SATURATION: 96 % | HEART RATE: 81 BPM | WEIGHT: 130.5 LBS | BODY MASS INDEX: 22.28 KG/M2 | SYSTOLIC BLOOD PRESSURE: 122 MMHG | TEMPERATURE: 99 F | HEIGHT: 64 IN | DIASTOLIC BLOOD PRESSURE: 72 MMHG

## 2022-01-01 VITALS
RESPIRATION RATE: 16 BRPM | TEMPERATURE: 98 F | DIASTOLIC BLOOD PRESSURE: 72 MMHG | SYSTOLIC BLOOD PRESSURE: 136 MMHG | WEIGHT: 128 LBS | BODY MASS INDEX: 21.85 KG/M2 | OXYGEN SATURATION: 99 % | HEIGHT: 64 IN | HEART RATE: 75 BPM

## 2022-01-01 VITALS — SYSTOLIC BLOOD PRESSURE: 131 MMHG | HEART RATE: 80 BPM | RESPIRATION RATE: 18 BRPM | DIASTOLIC BLOOD PRESSURE: 64 MMHG

## 2022-01-01 VITALS
DIASTOLIC BLOOD PRESSURE: 86 MMHG | HEART RATE: 65 BPM | RESPIRATION RATE: 17 BRPM | BODY MASS INDEX: 19.99 KG/M2 | OXYGEN SATURATION: 97 % | TEMPERATURE: 99 F | WEIGHT: 120 LBS | SYSTOLIC BLOOD PRESSURE: 179 MMHG | HEIGHT: 65 IN

## 2022-01-01 VITALS
SYSTOLIC BLOOD PRESSURE: 104 MMHG | BODY MASS INDEX: 21.06 KG/M2 | OXYGEN SATURATION: 100 % | WEIGHT: 123.38 LBS | HEART RATE: 84 BPM | RESPIRATION RATE: 16 BRPM | DIASTOLIC BLOOD PRESSURE: 59 MMHG | HEIGHT: 64 IN

## 2022-01-01 VITALS
HEART RATE: 77 BPM | BODY MASS INDEX: 21 KG/M2 | SYSTOLIC BLOOD PRESSURE: 153 MMHG | WEIGHT: 123 LBS | OXYGEN SATURATION: 96 % | RESPIRATION RATE: 18 BRPM | DIASTOLIC BLOOD PRESSURE: 71 MMHG | TEMPERATURE: 99 F | HEIGHT: 64 IN

## 2022-01-01 VITALS
SYSTOLIC BLOOD PRESSURE: 130 MMHG | BODY MASS INDEX: 21.53 KG/M2 | WEIGHT: 126.13 LBS | HEART RATE: 79 BPM | DIASTOLIC BLOOD PRESSURE: 60 MMHG | HEIGHT: 64 IN | OXYGEN SATURATION: 96 %

## 2022-01-01 VITALS — WEIGHT: 128.06 LBS | HEIGHT: 64 IN | BODY MASS INDEX: 21.86 KG/M2

## 2022-01-01 VITALS
HEIGHT: 64 IN | TEMPERATURE: 99 F | DIASTOLIC BLOOD PRESSURE: 68 MMHG | WEIGHT: 134.25 LBS | BODY MASS INDEX: 22.92 KG/M2 | HEART RATE: 91 BPM | SYSTOLIC BLOOD PRESSURE: 118 MMHG | OXYGEN SATURATION: 98 %

## 2022-01-01 VITALS
DIASTOLIC BLOOD PRESSURE: 59 MMHG | TEMPERATURE: 98 F | WEIGHT: 126.13 LBS | HEIGHT: 64 IN | BODY MASS INDEX: 21.53 KG/M2 | SYSTOLIC BLOOD PRESSURE: 122 MMHG | HEART RATE: 94 BPM | OXYGEN SATURATION: 98 % | RESPIRATION RATE: 19 BRPM

## 2022-01-01 VITALS
TEMPERATURE: 98 F | BODY MASS INDEX: 21.86 KG/M2 | HEIGHT: 64 IN | DIASTOLIC BLOOD PRESSURE: 60 MMHG | SYSTOLIC BLOOD PRESSURE: 114 MMHG | OXYGEN SATURATION: 96 % | WEIGHT: 128.06 LBS | HEART RATE: 92 BPM

## 2022-01-01 VITALS — HEART RATE: 94 BPM | DIASTOLIC BLOOD PRESSURE: 66 MMHG | SYSTOLIC BLOOD PRESSURE: 127 MMHG

## 2022-01-01 DIAGNOSIS — C91.10 CLL (CHRONIC LYMPHOCYTIC LEUKEMIA): Primary | ICD-10-CM

## 2022-01-01 DIAGNOSIS — R10.9 ABDOMINAL PAIN: ICD-10-CM

## 2022-01-01 DIAGNOSIS — C91.10 CLL (CHRONIC LYMPHOCYTIC LEUKEMIA): ICD-10-CM

## 2022-01-01 DIAGNOSIS — M54.50 LOW BACK PAIN WITHOUT SCIATICA, UNSPECIFIED BACK PAIN LATERALITY, UNSPECIFIED CHRONICITY: ICD-10-CM

## 2022-01-01 DIAGNOSIS — R59.1 LYMPHADENOPATHY: ICD-10-CM

## 2022-01-01 DIAGNOSIS — R19.00 RETROPERITONEAL MASS: ICD-10-CM

## 2022-01-01 DIAGNOSIS — Z71.89 ADVANCED CARE PLANNING/COUNSELING DISCUSSION: ICD-10-CM

## 2022-01-01 DIAGNOSIS — R52 PAIN: ICD-10-CM

## 2022-01-01 DIAGNOSIS — N39.0 URINARY TRACT INFECTION WITHOUT HEMATURIA, SITE UNSPECIFIED: ICD-10-CM

## 2022-01-01 DIAGNOSIS — K59.00 CONSTIPATION, UNSPECIFIED CONSTIPATION TYPE: ICD-10-CM

## 2022-01-01 DIAGNOSIS — R59.1 LYMPHADENOPATHY: Primary | ICD-10-CM

## 2022-01-01 DIAGNOSIS — R59.0 ABDOMINAL LYMPHADENOPATHY: ICD-10-CM

## 2022-01-01 DIAGNOSIS — I10 ESSENTIAL HYPERTENSION: ICD-10-CM

## 2022-01-01 DIAGNOSIS — E78.2 MIXED HYPERLIPIDEMIA: Chronic | ICD-10-CM

## 2022-01-01 DIAGNOSIS — R11.0 NAUSEA: ICD-10-CM

## 2022-01-01 DIAGNOSIS — K64.4 INFLAMED EXTERNAL HEMORRHOID: Primary | ICD-10-CM

## 2022-01-01 DIAGNOSIS — E11.65 TYPE 2 DIABETES MELLITUS WITH HYPERGLYCEMIA, WITH LONG-TERM CURRENT USE OF INSULIN: Primary | ICD-10-CM

## 2022-01-01 DIAGNOSIS — N20.0 NEPHROLITHIASIS: ICD-10-CM

## 2022-01-01 DIAGNOSIS — Z51.5 ENCOUNTER FOR PALLIATIVE CARE: ICD-10-CM

## 2022-01-01 DIAGNOSIS — E11.65 TYPE 2 DIABETES MELLITUS WITH HYPERGLYCEMIA, WITH LONG-TERM CURRENT USE OF INSULIN: ICD-10-CM

## 2022-01-01 DIAGNOSIS — R06.00 DYSPNEA, UNSPECIFIED TYPE: ICD-10-CM

## 2022-01-01 DIAGNOSIS — R59.0 LOCALIZED ENLARGED LYMPH NODES: ICD-10-CM

## 2022-01-01 DIAGNOSIS — D72.829 LEUKOCYTOSIS, UNSPECIFIED TYPE: ICD-10-CM

## 2022-01-01 DIAGNOSIS — D63.0 ANEMIA IN NEOPLASTIC DISEASE: ICD-10-CM

## 2022-01-01 DIAGNOSIS — R19.7 DIARRHEA IN ADULT PATIENT: Primary | ICD-10-CM

## 2022-01-01 DIAGNOSIS — J90 RECURRENT LEFT PLEURAL EFFUSION: Primary | ICD-10-CM

## 2022-01-01 DIAGNOSIS — R19.00 RETROPERITONEAL MASS: Primary | ICD-10-CM

## 2022-01-01 DIAGNOSIS — R53.1 WEAKNESS: ICD-10-CM

## 2022-01-01 DIAGNOSIS — I10 ESSENTIAL HYPERTENSION: Primary | ICD-10-CM

## 2022-01-01 DIAGNOSIS — G89.3 CANCER RELATED PAIN: ICD-10-CM

## 2022-01-01 DIAGNOSIS — R53.0 NEOPLASTIC (MALIGNANT) RELATED FATIGUE: ICD-10-CM

## 2022-01-01 DIAGNOSIS — R63.0 ANOREXIA: ICD-10-CM

## 2022-01-01 DIAGNOSIS — Z79.4 TYPE 2 DIABETES MELLITUS WITH HYPERGLYCEMIA, WITH LONG-TERM CURRENT USE OF INSULIN: Primary | ICD-10-CM

## 2022-01-01 DIAGNOSIS — R19.7 DIARRHEA, UNSPECIFIED TYPE: Primary | ICD-10-CM

## 2022-01-01 DIAGNOSIS — Z79.4 TYPE 2 DIABETES MELLITUS WITH HYPERGLYCEMIA, WITH LONG-TERM CURRENT USE OF INSULIN: ICD-10-CM

## 2022-01-01 DIAGNOSIS — R06.02 SOB (SHORTNESS OF BREATH): ICD-10-CM

## 2022-01-01 DIAGNOSIS — R07.9 CHEST PAIN: ICD-10-CM

## 2022-01-01 DIAGNOSIS — D58.2 HEMOGLOBIN C TRAIT: ICD-10-CM

## 2022-01-01 DIAGNOSIS — E11.9 TYPE 2 DIABETES MELLITUS WITHOUT COMPLICATION, WITHOUT LONG-TERM CURRENT USE OF INSULIN: ICD-10-CM

## 2022-01-01 DIAGNOSIS — D84.9 IMMUNOSUPPRESSED STATUS: ICD-10-CM

## 2022-01-01 DIAGNOSIS — D49.9 NEOPLASM: ICD-10-CM

## 2022-01-01 DIAGNOSIS — J96.01 ACUTE HYPOXEMIC RESPIRATORY FAILURE: ICD-10-CM

## 2022-01-01 DIAGNOSIS — R19.7 DIARRHEA IN ADULT PATIENT: ICD-10-CM

## 2022-01-01 DIAGNOSIS — G47.00 INSOMNIA, UNSPECIFIED TYPE: ICD-10-CM

## 2022-01-01 DIAGNOSIS — N20.0 NEPHROLITHIASIS: Primary | ICD-10-CM

## 2022-01-01 DIAGNOSIS — R10.9 ABDOMINAL PAIN, UNSPECIFIED ABDOMINAL LOCATION: ICD-10-CM

## 2022-01-01 DIAGNOSIS — R73.9 HYPERGLYCEMIA: ICD-10-CM

## 2022-01-01 DIAGNOSIS — F43.23 ADJUSTMENT DISORDER WITH MIXED ANXIETY AND DEPRESSED MOOD: ICD-10-CM

## 2022-01-01 DIAGNOSIS — R53.83 FATIGUE, UNSPECIFIED TYPE: ICD-10-CM

## 2022-01-01 DIAGNOSIS — E11.9 TYPE 2 DIABETES MELLITUS WITHOUT COMPLICATION, WITHOUT LONG-TERM CURRENT USE OF INSULIN: Primary | ICD-10-CM

## 2022-01-01 DIAGNOSIS — J90 PLEURAL EFFUSION, LEFT: Primary | ICD-10-CM

## 2022-01-01 DIAGNOSIS — I50.9 HEART FAILURE: ICD-10-CM

## 2022-01-01 DIAGNOSIS — Z09 HOSPITAL DISCHARGE FOLLOW-UP: Primary | ICD-10-CM

## 2022-01-01 DIAGNOSIS — D72.829 LEUKOCYTOSIS, UNSPECIFIED TYPE: Primary | ICD-10-CM

## 2022-01-01 LAB
ABO + RH BLD: NORMAL
ALBUMIN SERPL BCP-MCNC: 2.3 G/DL (ref 3.5–5.2)
ALBUMIN SERPL BCP-MCNC: 2.4 G/DL (ref 3.5–5.2)
ALBUMIN SERPL BCP-MCNC: 2.6 G/DL (ref 3.5–5.2)
ALBUMIN SERPL BCP-MCNC: 2.7 G/DL (ref 3.5–5.2)
ALBUMIN SERPL BCP-MCNC: 2.8 G/DL (ref 3.5–5.2)
ALBUMIN SERPL BCP-MCNC: 2.8 G/DL (ref 3.5–5.2)
ALBUMIN SERPL BCP-MCNC: 3.2 G/DL (ref 3.5–5.2)
ALBUMIN SERPL BCP-MCNC: 3.2 G/DL (ref 3.5–5.2)
ALBUMIN SERPL BCP-MCNC: 3.4 G/DL (ref 3.5–5.2)
ALBUMIN SERPL BCP-MCNC: 3.5 G/DL (ref 3.5–5.2)
ALBUMIN SERPL BCP-MCNC: 3.6 G/DL (ref 3.5–5.2)
ALBUMIN SERPL BCP-MCNC: 4 G/DL (ref 3.5–5.2)
ALLENS TEST: ABNORMAL
ALLENS TEST: ABNORMAL
ALP SERPL-CCNC: 66 U/L (ref 55–135)
ALP SERPL-CCNC: 67 U/L (ref 55–135)
ALP SERPL-CCNC: 67 U/L (ref 55–135)
ALP SERPL-CCNC: 73 U/L (ref 55–135)
ALP SERPL-CCNC: 73 U/L (ref 55–135)
ALP SERPL-CCNC: 74 U/L (ref 55–135)
ALP SERPL-CCNC: 74 U/L (ref 55–135)
ALP SERPL-CCNC: 76 U/L (ref 55–135)
ALP SERPL-CCNC: 78 U/L (ref 55–135)
ALP SERPL-CCNC: 79 U/L (ref 55–135)
ALP SERPL-CCNC: 79 U/L (ref 55–135)
ALP SERPL-CCNC: 82 U/L (ref 55–135)
ALP SERPL-CCNC: 88 U/L (ref 55–135)
ALP SERPL-CCNC: 90 U/L (ref 55–135)
ALT SERPL W/O P-5'-P-CCNC: 12 U/L (ref 10–44)
ALT SERPL W/O P-5'-P-CCNC: 5 U/L (ref 10–44)
ALT SERPL W/O P-5'-P-CCNC: 5 U/L (ref 10–44)
ALT SERPL W/O P-5'-P-CCNC: 6 U/L (ref 10–44)
ALT SERPL W/O P-5'-P-CCNC: 6 U/L (ref 10–44)
ALT SERPL W/O P-5'-P-CCNC: 7 U/L (ref 10–44)
ALT SERPL W/O P-5'-P-CCNC: 8 U/L (ref 10–44)
ALT SERPL W/O P-5'-P-CCNC: 9 U/L (ref 10–44)
ALT SERPL W/O P-5'-P-CCNC: <5 U/L (ref 10–44)
ANION GAP SERPL CALC-SCNC: 10 MMOL/L (ref 8–16)
ANION GAP SERPL CALC-SCNC: 11 MMOL/L (ref 8–16)
ANION GAP SERPL CALC-SCNC: 12 MMOL/L (ref 8–16)
ANION GAP SERPL CALC-SCNC: 13 MMOL/L (ref 8–16)
ANION GAP SERPL CALC-SCNC: 14 MMOL/L (ref 8–16)
ANION GAP SERPL CALC-SCNC: 15 MMOL/L (ref 8–16)
ANION GAP SERPL CALC-SCNC: 15 MMOL/L (ref 8–16)
ANION GAP SERPL CALC-SCNC: 17 MMOL/L (ref 8–16)
ANISOCYTOSIS BLD QL SMEAR: SLIGHT
APPEARANCE FLD: NORMAL
APPEARANCE FLD: NORMAL
ASCENDING AORTA: 2.94 CM
AST SERPL-CCNC: 13 U/L (ref 10–40)
AST SERPL-CCNC: 16 U/L (ref 10–40)
AST SERPL-CCNC: 17 U/L (ref 10–40)
AST SERPL-CCNC: 18 U/L (ref 10–40)
AST SERPL-CCNC: 19 U/L (ref 10–40)
AST SERPL-CCNC: 21 U/L (ref 10–40)
AST SERPL-CCNC: 21 U/L (ref 10–40)
AST SERPL-CCNC: 26 U/L (ref 10–40)
AV INDEX (PROSTH): 0.68
AV MEAN GRADIENT: 4 MMHG
AV PEAK GRADIENT: 8 MMHG
AV VALVE AREA: 2.2 CM2
AV VELOCITY RATIO: 0.68
B-OH-BUTYR BLD STRIP-SCNC: 0.1 MMOL/L (ref 0–0.5)
B-OH-BUTYR BLD STRIP-SCNC: 0.3 MMOL/L (ref 0–0.5)
B2 MICROGLOB SERPL-MCNC: 2.5 UG/ML (ref 0–2.5)
BACTERIA #/AREA URNS AUTO: ABNORMAL /HPF
BACTERIA #/AREA URNS AUTO: NORMAL /HPF
BACTERIA BLD CULT: ABNORMAL
BACTERIA BLD CULT: NORMAL
BACTERIA BLD CULT: NORMAL
BACTERIA SPEC AEROBE CULT: NO GROWTH
BACTERIA SPEC ANAEROBE CULT: NORMAL
BACTERIA UR CULT: ABNORMAL
BACTERIA UR CULT: NO GROWTH
BACTERIA UR CULT: NO GROWTH
BASOPHILS # BLD AUTO: 0.03 K/UL (ref 0–0.2)
BASOPHILS # BLD AUTO: 0.05 K/UL (ref 0–0.2)
BASOPHILS # BLD AUTO: 0.06 K/UL (ref 0–0.2)
BASOPHILS # BLD AUTO: 0.08 K/UL (ref 0–0.2)
BASOPHILS # BLD AUTO: ABNORMAL K/UL (ref 0–0.2)
BASOPHILS # BLD AUTO: ABNORMAL K/UL (ref 0–0.2)
BASOPHILS NFR BLD: 0 % (ref 0–1.9)
BASOPHILS NFR BLD: 0.1 % (ref 0–1.9)
BASOPHILS NFR BLD: 0.3 % (ref 0–1.9)
BASOPHILS NFR BLD: 0.4 % (ref 0–1.9)
BASOPHILS NFR BLD: 0.5 % (ref 0–1.9)
BASOPHILS NFR BLD: 0.6 % (ref 0–1.9)
BASOPHILS NFR BLD: 0.7 % (ref 0–1.9)
BASOPHILS NFR BLD: 1 % (ref 0–1.9)
BASOPHILS NFR FLD MANUAL: 1 %
BILIRUB SERPL-MCNC: 0.3 MG/DL (ref 0.1–1)
BILIRUB SERPL-MCNC: 0.4 MG/DL (ref 0.1–1)
BILIRUB SERPL-MCNC: 0.5 MG/DL (ref 0.1–1)
BILIRUB SERPL-MCNC: 0.6 MG/DL (ref 0.1–1)
BILIRUB SERPL-MCNC: 0.7 MG/DL (ref 0.1–1)
BILIRUB UR QL STRIP: NEGATIVE
BLD GP AB SCN CELLS X3 SERPL QL: NORMAL
BNP SERPL-MCNC: 62 PG/ML (ref 0–99)
BODY FLD TYPE: NORMAL
BODY FLD TYPE: NORMAL
BODY FLUID SOURCE, LDH: NORMAL
BSA FOR ECHO PROCEDURE: 1.62 M2
BUN SERPL-MCNC: 10 MG/DL (ref 8–23)
BUN SERPL-MCNC: 12 MG/DL (ref 8–23)
BUN SERPL-MCNC: 12 MG/DL (ref 8–23)
BUN SERPL-MCNC: 13 MG/DL (ref 8–23)
BUN SERPL-MCNC: 13 MG/DL (ref 8–23)
BUN SERPL-MCNC: 15 MG/DL (ref 8–23)
BUN SERPL-MCNC: 16 MG/DL (ref 8–23)
BUN SERPL-MCNC: 17 MG/DL (ref 8–23)
BUN SERPL-MCNC: 22 MG/DL (ref 8–23)
BUN SERPL-MCNC: 7 MG/DL (ref 8–23)
BURR CELLS BLD QL SMEAR: ABNORMAL
CALCIUM SERPL-MCNC: 10 MG/DL (ref 8.7–10.5)
CALCIUM SERPL-MCNC: 10.1 MG/DL (ref 8.7–10.5)
CALCIUM SERPL-MCNC: 10.2 MG/DL (ref 8.7–10.5)
CALCIUM SERPL-MCNC: 10.3 MG/DL (ref 8.7–10.5)
CALCIUM SERPL-MCNC: 10.3 MG/DL (ref 8.7–10.5)
CALCIUM SERPL-MCNC: 11.4 MG/DL (ref 8.7–10.5)
CALCIUM SERPL-MCNC: 11.6 MG/DL (ref 8.7–10.5)
CALCIUM SERPL-MCNC: 12 MG/DL (ref 8.7–10.5)
CALCIUM SERPL-MCNC: 9 MG/DL (ref 8.7–10.5)
CALCIUM SERPL-MCNC: 9.2 MG/DL (ref 8.7–10.5)
CALCIUM SERPL-MCNC: 9.3 MG/DL (ref 8.7–10.5)
CALCIUM SERPL-MCNC: 9.5 MG/DL (ref 8.7–10.5)
CALCIUM SERPL-MCNC: 9.8 MG/DL (ref 8.7–10.5)
CALCIUM SERPL-MCNC: 9.9 MG/DL (ref 8.7–10.5)
CALCIUM SERPL-MCNC: 9.9 MG/DL (ref 8.7–10.5)
CHLORIDE SERPL-SCNC: 100 MMOL/L (ref 95–110)
CHLORIDE SERPL-SCNC: 100 MMOL/L (ref 95–110)
CHLORIDE SERPL-SCNC: 101 MMOL/L (ref 95–110)
CHLORIDE SERPL-SCNC: 102 MMOL/L (ref 95–110)
CHLORIDE SERPL-SCNC: 102 MMOL/L (ref 95–110)
CHLORIDE SERPL-SCNC: 103 MMOL/L (ref 95–110)
CHLORIDE SERPL-SCNC: 95 MMOL/L (ref 95–110)
CHLORIDE SERPL-SCNC: 96 MMOL/L (ref 95–110)
CHLORIDE SERPL-SCNC: 97 MMOL/L (ref 95–110)
CHLORIDE SERPL-SCNC: 98 MMOL/L (ref 95–110)
CHLORIDE SERPL-SCNC: 98 MMOL/L (ref 95–110)
CHLORIDE SERPL-SCNC: 99 MMOL/L (ref 95–110)
CHLORIDE SERPL-SCNC: 99 MMOL/L (ref 95–110)
CHOLEST SERPL-MCNC: 144 MG/DL (ref 120–199)
CHOLEST/HDLC SERPL: 2.4 {RATIO} (ref 2–5)
CLARITY UR REFRACT.AUTO: ABNORMAL
CLARITY UR REFRACT.AUTO: CLEAR
CO2 SERPL-SCNC: 21 MMOL/L (ref 23–29)
CO2 SERPL-SCNC: 22 MMOL/L (ref 23–29)
CO2 SERPL-SCNC: 22 MMOL/L (ref 23–29)
CO2 SERPL-SCNC: 23 MMOL/L (ref 23–29)
CO2 SERPL-SCNC: 24 MMOL/L (ref 23–29)
CO2 SERPL-SCNC: 25 MMOL/L (ref 23–29)
CO2 SERPL-SCNC: 26 MMOL/L (ref 23–29)
CO2 SERPL-SCNC: 26 MMOL/L (ref 23–29)
CO2 SERPL-SCNC: 27 MMOL/L (ref 23–29)
CO2 SERPL-SCNC: 28 MMOL/L (ref 23–29)
COLOR FLD: NORMAL
COLOR FLD: NORMAL
COLOR UR AUTO: ABNORMAL
COLOR UR AUTO: ABNORMAL
COLOR UR AUTO: YELLOW
COLOR UR AUTO: YELLOW
COMMENT: ABNORMAL
CREAT SERPL-MCNC: 0.6 MG/DL (ref 0.5–1.4)
CREAT SERPL-MCNC: 0.7 MG/DL (ref 0.5–1.4)
CREAT SERPL-MCNC: 0.8 MG/DL (ref 0.5–1.4)
CREAT SERPL-MCNC: 0.9 MG/DL (ref 0.5–1.4)
CTP QC/QA: YES
CV ECHO LV RWT: 0.46 CM
DELSYS: ABNORMAL
DIFFERENTIAL METHOD: ABNORMAL
DOP CALC AO PEAK VEL: 1.37 M/S
DOP CALC AO VTI: 24 CM
DOP CALC LVOT AREA: 3.2 CM2
DOP CALC LVOT DIAMETER: 2.03 CM
DOP CALC LVOT PEAK VEL: 0.93 M/S
DOP CALC LVOT STROKE VOLUME: 52.7 CM3
DOP CALCLVOT PEAK VEL VTI: 16.29 CM
ECHO LV POSTERIOR WALL: 0.81 CM (ref 0.6–1.1)
EJECTION FRACTION: 55 %
EOSINOPHIL # BLD AUTO: 0 K/UL (ref 0–0.5)
EOSINOPHIL # BLD AUTO: 0.1 K/UL (ref 0–0.5)
EOSINOPHIL # BLD AUTO: 0.1 K/UL (ref 0–0.5)
EOSINOPHIL # BLD AUTO: 0.2 K/UL (ref 0–0.5)
EOSINOPHIL # BLD AUTO: ABNORMAL K/UL (ref 0–0.5)
EOSINOPHIL # BLD AUTO: ABNORMAL K/UL (ref 0–0.5)
EOSINOPHIL NFR BLD: 0 % (ref 0–8)
EOSINOPHIL NFR BLD: 0.1 % (ref 0–8)
EOSINOPHIL NFR BLD: 0.3 % (ref 0–8)
EOSINOPHIL NFR BLD: 1 % (ref 0–8)
EOSINOPHIL NFR BLD: 1.1 % (ref 0–8)
EOSINOPHIL NFR BLD: 1.3 % (ref 0–8)
EOSINOPHIL NFR BLD: 1.6 % (ref 0–8)
EOSINOPHIL NFR BLD: 3 % (ref 0–8)
ERYTHROCYTE [DISTWIDTH] IN BLOOD BY AUTOMATED COUNT: 15.9 % (ref 11.5–14.5)
ERYTHROCYTE [DISTWIDTH] IN BLOOD BY AUTOMATED COUNT: 15.9 % (ref 11.5–14.5)
ERYTHROCYTE [DISTWIDTH] IN BLOOD BY AUTOMATED COUNT: 16.1 % (ref 11.5–14.5)
ERYTHROCYTE [DISTWIDTH] IN BLOOD BY AUTOMATED COUNT: 16.1 % (ref 11.5–14.5)
ERYTHROCYTE [DISTWIDTH] IN BLOOD BY AUTOMATED COUNT: 16.3 % (ref 11.5–14.5)
ERYTHROCYTE [DISTWIDTH] IN BLOOD BY AUTOMATED COUNT: 16.3 % (ref 11.5–14.5)
ERYTHROCYTE [DISTWIDTH] IN BLOOD BY AUTOMATED COUNT: 16.4 % (ref 11.5–14.5)
ERYTHROCYTE [DISTWIDTH] IN BLOOD BY AUTOMATED COUNT: 16.4 % (ref 11.5–14.5)
ERYTHROCYTE [DISTWIDTH] IN BLOOD BY AUTOMATED COUNT: 16.5 % (ref 11.5–14.5)
ERYTHROCYTE [DISTWIDTH] IN BLOOD BY AUTOMATED COUNT: 16.5 % (ref 11.5–14.5)
ERYTHROCYTE [DISTWIDTH] IN BLOOD BY AUTOMATED COUNT: 16.6 % (ref 11.5–14.5)
ERYTHROCYTE [DISTWIDTH] IN BLOOD BY AUTOMATED COUNT: 17.1 % (ref 11.5–14.5)
ERYTHROCYTE [DISTWIDTH] IN BLOOD BY AUTOMATED COUNT: 17.6 % (ref 11.5–14.5)
EST. GFR  (AFRICAN AMERICAN): >60 ML/MIN/1.73 M^2
EST. GFR  (NON AFRICAN AMERICAN): 58.1 ML/MIN/1.73 M^2
EST. GFR  (NON AFRICAN AMERICAN): >60 ML/MIN/1.73 M^2
ESTIMATED AVG GLUCOSE: 174 MG/DL (ref 68–131)
ESTIMATED AVG GLUCOSE: 180 MG/DL (ref 68–131)
FERRITIN SERPL-MCNC: 86 NG/ML (ref 20–300)
FINAL PATHOLOGIC DIAGNOSIS: ABNORMAL
FINAL PATHOLOGIC DIAGNOSIS: NORMAL
FINAL PATHOLOGIC DIAGNOSIS: NORMAL
FLOW CYTOMETRY ANTIBODIES ANALYZED - BLOOD: NORMAL
FLOW CYTOMETRY ANTIBODIES ANALYZED - LYMPH NODE: NORMAL
FLOW CYTOMETRY COMMENT - BLOOD: NORMAL
FLOW CYTOMETRY COMMENT - LYMPH NODE: NORMAL
FLOW CYTOMETRY INTERPRETATION - BLOOD: NORMAL
FLOW CYTOMETRY INTERPRETATION - LYMPH NODE: NORMAL
FRACTIONAL SHORTENING: 23 % (ref 28–44)
GIANT PLATELETS BLD QL SMEAR: PRESENT
GLUCOSE SERPL-MCNC: 107 MG/DL (ref 70–110)
GLUCOSE SERPL-MCNC: 112 MG/DL (ref 70–110)
GLUCOSE SERPL-MCNC: 134 MG/DL (ref 70–110)
GLUCOSE SERPL-MCNC: 137 MG/DL (ref 70–110)
GLUCOSE SERPL-MCNC: 141 MG/DL (ref 70–110)
GLUCOSE SERPL-MCNC: 149 MG/DL (ref 70–110)
GLUCOSE SERPL-MCNC: 156 MG/DL (ref 70–110)
GLUCOSE SERPL-MCNC: 169 MG/DL (ref 70–110)
GLUCOSE SERPL-MCNC: 169 MG/DL (ref 70–110)
GLUCOSE SERPL-MCNC: 184 MG/DL (ref 70–110)
GLUCOSE SERPL-MCNC: 233 MG/DL (ref 70–110)
GLUCOSE SERPL-MCNC: 269 MG/DL (ref 70–110)
GLUCOSE SERPL-MCNC: 288 MG/DL (ref 70–110)
GLUCOSE SERPL-MCNC: 308 MG/DL (ref 70–110)
GLUCOSE SERPL-MCNC: 97 MG/DL (ref 70–110)
GLUCOSE UR QL STRIP: ABNORMAL
GLUCOSE UR QL STRIP: ABNORMAL
GLUCOSE UR QL STRIP: NEGATIVE
GLUCOSE UR QL STRIP: NEGATIVE
GRAM STN SPEC: NORMAL
GRAM STN SPEC: NORMAL
GROSS: ABNORMAL
HAPTOGLOB SERPL-MCNC: 375 MG/DL (ref 30–250)
HBA1C MFR BLD: 7.7 % (ref 4–5.6)
HBA1C MFR BLD: 7.9 % (ref 4–5.6)
HCO3 UR-SCNC: 28.8 MMOL/L (ref 24–28)
HCO3 UR-SCNC: 31.5 MMOL/L (ref 24–28)
HCT VFR BLD AUTO: 22.6 % (ref 37–48.5)
HCT VFR BLD AUTO: 24.9 % (ref 37–48.5)
HCT VFR BLD AUTO: 25.3 % (ref 37–48.5)
HCT VFR BLD AUTO: 26 % (ref 37–48.5)
HCT VFR BLD AUTO: 27.8 % (ref 37–48.5)
HCT VFR BLD AUTO: 30.5 % (ref 37–48.5)
HCT VFR BLD AUTO: 32 % (ref 37–48.5)
HCT VFR BLD AUTO: 34.2 % (ref 37–48.5)
HCT VFR BLD AUTO: 35.5 % (ref 37–48.5)
HCT VFR BLD AUTO: 35.8 % (ref 37–48.5)
HCT VFR BLD AUTO: 37.3 % (ref 37–48.5)
HCT VFR BLD AUTO: 37.3 % (ref 37–48.5)
HCT VFR BLD AUTO: 38.5 % (ref 37–48.5)
HCV AB SERPL QL IA: NEGATIVE
HDLC SERPL-MCNC: 59 MG/DL (ref 40–75)
HDLC SERPL: 41 % (ref 20–50)
HGB A2 MFR BLD HPLC: 3.9 % (ref 2.2–3.2)
HGB BLD-MCNC: 10.1 G/DL (ref 12–16)
HGB BLD-MCNC: 10.6 G/DL (ref 12–16)
HGB BLD-MCNC: 11.4 G/DL (ref 12–16)
HGB BLD-MCNC: 11.7 G/DL (ref 12–16)
HGB BLD-MCNC: 11.9 G/DL (ref 12–16)
HGB BLD-MCNC: 12 G/DL (ref 12–16)
HGB BLD-MCNC: 12 G/DL (ref 12–16)
HGB BLD-MCNC: 12.3 G/DL (ref 12–16)
HGB BLD-MCNC: 7.3 G/DL (ref 12–16)
HGB BLD-MCNC: 8.1 G/DL (ref 12–16)
HGB BLD-MCNC: 8.2 G/DL (ref 12–16)
HGB BLD-MCNC: 8.6 G/DL (ref 12–16)
HGB BLD-MCNC: 9.3 G/DL (ref 12–16)
HGB FRACT BLD ELPH PH6.0-IMP: NORMAL
HGB FRACT BLD ELPH-IMP: ABNORMAL
HGB FRACT BLD ELPH-IMP: ABNORMAL
HGB UR QL STRIP: ABNORMAL
HGB UR QL STRIP: NEGATIVE
HIV 1+2 AB+HIV1 P24 AG SERPL QL IA: NEGATIVE
HYALINE CASTS UR QL AUTO: 18 /LPF
HYALINE CASTS UR QL AUTO: 9 /LPF
HYPOCHROMIA BLD QL SMEAR: ABNORMAL
IMM GRANULOCYTES # BLD AUTO: 0.05 K/UL (ref 0–0.04)
IMM GRANULOCYTES # BLD AUTO: 0.05 K/UL (ref 0–0.04)
IMM GRANULOCYTES # BLD AUTO: 0.07 K/UL (ref 0–0.04)
IMM GRANULOCYTES # BLD AUTO: 0.08 K/UL (ref 0–0.04)
IMM GRANULOCYTES # BLD AUTO: 0.12 K/UL (ref 0–0.04)
IMM GRANULOCYTES # BLD AUTO: 0.14 K/UL (ref 0–0.04)
IMM GRANULOCYTES # BLD AUTO: ABNORMAL K/UL (ref 0–0.04)
IMM GRANULOCYTES NFR BLD AUTO: 0.3 % (ref 0–0.5)
IMM GRANULOCYTES NFR BLD AUTO: 0.5 % (ref 0–0.5)
IMM GRANULOCYTES NFR BLD AUTO: 0.5 % (ref 0–0.5)
IMM GRANULOCYTES NFR BLD AUTO: 0.6 % (ref 0–0.5)
IMM GRANULOCYTES NFR BLD AUTO: 0.7 % (ref 0–0.5)
IMM GRANULOCYTES NFR BLD AUTO: 0.9 % (ref 0–0.5)
IMM GRANULOCYTES NFR BLD AUTO: ABNORMAL % (ref 0–0.5)
INR PPP: 1 (ref 0.8–1.2)
INR PPP: 1.1 (ref 0.8–1.2)
INTERVENTRICULAR SEPTUM: 0.97 CM (ref 0.6–1.1)
IRON SERPL-MCNC: 78 UG/DL (ref 30–160)
KETONES UR QL STRIP: ABNORMAL
KETONES UR QL STRIP: NEGATIVE
LA MAJOR: 3.87 CM
LA MINOR: 4.28 CM
LA WIDTH: 3.62 CM
LACTATE SERPL-SCNC: 1.3 MMOL/L (ref 0.5–2.2)
LACTATE SERPL-SCNC: 1.7 MMOL/L (ref 0.5–2.2)
LACTATE SERPL-SCNC: 1.9 MMOL/L (ref 0.5–2.2)
LACTATE SERPL-SCNC: 2.3 MMOL/L (ref 0.5–2.2)
LACTATE SERPL-SCNC: 2.5 MMOL/L (ref 0.5–2.2)
LACTATE SERPL-SCNC: 4 MMOL/L (ref 0.5–2.2)
LDH FLD L TO P-CCNC: <30 U/L
LDH SERPL L TO P-CCNC: 223 U/L (ref 110–260)
LDH SERPL L TO P-CCNC: 224 U/L (ref 110–260)
LDH SERPL L TO P-CCNC: 429 U/L (ref 110–260)
LDH SERPL L TO P-CCNC: 483 U/L (ref 110–260)
LDLC SERPL CALC-MCNC: 64.8 MG/DL (ref 63–159)
LEFT ATRIUM SIZE: 2.53 CM
LEFT ATRIUM VOLUME INDEX: 19.5 ML/M2
LEFT ATRIUM VOLUME: 31.64 CM3
LEFT INTERNAL DIMENSION IN SYSTOLE: 2.7 CM (ref 2.1–4)
LEFT VENTRICLE DIASTOLIC VOLUME INDEX: 31.23 ML/M2
LEFT VENTRICLE DIASTOLIC VOLUME: 50.6 ML
LEFT VENTRICLE MASS INDEX: 54 G/M2
LEFT VENTRICLE SYSTOLIC VOLUME INDEX: 16.7 ML/M2
LEFT VENTRICLE SYSTOLIC VOLUME: 27.03 ML
LEFT VENTRICULAR INTERNAL DIMENSION IN DIASTOLE: 3.49 CM (ref 3.5–6)
LEFT VENTRICULAR MASS: 87.01 G
LEUKOCYTE ESTERASE UR QL STRIP: ABNORMAL
LEUKOCYTE ESTERASE UR QL STRIP: NEGATIVE
LIPASE SERPL-CCNC: 29 U/L (ref 4–60)
LIPASE SERPL-CCNC: 31 U/L (ref 4–60)
LYMPHOCYTES # BLD AUTO: 1.1 K/UL (ref 1–4.8)
LYMPHOCYTES # BLD AUTO: 1.4 K/UL (ref 1–4.8)
LYMPHOCYTES # BLD AUTO: 10.7 K/UL (ref 1–4.8)
LYMPHOCYTES # BLD AUTO: 2.1 K/UL (ref 1–4.8)
LYMPHOCYTES # BLD AUTO: 5.6 K/UL (ref 1–4.8)
LYMPHOCYTES # BLD AUTO: 7.8 K/UL (ref 1–4.8)
LYMPHOCYTES # BLD AUTO: ABNORMAL K/UL (ref 1–4.8)
LYMPHOCYTES # BLD AUTO: ABNORMAL K/UL (ref 1–4.8)
LYMPHOCYTES NFR BLD: 11.3 % (ref 18–48)
LYMPHOCYTES NFR BLD: 12.5 % (ref 18–48)
LYMPHOCYTES NFR BLD: 13.5 % (ref 18–48)
LYMPHOCYTES NFR BLD: 46 % (ref 18–48)
LYMPHOCYTES NFR BLD: 46.7 % (ref 18–48)
LYMPHOCYTES NFR BLD: 48 % (ref 18–48)
LYMPHOCYTES NFR BLD: 48.5 % (ref 18–48)
LYMPHOCYTES NFR BLD: 53 % (ref 18–48)
LYMPHOCYTES NFR BLD: 55 % (ref 18–48)
LYMPHOCYTES NFR BLD: 56 % (ref 18–48)
LYMPHOCYTES NFR BLD: 62 % (ref 18–48)
LYMPHOCYTES NFR BLD: 67 % (ref 18–48)
LYMPHOCYTES NFR BLD: 73 % (ref 18–48)
LYMPHOCYTES NFR FLD MANUAL: 78 %
LYMPHOCYTES NFR FLD MANUAL: 78 %
Lab: ABNORMAL
Lab: NORMAL
Lab: NORMAL
MAGNESIUM SERPL-MCNC: 1.3 MG/DL (ref 1.6–2.6)
MAGNESIUM SERPL-MCNC: 1.6 MG/DL (ref 1.6–2.6)
MAGNESIUM SERPL-MCNC: 1.7 MG/DL (ref 1.6–2.6)
MAGNESIUM SERPL-MCNC: 2.2 MG/DL (ref 1.6–2.6)
MCH RBC QN AUTO: 20.7 PG (ref 27–31)
MCH RBC QN AUTO: 21 PG (ref 27–31)
MCH RBC QN AUTO: 21.1 PG (ref 27–31)
MCH RBC QN AUTO: 21.9 PG (ref 27–31)
MCH RBC QN AUTO: 21.9 PG (ref 27–31)
MCH RBC QN AUTO: 22 PG (ref 27–31)
MCH RBC QN AUTO: 22.1 PG (ref 27–31)
MCH RBC QN AUTO: 22.2 PG (ref 27–31)
MCH RBC QN AUTO: 22.2 PG (ref 27–31)
MCH RBC QN AUTO: 22.4 PG (ref 27–31)
MCH RBC QN AUTO: 22.7 PG (ref 27–31)
MCHC RBC AUTO-ENTMCNC: 31.9 G/DL (ref 32–36)
MCHC RBC AUTO-ENTMCNC: 32.2 G/DL (ref 32–36)
MCHC RBC AUTO-ENTMCNC: 32.2 G/DL (ref 32–36)
MCHC RBC AUTO-ENTMCNC: 32.3 G/DL (ref 32–36)
MCHC RBC AUTO-ENTMCNC: 32.4 G/DL (ref 32–36)
MCHC RBC AUTO-ENTMCNC: 32.5 G/DL (ref 32–36)
MCHC RBC AUTO-ENTMCNC: 33 G/DL (ref 32–36)
MCHC RBC AUTO-ENTMCNC: 33.1 G/DL (ref 32–36)
MCHC RBC AUTO-ENTMCNC: 33.2 G/DL (ref 32–36)
MCHC RBC AUTO-ENTMCNC: 33.3 G/DL (ref 32–36)
MCHC RBC AUTO-ENTMCNC: 33.5 G/DL (ref 32–36)
MCV RBC AUTO: 64 FL (ref 82–98)
MCV RBC AUTO: 64 FL (ref 82–98)
MCV RBC AUTO: 65 FL (ref 82–98)
MCV RBC AUTO: 66 FL (ref 82–98)
MCV RBC AUTO: 67 FL (ref 82–98)
MCV RBC AUTO: 68 FL (ref 82–98)
MCV RBC AUTO: 69 FL (ref 82–98)
MCV RBC AUTO: 70 FL (ref 82–98)
MESOTHL CELL NFR FLD MANUAL: 1 %
MESOTHL CELL NFR FLD MANUAL: 3 %
MICROSCOPIC COMMENT: ABNORMAL
MICROSCOPIC COMMENT: NORMAL
MICROSCOPIC EXAM: ABNORMAL
MODE: ABNORMAL
MONOCYTES # BLD AUTO: 0.7 K/UL (ref 0.3–1)
MONOCYTES # BLD AUTO: 0.7 K/UL (ref 0.3–1)
MONOCYTES # BLD AUTO: 0.8 K/UL (ref 0.3–1)
MONOCYTES # BLD AUTO: 0.9 K/UL (ref 0.3–1)
MONOCYTES # BLD AUTO: 0.9 K/UL (ref 0.3–1)
MONOCYTES # BLD AUTO: 1.1 K/UL (ref 0.3–1)
MONOCYTES # BLD AUTO: ABNORMAL K/UL (ref 0.3–1)
MONOCYTES # BLD AUTO: ABNORMAL K/UL (ref 0.3–1)
MONOCYTES NFR BLD: 0 % (ref 4–15)
MONOCYTES NFR BLD: 1 % (ref 4–15)
MONOCYTES NFR BLD: 2 % (ref 4–15)
MONOCYTES NFR BLD: 2.9 % (ref 4–15)
MONOCYTES NFR BLD: 3 % (ref 4–15)
MONOCYTES NFR BLD: 4 % (ref 4–15)
MONOCYTES NFR BLD: 4.6 % (ref 4–15)
MONOCYTES NFR BLD: 6.9 % (ref 4–15)
MONOCYTES NFR BLD: 8 % (ref 4–15)
MONOCYTES NFR BLD: 8 % (ref 4–15)
MONOCYTES NFR BLD: 8.1 % (ref 4–15)
MONOS+MACROS NFR FLD MANUAL: 18 %
MONOS+MACROS NFR FLD MANUAL: 7 %
NEUTROPHILS # BLD AUTO: 11.4 K/UL (ref 1.8–7.7)
NEUTROPHILS # BLD AUTO: 11.9 K/UL (ref 1.8–7.7)
NEUTROPHILS # BLD AUTO: 4.8 K/UL (ref 1.8–7.7)
NEUTROPHILS # BLD AUTO: 6.1 K/UL (ref 1.8–7.7)
NEUTROPHILS # BLD AUTO: 7.6 K/UL (ref 1.8–7.7)
NEUTROPHILS # BLD AUTO: 8.4 K/UL (ref 1.8–7.7)
NEUTROPHILS NFR BLD: 25 % (ref 38–73)
NEUTROPHILS NFR BLD: 29 % (ref 38–73)
NEUTROPHILS NFR BLD: 36 % (ref 38–73)
NEUTROPHILS NFR BLD: 41 % (ref 38–73)
NEUTROPHILS NFR BLD: 41.1 % (ref 38–73)
NEUTROPHILS NFR BLD: 41.5 % (ref 38–73)
NEUTROPHILS NFR BLD: 43 % (ref 38–73)
NEUTROPHILS NFR BLD: 49.8 % (ref 38–73)
NEUTROPHILS NFR BLD: 50 % (ref 38–73)
NEUTROPHILS NFR BLD: 52 % (ref 38–73)
NEUTROPHILS NFR BLD: 76.9 % (ref 38–73)
NEUTROPHILS NFR BLD: 78 % (ref 38–73)
NEUTROPHILS NFR BLD: 78.2 % (ref 38–73)
NEUTROPHILS NFR FLD MANUAL: 12 %
NEUTROPHILS NFR FLD MANUAL: 2 %
NITRITE UR QL STRIP: NEGATIVE
NON-SQ EPI CELLS #/AREA URNS AUTO: 1 /HPF
NONHDLC SERPL-MCNC: 85 MG/DL
NRBC BLD-RTO: 0 /100 WBC
OVALOCYTES BLD QL SMEAR: ABNORMAL
PATH REV BLD -IMP: NORMAL
PCO2 BLDA: 45.6 MMHG (ref 35–45)
PCO2 BLDA: 46.5 MMHG (ref 35–45)
PH SMN: 7.4 [PH] (ref 7.35–7.45)
PH SMN: 7.45 [PH] (ref 7.35–7.45)
PH UR STRIP: 5 [PH] (ref 5–8)
PH UR STRIP: 6 [PH] (ref 5–8)
PH UR STRIP: 7 [PH] (ref 5–8)
PH UR STRIP: 7 [PH] (ref 5–8)
PHOSPHATE SERPL-MCNC: 2.1 MG/DL (ref 2.7–4.5)
PHOSPHATE SERPL-MCNC: 3.2 MG/DL (ref 2.7–4.5)
PHOSPHATE SERPL-MCNC: 3.7 MG/DL (ref 2.7–4.5)
PHOSPHATE SERPL-MCNC: 3.7 MG/DL (ref 2.7–4.5)
PHOSPHATE SERPL-MCNC: 4.1 MG/DL (ref 2.7–4.5)
PLATELET # BLD AUTO: 256 K/UL (ref 150–450)
PLATELET # BLD AUTO: 281 K/UL (ref 150–450)
PLATELET # BLD AUTO: 285 K/UL (ref 150–450)
PLATELET # BLD AUTO: 291 K/UL (ref 150–450)
PLATELET # BLD AUTO: 306 K/UL (ref 150–450)
PLATELET # BLD AUTO: 319 K/UL (ref 150–450)
PLATELET # BLD AUTO: 334 K/UL (ref 150–450)
PLATELET # BLD AUTO: 353 K/UL (ref 150–450)
PLATELET # BLD AUTO: 377 K/UL (ref 150–450)
PLATELET # BLD AUTO: 395 K/UL (ref 150–450)
PLATELET # BLD AUTO: 413 K/UL (ref 150–450)
PLATELET # BLD AUTO: 459 K/UL (ref 150–450)
PLATELET # BLD AUTO: 459 K/UL (ref 150–450)
PLATELET BLD QL SMEAR: ABNORMAL
PMV BLD AUTO: 10 FL (ref 9.2–12.9)
PMV BLD AUTO: 10.1 FL (ref 9.2–12.9)
PMV BLD AUTO: 10.2 FL (ref 9.2–12.9)
PMV BLD AUTO: 10.4 FL (ref 9.2–12.9)
PMV BLD AUTO: 10.6 FL (ref 9.2–12.9)
PMV BLD AUTO: 11.1 FL (ref 9.2–12.9)
PMV BLD AUTO: 11.3 FL (ref 9.2–12.9)
PMV BLD AUTO: 11.3 FL (ref 9.2–12.9)
PMV BLD AUTO: 11.4 FL (ref 9.2–12.9)
PMV BLD AUTO: 9.7 FL (ref 9.2–12.9)
PMV BLD AUTO: 9.8 FL (ref 9.2–12.9)
PMV BLD AUTO: 9.8 FL (ref 9.2–12.9)
PMV BLD AUTO: 9.9 FL (ref 9.2–12.9)
PO2 BLDA: 21 MMHG (ref 40–60)
PO2 BLDA: 23 MMHG (ref 40–60)
POC BE: 4 MMOL/L
POC BE: 7 MMOL/L
POC SATURATED O2: 35 % (ref 95–100)
POC SATURATED O2: 40 % (ref 95–100)
POC TCO2: 30 MMOL/L (ref 24–29)
POC TCO2: 33 MMOL/L (ref 24–29)
POCT GLUCOSE: 117 MG/DL (ref 70–110)
POCT GLUCOSE: 145 MG/DL (ref 70–110)
POCT GLUCOSE: 192 MG/DL (ref 70–110)
POCT GLUCOSE: 231 MG/DL (ref 70–110)
POCT GLUCOSE: 270 MG/DL (ref 70–110)
POCT GLUCOSE: 279 MG/DL (ref 70–110)
POCT GLUCOSE: 291 MG/DL (ref 70–110)
POCT GLUCOSE: 339 MG/DL (ref 70–110)
POCT GLUCOSE: 362 MG/DL (ref 70–110)
POIKILOCYTOSIS BLD QL SMEAR: SLIGHT
POLYCHROMASIA BLD QL SMEAR: ABNORMAL
POTASSIUM SERPL-SCNC: 3.4 MMOL/L (ref 3.5–5.1)
POTASSIUM SERPL-SCNC: 3.5 MMOL/L (ref 3.5–5.1)
POTASSIUM SERPL-SCNC: 3.6 MMOL/L (ref 3.5–5.1)
POTASSIUM SERPL-SCNC: 3.7 MMOL/L (ref 3.5–5.1)
POTASSIUM SERPL-SCNC: 3.9 MMOL/L (ref 3.5–5.1)
POTASSIUM SERPL-SCNC: 4 MMOL/L (ref 3.5–5.1)
POTASSIUM SERPL-SCNC: 4.6 MMOL/L (ref 3.5–5.1)
POTASSIUM SERPL-SCNC: 4.6 MMOL/L (ref 3.5–5.1)
POTASSIUM SERPL-SCNC: 4.8 MMOL/L (ref 3.5–5.1)
POTASSIUM SERPL-SCNC: 4.9 MMOL/L (ref 3.5–5.1)
POTASSIUM SERPL-SCNC: 4.9 MMOL/L (ref 3.5–5.1)
PROCALCITONIN SERPL IA-MCNC: 0.15 NG/ML
PROT FLD-MCNC: 3 G/DL
PROT SERPL-MCNC: 5.3 G/DL (ref 6–8.4)
PROT SERPL-MCNC: 5.4 G/DL (ref 6–8.4)
PROT SERPL-MCNC: 5.7 G/DL (ref 6–8.4)
PROT SERPL-MCNC: 5.8 G/DL (ref 6–8.4)
PROT SERPL-MCNC: 5.8 G/DL (ref 6–8.4)
PROT SERPL-MCNC: 5.9 G/DL (ref 6–8.4)
PROT SERPL-MCNC: 6.1 G/DL (ref 6–8.4)
PROT SERPL-MCNC: 6.3 G/DL (ref 6–8.4)
PROT SERPL-MCNC: 6.6 G/DL (ref 6–8.4)
PROT SERPL-MCNC: 7.1 G/DL (ref 6–8.4)
PROT SERPL-MCNC: 7.2 G/DL (ref 6–8.4)
PROT SERPL-MCNC: 7.7 G/DL (ref 6–8.4)
PROT UR QL STRIP: NEGATIVE
PROTHROMBIN TIME: 10.7 SEC (ref 9–12.5)
PROTHROMBIN TIME: 11.4 SEC (ref 9–12.5)
RA MAJOR: 3.84 CM
RA WIDTH: 2.83 CM
RBC # BLD AUTO: 3.47 M/UL (ref 4–5.4)
RBC # BLD AUTO: 3.66 M/UL (ref 4–5.4)
RBC # BLD AUTO: 3.92 M/UL (ref 4–5.4)
RBC # BLD AUTO: 4.08 M/UL (ref 4–5.4)
RBC # BLD AUTO: 4.18 M/UL (ref 4–5.4)
RBC # BLD AUTO: 4.44 M/UL (ref 4–5.4)
RBC # BLD AUTO: 4.73 M/UL (ref 4–5.4)
RBC # BLD AUTO: 5.2 M/UL (ref 4–5.4)
RBC # BLD AUTO: 5.31 M/UL (ref 4–5.4)
RBC # BLD AUTO: 5.35 M/UL (ref 4–5.4)
RBC # BLD AUTO: 5.38 M/UL (ref 4–5.4)
RBC # BLD AUTO: 5.4 M/UL (ref 4–5.4)
RBC # BLD AUTO: 5.62 M/UL (ref 4–5.4)
RBC #/AREA URNS AUTO: 13 /HPF (ref 0–4)
RBC #/AREA URNS AUTO: 2 /HPF (ref 0–4)
RBC #/AREA URNS AUTO: 7 /HPF (ref 0–4)
RBC #/AREA URNS AUTO: 7 /HPF (ref 0–4)
RETICS/RBC NFR AUTO: 1.8 % (ref 0.5–2.5)
RIGHT VENTRICULAR END-DIASTOLIC DIMENSION: 3.17 CM
SAMPLE: ABNORMAL
SAMPLE: ABNORMAL
SARS-COV-2 RDRP RESP QL NAA+PROBE: NEGATIVE
SATURATED IRON: 22 % (ref 20–50)
SCHISTOCYTES BLD QL SMEAR: ABNORMAL
SINUS: 2.74 CM
SITE: ABNORMAL
SITE: ABNORMAL
SMUDGE CELLS BLD QL SMEAR: PRESENT
SODIUM SERPL-SCNC: 132 MMOL/L (ref 136–145)
SODIUM SERPL-SCNC: 133 MMOL/L (ref 136–145)
SODIUM SERPL-SCNC: 133 MMOL/L (ref 136–145)
SODIUM SERPL-SCNC: 135 MMOL/L (ref 136–145)
SODIUM SERPL-SCNC: 136 MMOL/L (ref 136–145)
SODIUM SERPL-SCNC: 137 MMOL/L (ref 136–145)
SODIUM SERPL-SCNC: 138 MMOL/L (ref 136–145)
SODIUM SERPL-SCNC: 139 MMOL/L (ref 136–145)
SODIUM SERPL-SCNC: 139 MMOL/L (ref 136–145)
SP GR UR STRIP: 1 (ref 1–1.03)
SP GR UR STRIP: 1.01 (ref 1–1.03)
SP GR UR STRIP: 1.01 (ref 1–1.03)
SP GR UR STRIP: 1.02 (ref 1–1.03)
SPECIMEN SOURCE: NORMAL
SQUAMOUS #/AREA URNS AUTO: 0 /HPF
SQUAMOUS #/AREA URNS AUTO: 25 /HPF
SQUAMOUS #/AREA URNS AUTO: 35 /HPF
SQUAMOUS #/AREA URNS AUTO: 6 /HPF
STJ: 2.94 CM
SUPPLEMENTAL DIAGNOSIS: ABNORMAL
TARGETS BLD QL SMEAR: ABNORMAL
TDI LATERAL: 0.06 M/S
TDI SEPTAL: 0.04 M/S
TDI: 0.05 M/S
TOTAL IRON BINDING CAPACITY: 357 UG/DL (ref 250–450)
TRANSFERRIN SERPL-MCNC: 241 MG/DL (ref 200–375)
TRIGL SERPL-MCNC: 101 MG/DL (ref 30–150)
TROPONIN I SERPL DL<=0.01 NG/ML-MCNC: 0.01 NG/ML (ref 0–0.03)
URATE SERPL-MCNC: 2.8 MG/DL (ref 2.4–5.7)
URATE SERPL-MCNC: 2.9 MG/DL (ref 2.4–5.7)
URATE SERPL-MCNC: 8.7 MG/DL (ref 2.4–5.7)
URN SPEC COLLECT METH UR: ABNORMAL
WBC # BLD AUTO: 10.93 K/UL (ref 3.9–12.7)
WBC # BLD AUTO: 11.57 K/UL (ref 3.9–12.7)
WBC # BLD AUTO: 14.63 K/UL (ref 3.9–12.7)
WBC # BLD AUTO: 15.21 K/UL (ref 3.9–12.7)
WBC # BLD AUTO: 16.02 K/UL (ref 3.9–12.7)
WBC # BLD AUTO: 17.02 K/UL (ref 3.9–12.7)
WBC # BLD AUTO: 18.53 K/UL (ref 3.9–12.7)
WBC # BLD AUTO: 19.42 K/UL (ref 3.9–12.7)
WBC # BLD AUTO: 19.93 K/UL (ref 3.9–12.7)
WBC # BLD AUTO: 21.81 K/UL (ref 3.9–12.7)
WBC # BLD AUTO: 22.95 K/UL (ref 3.9–12.7)
WBC # BLD AUTO: 24.38 K/UL (ref 3.9–12.7)
WBC # BLD AUTO: 27.03 K/UL (ref 3.9–12.7)
WBC # BLD AUTO: 9.79 K/UL (ref 3.9–12.7)
WBC # FLD: 416 /CU MM
WBC # FLD: 835 /CU MM
WBC #/AREA URNS AUTO: 1 /HPF (ref 0–5)
WBC #/AREA URNS AUTO: 27 /HPF (ref 0–5)
WBC #/AREA URNS AUTO: 42 /HPF (ref 0–5)
WBC #/AREA URNS AUTO: >100 /HPF (ref 0–5)
WBC CLUMPS UR QL AUTO: ABNORMAL
YEAST UR QL AUTO: NORMAL

## 2022-01-01 PROCEDURE — 1160F RVW MEDS BY RX/DR IN RCRD: CPT | Mod: CPTII,S$GLB,, | Performed by: STUDENT IN AN ORGANIZED HEALTH CARE EDUCATION/TRAINING PROGRAM

## 2022-01-01 PROCEDURE — 1160F RVW MEDS BY RX/DR IN RCRD: CPT | Mod: CPTII,S$GLB,, | Performed by: INTERNAL MEDICINE

## 2022-01-01 PROCEDURE — 99900035 HC TECH TIME PER 15 MIN (STAT)

## 2022-01-01 PROCEDURE — 1111F PR DISCHARGE MEDS RECONCILED W/ CURRENT OUTPATIENT MED LIST: ICD-10-PCS | Mod: CPTII,GC,, | Performed by: INTERNAL MEDICINE

## 2022-01-01 PROCEDURE — 99203 OFFICE O/P NEW LOW 30 MIN: CPT | Mod: 95,,, | Performed by: FAMILY MEDICINE

## 2022-01-01 PROCEDURE — 88189 FLOWCYTOMETRY/READ 16 & >: CPT | Mod: ,,, | Performed by: PATHOLOGY

## 2022-01-01 PROCEDURE — 99999 PR PBB SHADOW E&M-EST. PATIENT-LVL V: ICD-10-PCS | Mod: PBBFAC,HCNC,GC, | Performed by: INTERNAL MEDICINE

## 2022-01-01 PROCEDURE — 81001 URINALYSIS AUTO W/SCOPE: CPT | Performed by: EMERGENCY MEDICINE

## 2022-01-01 PROCEDURE — 88305 TISSUE EXAM BY PATHOLOGIST: CPT | Performed by: PATHOLOGY

## 2022-01-01 PROCEDURE — 80053 COMPREHEN METABOLIC PANEL: CPT | Mod: HCNC | Performed by: STUDENT IN AN ORGANIZED HEALTH CARE EDUCATION/TRAINING PROGRAM

## 2022-01-01 PROCEDURE — 88184 FLOWCYTOMETRY/ TC 1 MARKER: CPT | Mod: HCNC | Performed by: PATHOLOGY

## 2022-01-01 PROCEDURE — 99499 UNLISTED E&M SERVICE: CPT | Mod: S$GLB,,, | Performed by: STUDENT IN AN ORGANIZED HEALTH CARE EDUCATION/TRAINING PROGRAM

## 2022-01-01 PROCEDURE — 99213 PR OFFICE/OUTPT VISIT, EST, LEVL III, 20-29 MIN: ICD-10-PCS | Mod: S$GLB,,, | Performed by: PHYSICIAN ASSISTANT

## 2022-01-01 PROCEDURE — 99284 EMERGENCY DEPT VISIT MOD MDM: CPT | Mod: 25

## 2022-01-01 PROCEDURE — 3288F PR FALLS RISK ASSESSMENT DOCUMENTED: ICD-10-PCS | Mod: CPTII,S$GLB,, | Performed by: INTERNAL MEDICINE

## 2022-01-01 PROCEDURE — 88305 TISSUE EXAM BY PATHOLOGIST: CPT | Mod: 26,,, | Performed by: PATHOLOGY

## 2022-01-01 PROCEDURE — 88333 PATH CONSLTJ SURG CYTO XM 1: CPT | Mod: 26,,, | Performed by: PATHOLOGY

## 2022-01-01 PROCEDURE — 99999 PR PBB SHADOW E&M-EST. PATIENT-LVL IV: ICD-10-PCS | Mod: PBBFAC,HCNC,, | Performed by: INTERNAL MEDICINE

## 2022-01-01 PROCEDURE — 25000003 PHARM REV CODE 250: Performed by: INTERNAL MEDICINE

## 2022-01-01 PROCEDURE — 84145 PROCALCITONIN (PCT): CPT | Performed by: STUDENT IN AN ORGANIZED HEALTH CARE EDUCATION/TRAINING PROGRAM

## 2022-01-01 PROCEDURE — 87077 CULTURE AEROBIC IDENTIFY: CPT | Mod: 59 | Performed by: EMERGENCY MEDICINE

## 2022-01-01 PROCEDURE — 93010 ELECTROCARDIOGRAM REPORT: CPT | Mod: HCNC,,, | Performed by: INTERNAL MEDICINE

## 2022-01-01 PROCEDURE — 86803 HEPATITIS C AB TEST: CPT | Performed by: EMERGENCY MEDICINE

## 2022-01-01 PROCEDURE — 99223 PR INITIAL HOSPITAL CARE,LEVL III: ICD-10-PCS | Mod: AI,GC,, | Performed by: INTERNAL MEDICINE

## 2022-01-01 PROCEDURE — 99999 PR PBB SHADOW E&M-EST. PATIENT-LVL II: CPT | Mod: PBBFAC,,, | Performed by: STUDENT IN AN ORGANIZED HEALTH CARE EDUCATION/TRAINING PROGRAM

## 2022-01-01 PROCEDURE — 1125F PR PAIN SEVERITY QUANTIFIED, PAIN PRESENT: ICD-10-PCS | Mod: HCNC,CPTII,GC,S$GLB | Performed by: INTERNAL MEDICINE

## 2022-01-01 PROCEDURE — 74177 CT ABD & PELVIS W/CONTRAST: CPT | Mod: TC

## 2022-01-01 PROCEDURE — 80053 COMPREHEN METABOLIC PANEL: CPT | Performed by: STUDENT IN AN ORGANIZED HEALTH CARE EDUCATION/TRAINING PROGRAM

## 2022-01-01 PROCEDURE — 99900035 HC TECH TIME PER 15 MIN (STAT): Mod: HCNC

## 2022-01-01 PROCEDURE — 88185 FLOWCYTOMETRY/TC ADD-ON: CPT | Mod: 59,HCNC | Performed by: PATHOLOGY

## 2022-01-01 PROCEDURE — 3288F FALL RISK ASSESSMENT DOCD: CPT | Mod: HCNC,CPTII,GC,S$GLB | Performed by: INTERNAL MEDICINE

## 2022-01-01 PROCEDURE — 99204 PR OFFICE/OUTPT VISIT, NEW, LEVL IV, 45-59 MIN: ICD-10-PCS | Mod: S$GLB,,, | Performed by: STUDENT IN AN ORGANIZED HEALTH CARE EDUCATION/TRAINING PROGRAM

## 2022-01-01 PROCEDURE — 1160F PR REVIEW ALL MEDS BY PRESCRIBER/CLIN PHARMACIST DOCUMENTED: ICD-10-PCS | Mod: CPTII,S$GLB,, | Performed by: STUDENT IN AN ORGANIZED HEALTH CARE EDUCATION/TRAINING PROGRAM

## 2022-01-01 PROCEDURE — 99499 UNLISTED E&M SERVICE: CPT | Mod: S$GLB,,, | Performed by: INTERNAL MEDICINE

## 2022-01-01 PROCEDURE — 1101F PT FALLS ASSESS-DOCD LE1/YR: CPT | Mod: CPTII,S$GLB,, | Performed by: PHYSICIAN ASSISTANT

## 2022-01-01 PROCEDURE — 97165 OT EVAL LOW COMPLEX 30 MIN: CPT | Mod: HCNC

## 2022-01-01 PROCEDURE — 99213 OFFICE O/P EST LOW 20 MIN: CPT | Mod: S$GLB,,, | Performed by: PHYSICIAN ASSISTANT

## 2022-01-01 PROCEDURE — 85025 COMPLETE CBC W/AUTO DIFF WBC: CPT | Performed by: EMERGENCY MEDICINE

## 2022-01-01 PROCEDURE — 84100 ASSAY OF PHOSPHORUS: CPT | Performed by: STUDENT IN AN ORGANIZED HEALTH CARE EDUCATION/TRAINING PROGRAM

## 2022-01-01 PROCEDURE — 3288F PR FALLS RISK ASSESSMENT DOCUMENTED: ICD-10-PCS | Mod: CPTII,S$GLB,, | Performed by: NURSE PRACTITIONER

## 2022-01-01 PROCEDURE — 36415 COLL VENOUS BLD VENIPUNCTURE: CPT | Performed by: FAMILY MEDICINE

## 2022-01-01 PROCEDURE — 36415 COLL VENOUS BLD VENIPUNCTURE: CPT | Performed by: STUDENT IN AN ORGANIZED HEALTH CARE EDUCATION/TRAINING PROGRAM

## 2022-01-01 PROCEDURE — 63600175 PHARM REV CODE 636 W HCPCS: Mod: HCNC | Performed by: STUDENT IN AN ORGANIZED HEALTH CARE EDUCATION/TRAINING PROGRAM

## 2022-01-01 PROCEDURE — 1159F PR MEDICATION LIST DOCUMENTED IN MEDICAL RECORD: ICD-10-PCS | Mod: HCNC,CPTII,S$GLB, | Performed by: INTERNAL MEDICINE

## 2022-01-01 PROCEDURE — 88333 PATH CONSLTJ SURG CYTO XM 1: CPT | Performed by: PATHOLOGY

## 2022-01-01 PROCEDURE — 3288F FALL RISK ASSESSMENT DOCD: CPT | Mod: HCNC,CPTII,S$GLB, | Performed by: NURSE PRACTITIONER

## 2022-01-01 PROCEDURE — 3288F FALL RISK ASSESSMENT DOCD: CPT | Mod: CPTII,S$GLB,, | Performed by: STUDENT IN AN ORGANIZED HEALTH CARE EDUCATION/TRAINING PROGRAM

## 2022-01-01 PROCEDURE — 99999 PR PBB SHADOW E&M-EST. PATIENT-LVL IV: CPT | Mod: PBBFAC,HCNC,GC, | Performed by: INTERNAL MEDICINE

## 2022-01-01 PROCEDURE — 80053 COMPREHEN METABOLIC PANEL: CPT | Mod: HCNC | Performed by: INTERNAL MEDICINE

## 2022-01-01 PROCEDURE — 99285 PR EMERGENCY DEPT VISIT,LEVEL V: ICD-10-PCS | Mod: HCNC,GC,CS, | Performed by: EMERGENCY MEDICINE

## 2022-01-01 PROCEDURE — 99443 PR PHYSICIAN TELEPHONE EVALUATION 21-30 MIN: ICD-10-PCS | Mod: HCNC,95,, | Performed by: INTERNAL MEDICINE

## 2022-01-01 PROCEDURE — 99285 EMERGENCY DEPT VISIT HI MDM: CPT | Mod: 25,HCNC

## 2022-01-01 PROCEDURE — 99999 PR PBB SHADOW E&M-EST. PATIENT-LVL V: CPT | Mod: PBBFAC,GC,, | Performed by: INTERNAL MEDICINE

## 2022-01-01 PROCEDURE — 1160F RVW MEDS BY RX/DR IN RCRD: CPT | Mod: HCNC,CPTII,S$GLB, | Performed by: PHYSICIAN ASSISTANT

## 2022-01-01 PROCEDURE — 1111F DSCHRG MED/CURRENT MED MERGE: CPT | Mod: HCNC,CPTII,GC,S$GLB | Performed by: INTERNAL MEDICINE

## 2022-01-01 PROCEDURE — 3288F PR FALLS RISK ASSESSMENT DOCUMENTED: ICD-10-PCS | Mod: CPTII,GC,S$GLB, | Performed by: INTERNAL MEDICINE

## 2022-01-01 PROCEDURE — 71260 CT THORAX DX C+: CPT | Mod: 26,,, | Performed by: RADIOLOGY

## 2022-01-01 PROCEDURE — 1126F PR PAIN SEVERITY QUANTIFIED, NO PAIN PRESENT: ICD-10-PCS | Mod: CPTII,S$GLB,, | Performed by: PHYSICIAN ASSISTANT

## 2022-01-01 PROCEDURE — 88305 TISSUE EXAM BY PATHOLOGIST: ICD-10-PCS | Mod: 26,,, | Performed by: PATHOLOGY

## 2022-01-01 PROCEDURE — 3288F FALL RISK ASSESSMENT DOCD: CPT | Mod: HCNC,CPTII,S$GLB, | Performed by: INTERNAL MEDICINE

## 2022-01-01 PROCEDURE — 95251 CONT GLUC MNTR ANALYSIS I&R: CPT | Mod: HCNC,S$GLB,, | Performed by: NURSE PRACTITIONER

## 2022-01-01 PROCEDURE — 36415 COLL VENOUS BLD VENIPUNCTURE: CPT | Mod: HCNC | Performed by: INTERNAL MEDICINE

## 2022-01-01 PROCEDURE — 99285 PR EMERGENCY DEPT VISIT,LEVEL V: ICD-10-PCS | Mod: CS,,, | Performed by: EMERGENCY MEDICINE

## 2022-01-01 PROCEDURE — 88185 FLOWCYTOMETRY/TC ADD-ON: CPT | Mod: 59 | Performed by: PATHOLOGY

## 2022-01-01 PROCEDURE — 25000003 PHARM REV CODE 250: Mod: HCNC

## 2022-01-01 PROCEDURE — 85027 COMPLETE CBC AUTOMATED: CPT | Mod: HCNC | Performed by: EMERGENCY MEDICINE

## 2022-01-01 PROCEDURE — 3288F PR FALLS RISK ASSESSMENT DOCUMENTED: ICD-10-PCS | Mod: CPTII,S$GLB,, | Performed by: STUDENT IN AN ORGANIZED HEALTH CARE EDUCATION/TRAINING PROGRAM

## 2022-01-01 PROCEDURE — 99999 PR PBB SHADOW E&M-EST. PATIENT-LVL II: ICD-10-PCS | Mod: PBBFAC,,, | Performed by: STUDENT IN AN ORGANIZED HEALTH CARE EDUCATION/TRAINING PROGRAM

## 2022-01-01 PROCEDURE — 96360 HYDRATION IV INFUSION INIT: CPT

## 2022-01-01 PROCEDURE — 99999 PR PBB SHADOW E&M-EST. PATIENT-LVL V: ICD-10-PCS | Mod: PBBFAC,,, | Performed by: INTERNAL MEDICINE

## 2022-01-01 PROCEDURE — 1126F AMNT PAIN NOTED NONE PRSNT: CPT | Mod: CPTII,S$GLB,, | Performed by: NURSE PRACTITIONER

## 2022-01-01 PROCEDURE — 99495 TRANSJ CARE MGMT MOD F2F 14D: CPT | Mod: HCNC,S$GLB,, | Performed by: PHYSICIAN ASSISTANT

## 2022-01-01 PROCEDURE — 99499 RISK ADDL DX/OHS AUDIT: ICD-10-PCS | Mod: S$GLB,,, | Performed by: INTERNAL MEDICINE

## 2022-01-01 PROCEDURE — 99499 RISK ADDL DX/OHS AUDIT: ICD-10-PCS | Mod: S$GLB,,, | Performed by: STUDENT IN AN ORGANIZED HEALTH CARE EDUCATION/TRAINING PROGRAM

## 2022-01-01 PROCEDURE — 99999 PR PBB SHADOW E&M-EST. PATIENT-LVL III: CPT | Mod: PBBFAC,HCNC,, | Performed by: NURSE PRACTITIONER

## 2022-01-01 PROCEDURE — 3288F PR FALLS RISK ASSESSMENT DOCUMENTED: ICD-10-PCS | Mod: HCNC,CPTII,GC,S$GLB | Performed by: INTERNAL MEDICINE

## 2022-01-01 PROCEDURE — 71046 X-RAY EXAM CHEST 2 VIEWS: CPT | Mod: 26,HCNC,, | Performed by: RADIOLOGY

## 2022-01-01 PROCEDURE — 82010 KETONE BODYS QUAN: CPT | Mod: HCNC | Performed by: EMERGENCY MEDICINE

## 2022-01-01 PROCEDURE — 88342 CHG IMMUNOCYTOCHEMISTRY: ICD-10-PCS | Mod: 26,,, | Performed by: PATHOLOGY

## 2022-01-01 PROCEDURE — 71046 XR CHEST PA AND LATERAL: ICD-10-PCS | Mod: 26,HCNC,, | Performed by: RADIOLOGY

## 2022-01-01 PROCEDURE — 1159F MED LIST DOCD IN RCRD: CPT | Mod: HCNC,CPTII,GC,S$GLB | Performed by: INTERNAL MEDICINE

## 2022-01-01 PROCEDURE — 63600175 PHARM REV CODE 636 W HCPCS: Mod: JG | Performed by: INTERNAL MEDICINE

## 2022-01-01 PROCEDURE — 99999 PR PBB SHADOW E&M-EST. PATIENT-LVL III: CPT | Mod: PBBFAC,HCNC,GC, | Performed by: INTERNAL MEDICINE

## 2022-01-01 PROCEDURE — 1126F AMNT PAIN NOTED NONE PRSNT: CPT | Mod: HCNC,CPTII,GC,S$GLB | Performed by: INTERNAL MEDICINE

## 2022-01-01 PROCEDURE — 99999 PR PBB SHADOW E&M-EST. PATIENT-LVL V: CPT | Mod: PBBFAC,HCNC,GC, | Performed by: INTERNAL MEDICINE

## 2022-01-01 PROCEDURE — 63600175 PHARM REV CODE 636 W HCPCS: Performed by: RADIOLOGY

## 2022-01-01 PROCEDURE — 99215 OFFICE O/P EST HI 40 MIN: CPT | Mod: S$GLB,,, | Performed by: STUDENT IN AN ORGANIZED HEALTH CARE EDUCATION/TRAINING PROGRAM

## 2022-01-01 PROCEDURE — 1160F RVW MEDS BY RX/DR IN RCRD: CPT | Mod: CPTII,GC,S$GLB, | Performed by: INTERNAL MEDICINE

## 2022-01-01 PROCEDURE — 84550 ASSAY OF BLOOD/URIC ACID: CPT | Performed by: INTERNAL MEDICINE

## 2022-01-01 PROCEDURE — 1159F PR MEDICATION LIST DOCUMENTED IN MEDICAL RECORD: ICD-10-PCS | Mod: HCNC,CPTII,S$GLB, | Performed by: NURSE PRACTITIONER

## 2022-01-01 PROCEDURE — 74176 CT ABD & PELVIS W/O CONTRAST: CPT | Mod: 26,HCNC,, | Performed by: RADIOLOGY

## 2022-01-01 PROCEDURE — 99999 PR PBB SHADOW E&M-EST. PATIENT-LVL IV: CPT | Mod: PBBFAC,HCNC,, | Performed by: INTERNAL MEDICINE

## 2022-01-01 PROCEDURE — 87449 NOS EACH ORGANISM AG IA: CPT | Mod: HCNC | Performed by: INTERNAL MEDICINE

## 2022-01-01 PROCEDURE — 20600001 HC STEP DOWN PRIVATE ROOM: Mod: HCNC

## 2022-01-01 PROCEDURE — 85025 COMPLETE CBC W/AUTO DIFF WBC: CPT | Mod: HCNC | Performed by: INTERNAL MEDICINE

## 2022-01-01 PROCEDURE — 1126F PR PAIN SEVERITY QUANTIFIED, NO PAIN PRESENT: ICD-10-PCS | Mod: HCNC,CPTII,GC,S$GLB | Performed by: INTERNAL MEDICINE

## 2022-01-01 PROCEDURE — 1126F PR PAIN SEVERITY QUANTIFIED, NO PAIN PRESENT: ICD-10-PCS | Mod: CPTII,S$GLB,, | Performed by: STUDENT IN AN ORGANIZED HEALTH CARE EDUCATION/TRAINING PROGRAM

## 2022-01-01 PROCEDURE — 84550 ASSAY OF BLOOD/URIC ACID: CPT | Mod: HCNC | Performed by: STUDENT IN AN ORGANIZED HEALTH CARE EDUCATION/TRAINING PROGRAM

## 2022-01-01 PROCEDURE — 85027 COMPLETE CBC AUTOMATED: CPT | Mod: HCNC | Performed by: INTERNAL MEDICINE

## 2022-01-01 PROCEDURE — 1126F AMNT PAIN NOTED NONE PRSNT: CPT | Mod: CPTII,GC,S$GLB, | Performed by: INTERNAL MEDICINE

## 2022-01-01 PROCEDURE — 99223 1ST HOSP IP/OBS HIGH 75: CPT | Mod: AI,HCNC,, | Performed by: HOSPITALIST

## 2022-01-01 PROCEDURE — 1101F PR PT FALLS ASSESS DOC 0-1 FALLS W/OUT INJ PAST YR: ICD-10-PCS | Mod: HCNC,CPTII,GC,S$GLB | Performed by: INTERNAL MEDICINE

## 2022-01-01 PROCEDURE — 74176 CT ABD & PELVIS W/O CONTRAST: CPT | Mod: TC,HCNC

## 2022-01-01 PROCEDURE — 3051F PR MOST RECENT HEMOGLOBIN A1C LEVEL 7.0 - < 8.0%: ICD-10-PCS | Mod: HCNC,CPTII,S$GLB, | Performed by: NURSE PRACTITIONER

## 2022-01-01 PROCEDURE — 83690 ASSAY OF LIPASE: CPT | Mod: HCNC | Performed by: INTERNAL MEDICINE

## 2022-01-01 PROCEDURE — 99999 PR PBB SHADOW E&M-EST. PATIENT-LVL III: CPT | Mod: PBBFAC,HCNC,, | Performed by: PHYSICIAN ASSISTANT

## 2022-01-01 PROCEDURE — 1125F PR PAIN SEVERITY QUANTIFIED, PAIN PRESENT: ICD-10-PCS | Mod: HCNC,CPTII,S$GLB, | Performed by: NURSE PRACTITIONER

## 2022-01-01 PROCEDURE — 85048 AUTOMATED LEUKOCYTE COUNT: CPT | Mod: HCNC | Performed by: INTERNAL MEDICINE

## 2022-01-01 PROCEDURE — 63600175 PHARM REV CODE 636 W HCPCS: Mod: HCNC

## 2022-01-01 PROCEDURE — 99215 PR OFFICE/OUTPT VISIT, EST, LEVL V, 40-54 MIN: ICD-10-PCS | Mod: HCNC,GC,S$GLB, | Performed by: INTERNAL MEDICINE

## 2022-01-01 PROCEDURE — 36415 COLL VENOUS BLD VENIPUNCTURE: CPT | Mod: HCNC | Performed by: STUDENT IN AN ORGANIZED HEALTH CARE EDUCATION/TRAINING PROGRAM

## 2022-01-01 PROCEDURE — 83605 ASSAY OF LACTIC ACID: CPT | Mod: HCNC | Performed by: INTERNAL MEDICINE

## 2022-01-01 PROCEDURE — 99238 HOSP IP/OBS DSCHRG MGMT 30/<: CPT | Mod: GC,,, | Performed by: INTERNAL MEDICINE

## 2022-01-01 PROCEDURE — 80053 COMPREHEN METABOLIC PANEL: CPT | Mod: HCNC | Performed by: EMERGENCY MEDICINE

## 2022-01-01 PROCEDURE — 93005 ELECTROCARDIOGRAM TRACING: CPT | Mod: HCNC

## 2022-01-01 PROCEDURE — 88342 IMHCHEM/IMCYTCHM 1ST ANTB: CPT | Mod: 59 | Performed by: PATHOLOGY

## 2022-01-01 PROCEDURE — 88305 TISSUE EXAM BY PATHOLOGIST: CPT | Mod: 59 | Performed by: PATHOLOGY

## 2022-01-01 PROCEDURE — 84100 ASSAY OF PHOSPHORUS: CPT | Mod: HCNC

## 2022-01-01 PROCEDURE — 25000003 PHARM REV CODE 250: Performed by: STUDENT IN AN ORGANIZED HEALTH CARE EDUCATION/TRAINING PROGRAM

## 2022-01-01 PROCEDURE — 87086 URINE CULTURE/COLONY COUNT: CPT | Performed by: EMERGENCY MEDICINE

## 2022-01-01 PROCEDURE — 63600175 PHARM REV CODE 636 W HCPCS: Mod: HCNC | Performed by: EMERGENCY MEDICINE

## 2022-01-01 PROCEDURE — 25500020 PHARM REV CODE 255: Performed by: INTERNAL MEDICINE

## 2022-01-01 PROCEDURE — 63600175 PHARM REV CODE 636 W HCPCS: Performed by: STUDENT IN AN ORGANIZED HEALTH CARE EDUCATION/TRAINING PROGRAM

## 2022-01-01 PROCEDURE — 1159F PR MEDICATION LIST DOCUMENTED IN MEDICAL RECORD: ICD-10-PCS | Mod: HCNC,CPTII,95, | Performed by: INTERNAL MEDICINE

## 2022-01-01 PROCEDURE — 99215 OFFICE O/P EST HI 40 MIN: CPT | Mod: GC,S$GLB,, | Performed by: INTERNAL MEDICINE

## 2022-01-01 PROCEDURE — 1126F AMNT PAIN NOTED NONE PRSNT: CPT | Mod: CPTII,S$GLB,, | Performed by: STUDENT IN AN ORGANIZED HEALTH CARE EDUCATION/TRAINING PROGRAM

## 2022-01-01 PROCEDURE — 1159F PR MEDICATION LIST DOCUMENTED IN MEDICAL RECORD: ICD-10-PCS | Mod: CPTII,S$GLB,, | Performed by: STUDENT IN AN ORGANIZED HEALTH CARE EDUCATION/TRAINING PROGRAM

## 2022-01-01 PROCEDURE — 85027 COMPLETE CBC AUTOMATED: CPT | Performed by: INTERNAL MEDICINE

## 2022-01-01 PROCEDURE — 88342 IMHCHEM/IMCYTCHM 1ST ANTB: CPT | Mod: 26,,, | Performed by: PATHOLOGY

## 2022-01-01 PROCEDURE — 85007 BL SMEAR W/DIFF WBC COUNT: CPT | Mod: HCNC | Performed by: INTERNAL MEDICINE

## 2022-01-01 PROCEDURE — 99213 PR OFFICE/OUTPT VISIT, EST, LEVL III, 20-29 MIN: ICD-10-PCS | Mod: HCNC,S$GLB,, | Performed by: INTERNAL MEDICINE

## 2022-01-01 PROCEDURE — 96360 HYDRATION IV INFUSION INIT: CPT | Mod: HCNC

## 2022-01-01 PROCEDURE — 25000003 PHARM REV CODE 250: Mod: HCNC | Performed by: INTERNAL MEDICINE

## 2022-01-01 PROCEDURE — 1159F PR MEDICATION LIST DOCUMENTED IN MEDICAL RECORD: ICD-10-PCS | Mod: HCNC,CPTII,GC,S$GLB | Performed by: INTERNAL MEDICINE

## 2022-01-01 PROCEDURE — 88184 FLOWCYTOMETRY/ TC 1 MARKER: CPT | Performed by: PATHOLOGY

## 2022-01-01 PROCEDURE — 99999 PR PBB SHADOW E&M-EST. PATIENT-LVL V: ICD-10-PCS | Mod: PBBFAC,GC,, | Performed by: INTERNAL MEDICINE

## 2022-01-01 PROCEDURE — 88365 PR  TISSUE HYBRIDIZATION: ICD-10-PCS | Mod: 26,,, | Performed by: PATHOLOGY

## 2022-01-01 PROCEDURE — 63600175 PHARM REV CODE 636 W HCPCS: Mod: HCNC | Performed by: INTERNAL MEDICINE

## 2022-01-01 PROCEDURE — 1101F PR PT FALLS ASSESS DOC 0-1 FALLS W/OUT INJ PAST YR: ICD-10-PCS | Mod: CPTII,S$GLB,, | Performed by: NURSE PRACTITIONER

## 2022-01-01 PROCEDURE — 74177 CT ABD & PELVIS W/CONTRAST: CPT | Mod: 26,,, | Performed by: RADIOLOGY

## 2022-01-01 PROCEDURE — 25500020 PHARM REV CODE 255: Mod: HCNC | Performed by: EMERGENCY MEDICINE

## 2022-01-01 PROCEDURE — 83735 ASSAY OF MAGNESIUM: CPT | Performed by: STUDENT IN AN ORGANIZED HEALTH CARE EDUCATION/TRAINING PROGRAM

## 2022-01-01 PROCEDURE — 99215 OFFICE O/P EST HI 40 MIN: CPT | Mod: HCNC,GC,S$GLB, | Performed by: INTERNAL MEDICINE

## 2022-01-01 PROCEDURE — 85027 COMPLETE CBC AUTOMATED: CPT | Mod: HCNC | Performed by: STUDENT IN AN ORGANIZED HEALTH CARE EDUCATION/TRAINING PROGRAM

## 2022-01-01 PROCEDURE — 99153 MOD SED SAME PHYS/QHP EA: CPT | Performed by: RADIOLOGY

## 2022-01-01 PROCEDURE — 1159F PR MEDICATION LIST DOCUMENTED IN MEDICAL RECORD: ICD-10-PCS | Mod: CPTII,GC,S$GLB, | Performed by: INTERNAL MEDICINE

## 2022-01-01 PROCEDURE — 1101F PT FALLS ASSESS-DOCD LE1/YR: CPT | Mod: HCNC,CPTII,S$GLB, | Performed by: PHYSICIAN ASSISTANT

## 2022-01-01 PROCEDURE — 1159F MED LIST DOCD IN RCRD: CPT | Mod: CPTII,S$GLB,, | Performed by: INTERNAL MEDICINE

## 2022-01-01 PROCEDURE — 71260 CT THORAX DX C+: CPT | Mod: TC

## 2022-01-01 PROCEDURE — G0108 PR DIAB MANAGE TRN  PER INDIV: ICD-10-PCS | Mod: S$GLB,,, | Performed by: NUTRITIONIST

## 2022-01-01 PROCEDURE — 25000003 PHARM REV CODE 250: Performed by: PHYSICIAN ASSISTANT

## 2022-01-01 PROCEDURE — 99215 PR OFFICE/OUTPT VISIT, EST, LEVL V, 40-54 MIN: ICD-10-PCS | Mod: GC,S$GLB,, | Performed by: INTERNAL MEDICINE

## 2022-01-01 PROCEDURE — 1101F PT FALLS ASSESS-DOCD LE1/YR: CPT | Mod: CPTII,S$GLB,, | Performed by: INTERNAL MEDICINE

## 2022-01-01 PROCEDURE — 82728 ASSAY OF FERRITIN: CPT | Mod: HCNC | Performed by: STUDENT IN AN ORGANIZED HEALTH CARE EDUCATION/TRAINING PROGRAM

## 2022-01-01 PROCEDURE — 3288F FALL RISK ASSESSMENT DOCD: CPT | Mod: CPTII,S$GLB,, | Performed by: INTERNAL MEDICINE

## 2022-01-01 PROCEDURE — 99239 HOSP IP/OBS DSCHRG MGMT >30: CPT | Mod: HCNC,,, | Performed by: HOSPITALIST

## 2022-01-01 PROCEDURE — 1126F PR PAIN SEVERITY QUANTIFIED, NO PAIN PRESENT: ICD-10-PCS | Mod: CPTII,S$GLB,, | Performed by: INTERNAL MEDICINE

## 2022-01-01 PROCEDURE — U0002 COVID-19 LAB TEST NON-CDC: HCPCS | Performed by: EMERGENCY MEDICINE

## 2022-01-01 PROCEDURE — 83605 ASSAY OF LACTIC ACID: CPT | Mod: HCNC

## 2022-01-01 PROCEDURE — 82232 ASSAY OF BETA-2 PROTEIN: CPT | Mod: HCNC | Performed by: INTERNAL MEDICINE

## 2022-01-01 PROCEDURE — 1160F PR REVIEW ALL MEDS BY PRESCRIBER/CLIN PHARMACIST DOCUMENTED: ICD-10-PCS | Mod: CPTII,S$GLB,, | Performed by: INTERNAL MEDICINE

## 2022-01-01 PROCEDURE — 74176 CT ABDOMEN PELVIS WITHOUT CONTRAST: ICD-10-PCS | Mod: 26,HCNC,, | Performed by: RADIOLOGY

## 2022-01-01 PROCEDURE — 3288F PR FALLS RISK ASSESSMENT DOCUMENTED: ICD-10-PCS | Mod: HCNC,CPTII,S$GLB, | Performed by: INTERNAL MEDICINE

## 2022-01-01 PROCEDURE — 80053 COMPREHEN METABOLIC PANEL: CPT | Performed by: INTERNAL MEDICINE

## 2022-01-01 PROCEDURE — 3051F HG A1C>EQUAL 7.0%<8.0%: CPT | Mod: HCNC,CPTII,95, | Performed by: NURSE PRACTITIONER

## 2022-01-01 PROCEDURE — 88305 TISSUE EXAM BY PATHOLOGIST: CPT | Mod: 26,59,, | Performed by: PATHOLOGY

## 2022-01-01 PROCEDURE — G0180 MD CERTIFICATION HHA PATIENT: HCPCS | Mod: ,,, | Performed by: INTERNAL MEDICINE

## 2022-01-01 PROCEDURE — 1159F PR MEDICATION LIST DOCUMENTED IN MEDICAL RECORD: ICD-10-PCS | Mod: CPTII,S$GLB,, | Performed by: INTERNAL MEDICINE

## 2022-01-01 PROCEDURE — 1160F RVW MEDS BY RX/DR IN RCRD: CPT | Mod: HCNC,CPTII,GC,S$GLB | Performed by: INTERNAL MEDICINE

## 2022-01-01 PROCEDURE — 83605 ASSAY OF LACTIC ACID: CPT | Performed by: EMERGENCY MEDICINE

## 2022-01-01 PROCEDURE — 88112 PR  CYTOPATH, CELL ENHANCE TECH: ICD-10-PCS | Mod: 26,,, | Performed by: PATHOLOGY

## 2022-01-01 PROCEDURE — 96374 THER/PROPH/DIAG INJ IV PUSH: CPT | Mod: HCNC

## 2022-01-01 PROCEDURE — 87116 MYCOBACTERIA CULTURE: CPT | Performed by: STUDENT IN AN ORGANIZED HEALTH CARE EDUCATION/TRAINING PROGRAM

## 2022-01-01 PROCEDURE — 99999 PR PBB SHADOW E&M-EST. PATIENT-LVL IV: ICD-10-PCS | Mod: PBBFAC,,, | Performed by: PHYSICIAN ASSISTANT

## 2022-01-01 PROCEDURE — 83735 ASSAY OF MAGNESIUM: CPT | Performed by: EMERGENCY MEDICINE

## 2022-01-01 PROCEDURE — 99223 PR INITIAL HOSPITAL CARE,LEVL III: ICD-10-PCS | Mod: AI,HCNC,, | Performed by: HOSPITALIST

## 2022-01-01 PROCEDURE — 25000003 PHARM REV CODE 250: Mod: HCNC | Performed by: EMERGENCY MEDICINE

## 2022-01-01 PROCEDURE — 1101F PT FALLS ASSESS-DOCD LE1/YR: CPT | Mod: CPTII,S$GLB,, | Performed by: NURSE PRACTITIONER

## 2022-01-01 PROCEDURE — 94761 N-INVAS EAR/PLS OXIMETRY MLT: CPT

## 2022-01-01 PROCEDURE — 99999 PR PBB SHADOW E&M-EST. PATIENT-LVL V: ICD-10-PCS | Mod: PBBFAC,,, | Performed by: NURSE PRACTITIONER

## 2022-01-01 PROCEDURE — 83615 LACTATE (LD) (LDH) ENZYME: CPT | Mod: HCNC | Performed by: INTERNAL MEDICINE

## 2022-01-01 PROCEDURE — 1160F PR REVIEW ALL MEDS BY PRESCRIBER/CLIN PHARMACIST DOCUMENTED: ICD-10-PCS | Mod: CPTII,GC,S$GLB, | Performed by: INTERNAL MEDICINE

## 2022-01-01 PROCEDURE — 3288F FALL RISK ASSESSMENT DOCD: CPT | Mod: CPTII,S$GLB,, | Performed by: NURSE PRACTITIONER

## 2022-01-01 PROCEDURE — 99214 PR OFFICE/OUTPT VISIT, EST, LEVL IV, 30-39 MIN: ICD-10-PCS | Mod: HCNC,S$GLB,, | Performed by: NURSE PRACTITIONER

## 2022-01-01 PROCEDURE — 83615 LACTATE (LD) (LDH) ENZYME: CPT | Performed by: RADIOLOGY

## 2022-01-01 PROCEDURE — 87086 URINE CULTURE/COLONY COUNT: CPT | Mod: HCNC | Performed by: EMERGENCY MEDICINE

## 2022-01-01 PROCEDURE — 99284 PR EMERGENCY DEPT VISIT,LEVEL IV: ICD-10-PCS | Mod: HCNC,,, | Performed by: EMERGENCY MEDICINE

## 2022-01-01 PROCEDURE — 1159F MED LIST DOCD IN RCRD: CPT | Mod: CPTII,S$GLB,, | Performed by: STUDENT IN AN ORGANIZED HEALTH CARE EDUCATION/TRAINING PROGRAM

## 2022-01-01 PROCEDURE — 3051F HG A1C>EQUAL 7.0%<8.0%: CPT | Mod: HCNC,CPTII,S$GLB, | Performed by: NURSE PRACTITIONER

## 2022-01-01 PROCEDURE — 36415 COLL VENOUS BLD VENIPUNCTURE: CPT | Performed by: INTERNAL MEDICINE

## 2022-01-01 PROCEDURE — 1126F PR PAIN SEVERITY QUANTIFIED, NO PAIN PRESENT: ICD-10-PCS | Mod: CPTII,S$GLB,, | Performed by: NURSE PRACTITIONER

## 2022-01-01 PROCEDURE — P9612 CATHETERIZE FOR URINE SPEC: HCPCS

## 2022-01-01 PROCEDURE — 1101F PT FALLS ASSESS-DOCD LE1/YR: CPT | Mod: CPTII,GC,S$GLB, | Performed by: INTERNAL MEDICINE

## 2022-01-01 PROCEDURE — 80053 COMPREHEN METABOLIC PANEL: CPT | Performed by: EMERGENCY MEDICINE

## 2022-01-01 PROCEDURE — 82010 KETONE BODYS QUAN: CPT | Mod: HCNC | Performed by: INTERNAL MEDICINE

## 2022-01-01 PROCEDURE — 83036 HEMOGLOBIN GLYCOSYLATED A1C: CPT | Mod: HCNC | Performed by: STUDENT IN AN ORGANIZED HEALTH CARE EDUCATION/TRAINING PROGRAM

## 2022-01-01 PROCEDURE — 80048 BASIC METABOLIC PNL TOTAL CA: CPT | Mod: HCNC | Performed by: INTERNAL MEDICINE

## 2022-01-01 PROCEDURE — 1159F MED LIST DOCD IN RCRD: CPT | Mod: CPTII,S$GLB,, | Performed by: NURSE PRACTITIONER

## 2022-01-01 PROCEDURE — 1160F PR REVIEW ALL MEDS BY PRESCRIBER/CLIN PHARMACIST DOCUMENTED: ICD-10-PCS | Mod: HCNC,CPTII,95, | Performed by: INTERNAL MEDICINE

## 2022-01-01 PROCEDURE — 99285 PR EMERGENCY DEPT VISIT,LEVEL V: ICD-10-PCS | Mod: HCNC,CS,, | Performed by: EMERGENCY MEDICINE

## 2022-01-01 PROCEDURE — 25500020 PHARM REV CODE 255: Mod: HCNC | Performed by: HOSPITALIST

## 2022-01-01 PROCEDURE — 83735 ASSAY OF MAGNESIUM: CPT | Mod: HCNC | Performed by: STUDENT IN AN ORGANIZED HEALTH CARE EDUCATION/TRAINING PROGRAM

## 2022-01-01 PROCEDURE — 99223 1ST HOSP IP/OBS HIGH 75: CPT | Mod: HCNC,GC,, | Performed by: INTERNAL MEDICINE

## 2022-01-01 PROCEDURE — 88365 INSITU HYBRIDIZATION (FISH): CPT | Performed by: PATHOLOGY

## 2022-01-01 PROCEDURE — 87102 FUNGUS ISOLATION CULTURE: CPT | Performed by: STUDENT IN AN ORGANIZED HEALTH CARE EDUCATION/TRAINING PROGRAM

## 2022-01-01 PROCEDURE — 1159F PR MEDICATION LIST DOCUMENTED IN MEDICAL RECORD: ICD-10-PCS | Mod: CPTII,S$GLB,, | Performed by: NURSE PRACTITIONER

## 2022-01-01 PROCEDURE — 99999 PR PBB SHADOW E&M-EST. PATIENT-LVL II: CPT | Mod: PBBFAC,HCNC,, | Performed by: DIETITIAN, REGISTERED

## 2022-01-01 PROCEDURE — G0180 PR HOME HEALTH MD CERTIFICATION: ICD-10-PCS | Mod: ,,, | Performed by: INTERNAL MEDICINE

## 2022-01-01 PROCEDURE — 84100 ASSAY OF PHOSPHORUS: CPT | Mod: HCNC | Performed by: EMERGENCY MEDICINE

## 2022-01-01 PROCEDURE — 83880 ASSAY OF NATRIURETIC PEPTIDE: CPT | Performed by: STUDENT IN AN ORGANIZED HEALTH CARE EDUCATION/TRAINING PROGRAM

## 2022-01-01 PROCEDURE — C9399 UNCLASSIFIED DRUGS OR BIOLOG: HCPCS | Mod: HCNC | Performed by: STUDENT IN AN ORGANIZED HEALTH CARE EDUCATION/TRAINING PROGRAM

## 2022-01-01 PROCEDURE — 87075 CULTR BACTERIA EXCEPT BLOOD: CPT | Performed by: STUDENT IN AN ORGANIZED HEALTH CARE EDUCATION/TRAINING PROGRAM

## 2022-01-01 PROCEDURE — 99213 OFFICE O/P EST LOW 20 MIN: CPT | Mod: S$GLB,,, | Performed by: NURSE PRACTITIONER

## 2022-01-01 PROCEDURE — 1126F AMNT PAIN NOTED NONE PRSNT: CPT | Mod: CPTII,S$GLB,, | Performed by: PHYSICIAN ASSISTANT

## 2022-01-01 PROCEDURE — 80053 COMPREHEN METABOLIC PANEL: CPT | Performed by: NURSE PRACTITIONER

## 2022-01-01 PROCEDURE — 1101F PR PT FALLS ASSESS DOC 0-1 FALLS W/OUT INJ PAST YR: ICD-10-PCS | Mod: HCNC,CPTII,S$GLB, | Performed by: PHYSICIAN ASSISTANT

## 2022-01-01 PROCEDURE — 88377 M/PHMTRC ALYS ISHQUANT/SEMIQ: CPT | Mod: 91 | Performed by: PATHOLOGY

## 2022-01-01 PROCEDURE — 1159F PR MEDICATION LIST DOCUMENTED IN MEDICAL RECORD: ICD-10-PCS | Mod: HCNC,CPTII,S$GLB, | Performed by: PHYSICIAN ASSISTANT

## 2022-01-01 PROCEDURE — 25000003 PHARM REV CODE 250: Mod: HCNC | Performed by: STUDENT IN AN ORGANIZED HEALTH CARE EDUCATION/TRAINING PROGRAM

## 2022-01-01 PROCEDURE — 36415 COLL VENOUS BLD VENIPUNCTURE: CPT | Performed by: NURSE PRACTITIONER

## 2022-01-01 PROCEDURE — 27000207 HC ISOLATION

## 2022-01-01 PROCEDURE — 1101F PR PT FALLS ASSESS DOC 0-1 FALLS W/OUT INJ PAST YR: ICD-10-PCS | Mod: HCNC,CPTII,S$GLB, | Performed by: NURSE PRACTITIONER

## 2022-01-01 PROCEDURE — 99497 ADVNCD CARE PLAN 30 MIN: CPT | Mod: S$GLB,,, | Performed by: STUDENT IN AN ORGANIZED HEALTH CARE EDUCATION/TRAINING PROGRAM

## 2022-01-01 PROCEDURE — 83020 HEMOGLOBIN ELECTROPHORESIS: CPT | Performed by: INTERNAL MEDICINE

## 2022-01-01 PROCEDURE — 63600175 PHARM REV CODE 636 W HCPCS: Performed by: INTERNAL MEDICINE

## 2022-01-01 PROCEDURE — 99999 PR PBB SHADOW E&M-EST. PATIENT-LVL III: ICD-10-PCS | Mod: PBBFAC,,, | Performed by: STUDENT IN AN ORGANIZED HEALTH CARE EDUCATION/TRAINING PROGRAM

## 2022-01-01 PROCEDURE — 99152 MOD SED SAME PHYS/QHP 5/>YRS: CPT | Performed by: RADIOLOGY

## 2022-01-01 PROCEDURE — 87040 BLOOD CULTURE FOR BACTERIA: CPT | Performed by: EMERGENCY MEDICINE

## 2022-01-01 PROCEDURE — 3051F HG A1C>EQUAL 7.0%<8.0%: CPT | Mod: CPTII,S$GLB,, | Performed by: NURSE PRACTITIONER

## 2022-01-01 PROCEDURE — 83690 ASSAY OF LIPASE: CPT | Mod: HCNC

## 2022-01-01 PROCEDURE — 81001 URINALYSIS AUTO W/SCOPE: CPT | Mod: HCNC | Performed by: EMERGENCY MEDICINE

## 2022-01-01 PROCEDURE — 87205 SMEAR GRAM STAIN: CPT | Performed by: STUDENT IN AN ORGANIZED HEALTH CARE EDUCATION/TRAINING PROGRAM

## 2022-01-01 PROCEDURE — 83735 ASSAY OF MAGNESIUM: CPT | Mod: HCNC | Performed by: EMERGENCY MEDICINE

## 2022-01-01 PROCEDURE — G0108 DIAB MANAGE TRN  PER INDIV: HCPCS | Mod: HCNC,S$GLB,, | Performed by: DIETITIAN, REGISTERED

## 2022-01-01 PROCEDURE — 82962 GLUCOSE BLOOD TEST: CPT | Mod: HCNC

## 2022-01-01 PROCEDURE — 88305 TISSUE EXAM BY PATHOLOGIST: ICD-10-PCS | Mod: 26,59,, | Performed by: PATHOLOGY

## 2022-01-01 PROCEDURE — 1101F PT FALLS ASSESS-DOCD LE1/YR: CPT | Mod: CPTII,S$GLB,, | Performed by: STUDENT IN AN ORGANIZED HEALTH CARE EDUCATION/TRAINING PROGRAM

## 2022-01-01 PROCEDURE — 1160F PR REVIEW ALL MEDS BY PRESCRIBER/CLIN PHARMACIST DOCUMENTED: ICD-10-PCS | Mod: HCNC,CPTII,GC,S$GLB | Performed by: INTERNAL MEDICINE

## 2022-01-01 PROCEDURE — 1159F MED LIST DOCD IN RCRD: CPT | Mod: HCNC,CPTII,95, | Performed by: INTERNAL MEDICINE

## 2022-01-01 PROCEDURE — 3288F PR FALLS RISK ASSESSMENT DOCUMENTED: ICD-10-PCS | Mod: HCNC,CPTII,S$GLB, | Performed by: NURSE PRACTITIONER

## 2022-01-01 PROCEDURE — 82803 BLOOD GASES ANY COMBINATION: CPT | Mod: HCNC

## 2022-01-01 PROCEDURE — 99232 SBSQ HOSP IP/OBS MODERATE 35: CPT | Mod: GC,,, | Performed by: INTERNAL MEDICINE

## 2022-01-01 PROCEDURE — 87040 BLOOD CULTURE FOR BACTERIA: CPT | Mod: 59,HCNC | Performed by: INTERNAL MEDICINE

## 2022-01-01 PROCEDURE — 27000221 HC OXYGEN, UP TO 24 HOURS

## 2022-01-01 PROCEDURE — 87070 CULTURE OTHR SPECIMN AEROBIC: CPT | Performed by: STUDENT IN AN ORGANIZED HEALTH CARE EDUCATION/TRAINING PROGRAM

## 2022-01-01 PROCEDURE — 3288F PR FALLS RISK ASSESSMENT DOCUMENTED: ICD-10-PCS | Mod: CPTII,S$GLB,, | Performed by: PHYSICIAN ASSISTANT

## 2022-01-01 PROCEDURE — 99204 OFFICE O/P NEW MOD 45 MIN: CPT | Mod: S$GLB,,, | Performed by: STUDENT IN AN ORGANIZED HEALTH CARE EDUCATION/TRAINING PROGRAM

## 2022-01-01 PROCEDURE — 3051F PR MOST RECENT HEMOGLOBIN A1C LEVEL 7.0 - < 8.0%: ICD-10-PCS | Mod: HCNC,CPTII,95, | Performed by: NURSE PRACTITIONER

## 2022-01-01 PROCEDURE — 85007 BL SMEAR W/DIFF WBC COUNT: CPT | Mod: HCNC | Performed by: EMERGENCY MEDICINE

## 2022-01-01 PROCEDURE — 88112 CYTOPATH CELL ENHANCE TECH: CPT | Mod: 59 | Performed by: PATHOLOGY

## 2022-01-01 PROCEDURE — 25000003 PHARM REV CODE 250: Performed by: EMERGENCY MEDICINE

## 2022-01-01 PROCEDURE — U0002 COVID-19 LAB TEST NON-CDC: HCPCS | Mod: HCNC

## 2022-01-01 PROCEDURE — 93010 EKG 12-LEAD: ICD-10-PCS | Mod: HCNC,,, | Performed by: INTERNAL MEDICINE

## 2022-01-01 PROCEDURE — 99999 PR PBB SHADOW E&M-EST. PATIENT-LVL III: ICD-10-PCS | Mod: PBBFAC,HCNC,GC, | Performed by: INTERNAL MEDICINE

## 2022-01-01 PROCEDURE — 99999 PR PBB SHADOW E&M-EST. PATIENT-LVL II: ICD-10-PCS | Mod: PBBFAC,HCNC,, | Performed by: DIETITIAN, REGISTERED

## 2022-01-01 PROCEDURE — 1101F PT FALLS ASSESS-DOCD LE1/YR: CPT | Mod: HCNC,CPTII,S$GLB, | Performed by: NURSE PRACTITIONER

## 2022-01-01 PROCEDURE — 88342 IMHCHEM/IMCYTCHM 1ST ANTB: CPT | Mod: 91 | Performed by: PATHOLOGY

## 2022-01-01 PROCEDURE — 80061 LIPID PANEL: CPT | Mod: HCNC | Performed by: STUDENT IN AN ORGANIZED HEALTH CARE EDUCATION/TRAINING PROGRAM

## 2022-01-01 PROCEDURE — 1159F MED LIST DOCD IN RCRD: CPT | Mod: HCNC,CPTII,S$GLB, | Performed by: INTERNAL MEDICINE

## 2022-01-01 PROCEDURE — 1101F PR PT FALLS ASSESS DOC 0-1 FALLS W/OUT INJ PAST YR: ICD-10-PCS | Mod: CPTII,GC,S$GLB, | Performed by: INTERNAL MEDICINE

## 2022-01-01 PROCEDURE — 1125F PR PAIN SEVERITY QUANTIFIED, PAIN PRESENT: ICD-10-PCS | Mod: HCNC,CPTII,S$GLB, | Performed by: PHYSICIAN ASSISTANT

## 2022-01-01 PROCEDURE — 99285 EMERGENCY DEPT VISIT HI MDM: CPT | Mod: HCNC,CS,, | Performed by: EMERGENCY MEDICINE

## 2022-01-01 PROCEDURE — 88341 PR IHC OR ICC EACH ADD'L SINGLE ANTIBODY  STAINPR: ICD-10-PCS | Mod: 26,,, | Performed by: PATHOLOGY

## 2022-01-01 PROCEDURE — 1101F PR PT FALLS ASSESS DOC 0-1 FALLS W/OUT INJ PAST YR: ICD-10-PCS | Mod: HCNC,CPTII,S$GLB, | Performed by: INTERNAL MEDICINE

## 2022-01-01 PROCEDURE — 99999 PR PBB SHADOW E&M-EST. PATIENT-LVL V: CPT | Mod: PBBFAC,,, | Performed by: NURSE PRACTITIONER

## 2022-01-01 PROCEDURE — 87088 URINE BACTERIA CULTURE: CPT | Mod: HCNC | Performed by: EMERGENCY MEDICINE

## 2022-01-01 PROCEDURE — 3288F FALL RISK ASSESSMENT DOCD: CPT | Mod: CPTII,GC,S$GLB, | Performed by: INTERNAL MEDICINE

## 2022-01-01 PROCEDURE — 85025 COMPLETE CBC W/AUTO DIFF WBC: CPT | Performed by: INTERNAL MEDICINE

## 2022-01-01 PROCEDURE — 1125F AMNT PAIN NOTED PAIN PRSNT: CPT | Mod: HCNC,CPTII,GC,S$GLB | Performed by: INTERNAL MEDICINE

## 2022-01-01 PROCEDURE — 1101F PR PT FALLS ASSESS DOC 0-1 FALLS W/OUT INJ PAST YR: ICD-10-PCS | Mod: CPTII,S$GLB,, | Performed by: INTERNAL MEDICINE

## 2022-01-01 PROCEDURE — U0002 COVID-19 LAB TEST NON-CDC: HCPCS | Mod: HCNC | Performed by: INTERNAL MEDICINE

## 2022-01-01 PROCEDURE — 88341 IMHCHEM/IMCYTCHM EA ADD ANTB: CPT | Mod: 26,,, | Performed by: PATHOLOGY

## 2022-01-01 PROCEDURE — 99285 EMERGENCY DEPT VISIT HI MDM: CPT | Mod: CS,,, | Performed by: EMERGENCY MEDICINE

## 2022-01-01 PROCEDURE — 20600001 HC STEP DOWN PRIVATE ROOM

## 2022-01-01 PROCEDURE — 99213 PR OFFICE/OUTPT VISIT, EST, LEVL III, 20-29 MIN: ICD-10-PCS | Mod: S$GLB,,, | Performed by: NURSE PRACTITIONER

## 2022-01-01 PROCEDURE — 74177 CT CHEST ABDOMEN PELVIS WITH CONTRAST (XPD): ICD-10-PCS | Mod: 26,,, | Performed by: RADIOLOGY

## 2022-01-01 PROCEDURE — 3288F PR FALLS RISK ASSESSMENT DOCUMENTED: ICD-10-PCS | Mod: HCNC,CPTII,S$GLB, | Performed by: PHYSICIAN ASSISTANT

## 2022-01-01 PROCEDURE — 63600175 PHARM REV CODE 636 W HCPCS: Performed by: EMERGENCY MEDICINE

## 2022-01-01 PROCEDURE — 1101F PT FALLS ASSESS-DOCD LE1/YR: CPT | Mod: HCNC,CPTII,GC,S$GLB | Performed by: INTERNAL MEDICINE

## 2022-01-01 PROCEDURE — 99285 EMERGENCY DEPT VISIT HI MDM: CPT | Mod: HCNC,GC,CS, | Performed by: EMERGENCY MEDICINE

## 2022-01-01 PROCEDURE — 1101F PT FALLS ASSESS-DOCD LE1/YR: CPT | Mod: HCNC,CPTII,S$GLB, | Performed by: INTERNAL MEDICINE

## 2022-01-01 PROCEDURE — 1159F MED LIST DOCD IN RCRD: CPT | Mod: CPTII,GC,S$GLB, | Performed by: INTERNAL MEDICINE

## 2022-01-01 PROCEDURE — 85610 PROTHROMBIN TIME: CPT | Mod: HCNC | Performed by: STUDENT IN AN ORGANIZED HEALTH CARE EDUCATION/TRAINING PROGRAM

## 2022-01-01 PROCEDURE — G0108 DIAB MANAGE TRN  PER INDIV: HCPCS | Mod: S$GLB,,, | Performed by: NUTRITIONIST

## 2022-01-01 PROCEDURE — 99497 PR ADVNCD CARE PLAN 30 MIN: ICD-10-PCS | Mod: S$GLB,,, | Performed by: STUDENT IN AN ORGANIZED HEALTH CARE EDUCATION/TRAINING PROGRAM

## 2022-01-01 PROCEDURE — 84484 ASSAY OF TROPONIN QUANT: CPT | Mod: HCNC | Performed by: INTERNAL MEDICINE

## 2022-01-01 PROCEDURE — 1126F AMNT PAIN NOTED NONE PRSNT: CPT | Mod: CPTII,S$GLB,, | Performed by: INTERNAL MEDICINE

## 2022-01-01 PROCEDURE — 99443 PR PHYSICIAN TELEPHONE EVALUATION 21-30 MIN: CPT | Mod: HCNC,95,, | Performed by: INTERNAL MEDICINE

## 2022-01-01 PROCEDURE — 85025 COMPLETE CBC W/AUTO DIFF WBC: CPT | Performed by: STUDENT IN AN ORGANIZED HEALTH CARE EDUCATION/TRAINING PROGRAM

## 2022-01-01 PROCEDURE — 99999 PR PBB SHADOW E&M-EST. PATIENT-LVL III: ICD-10-PCS | Mod: PBBFAC,HCNC,, | Performed by: NURSE PRACTITIONER

## 2022-01-01 PROCEDURE — 1159F MED LIST DOCD IN RCRD: CPT | Mod: HCNC,CPTII,S$GLB, | Performed by: NURSE PRACTITIONER

## 2022-01-01 PROCEDURE — 83010 ASSAY OF HAPTOGLOBIN QUANT: CPT | Performed by: INTERNAL MEDICINE

## 2022-01-01 PROCEDURE — 1159F MED LIST DOCD IN RCRD: CPT | Mod: HCNC,CPTII,S$GLB, | Performed by: PHYSICIAN ASSISTANT

## 2022-01-01 PROCEDURE — 88365 INSITU HYBRIDIZATION (FISH): CPT | Mod: 26,,, | Performed by: PATHOLOGY

## 2022-01-01 PROCEDURE — 85045 AUTOMATED RETICULOCYTE COUNT: CPT | Performed by: INTERNAL MEDICINE

## 2022-01-01 PROCEDURE — 96361 HYDRATE IV INFUSION ADD-ON: CPT | Mod: HCNC

## 2022-01-01 PROCEDURE — 99214 OFFICE O/P EST MOD 30 MIN: CPT | Mod: HCNC,S$GLB,, | Performed by: NURSE PRACTITIONER

## 2022-01-01 PROCEDURE — 99215 PR OFFICE/OUTPT VISIT, EST, LEVL V, 40-54 MIN: ICD-10-PCS | Mod: S$GLB,,, | Performed by: STUDENT IN AN ORGANIZED HEALTH CARE EDUCATION/TRAINING PROGRAM

## 2022-01-01 PROCEDURE — 99214 OFFICE O/P EST MOD 30 MIN: CPT | Mod: HCNC,S$GLB,, | Performed by: INTERNAL MEDICINE

## 2022-01-01 PROCEDURE — 83605 ASSAY OF LACTIC ACID: CPT | Mod: 91,HCNC | Performed by: STUDENT IN AN ORGANIZED HEALTH CARE EDUCATION/TRAINING PROGRAM

## 2022-01-01 PROCEDURE — 99232 PR SUBSEQUENT HOSPITAL CARE,LEVL II: ICD-10-PCS | Mod: GC,,, | Performed by: INTERNAL MEDICINE

## 2022-01-01 PROCEDURE — 85610 PROTHROMBIN TIME: CPT | Performed by: FAMILY MEDICINE

## 2022-01-01 PROCEDURE — 97161 PT EVAL LOW COMPLEX 20 MIN: CPT | Mod: HCNC

## 2022-01-01 PROCEDURE — 88333 PR  INTRAOPERATIVE CYTO PATH CONSULT, INITIAL SITE: ICD-10-PCS | Mod: 26,,, | Performed by: PATHOLOGY

## 2022-01-01 PROCEDURE — 88189 PR  FLOWCYTOMETRY/READ, 16 & > MARKERS: ICD-10-PCS | Mod: HCNC,,, | Performed by: PATHOLOGY

## 2022-01-01 PROCEDURE — 99495 TCM SERVICES (MODERATE COMPLEXITY): ICD-10-PCS | Mod: HCNC,S$GLB,, | Performed by: PHYSICIAN ASSISTANT

## 2022-01-01 PROCEDURE — 99999 PR PBB SHADOW E&M-EST. PATIENT-LVL I: ICD-10-PCS | Mod: PBBFAC,,, | Performed by: NUTRITIONIST

## 2022-01-01 PROCEDURE — 89051 BODY FLUID CELL COUNT: CPT | Performed by: RADIOLOGY

## 2022-01-01 PROCEDURE — 99999 PR PBB SHADOW E&M-EST. PATIENT-LVL IV: CPT | Mod: PBBFAC,,, | Performed by: PHYSICIAN ASSISTANT

## 2022-01-01 PROCEDURE — 99204 OFFICE O/P NEW MOD 45 MIN: CPT | Mod: HCNC,GC,S$GLB, | Performed by: INTERNAL MEDICINE

## 2022-01-01 PROCEDURE — 1101F PR PT FALLS ASSESS DOC 0-1 FALLS W/OUT INJ PAST YR: ICD-10-PCS | Mod: CPTII,S$GLB,, | Performed by: STUDENT IN AN ORGANIZED HEALTH CARE EDUCATION/TRAINING PROGRAM

## 2022-01-01 PROCEDURE — 86901 BLOOD TYPING SEROLOGIC RH(D): CPT | Mod: HCNC | Performed by: EMERGENCY MEDICINE

## 2022-01-01 PROCEDURE — 99999 PR PBB SHADOW E&M-EST. PATIENT-LVL V: CPT | Mod: PBBFAC,,, | Performed by: INTERNAL MEDICINE

## 2022-01-01 PROCEDURE — 1111F PR DISCHARGE MEDS RECONCILED W/ CURRENT OUTPATIENT MED LIST: ICD-10-PCS | Mod: HCNC,CPTII,, | Performed by: HOSPITALIST

## 2022-01-01 PROCEDURE — 87186 SC STD MICRODIL/AGAR DIL: CPT | Performed by: EMERGENCY MEDICINE

## 2022-01-01 PROCEDURE — 81001 URINALYSIS AUTO W/SCOPE: CPT | Mod: HCNC | Performed by: INTERNAL MEDICINE

## 2022-01-01 PROCEDURE — 87147 CULTURE TYPE IMMUNOLOGIC: CPT | Mod: HCNC | Performed by: EMERGENCY MEDICINE

## 2022-01-01 PROCEDURE — 85025 COMPLETE CBC W/AUTO DIFF WBC: CPT | Mod: HCNC | Performed by: STUDENT IN AN ORGANIZED HEALTH CARE EDUCATION/TRAINING PROGRAM

## 2022-01-01 PROCEDURE — 99499 UNLISTED E&M SERVICE: CPT | Mod: S$GLB,,, | Performed by: PHYSICIAN ASSISTANT

## 2022-01-01 PROCEDURE — 99214 PR OFFICE/OUTPT VISIT, EST, LEVL IV, 30-39 MIN: ICD-10-PCS | Mod: HCNC,95,, | Performed by: NURSE PRACTITIONER

## 2022-01-01 PROCEDURE — 99499 RISK ADDL DX/OHS AUDIT: ICD-10-PCS | Mod: S$GLB,,, | Performed by: PHYSICIAN ASSISTANT

## 2022-01-01 PROCEDURE — 83036 HEMOGLOBIN GLYCOSYLATED A1C: CPT | Performed by: NURSE PRACTITIONER

## 2022-01-01 PROCEDURE — 99999 PR PBB SHADOW E&M-EST. PATIENT-LVL IV: ICD-10-PCS | Mod: PBBFAC,HCNC,GC, | Performed by: INTERNAL MEDICINE

## 2022-01-01 PROCEDURE — 99239 PR HOSPITAL DISCHARGE DAY,>30 MIN: ICD-10-PCS | Mod: HCNC,,, | Performed by: HOSPITALIST

## 2022-01-01 PROCEDURE — 99214 PR OFFICE/OUTPT VISIT, EST, LEVL IV, 30-39 MIN: ICD-10-PCS | Mod: HCNC,S$GLB,, | Performed by: INTERNAL MEDICINE

## 2022-01-01 PROCEDURE — 1125F AMNT PAIN NOTED PAIN PRSNT: CPT | Mod: HCNC,CPTII,S$GLB, | Performed by: PHYSICIAN ASSISTANT

## 2022-01-01 PROCEDURE — 88112 CYTOPATH CELL ENHANCE TECH: CPT | Performed by: PATHOLOGY

## 2022-01-01 PROCEDURE — 1126F PR PAIN SEVERITY QUANTIFIED, NO PAIN PRESENT: ICD-10-PCS | Mod: CPTII,GC,S$GLB, | Performed by: INTERNAL MEDICINE

## 2022-01-01 PROCEDURE — 84550 ASSAY OF BLOOD/URIC ACID: CPT | Mod: HCNC | Performed by: INTERNAL MEDICINE

## 2022-01-01 PROCEDURE — 3051F PR MOST RECENT HEMOGLOBIN A1C LEVEL 7.0 - < 8.0%: ICD-10-PCS | Mod: CPTII,S$GLB,, | Performed by: NURSE PRACTITIONER

## 2022-01-01 PROCEDURE — 83020 HEMOGLOBIN ELECTROPHORESIS: CPT | Mod: 91 | Performed by: INTERNAL MEDICINE

## 2022-01-01 PROCEDURE — 95251 PR GLUCOSE MONITOR, 72 HOUR, PHYS INTERP: ICD-10-PCS | Mod: HCNC,S$GLB,, | Performed by: NURSE PRACTITIONER

## 2022-01-01 PROCEDURE — 99285 EMERGENCY DEPT VISIT HI MDM: CPT | Mod: 25

## 2022-01-01 PROCEDURE — 85007 BL SMEAR W/DIFF WBC COUNT: CPT | Mod: HCNC | Performed by: STUDENT IN AN ORGANIZED HEALTH CARE EDUCATION/TRAINING PROGRAM

## 2022-01-01 PROCEDURE — 63700000 PHARM REV CODE 250 ALT 637 W/O HCPCS: Performed by: STUDENT IN AN ORGANIZED HEALTH CARE EDUCATION/TRAINING PROGRAM

## 2022-01-01 PROCEDURE — 85060 BLOOD SMEAR INTERPRETATION: CPT | Mod: HCNC,,, | Performed by: PATHOLOGY

## 2022-01-01 PROCEDURE — 99284 EMERGENCY DEPT VISIT MOD MDM: CPT | Mod: HCNC,,, | Performed by: EMERGENCY MEDICINE

## 2022-01-01 PROCEDURE — 1111F DSCHRG MED/CURRENT MED MERGE: CPT | Mod: HCNC,CPTII,, | Performed by: HOSPITALIST

## 2022-01-01 PROCEDURE — 99999 PR PBB SHADOW E&M-EST. PATIENT-LVL III: ICD-10-PCS | Mod: PBBFAC,HCNC,, | Performed by: PHYSICIAN ASSISTANT

## 2022-01-01 PROCEDURE — G0108 PR DIAB MANAGE TRN  PER INDIV: ICD-10-PCS | Mod: HCNC,S$GLB,, | Performed by: DIETITIAN, REGISTERED

## 2022-01-01 PROCEDURE — 1160F PR REVIEW ALL MEDS BY PRESCRIBER/CLIN PHARMACIST DOCUMENTED: ICD-10-PCS | Mod: HCNC,CPTII,S$GLB, | Performed by: PHYSICIAN ASSISTANT

## 2022-01-01 PROCEDURE — 83615 LACTATE (LD) (LDH) ENZYME: CPT | Performed by: INTERNAL MEDICINE

## 2022-01-01 PROCEDURE — 84466 ASSAY OF TRANSFERRIN: CPT | Mod: HCNC | Performed by: STUDENT IN AN ORGANIZED HEALTH CARE EDUCATION/TRAINING PROGRAM

## 2022-01-01 PROCEDURE — 1125F AMNT PAIN NOTED PAIN PRSNT: CPT | Mod: HCNC,CPTII,S$GLB, | Performed by: NURSE PRACTITIONER

## 2022-01-01 PROCEDURE — 99223 PR INITIAL HOSPITAL CARE,LEVL III: ICD-10-PCS | Mod: HCNC,GC,, | Performed by: INTERNAL MEDICINE

## 2022-01-01 PROCEDURE — 12000002 HC ACUTE/MED SURGE SEMI-PRIVATE ROOM

## 2022-01-01 PROCEDURE — 88112 CYTOPATH CELL ENHANCE TECH: CPT | Mod: 26,,, | Performed by: PATHOLOGY

## 2022-01-01 PROCEDURE — 3288F FALL RISK ASSESSMENT DOCD: CPT | Mod: CPTII,S$GLB,, | Performed by: PHYSICIAN ASSISTANT

## 2022-01-01 PROCEDURE — 99999 PR PBB SHADOW E&M-EST. PATIENT-LVL III: CPT | Mod: PBBFAC,,, | Performed by: STUDENT IN AN ORGANIZED HEALTH CARE EDUCATION/TRAINING PROGRAM

## 2022-01-01 PROCEDURE — 99238 PR HOSPITAL DISCHARGE DAY,<30 MIN: ICD-10-PCS | Mod: GC,,, | Performed by: INTERNAL MEDICINE

## 2022-01-01 PROCEDURE — 99213 OFFICE O/P EST LOW 20 MIN: CPT | Mod: HCNC,S$GLB,, | Performed by: INTERNAL MEDICINE

## 2022-01-01 PROCEDURE — 1111F DSCHRG MED/CURRENT MED MERGE: CPT | Mod: CPTII,GC,, | Performed by: INTERNAL MEDICINE

## 2022-01-01 PROCEDURE — 1160F RVW MEDS BY RX/DR IN RCRD: CPT | Mod: HCNC,CPTII,95, | Performed by: INTERNAL MEDICINE

## 2022-01-01 PROCEDURE — 99204 PR OFFICE/OUTPT VISIT, NEW, LEVL IV, 45-59 MIN: ICD-10-PCS | Mod: HCNC,GC,S$GLB, | Performed by: INTERNAL MEDICINE

## 2022-01-01 PROCEDURE — 88189 FLOWCYTOMETRY/READ 16 & >: CPT | Mod: HCNC,,, | Performed by: PATHOLOGY

## 2022-01-01 PROCEDURE — 88189 PR  FLOWCYTOMETRY/READ, 16 & > MARKERS: ICD-10-PCS | Mod: ,,, | Performed by: PATHOLOGY

## 2022-01-01 PROCEDURE — 96365 THER/PROPH/DIAG IV INF INIT: CPT

## 2022-01-01 PROCEDURE — 88341 IMHCHEM/IMCYTCHM EA ADD ANTB: CPT | Mod: 59 | Performed by: PATHOLOGY

## 2022-01-01 PROCEDURE — 83605 ASSAY OF LACTIC ACID: CPT | Mod: HCNC | Performed by: STUDENT IN AN ORGANIZED HEALTH CARE EDUCATION/TRAINING PROGRAM

## 2022-01-01 PROCEDURE — 1101F PR PT FALLS ASSESS DOC 0-1 FALLS W/OUT INJ PAST YR: ICD-10-PCS | Mod: CPTII,S$GLB,, | Performed by: PHYSICIAN ASSISTANT

## 2022-01-01 PROCEDURE — 99214 OFFICE O/P EST MOD 30 MIN: CPT | Mod: HCNC,95,, | Performed by: NURSE PRACTITIONER

## 2022-01-01 PROCEDURE — 1111F PR DISCHARGE MEDS RECONCILED W/ CURRENT OUTPATIENT MED LIST: ICD-10-PCS | Mod: HCNC,CPTII,GC,S$GLB | Performed by: INTERNAL MEDICINE

## 2022-01-01 PROCEDURE — 89051 BODY FLUID CELL COUNT: CPT | Performed by: STUDENT IN AN ORGANIZED HEALTH CARE EDUCATION/TRAINING PROGRAM

## 2022-01-01 PROCEDURE — 84157 ASSAY OF PROTEIN OTHER: CPT | Performed by: RADIOLOGY

## 2022-01-01 PROCEDURE — 71260 CT CHEST ABDOMEN PELVIS WITH CONTRAST (XPD): ICD-10-PCS | Mod: 26,,, | Performed by: RADIOLOGY

## 2022-01-01 PROCEDURE — 3288F FALL RISK ASSESSMENT DOCD: CPT | Mod: HCNC,CPTII,S$GLB, | Performed by: PHYSICIAN ASSISTANT

## 2022-01-01 PROCEDURE — 85060 PATHOLOGIST REVIEW: ICD-10-PCS | Mod: HCNC,,, | Performed by: PATHOLOGY

## 2022-01-01 PROCEDURE — 99203 PR OFFICE/OUTPT VISIT, NEW, LEVL III, 30-44 MIN: ICD-10-PCS | Mod: 95,,, | Performed by: FAMILY MEDICINE

## 2022-01-01 PROCEDURE — 71046 X-RAY EXAM CHEST 2 VIEWS: CPT | Mod: TC,HCNC,FY

## 2022-01-01 PROCEDURE — 87206 SMEAR FLUORESCENT/ACID STAI: CPT | Performed by: STUDENT IN AN ORGANIZED HEALTH CARE EDUCATION/TRAINING PROGRAM

## 2022-01-01 PROCEDURE — 87389 HIV-1 AG W/HIV-1&-2 AB AG IA: CPT | Performed by: EMERGENCY MEDICINE

## 2022-01-01 PROCEDURE — 99999 PR PBB SHADOW E&M-EST. PATIENT-LVL I: CPT | Mod: PBBFAC,,, | Performed by: NUTRITIONIST

## 2022-01-01 PROCEDURE — 99223 1ST HOSP IP/OBS HIGH 75: CPT | Mod: AI,GC,, | Performed by: INTERNAL MEDICINE

## 2022-01-01 PROCEDURE — 85007 BL SMEAR W/DIFF WBC COUNT: CPT | Performed by: INTERNAL MEDICINE

## 2022-01-01 RX ORDER — PREDNISONE 20 MG/1
40 TABLET ORAL DAILY
Qty: 10 TABLET | Refills: 0 | Status: CANCELLED | OUTPATIENT
Start: 2022-01-01 | End: 2022-01-01

## 2022-01-01 RX ORDER — IBUPROFEN 200 MG
24 TABLET ORAL
Status: CANCELLED | OUTPATIENT
Start: 2022-01-01

## 2022-01-01 RX ORDER — OXYCODONE HYDROCHLORIDE 5 MG/1
5 TABLET ORAL EVERY 6 HOURS PRN
Qty: 25 TABLET | Refills: 0
Start: 2022-01-01 | End: 2022-01-01

## 2022-01-01 RX ORDER — ACETAMINOPHEN 325 MG/1
650 TABLET ORAL EVERY 4 HOURS PRN
Qty: 30 TABLET | Refills: 3 | Status: SHIPPED | OUTPATIENT
Start: 2022-01-01 | End: 2022-01-01 | Stop reason: SDUPTHER

## 2022-01-01 RX ORDER — TALC
6 POWDER (GRAM) TOPICAL NIGHTLY PRN
Status: CANCELLED | OUTPATIENT
Start: 2022-01-01

## 2022-01-01 RX ORDER — MORPHINE SULFATE 15 MG/1
15 TABLET ORAL EVERY 4 HOURS PRN
Qty: 30 TABLET | Refills: 0 | Status: CANCELLED | OUTPATIENT
Start: 2022-01-01

## 2022-01-01 RX ORDER — NALOXONE HCL 0.4 MG/ML
0.02 VIAL (ML) INJECTION
Status: DISCONTINUED | OUTPATIENT
Start: 2022-01-01 | End: 2022-01-01 | Stop reason: SDUPTHER

## 2022-01-01 RX ORDER — HYDROCODONE BITARTRATE AND ACETAMINOPHEN 7.5; 325 MG/1; MG/1
1 TABLET ORAL EVERY 6 HOURS PRN
Qty: 28 TABLET | Refills: 0 | Status: SHIPPED | OUTPATIENT
Start: 2022-01-01 | End: 2022-01-01

## 2022-01-01 RX ORDER — MORPHINE SULFATE 15 MG/1
15 TABLET ORAL EVERY 4 HOURS PRN
Qty: 30 TABLET | Refills: 0 | Status: SHIPPED | OUTPATIENT
Start: 2022-01-01 | End: 2022-01-01

## 2022-01-01 RX ORDER — CYPROHEPTADINE HYDROCHLORIDE 4 MG/1
4 TABLET ORAL 2 TIMES DAILY WITH MEALS
Qty: 60 TABLET | Refills: 0 | Status: SHIPPED | OUTPATIENT
Start: 2022-01-01 | End: 2022-01-01

## 2022-01-01 RX ORDER — ENOXAPARIN SODIUM 100 MG/ML
40 INJECTION SUBCUTANEOUS EVERY 24 HOURS
Status: DISCONTINUED | OUTPATIENT
Start: 2022-01-01 | End: 2022-01-01 | Stop reason: HOSPADM

## 2022-01-01 RX ORDER — MORPHINE SULFATE 15 MG/1
15 TABLET ORAL EVERY 4 HOURS PRN
Qty: 30 TABLET | Refills: 0 | Status: SHIPPED | OUTPATIENT
Start: 2022-01-01 | End: 2022-01-01 | Stop reason: SDUPTHER

## 2022-01-01 RX ORDER — LOSARTAN POTASSIUM 25 MG/1
25 TABLET ORAL DAILY
Qty: 90 TABLET | Refills: 3 | Status: SHIPPED | OUTPATIENT
Start: 2022-01-01 | End: 2023-02-07

## 2022-01-01 RX ORDER — ATORVASTATIN CALCIUM 20 MG/1
20 TABLET, FILM COATED ORAL DAILY
Status: DISCONTINUED | OUTPATIENT
Start: 2022-01-01 | End: 2022-01-01 | Stop reason: HOSPADM

## 2022-01-01 RX ORDER — AMOXICILLIN 250 MG
1 CAPSULE ORAL DAILY
Status: DISCONTINUED | OUTPATIENT
Start: 2022-01-01 | End: 2022-01-01

## 2022-01-01 RX ORDER — MIRTAZAPINE 7.5 MG/1
7.5 TABLET, FILM COATED ORAL NIGHTLY
Qty: 30 TABLET | Refills: 11 | Status: SHIPPED | OUTPATIENT
Start: 2022-01-01 | End: 2023-02-06

## 2022-01-01 RX ORDER — FLASH GLUCOSE SENSOR
KIT MISCELLANEOUS
Qty: 1 KIT | Refills: 3 | Status: SHIPPED | OUTPATIENT
Start: 2022-01-01 | End: 2022-01-01 | Stop reason: SDUPTHER

## 2022-01-01 RX ORDER — MORPHINE SULFATE 2 MG/ML
2 INJECTION, SOLUTION INTRAMUSCULAR; INTRAVENOUS
Status: COMPLETED | OUTPATIENT
Start: 2022-01-01 | End: 2022-01-01

## 2022-01-01 RX ORDER — LACTULOSE 10 G/15ML
20 SOLUTION ORAL 2 TIMES DAILY
Status: DISCONTINUED | OUTPATIENT
Start: 2022-01-01 | End: 2022-01-01 | Stop reason: HOSPADM

## 2022-01-01 RX ORDER — ESCITALOPRAM OXALATE 10 MG/1
10 TABLET ORAL DAILY
Status: DISCONTINUED | OUTPATIENT
Start: 2022-01-01 | End: 2022-01-01 | Stop reason: HOSPADM

## 2022-01-01 RX ORDER — METHOCARBAMOL 500 MG/1
500 TABLET, FILM COATED ORAL 3 TIMES DAILY PRN
Qty: 30 TABLET | Refills: 0 | OUTPATIENT
Start: 2022-01-01

## 2022-01-01 RX ORDER — SODIUM CHLORIDE 0.9 % (FLUSH) 0.9 %
10 SYRINGE (ML) INJECTION EVERY 12 HOURS PRN
Status: DISCONTINUED | OUTPATIENT
Start: 2022-01-01 | End: 2022-01-01 | Stop reason: HOSPADM

## 2022-01-01 RX ORDER — MORPHINE SULFATE 4 MG/ML
4 INJECTION, SOLUTION INTRAMUSCULAR; INTRAVENOUS EVERY 4 HOURS PRN
Status: DISCONTINUED | OUTPATIENT
Start: 2022-01-01 | End: 2022-01-01

## 2022-01-01 RX ORDER — IBUPROFEN 200 MG
16 TABLET ORAL
Status: CANCELLED | OUTPATIENT
Start: 2022-01-01

## 2022-01-01 RX ORDER — MORPHINE SULFATE 15 MG/1
15 TABLET ORAL EVERY 4 HOURS PRN
Qty: 40 TABLET | Refills: 0 | Status: SHIPPED | OUTPATIENT
Start: 2022-01-01 | End: 2022-01-01

## 2022-01-01 RX ORDER — MORPHINE SULFATE 15 MG/1
15 TABLET ORAL EVERY 4 HOURS PRN
Qty: 30 TABLET | Refills: 0 | Status: SHIPPED | OUTPATIENT
Start: 2022-01-01

## 2022-01-01 RX ORDER — FLASH GLUCOSE SCANNING READER
EACH MISCELLANEOUS
Qty: 1 EACH | Refills: 0 | Status: SHIPPED | OUTPATIENT
Start: 2022-01-01

## 2022-01-01 RX ORDER — ONDANSETRON 2 MG/ML
4 INJECTION INTRAMUSCULAR; INTRAVENOUS EVERY 8 HOURS PRN
Status: CANCELLED | OUTPATIENT
Start: 2022-01-01

## 2022-01-01 RX ORDER — INSULIN ASPART 100 [IU]/ML
2 INJECTION, SOLUTION INTRAVENOUS; SUBCUTANEOUS
Status: DISCONTINUED | OUTPATIENT
Start: 2022-01-01 | End: 2022-01-01

## 2022-01-01 RX ORDER — METHOCARBAMOL 500 MG/1
500 TABLET, FILM COATED ORAL 3 TIMES DAILY PRN
Qty: 30 TABLET | Refills: 0 | Status: SHIPPED | OUTPATIENT
Start: 2022-01-01

## 2022-01-01 RX ORDER — FUROSEMIDE 10 MG/ML
40 INJECTION INTRAMUSCULAR; INTRAVENOUS ONCE
Status: COMPLETED | OUTPATIENT
Start: 2022-01-01 | End: 2022-01-01

## 2022-01-01 RX ORDER — AMLODIPINE BESYLATE 10 MG/1
10 TABLET ORAL DAILY
Status: DISCONTINUED | OUTPATIENT
Start: 2022-01-01 | End: 2022-01-01 | Stop reason: HOSPADM

## 2022-01-01 RX ORDER — PREDNISONE 20 MG/1
40 TABLET ORAL DAILY
Qty: 10 TABLET | Refills: 0 | Status: SHIPPED | OUTPATIENT
Start: 2022-01-01 | End: 2022-01-01

## 2022-01-01 RX ORDER — AMLODIPINE BESYLATE 10 MG/1
10 TABLET ORAL
Status: COMPLETED | OUTPATIENT
Start: 2022-01-01 | End: 2022-01-01

## 2022-01-01 RX ORDER — MIDAZOLAM HYDROCHLORIDE 1 MG/ML
INJECTION INTRAMUSCULAR; INTRAVENOUS
Status: DISCONTINUED | OUTPATIENT
Start: 2022-01-01 | End: 2022-01-01 | Stop reason: HOSPADM

## 2022-01-01 RX ORDER — IBUPROFEN 200 MG
24 TABLET ORAL
Status: DISCONTINUED | OUTPATIENT
Start: 2022-01-01 | End: 2022-01-01 | Stop reason: HOSPADM

## 2022-01-01 RX ORDER — ONDANSETRON 4 MG/1
4 TABLET, ORALLY DISINTEGRATING ORAL EVERY 6 HOURS PRN
Qty: 60 TABLET | Refills: 0 | Status: SHIPPED | OUTPATIENT
Start: 2022-01-01

## 2022-01-01 RX ORDER — HYOSCYAMINE SULFATE 0.12 MG/1
0.12 TABLET SUBLINGUAL
Status: COMPLETED | OUTPATIENT
Start: 2022-01-01 | End: 2022-01-01

## 2022-01-01 RX ORDER — ACETAMINOPHEN 325 MG/1
650 TABLET ORAL EVERY 4 HOURS PRN
Status: DISCONTINUED | OUTPATIENT
Start: 2022-01-01 | End: 2022-01-01 | Stop reason: HOSPADM

## 2022-01-01 RX ORDER — PEN NEEDLE, DIABETIC 30 GX3/16"
1 NEEDLE, DISPOSABLE MISCELLANEOUS 2 TIMES DAILY WITH MEALS
Qty: 100 EACH | Refills: 11 | Status: SHIPPED | OUTPATIENT
Start: 2022-01-01 | End: 2022-01-01

## 2022-01-01 RX ORDER — INSULIN ASPART 100 [IU]/ML
0-5 INJECTION, SOLUTION INTRAVENOUS; SUBCUTANEOUS
Status: DISCONTINUED | OUTPATIENT
Start: 2022-01-01 | End: 2022-01-01

## 2022-01-01 RX ORDER — IBUPROFEN 200 MG
24 TABLET ORAL
Status: DISCONTINUED | OUTPATIENT
Start: 2022-01-01 | End: 2022-01-01

## 2022-01-01 RX ORDER — METHOCARBAMOL 500 MG/1
500 TABLET, FILM COATED ORAL 3 TIMES DAILY PRN
Qty: 30 TABLET | Refills: 0 | Status: SHIPPED | OUTPATIENT
Start: 2022-01-01 | End: 2022-01-01 | Stop reason: SDUPTHER

## 2022-01-01 RX ORDER — INSULIN DETEMIR 100 [IU]/ML
7 INJECTION, SOLUTION SUBCUTANEOUS NIGHTLY
Qty: 15 ML | Refills: 4 | Status: SHIPPED | OUTPATIENT
Start: 2022-01-01 | End: 2022-01-01

## 2022-01-01 RX ORDER — LOSARTAN POTASSIUM 25 MG/1
25 TABLET ORAL DAILY
Status: DISCONTINUED | OUTPATIENT
Start: 2022-01-01 | End: 2022-01-01 | Stop reason: HOSPADM

## 2022-01-01 RX ORDER — ONDANSETRON 4 MG/1
4 TABLET, ORALLY DISINTEGRATING ORAL ONCE
Qty: 1 TABLET | Refills: 0 | Status: SHIPPED | OUTPATIENT
Start: 2022-01-01 | End: 2022-01-01 | Stop reason: SDUPTHER

## 2022-01-01 RX ORDER — IBUPROFEN 200 MG
16 TABLET ORAL
Status: DISCONTINUED | OUTPATIENT
Start: 2022-01-01 | End: 2022-01-01 | Stop reason: HOSPADM

## 2022-01-01 RX ORDER — GLUCAGON 1 MG
1 KIT INJECTION
Status: DISCONTINUED | OUTPATIENT
Start: 2022-01-01 | End: 2022-01-01

## 2022-01-01 RX ORDER — HYDRALAZINE HYDROCHLORIDE 25 MG/1
25 TABLET, FILM COATED ORAL EVERY 8 HOURS PRN
Status: DISCONTINUED | OUTPATIENT
Start: 2022-01-01 | End: 2022-01-01 | Stop reason: HOSPADM

## 2022-01-01 RX ORDER — DEXTROSE 4 G
1 TABLET,CHEWABLE ORAL 2 TIMES DAILY WITH MEALS
Qty: 1 EACH | Refills: 0 | Status: SHIPPED | OUTPATIENT
Start: 2022-01-01 | End: 2023-02-06

## 2022-01-01 RX ORDER — GLUCAGON 1 MG
1 KIT INJECTION
Status: DISCONTINUED | OUTPATIENT
Start: 2022-01-01 | End: 2022-01-01 | Stop reason: HOSPADM

## 2022-01-01 RX ORDER — MORPHINE SULFATE 15 MG/1
15 TABLET ORAL EVERY 4 HOURS PRN
Status: DISCONTINUED | OUTPATIENT
Start: 2022-01-01 | End: 2022-01-01 | Stop reason: HOSPADM

## 2022-01-01 RX ORDER — PREDNISONE 20 MG/1
40 TABLET ORAL DAILY
Qty: 10 TABLET | Refills: 0 | Status: SHIPPED | OUTPATIENT
Start: 2022-01-01 | End: 2022-01-01 | Stop reason: SDUPTHER

## 2022-01-01 RX ORDER — INSULIN ASPART 100 [IU]/ML
0-10 INJECTION, SOLUTION INTRAVENOUS; SUBCUTANEOUS
Status: DISCONTINUED | OUTPATIENT
Start: 2022-01-01 | End: 2022-01-01 | Stop reason: HOSPADM

## 2022-01-01 RX ORDER — IBUPROFEN 600 MG/1
600 TABLET ORAL EVERY 8 HOURS PRN
Qty: 30 TABLET | Refills: 0 | Status: SHIPPED | OUTPATIENT
Start: 2022-01-01 | End: 2022-01-01 | Stop reason: ALTCHOICE

## 2022-01-01 RX ORDER — PREDNISONE 10 MG/1
10 TABLET ORAL 2 TIMES DAILY
Qty: 60 TABLET | Refills: 0 | Status: SHIPPED | OUTPATIENT
Start: 2022-01-01 | End: 2022-01-01

## 2022-01-01 RX ORDER — ALLOPURINOL 300 MG/1
300 TABLET ORAL DAILY
Status: DISCONTINUED | OUTPATIENT
Start: 2022-01-01 | End: 2022-01-01 | Stop reason: HOSPADM

## 2022-01-01 RX ORDER — NALOXONE HYDROCHLORIDE 4 MG/.1ML
SPRAY NASAL
Qty: 1 EACH | Refills: 11 | Status: SHIPPED | OUTPATIENT
Start: 2022-01-01 | End: 2022-01-01 | Stop reason: SDUPTHER

## 2022-01-01 RX ORDER — FLASH GLUCOSE SENSOR
KIT MISCELLANEOUS
Qty: 1 KIT | Refills: 3 | Status: SHIPPED | OUTPATIENT
Start: 2022-01-01

## 2022-01-01 RX ORDER — NALOXONE HCL 0.4 MG/ML
0.02 VIAL (ML) INJECTION
Status: DISCONTINUED | OUTPATIENT
Start: 2022-01-01 | End: 2022-01-01 | Stop reason: HOSPADM

## 2022-01-01 RX ORDER — GLUCOSAM/CHON-MSM1/C/MANG/BOSW 500-416.6
TABLET ORAL
COMMUNITY
Start: 2021-01-01

## 2022-01-01 RX ORDER — DICLOFENAC SODIUM 10 MG/G
2 GEL TOPICAL DAILY PRN
Qty: 50 G | Refills: 1 | Status: SHIPPED | OUTPATIENT
Start: 2022-01-01

## 2022-01-01 RX ORDER — MORPHINE SULFATE 4 MG/ML
4 INJECTION, SOLUTION INTRAMUSCULAR; INTRAVENOUS EVERY 4 HOURS PRN
Status: CANCELLED | OUTPATIENT
Start: 2022-01-01

## 2022-01-01 RX ORDER — SODIUM CHLORIDE 0.9 % (FLUSH) 0.9 %
10 SYRINGE (ML) INJECTION
Status: DISCONTINUED | OUTPATIENT
Start: 2022-01-01 | End: 2022-01-01 | Stop reason: HOSPADM

## 2022-01-01 RX ORDER — PREDNISONE 20 MG/1
40 TABLET ORAL DAILY
Qty: 10 TABLET | Refills: 0 | Status: ON HOLD | OUTPATIENT
Start: 2022-01-01 | End: 2022-01-01 | Stop reason: HOSPADM

## 2022-01-01 RX ORDER — AZITHROMYCIN 250 MG/1
250 TABLET, FILM COATED ORAL DAILY
Status: DISCONTINUED | OUTPATIENT
Start: 2022-01-01 | End: 2022-01-01 | Stop reason: HOSPADM

## 2022-01-01 RX ORDER — HEPARIN SODIUM 5000 [USP'U]/ML
5000 INJECTION, SOLUTION INTRAVENOUS; SUBCUTANEOUS EVERY 8 HOURS
Status: CANCELLED | OUTPATIENT
Start: 2022-01-01

## 2022-01-01 RX ORDER — GLUCAGON 1 MG
1 KIT INJECTION
Status: CANCELLED | OUTPATIENT
Start: 2022-01-01

## 2022-01-01 RX ORDER — MORPHINE SULFATE 15 MG/1
15 TABLET ORAL EVERY 4 HOURS PRN
Qty: 40 TABLET | Refills: 0
Start: 2022-01-01 | End: 2022-01-01

## 2022-01-01 RX ORDER — AMOXICILLIN 250 MG
1 CAPSULE ORAL 2 TIMES DAILY
Status: DISCONTINUED | OUTPATIENT
Start: 2022-01-01 | End: 2022-01-01 | Stop reason: HOSPADM

## 2022-01-01 RX ORDER — NALOXONE HCL 0.4 MG/ML
0.02 VIAL (ML) INJECTION
Status: CANCELLED | OUTPATIENT
Start: 2022-01-01

## 2022-01-01 RX ORDER — ACETAMINOPHEN 325 MG/1
650 TABLET ORAL EVERY 4 HOURS PRN
Qty: 30 TABLET | Refills: 3 | Status: SHIPPED | OUTPATIENT
Start: 2022-01-01

## 2022-01-01 RX ORDER — HYDROCODONE BITARTRATE AND ACETAMINOPHEN 10; 325 MG/1; MG/1
1 TABLET ORAL EVERY 6 HOURS PRN
COMMUNITY
Start: 2022-01-01 | End: 2022-01-01 | Stop reason: SDUPTHER

## 2022-01-01 RX ORDER — MAGNESIUM SULFATE HEPTAHYDRATE 40 MG/ML
2 INJECTION, SOLUTION INTRAVENOUS
Status: COMPLETED | OUTPATIENT
Start: 2022-01-01 | End: 2022-01-01

## 2022-01-01 RX ORDER — HEPARIN 100 UNIT/ML
500 SYRINGE INTRAVENOUS
Status: DISCONTINUED | OUTPATIENT
Start: 2022-01-01 | End: 2022-01-01 | Stop reason: HOSPADM

## 2022-01-01 RX ORDER — LIDOCAINE 50 MG/G
1 PATCH TOPICAL
Status: ON HOLD | COMMUNITY
Start: 2022-01-01 | End: 2022-01-01 | Stop reason: SDUPTHER

## 2022-01-01 RX ORDER — INSULIN ASPART 100 [IU]/ML
3 INJECTION, SOLUTION INTRAVENOUS; SUBCUTANEOUS
Status: DISCONTINUED | OUTPATIENT
Start: 2022-01-01 | End: 2022-01-01 | Stop reason: HOSPADM

## 2022-01-01 RX ORDER — POLYETHYLENE GLYCOL 3350 17 G/17G
17 POWDER, FOR SOLUTION ORAL DAILY
Status: DISCONTINUED | OUTPATIENT
Start: 2022-01-01 | End: 2022-01-01 | Stop reason: HOSPADM

## 2022-01-01 RX ORDER — KETOROLAC TROMETHAMINE 30 MG/ML
15 INJECTION, SOLUTION INTRAMUSCULAR; INTRAVENOUS EVERY 6 HOURS PRN
Status: DISCONTINUED | OUTPATIENT
Start: 2022-01-01 | End: 2022-01-01

## 2022-01-01 RX ORDER — SODIUM CHLORIDE 0.9 % (FLUSH) 0.9 %
10 SYRINGE (ML) INJECTION EVERY 12 HOURS PRN
Status: CANCELLED | OUTPATIENT
Start: 2022-01-01

## 2022-01-01 RX ORDER — DICLOFENAC SODIUM 10 MG/G
2 GEL TOPICAL DAILY PRN
Qty: 50 G | Refills: 1 | OUTPATIENT
Start: 2022-01-01

## 2022-01-01 RX ORDER — ACETAMINOPHEN 325 MG/1
650 TABLET ORAL EVERY 4 HOURS PRN
Status: CANCELLED | OUTPATIENT
Start: 2022-01-01

## 2022-01-01 RX ORDER — PREDNISONE 10 MG/1
10 TABLET ORAL 2 TIMES DAILY
Status: DISCONTINUED | OUTPATIENT
Start: 2022-01-01 | End: 2022-01-01 | Stop reason: HOSPADM

## 2022-01-01 RX ORDER — MIRTAZAPINE 7.5 MG/1
7.5 TABLET, FILM COATED ORAL NIGHTLY
Status: DISCONTINUED | OUTPATIENT
Start: 2022-01-01 | End: 2022-01-01 | Stop reason: HOSPADM

## 2022-01-01 RX ORDER — CEPHALEXIN 500 MG/1
500 CAPSULE ORAL EVERY 6 HOURS
Qty: 20 CAPSULE | Refills: 0 | Status: SHIPPED | OUTPATIENT
Start: 2022-01-01 | End: 2022-01-01

## 2022-01-01 RX ORDER — CHOLECALCIFEROL (VITAMIN D3) 25 MCG
1000 TABLET ORAL DAILY
Status: DISCONTINUED | OUTPATIENT
Start: 2022-01-01 | End: 2022-01-01 | Stop reason: HOSPADM

## 2022-01-01 RX ORDER — GLUCAGON 3 MG/1
POWDER NASAL
Qty: 1 EACH | Refills: 3 | Status: SHIPPED | OUTPATIENT
Start: 2022-01-01

## 2022-01-01 RX ORDER — AZITHROMYCIN 250 MG/1
500 TABLET, FILM COATED ORAL DAILY
Status: DISCONTINUED | OUTPATIENT
Start: 2022-01-01 | End: 2022-01-01

## 2022-01-01 RX ORDER — METHOCARBAMOL 500 MG/1
500 TABLET, FILM COATED ORAL 3 TIMES DAILY PRN
Status: DISCONTINUED | OUTPATIENT
Start: 2022-01-01 | End: 2022-01-01 | Stop reason: HOSPADM

## 2022-01-01 RX ORDER — ESCITALOPRAM OXALATE 10 MG/1
10 TABLET ORAL DAILY
Status: DISCONTINUED | OUTPATIENT
Start: 2022-01-01 | End: 2022-01-01

## 2022-01-01 RX ORDER — LIDOCAINE HYDROCHLORIDE 10 MG/ML
INJECTION INFILTRATION; PERINEURAL CODE/TRAUMA/SEDATION MEDICATION
Status: COMPLETED | OUTPATIENT
Start: 2022-01-01 | End: 2022-01-01

## 2022-01-01 RX ORDER — AMOXICILLIN AND CLAVULANATE POTASSIUM 875; 125 MG/1; MG/1
1 TABLET, FILM COATED ORAL 2 TIMES DAILY
Qty: 14 TABLET | Refills: 0 | Status: SHIPPED | OUTPATIENT
Start: 2022-01-01 | End: 2022-01-01

## 2022-01-01 RX ORDER — MORPHINE SULFATE 2 MG/ML
2 INJECTION, SOLUTION INTRAMUSCULAR; INTRAVENOUS EVERY 4 HOURS PRN
Status: CANCELLED | OUTPATIENT
Start: 2022-01-01

## 2022-01-01 RX ORDER — ASPIRIN 81 MG/1
81 TABLET ORAL DAILY
Status: DISCONTINUED | OUTPATIENT
Start: 2022-01-01 | End: 2022-01-01 | Stop reason: HOSPADM

## 2022-01-01 RX ORDER — CALCITONIN SALMON 200 [USP'U]/ML
4 INJECTION, SOLUTION INTRAMUSCULAR; SUBCUTANEOUS EVERY 12 HOURS
Status: DISCONTINUED | OUTPATIENT
Start: 2022-01-01 | End: 2022-01-01 | Stop reason: HOSPADM

## 2022-01-01 RX ORDER — MORPHINE SULFATE 2 MG/ML
2 INJECTION, SOLUTION INTRAMUSCULAR; INTRAVENOUS EVERY 4 HOURS PRN
Status: DISCONTINUED | OUTPATIENT
Start: 2022-01-01 | End: 2022-01-01

## 2022-01-01 RX ORDER — IBUPROFEN 200 MG
16 TABLET ORAL
Status: DISCONTINUED | OUTPATIENT
Start: 2022-01-01 | End: 2022-01-01

## 2022-01-01 RX ORDER — POLYETHYLENE GLYCOL 3350 17 G/17G
17 POWDER, FOR SOLUTION ORAL DAILY
Status: CANCELLED | OUTPATIENT
Start: 2022-01-01

## 2022-01-01 RX ORDER — INSULIN DETEMIR 100 [IU]/ML
8 INJECTION, SOLUTION SUBCUTANEOUS NIGHTLY
Qty: 7.2 ML | Refills: 0 | Status: SHIPPED | OUTPATIENT
Start: 2022-01-01 | End: 2022-01-01

## 2022-01-01 RX ORDER — ACETAMINOPHEN 500 MG
1000 TABLET ORAL
Status: COMPLETED | OUTPATIENT
Start: 2022-01-01 | End: 2022-01-01

## 2022-01-01 RX ORDER — PROCHLORPERAZINE MALEATE 10 MG
10 TABLET ORAL EVERY 6 HOURS PRN
Qty: 30 TABLET | Refills: 1 | Status: SHIPPED | OUTPATIENT
Start: 2022-01-01 | End: 2022-01-01

## 2022-01-01 RX ORDER — SODIUM,POTASSIUM PHOSPHATES 280-250MG
2 POWDER IN PACKET (EA) ORAL EVERY 4 HOURS
Status: COMPLETED | OUTPATIENT
Start: 2022-01-01 | End: 2022-01-01

## 2022-01-01 RX ORDER — AZITHROMYCIN 250 MG/1
500 TABLET, FILM COATED ORAL ONCE
Status: COMPLETED | OUTPATIENT
Start: 2022-01-01 | End: 2022-01-01

## 2022-01-01 RX ORDER — HYDROCODONE BITARTRATE AND ACETAMINOPHEN 10; 325 MG/1; MG/1
1 TABLET ORAL EVERY 6 HOURS PRN
Qty: 28 TABLET | Refills: 0 | Status: SHIPPED | OUTPATIENT
Start: 2022-01-01 | End: 2022-01-01 | Stop reason: SDUPTHER

## 2022-01-01 RX ORDER — HYDROCODONE BITARTRATE AND ACETAMINOPHEN 5; 325 MG/1; MG/1
1 TABLET ORAL EVERY 8 HOURS PRN
Qty: 21 TABLET | Refills: 0 | Status: SHIPPED | OUTPATIENT
Start: 2022-01-01 | End: 2022-01-01 | Stop reason: ALTCHOICE

## 2022-01-01 RX ORDER — SODIUM CHLORIDE, SODIUM LACTATE, POTASSIUM CHLORIDE, CALCIUM CHLORIDE 600; 310; 30; 20 MG/100ML; MG/100ML; MG/100ML; MG/100ML
INJECTION, SOLUTION INTRAVENOUS CONTINUOUS
Status: ACTIVE | OUTPATIENT
Start: 2022-01-01 | End: 2022-01-01

## 2022-01-01 RX ORDER — PEN NEEDLE, DIABETIC 30 GX3/16"
NEEDLE, DISPOSABLE MISCELLANEOUS
Qty: 150 EACH | Refills: 4 | Status: SHIPPED | OUTPATIENT
Start: 2022-01-01

## 2022-01-01 RX ORDER — HYDROCODONE BITARTRATE AND ACETAMINOPHEN 10; 325 MG/1; MG/1
1 TABLET ORAL EVERY 6 HOURS PRN
Qty: 28 TABLET | Refills: 0 | Status: ON HOLD | OUTPATIENT
Start: 2022-01-01 | End: 2022-01-01 | Stop reason: HOSPADM

## 2022-01-01 RX ORDER — FENTANYL CITRATE 50 UG/ML
INJECTION, SOLUTION INTRAMUSCULAR; INTRAVENOUS
Status: DISCONTINUED | OUTPATIENT
Start: 2022-01-01 | End: 2022-01-01 | Stop reason: HOSPADM

## 2022-01-01 RX ORDER — NALOXONE HCL 0.4 MG/ML
0.2 VIAL (ML) INJECTION
Refills: 11 | Status: DISCONTINUED | OUTPATIENT
Start: 2022-01-01 | End: 2022-01-01 | Stop reason: HOSPADM

## 2022-01-01 RX ORDER — LIDOCAINE 50 MG/G
1 PATCH TOPICAL NIGHTLY
Status: DISCONTINUED | OUTPATIENT
Start: 2022-01-01 | End: 2022-01-01 | Stop reason: HOSPADM

## 2022-01-01 RX ORDER — POLYETHYLENE GLYCOL 3350 17 G/17G
17 POWDER, FOR SOLUTION ORAL 2 TIMES DAILY
Status: DISCONTINUED | OUTPATIENT
Start: 2022-01-01 | End: 2022-01-01 | Stop reason: HOSPADM

## 2022-01-01 RX ORDER — LIDOCAINE 50 MG/G
1 PATCH TOPICAL DAILY
Qty: 30 PATCH | Refills: 1 | Status: SHIPPED | OUTPATIENT
Start: 2022-01-01 | End: 2022-01-01

## 2022-01-01 RX ORDER — HEPARIN SODIUM 5000 [USP'U]/ML
5000 INJECTION, SOLUTION INTRAVENOUS; SUBCUTANEOUS EVERY 8 HOURS
Status: DISCONTINUED | OUTPATIENT
Start: 2022-01-01 | End: 2022-01-01

## 2022-01-01 RX ORDER — ONDANSETRON 2 MG/ML
4 INJECTION INTRAMUSCULAR; INTRAVENOUS EVERY 8 HOURS PRN
Status: DISCONTINUED | OUTPATIENT
Start: 2022-01-01 | End: 2022-01-01 | Stop reason: HOSPADM

## 2022-01-01 RX ORDER — OXYCODONE HYDROCHLORIDE 5 MG/1
5 TABLET ORAL EVERY 6 HOURS PRN
Qty: 5 TABLET | Refills: 0 | Status: SHIPPED | OUTPATIENT
Start: 2022-01-01 | End: 2022-01-01

## 2022-01-01 RX ORDER — OXYCODONE HYDROCHLORIDE 5 MG/1
5 TABLET ORAL EVERY 6 HOURS PRN
Status: DISCONTINUED | OUTPATIENT
Start: 2022-01-01 | End: 2022-01-01 | Stop reason: HOSPADM

## 2022-01-01 RX ORDER — MORPHINE SULFATE 15 MG/1
15 TABLET ORAL EVERY 4 HOURS PRN
Qty: 30 TABLET | Refills: 0 | OUTPATIENT
Start: 2022-01-01

## 2022-01-01 RX ORDER — LOSARTAN POTASSIUM 25 MG/1
12.5 TABLET ORAL DAILY
Qty: 45 TABLET | Refills: 3 | Status: SHIPPED | OUTPATIENT
Start: 2022-01-01 | End: 2022-01-01 | Stop reason: HOSPADM

## 2022-01-01 RX ADMIN — CEFTRIAXONE 2 G: 2 INJECTION, SOLUTION INTRAVENOUS at 04:05

## 2022-01-01 RX ADMIN — VANCOMYCIN HYDROCHLORIDE 1250 MG: 1.25 INJECTION, POWDER, LYOPHILIZED, FOR SOLUTION INTRAVENOUS at 06:05

## 2022-01-01 RX ADMIN — POLYETHYLENE GLYCOL 3350 17 G: 17 POWDER, FOR SOLUTION ORAL at 09:05

## 2022-01-01 RX ADMIN — LIDOCAINE 5% 1 PATCH: 700 PATCH TOPICAL at 09:02

## 2022-01-01 RX ADMIN — POLYETHYLENE GLYCOL 3350 17 G: 17 POWDER, FOR SOLUTION ORAL at 07:02

## 2022-01-01 RX ADMIN — ZOLEDRONIC ACID 4 MG: 0.04 INJECTION, SOLUTION INTRAVENOUS at 09:05

## 2022-01-01 RX ADMIN — ATORVASTATIN CALCIUM 20 MG: 20 TABLET, FILM COATED ORAL at 07:02

## 2022-01-01 RX ADMIN — ESCITALOPRAM OXALATE 10 MG: 10 TABLET ORAL at 07:02

## 2022-01-01 RX ADMIN — ENOXAPARIN SODIUM 40 MG: 100 INJECTION SUBCUTANEOUS at 10:02

## 2022-01-01 RX ADMIN — POTASSIUM & SODIUM PHOSPHATES POWDER PACK 280-160-250 MG 2 PACKET: 280-160-250 PACK at 11:02

## 2022-01-01 RX ADMIN — POTASSIUM & SODIUM PHOSPHATES POWDER PACK 280-160-250 MG 2 PACKET: 280-160-250 PACK at 06:02

## 2022-01-01 RX ADMIN — MORPHINE SULFATE 2 MG: 2 INJECTION, SOLUTION INTRAMUSCULAR; INTRAVENOUS at 11:02

## 2022-01-01 RX ADMIN — SODIUM CHLORIDE 500 ML: 0.9 INJECTION, SOLUTION INTRAVENOUS at 07:02

## 2022-01-01 RX ADMIN — PIPERACILLIN SODIUM AND TAZOBACTAM SODIUM 4.5 G: 4; .5 INJECTION, POWDER, LYOPHILIZED, FOR SOLUTION INTRAVENOUS at 07:05

## 2022-01-01 RX ADMIN — ASPIRIN 81 MG: 81 TABLET, COATED ORAL at 09:02

## 2022-01-01 RX ADMIN — MAGNESIUM SULFATE IN WATER 2 G: 40 INJECTION, SOLUTION INTRAVENOUS at 09:02

## 2022-01-01 RX ADMIN — INSULIN ASPART 3 UNITS: 100 INJECTION, SOLUTION INTRAVENOUS; SUBCUTANEOUS at 01:02

## 2022-01-01 RX ADMIN — Medication 1000 UNITS: at 08:02

## 2022-01-01 RX ADMIN — HYPROMELLOSE 2910 2 DROP: 5 SOLUTION OPHTHALMIC at 01:02

## 2022-01-01 RX ADMIN — LOSARTAN POTASSIUM 25 MG: 25 TABLET, FILM COATED ORAL at 08:02

## 2022-01-01 RX ADMIN — ESCITALOPRAM OXALATE 10 MG: 10 TABLET ORAL at 03:05

## 2022-01-01 RX ADMIN — ATORVASTATIN CALCIUM 20 MG: 20 TABLET, FILM COATED ORAL at 08:02

## 2022-01-01 RX ADMIN — IOHEXOL 75 ML: 350 INJECTION, SOLUTION INTRAVENOUS at 04:02

## 2022-01-01 RX ADMIN — CALCITONIN SALMON 228 UNITS: 200 INJECTION, SOLUTION INTRAMUSCULAR; SUBCUTANEOUS at 09:05

## 2022-01-01 RX ADMIN — LACTULOSE 20 G: 20 SOLUTION ORAL at 09:05

## 2022-01-01 RX ADMIN — ENOXAPARIN SODIUM 40 MG: 100 INJECTION SUBCUTANEOUS at 04:02

## 2022-01-01 RX ADMIN — AMLODIPINE BESYLATE 10 MG: 10 TABLET ORAL at 07:02

## 2022-01-01 RX ADMIN — MIRTAZAPINE 7.5 MG: 7.5 TABLET, FILM COATED ORAL at 09:02

## 2022-01-01 RX ADMIN — PREDNISONE 10 MG: 10 TABLET ORAL at 10:02

## 2022-01-01 RX ADMIN — IOHEXOL 75 ML: 350 INJECTION, SOLUTION INTRAVENOUS at 04:04

## 2022-01-01 RX ADMIN — ESCITALOPRAM OXALATE 10 MG: 10 TABLET ORAL at 09:05

## 2022-01-01 RX ADMIN — METHOCARBAMOL 500 MG: 500 TABLET ORAL at 01:02

## 2022-01-01 RX ADMIN — ALLOPURINOL 300 MG: 300 TABLET ORAL at 09:05

## 2022-01-01 RX ADMIN — SODIUM CHLORIDE 500 ML: 0.9 INJECTION, SOLUTION INTRAVENOUS at 03:02

## 2022-01-01 RX ADMIN — HYPROMELLOSE 2910 2 DROP: 5 SOLUTION OPHTHALMIC at 08:02

## 2022-01-01 RX ADMIN — MORPHINE SULFATE 2 MG: 2 INJECTION, SOLUTION INTRAMUSCULAR; INTRAVENOUS at 03:02

## 2022-01-01 RX ADMIN — POTASSIUM & SODIUM PHOSPHATES POWDER PACK 280-160-250 MG 2 PACKET: 280-160-250 PACK at 01:02

## 2022-01-01 RX ADMIN — SODIUM CHLORIDE, SODIUM LACTATE, POTASSIUM CHLORIDE, AND CALCIUM CHLORIDE 1000 ML: .6; .31; .03; .02 INJECTION, SOLUTION INTRAVENOUS at 10:03

## 2022-01-01 RX ADMIN — INSULIN ASPART 3 UNITS: 100 INJECTION, SOLUTION INTRAVENOUS; SUBCUTANEOUS at 04:02

## 2022-01-01 RX ADMIN — MAGNESIUM SULFATE IN WATER 2 G: 40 INJECTION, SOLUTION INTRAVENOUS at 11:02

## 2022-01-01 RX ADMIN — ASPIRIN 81 MG: 81 TABLET, COATED ORAL at 09:05

## 2022-01-01 RX ADMIN — SENNOSIDES AND DOCUSATE SODIUM 1 TABLET: 50; 8.6 TABLET ORAL at 09:05

## 2022-01-01 RX ADMIN — IOHEXOL 100 ML: 350 INJECTION, SOLUTION INTRAVENOUS at 05:02

## 2022-01-01 RX ADMIN — LIDOCAINE 5% 1 PATCH: 700 PATCH TOPICAL at 10:02

## 2022-01-01 RX ADMIN — AMLODIPINE BESYLATE 10 MG: 10 TABLET ORAL at 08:02

## 2022-01-01 RX ADMIN — LOSARTAN POTASSIUM 12.5 MG: 25 TABLET, FILM COATED ORAL at 11:02

## 2022-01-01 RX ADMIN — MORPHINE SULFATE 15 MG: 15 TABLET ORAL at 05:05

## 2022-01-01 RX ADMIN — METHOCARBAMOL 500 MG: 500 TABLET ORAL at 06:02

## 2022-01-01 RX ADMIN — LOSARTAN POTASSIUM 12.5 MG: 25 TABLET, FILM COATED ORAL at 06:02

## 2022-01-01 RX ADMIN — AZITHROMYCIN DIHYDRATE 500 MG: 250 TABLET, FILM COATED ORAL at 12:05

## 2022-01-01 RX ADMIN — Medication 1000 UNITS: at 07:02

## 2022-01-01 RX ADMIN — PREDNISONE 10 MG: 10 TABLET ORAL at 09:02

## 2022-01-01 RX ADMIN — MIRTAZAPINE 7.5 MG: 7.5 TABLET, FILM COATED ORAL at 11:05

## 2022-01-01 RX ADMIN — ATORVASTATIN CALCIUM 20 MG: 20 TABLET, FILM COATED ORAL at 09:05

## 2022-01-01 RX ADMIN — SODIUM CHLORIDE 500 ML: 0.9 INJECTION, SOLUTION INTRAVENOUS at 05:02

## 2022-01-01 RX ADMIN — METHOCARBAMOL 500 MG: 500 TABLET ORAL at 07:02

## 2022-01-01 RX ADMIN — PREDNISONE 10 MG: 10 TABLET ORAL at 07:02

## 2022-01-01 RX ADMIN — LIDOCAINE HYDROCHLORIDE 10 ML: 10 INJECTION, SOLUTION INFILTRATION; PERINEURAL at 03:05

## 2022-01-01 RX ADMIN — POLYETHYLENE GLYCOL 3350 17 G: 17 POWDER, FOR SOLUTION ORAL at 12:05

## 2022-01-01 RX ADMIN — POLYETHYLENE GLYCOL 3350 17 G: 17 POWDER, FOR SOLUTION ORAL at 08:02

## 2022-01-01 RX ADMIN — MORPHINE SULFATE 15 MG: 15 TABLET ORAL at 06:02

## 2022-01-01 RX ADMIN — INSULIN ASPART 3 UNITS: 100 INJECTION, SOLUTION INTRAVENOUS; SUBCUTANEOUS at 11:02

## 2022-01-01 RX ADMIN — ACETAMINOPHEN 1000 MG: 500 TABLET ORAL at 07:05

## 2022-01-01 RX ADMIN — ENOXAPARIN SODIUM 40 MG: 100 INJECTION SUBCUTANEOUS at 05:05

## 2022-01-01 RX ADMIN — MORPHINE SULFATE 2 MG: 2 INJECTION, SOLUTION INTRAMUSCULAR; INTRAVENOUS at 06:02

## 2022-01-01 RX ADMIN — INSULIN ASPART 3 UNITS: 100 INJECTION, SOLUTION INTRAVENOUS; SUBCUTANEOUS at 08:02

## 2022-01-01 RX ADMIN — POLYETHYLENE GLYCOL 3350 17 G: 17 POWDER, FOR SOLUTION ORAL at 08:05

## 2022-01-01 RX ADMIN — AZITHROMYCIN MONOHYDRATE 250 MG: 250 TABLET ORAL at 09:05

## 2022-01-01 RX ADMIN — INSULIN DETEMIR 6 UNITS: 100 INJECTION, SOLUTION SUBCUTANEOUS at 10:02

## 2022-01-01 RX ADMIN — ASPIRIN 81 MG: 81 TABLET, COATED ORAL at 07:02

## 2022-01-01 RX ADMIN — SODIUM CHLORIDE, SODIUM LACTATE, POTASSIUM CHLORIDE, AND CALCIUM CHLORIDE: .6; .31; .03; .02 INJECTION, SOLUTION INTRAVENOUS at 10:02

## 2022-01-01 RX ADMIN — MORPHINE SULFATE 4 MG: 4 INJECTION INTRAVENOUS at 01:02

## 2022-01-01 RX ADMIN — ESCITALOPRAM OXALATE 10 MG: 10 TABLET ORAL at 08:02

## 2022-01-01 RX ADMIN — HYDRALAZINE HYDROCHLORIDE 25 MG: 25 TABLET, FILM COATED ORAL at 10:02

## 2022-01-01 RX ADMIN — SODIUM CHLORIDE 1000 ML: 0.9 INJECTION, SOLUTION INTRAVENOUS at 01:03

## 2022-01-01 RX ADMIN — HYOSCYAMINE SULFATE 0.12 MG: 0.12 TABLET ORAL; SUBLINGUAL at 10:03

## 2022-01-01 RX ADMIN — FUROSEMIDE 40 MG: 10 INJECTION, SOLUTION INTRAMUSCULAR; INTRAVENOUS at 09:05

## 2022-01-01 RX ADMIN — ACETAMINOPHEN 650 MG: 325 TABLET ORAL at 10:02

## 2022-01-01 RX ADMIN — AMLODIPINE BESYLATE 10 MG: 10 TABLET ORAL at 07:05

## 2022-01-01 RX ADMIN — MIRTAZAPINE 7.5 MG: 7.5 TABLET, FILM COATED ORAL at 08:05

## 2022-01-01 RX ADMIN — POTASSIUM BICARBONATE 50 MEQ: 978 TABLET, EFFERVESCENT ORAL at 09:02

## 2022-01-01 RX ADMIN — INSULIN ASPART 2 UNITS: 100 INJECTION, SOLUTION INTRAVENOUS; SUBCUTANEOUS at 11:02

## 2022-01-01 RX ADMIN — PREDNISONE 10 MG: 10 TABLET ORAL at 08:02

## 2022-01-06 NOTE — TELEPHONE ENCOUNTER
Please contact patient and inform her that her CT scan did not show kidney stones. However, it did reveal enlarged lymph nodes. I will need to see her back in clinic to further evaluate. Please ask if she can come in to see me 1-10-22 at 11AM. Will need to be an override

## 2022-01-06 NOTE — TELEPHONE ENCOUNTER
Spoke to pt about CT Scan results. Pt will take the appt for 1/10. Pt understood results with verbal read back.

## 2022-01-09 NOTE — PROGRESS NOTES
Subjective:       Patient ID: Cary iMddleton is a 86 y.o. female.    Chief Complaint: Flank Pain    HPI  She complains of pain located in the left flank region.  Described as an ache.  4/10 in intensity.  No fever, chills, night sweats.    Medications:  See med list    Social history:  Does not smoke, does not drink alcohol      Review of Systems   Constitutional: Negative for chills, fatigue, fever and unexpected weight change.   Respiratory: Negative for chest tightness and shortness of breath.    Cardiovascular: Negative for chest pain and palpitations.   Gastrointestinal: Negative for abdominal pain and blood in stool.   Neurological: Negative for dizziness, syncope, numbness and headaches.       Objective:      Physical Exam  HENT:      Right Ear: External ear normal.      Left Ear: External ear normal.      Nose: Nose normal.      Mouth/Throat:      Mouth: Mucous membranes are moist.      Pharynx: Oropharynx is clear.   Eyes:      Pupils: Pupils are equal, round, and reactive to light.   Cardiovascular:      Rate and Rhythm: Normal rate and regular rhythm.      Heart sounds: No murmur heard.      Pulmonary:      Breath sounds: Normal breath sounds.   Chest:   Breasts:      Right: No axillary adenopathy.      Left: No axillary adenopathy.       Abdominal:      General: There is no distension.      Palpations: There is no hepatomegaly or splenomegaly.      Tenderness: There is no abdominal tenderness.   Musculoskeletal:      Cervical back: Normal range of motion.   Lymphadenopathy:      Cervical: No cervical adenopathy.      Upper Body:      Right upper body: No axillary adenopathy.      Left upper body: No axillary adenopathy.   Neurological:      Cranial Nerves: No cranial nerve deficit.      Sensory: No sensory deficit.      Motor: Motor function is intact.      Deep Tendon Reflexes: Reflexes are normal and symmetric.         Assessment/Plan       Assessment and plan:  Flank pain:  Schedule renal stone CT  scan.  Check CMP, CBC.  Urine sent for urinalysis and culture.  She was here for urgent care only appointment.  She will follow-up with her primary care physician for a physical

## 2022-01-11 NOTE — TELEPHONE ENCOUNTER
Contacted patient about her lab result. She denies fever, cough, dysuria. Will check repeat urine culture, CXR , blood culture. She states no one contacted her about Hem/onc appt. Will request again, but advised her to let me know if she is not contacted by them.

## 2022-01-12 NOTE — TELEPHONE ENCOUNTER
Called patient, she does not have a history of cancer. Message sent to PT to see if patient can be seen by them for lymphedema.Discussed with patient, she verbalized understanding.    ----- Message from Jerome Lynch sent at 1/12/2022  8:46 AM CST -----  Contact: self  Pt has a referral in system from Dr Radha David  pt need to speak with nurse to set up an appt      Please Call    Contact   [phone]   or [phone]

## 2022-01-15 NOTE — PROGRESS NOTES
Subjective:       Patient ID: Cary Middleton is a 86 y.o. female.    Chief Complaint: Follow-up    HPI  Discussed with patient her recent results  Review of Systems   Constitutional: Negative for chills, fatigue, fever and unexpected weight change.   Respiratory: Negative for chest tightness and shortness of breath.    Cardiovascular: Negative for chest pain and palpitations.   Gastrointestinal: Negative for abdominal pain and blood in stool.   Neurological: Negative for dizziness, syncope, numbness and headaches.       Objective:      Physical Exam  HENT:      Right Ear: External ear normal.      Left Ear: External ear normal.      Nose: Nose normal.      Mouth/Throat:      Mouth: Mucous membranes are moist.      Pharynx: Oropharynx is clear.   Eyes:      Pupils: Pupils are equal, round, and reactive to light.   Cardiovascular:      Rate and Rhythm: Normal rate and regular rhythm.      Heart sounds: No murmur heard.      Pulmonary:      Breath sounds: Normal breath sounds.   Chest:   Breasts:      Right: No axillary adenopathy.      Left: No axillary adenopathy.       Abdominal:      General: There is no distension.      Palpations: There is no hepatomegaly or splenomegaly.      Tenderness: There is no abdominal tenderness.   Musculoskeletal:      Cervical back: Normal range of motion.   Lymphadenopathy:      Cervical: No cervical adenopathy.      Upper Body:      Right upper body: No axillary adenopathy.      Left upper body: No axillary adenopathy.   Neurological:      Cranial Nerves: No cranial nerve deficit.      Sensory: No sensory deficit.      Motor: Motor function is intact.      Deep Tendon Reflexes: Reflexes are normal and symmetric.         Assessment/Plan       Assessment and plan:  Lymphadenopathy:  Schedule Hem/onc appointment.  Check BMP and WBC

## 2022-01-17 NOTE — TELEPHONE ENCOUNTER
Pt stated she has been sick over week. Suffering with pain down her back and side. Pt stated she took her last hydrocodone today. Pain is 8/10 now this am it was 10/10. Pt stated pain is severe. Pt stated she has abdominal pain because her bowels have not moved in 4 days. Care advice recommend pt go to Er. Pt verbalized understanding.   Reason for Disposition   [1] Abdominal pain AND [2] age > 60 years    Additional Information   Negative: Passed out (i.e., lost consciousness, collapsed and was not responding)   Negative: Shock suspected (e.g., cold/pale/clammy skin, too weak to stand, low BP, rapid pulse)   Negative: Sounds like a life-threatening emergency to the triager   Negative: [1] SEVERE back pain (e.g., excruciating) AND [2] sudden onset AND [3] age > 60 years   Negative: [1] Loss of bladder or bowel control (urine or bowel incontinence; wetting self, leaking stool) AND [2] new-onset   Negative: [1] Unable to urinate (or only a few drops) > 4 hours AND [2] bladder feels very full (e.g., palpable bladder or strong urge to urinate)   Negative: Numbness in groin or rectal area (i.e., loss of sensation)   Negative: [1] SEVERE abdominal pain AND [2] present > 1 hour    Protocols used: BACK PAIN-A-

## 2022-01-20 NOTE — PROGRESS NOTES
[facility]  [facility]  Hematology/Medical Oncology Clinic       PATIENT: Cary Middleton  MRN: [id number]  DATE: 1/20/2022    Reason for referral: Leukocytosis and LAD    Initial History: Patient is a 85 y/o female with hx of HTN, Depression, T2DM who presents to clinic after found to have abd LAD on CT abd/pel found incidentally for flank pain.    Labs with leukocytosis, absolute lymphocytosis since 8/2018. Normal renal function.     CT Abd/Pel w/o con 1/6/2022: Normal liver size. Normal spleen size. Mesenteric and retroperitoneal LAD. Multiple para-aortic lymph nodes. Largest lymph node is near the left renal hilu, 3.2 cm in greatest dimension.     Interval History:   Patient reports having left-sided flank pain that radiates to her right chest and epigastrium.  Reports she is unable to find a comfortable position.  Affecting her ADLs.  Not improved with Norco.     Patient went to ED at [facility] due to worsening left leg pain.  Underwent MRI which revealed a left periaortic retroperitoneal mass 5.1 cm in greatest dimension.     Past Medical History:   Past Medical History:   Diagnosis Date    Anemia     Anxiety     Cataract     Depression     Diabetes mellitus     Hyperlipidemia     Hypertension     Kidney stone     Macular degeneration     Osteoarthritis of left hip     Osteoporosis     Recurrent nephrolithiasis     Type 2 diabetes mellitus     Type 2 diabetes mellitus with ophthalmic manifestations        Past Surgical HIstory:   Past Surgical History:   Procedure Laterality Date    BLEPHAROPLASTY, QUAD      GANGLION CYST EXCISION      HYSTERECTOMY      INTRACAPSULAR CATARACT EXTRACTION      left eye       Family History:   Family History   Problem Relation Age of Onset    Colon cancer Mother     Cancer Mother     Heart disease Father     Cataracts Father     Diabetes Father     Pancreatic cancer Sister     Strabismus Daughter      Hypertension Daughter     Transient ischemic attack Daughter         x 3    Diabetes Brother     Hypertension Brother     Cancer Son         lung cancer    Hypertension Daughter     Arthritis Daughter     No Known Problems Daughter     Aneurysm Sister     Hypertension Brother     Glaucoma Neg Hx     Blindness Neg Hx        Social History:  reports that she has never smoked. She has never used smokeless tobacco. She reports that she does not drink alcohol and does not use drugs.    Allergies:  Review of patient's allergies indicates:   Allergen Reactions    Zoster vaccine live Rash    Lisinopril Swelling       Medications:  Current Outpatient Medications   Medication Sig Dispense Refill    amLODIPine (NORVASC) 10 MG tablet Take 1 tablet (10 mg total) by mouth once daily. 90 tablet 3    aspirin (ECOTRIN) 81 MG EC tablet Take 81 mg by mouth once daily.      atorvastatin (LIPITOR) 20 MG tablet Take 1 tablet (20 mg total) by mouth once daily. 90 tablet 3    cholecalciferol, vitamin D3, 1,000 unit capsule Take 1,000 Units by mouth once daily.      diclofenac sodium (VOLTAREN) 1 % Gel Apply 2 g topically daily as needed. 50 g 1    EScitalopram oxalate (LEXAPRO) 10 MG tablet Take 1 tablet (10 mg total) by mouth once daily. 90 tablet 3    glimepiride (AMARYL) 1 MG tablet Take 1 tablet (1 mg total) by mouth before breakfast. 90 tablet 3    HYDROcodone-acetaminophen (NORCO) 7.5-325 mg per tablet Take 1 tablet by mouth every 6 (six) hours as needed for Pain. 28 tablet 0    lancets Misc To check BG 2 times daily, to use with insurance preferred meter 100 each 12    metFORMIN (GLUCOPHAGE) 1000 MG tablet Take 1 tablet (1,000 mg total) by mouth 2 (two) times daily with meals. 180 tablet 3    methylcellulose (ARTIFICIAL TEARS) 1 % ophthalmic solution Place 1 drop into both eyes as needed.      SITagliptin (JANUVIA) 100 MG Tab Take 1 tablet (100 mg total) by mouth once daily. 90 tablet 3    TRUE METRIX  "GLUCOSE TEST STRIP Strp TEST BLOOD SUGAR TWICE A  each 12    TRUEPLUS LANCETS 30 gauge Misc USE ONE LANCET TWICE DAILY       No current facility-administered medications for this visit.     Facility-Administered Medications Ordered in Other Visits   Medication Dose Route Frequency Provider Last Rate Last Admin    0.9%  NaCl infusion  500 mL Intravenous Continuous Elif Lares MD           Review of Systems  Constitutional: Reports fatigue, weight loss, decreased appetite  HENT: Negative for nosebleeds and sore throat.    Respiratory: Negative for cough, chest tightness, shortness of breath and wheezing.    Cardiovascular: Negative for leg swelling. Negative for palpitations.   Gastrointestinal: Negative for constipation. Negative for abdominal pain, diarrhea, nausea and vomiting.   Endocrine: Negative for cold intolerance and heat intolerance.     ECOG Performance Status: 1   Objective:      Vitals:   Vitals:    01/20/22 1425   BP: 136/72   BP Location: Left arm   Patient Position: Sitting   BP Method: Medium (Automatic)   Pulse: 75   Resp: 16   Temp: 97.7 °F (36.5 °C)   SpO2: 99%   Weight: 58 kg (127 lb 15.6 oz)   Height: 5' 4" (1.626 m)       Physical Exam  Constitutional:       Appearance: Normal appearance. Thin female.   HENT:      Head: Normocephalic and atraumatic.      Mouth/Throat:      Mouth: Mucous membranes are moist.   Eyes:      Extraocular Movements: Extraocular movements intact.   Cardiovascular:      Rate and Rhythm: Normal rate and regular rhythm.      Pulses: Normal pulses.      Heart sounds: Normal heart sounds.   Pulmonary:      Effort: Pulmonary effort is normal.      Breath sounds: Normal breath sounds.   Abdominal:      Palpations: Abdomen is soft. Non-distended. No hepatosplenomegaly.   Musculoskeletal:      Cervical back: Normal range of motion and neck supple.   Lymphadenopathy:      Cervical: + cervical adenopathy.      Upper Body:      Right upper body: No supraclavicular " or axillary adenopathy.      Left upper body: No supraclavicular or axillary adenopathy.   Skin:     General: Skin is warm.   Neurological:      General: No focal deficit present.      Mental Status: She is alert.    Laboratory Data:  No visits with results within 1 Week(s) from this visit.   Latest known visit with results is:   Lab Visit on 01/13/2022   Component Date Value Ref Range Status    Blood Culture, Routine 01/13/2022 No growth after 5 days.   Final         Imaging: All pertinent imaging reviewed    Assessment and Plan        1. Lymphadenopathy        Leukocytosis w/ absolute lymphocytosis w/ abd/pel LAD  -Clinically concerning for CLL, may also be low grade lymphoma  -Her symptoms may be from diffuse abd/pelvic LAD. Will start prednisone 40mg x 5 days.   -Will order flow cytometry, LAD, B2 microglobulin  -CT neck and chest to complete staging    Follow up  -Follow up in 2 weeks  -Schedule CT neck and chest      Lucina Lara MD  Hematology/Oncology Fellow PGY IV  [facility]

## 2022-01-24 NOTE — TELEPHONE ENCOUNTER
----- Message from Antionette Garrido sent at 1/24/2022  3:57 PM CST -----  Regarding: pt called  Name of Who is Calling: GEOVANI DIAZ [[id number]] Rekha ( daughter)      What is the request in detail:pt Is requesting results for her mother and would like to speak with the nurse so they can prepare to make her mothers f/u appts. Please advise        Can the clinic reply by [facility]: No      What Number to Call Back [facility] [phone]

## 2022-01-28 NOTE — TELEPHONE ENCOUNTER
"----- Message from Jada Knight sent at 1/28/2022  9:49 AM CST -----  RX Name and Strength: HYDROcodone-acetaminophen (NORCO)  mg per tablet    Take 1 tablet by mouth every 6 (six) hours as needed. - Oral        Preferred Pharmacy with phone number:   [facility] - [place], [place] - [address]  [address]  [place] [facility] [address]  Phone: [phone] Fax: [phone]      Local or Mail Order: local     Ordering Provider: Dr Lara     Contact Preference: [phone]                   Additional Information:  "Thank you for all that you do for our patients"     "

## 2022-01-28 NOTE — PROGRESS NOTES
Established Patient - Audio Only Telehealth Visit     The patient location is:  [place]  The chief complaint leading to consultation is:  At pain  Visit type: Virtual visit with audio only (telephone)  Total time spent with patient:  25 minutes       The reason for the audio only service rather than synchronous audio and video virtual visit was related to technical difficulties or patient preference/necessity.     Each patient to whom I provide medical services by telemedicine is:  (1) informed of the relationship between the physician and patient and the respective role of any other health care provider with respect to management of the patient; and (2) notified that they may decline to receive medical services by telemedicine and may withdraw from such care at any time. Patient verbally consented to receive this service via voice-only telephone call.       HPI:   86-year-old lady with chronic pain depression and retroperitoneal adenopathy.  She was seen in the emergency room it [facility].  She has recently been informed that she has retroperitoneal adenopathy and is in the process of evaluation for workup.  He was taking Norco 7.5 and ibuprofen 600 which provided some relief.  Was discharged with short course of Norco 10s.  Reports that she ran out of them yesterday and has been having recurrence of pain.     Assessment and plan:    Patient with cancer related back pain.  She is scheduled for CT scan.  Will refill [place] x1 week until she can follow-up with her primary provider.    1. Lymphadenopathy  - HYDROcodone-acetaminophen (NORCO)  mg per tablet; Take 1 tablet by mouth every 6 (six) hours as needed.  Dispense: 28 tablet; Refill: 0    2. Low back pain without sciatica, unspecified back pain laterality, unspecified chronicity  - HYDROcodone-acetaminophen (NORCO)  mg per tablet; Take 1 tablet by mouth every 6 (six) hours as needed.  Dispense: 28 tablet; Refill: 0                             This service was not originating from a related E/M service provided within the previous 7 days nor will  to an E/M service or procedure within the next 24 hours or my soonest available appointment.  Prevailing standard of care was able to be met in this audio-only visit.

## 2022-02-01 NOTE — TELEPHONE ENCOUNTER
----- Message from Rafaela Mancilla sent at 2/1/2022 10:16 AM CST -----  Pt calling in regards to medication    Needs refill: predniSONE (DELTASONE) 20 MG tablet  Pharmacy: [facility] - [place], [place] - [address] [address]  [address] [address]  Phone: [phone] Fax: [phone]

## 2022-02-03 NOTE — TELEPHONE ENCOUNTER
"----- Message from Jada Knight sent at 2/3/2022  3:37 PM CST -----  Regarding: refill  RX Name and Strength: HYDROcodone-acetaminophen (NORCO)  mg per tablet     How is the patient currently taking it?  Take 1 tablet by mouth every 6 (six) hours as needed. - Oral     Is this a 30 day or 90 day Rx? 28 tablets        RX Name and Strength: predniSONE (DELTASONE) 20 MG tablet       How is the patient currently taking it?  Take 2 tablets (40 mg total) by mouth once daily. for 5 days - Oral     Is this a 30 day or 90 day Rx? 10 tablets        Preferred Pharmacy with phone number:     [facility] - [place], [place] - [address]  [facility] [address]  Phone: [phone] Fax: [phone]        Local or Mail Order: local     Ordering Provider: Dr Platt     Contact Preference: [phone]       Additional Information: pharmacy closes at 5          "Thank you for all that you do for our patients"     "

## 2022-02-03 NOTE — PROGRESS NOTES
[facility]  [facility]  Hematology/Medical Oncology Clinic       PATIENT: Cary Middleton  MRN: [id number]  DATE: 2/3/2022    Reason for referral: CLL    Initial History: Patient is a 87 y/o female with hx of HTN, Depression, T2DM who presents to clinic after found to have abd LAD on CT abd/pel found incidentally for flank pain.     Labs with leukocytosis, absolute lymphocytosis since 8/2018. Normal renal function.      CT Abd/Pel w/o con 1/6/2022: Normal liver size. Normal spleen size. Mesenteric and retroperitoneal LAD. Multiple para-aortic lymph nodes. Largest lymph node is near the left renal hilu, 3.2 cm in greatest dimension.     MRI 1/2022: Left periaortic retroperitoneal mass 5.1cm in greatest dimension.     Flow cytometry: Consistent with CLL    Interval History:   Patient prescribed prednisone 40mg daily x 10 days for pain. Reports this was somewhat helpful, however, took 40mg BID x 5 days. Reports norco q5h with lidocaine patch eases pain to where she can preform ADLs.     Denies fevers, chills, n/v, abd pain.     Past Medical History:   Past Medical History:   Diagnosis Date    Anemia     Anxiety     Cataract     Depression     Diabetes mellitus     Hyperlipidemia     Hypertension     Kidney stone     Macular degeneration     Osteoarthritis of left hip     Osteoporosis     Recurrent nephrolithiasis     Type 2 diabetes mellitus     Type 2 diabetes mellitus with ophthalmic manifestations        Past Surgical HIstory:   Past Surgical History:   Procedure Laterality Date    BLEPHAROPLASTY, QUAD      GANGLION CYST EXCISION      HYSTERECTOMY      INTRACAPSULAR CATARACT EXTRACTION      left eye       Family History:   Family History   Problem Relation Age of Onset    Colon cancer Mother     Cancer Mother     Heart disease Father     Cataracts Father     Diabetes Father     Pancreatic cancer Sister     Strabismus Daughter     Hypertension Daughter      Transient ischemic attack Daughter         x 3    Diabetes Brother     Hypertension Brother     Cancer Son         lung cancer    Hypertension Daughter     Arthritis Daughter     No Known Problems Daughter     Aneurysm Sister     Hypertension Brother     Glaucoma Neg Hx     Blindness Neg Hx        Social History:  reports that she has never smoked. She has never used smokeless tobacco. She reports that she does not drink alcohol and does not use drugs.    Allergies:  Review of patient's allergies indicates:   Allergen Reactions    Zoster vaccine live Rash    Lisinopril Swelling       Medications:  Current Outpatient Medications   Medication Sig Dispense Refill    amLODIPine (NORVASC) 10 MG tablet Take 1 tablet (10 mg total) by mouth once daily. 90 tablet 3    aspirin (ECOTRIN) 81 MG EC tablet Take 81 mg by mouth once daily.      atorvastatin (LIPITOR) 20 MG tablet Take 1 tablet (20 mg total) by mouth once daily. 90 tablet 3    cholecalciferol, vitamin D3, 1,000 unit capsule Take 1,000 Units by mouth once daily.      diclofenac sodium (VOLTAREN) 1 % Gel Apply 2 g topically daily as needed. 50 g 1    EScitalopram oxalate (LEXAPRO) 10 MG tablet Take 1 tablet (10 mg total) by mouth once daily. 90 tablet 3    glimepiride (AMARYL) 1 MG tablet Take 1 tablet (1 mg total) by mouth before breakfast. 90 tablet 3    HYDROcodone-acetaminophen (NORCO)  mg per tablet Take 1 tablet by mouth every 6 (six) hours as needed. 28 tablet 0    lancets Misc To check BG 2 times daily, to use with insurance preferred meter 100 each 12    LIDOcaine (LIDODERM) 5 % Place 1 patch onto the skin.      metFORMIN (GLUCOPHAGE) 1000 MG tablet Take 1 tablet (1,000 mg total) by mouth 2 (two) times daily with meals. 180 tablet 3    methylcellulose (ARTIFICIAL TEARS) 1 % ophthalmic solution Place 1 drop into both eyes as needed.      SITagliptin (JANUVIA) 100 MG Tab Take 1 tablet (100 mg total) by mouth once daily. 90 tablet  "3    SITagliptin (JANUVIA) 100 MG Tab Take 1 tablet by mouth.      TRUE METRIX GLUCOSE TEST STRIP Strp TEST BLOOD SUGAR TWICE A  each 12    TRUEPLUS LANCETS 30 gauge Misc USE ONE LANCET TWICE DAILY       No current facility-administered medications for this visit.     Facility-Administered Medications Ordered in Other Visits   Medication Dose Route Frequency Provider Last Rate Last Admin    0.9%  NaCl infusion  500 mL Intravenous Continuous Elif Lares MD           Review of Systems  Constitutional: Fatigue improved. Improved appetite. Denies chills, fever, night sweats and unexpected weight change.   HENT: Negative for nosebleeds and sore throat.    Respiratory: Negative for cough, chest tightness, shortness of breath and wheezing.    Cardiovascular: Negative for leg swelling. Negative for palpitations.   Gastrointestinal: Negative for constipation. Left flank tight pain improved.     ECOG Performance Status: 2  Objective:      Vitals:   Vitals:    02/03/22 1332   BP: (!) 142/73   Pulse: 84   Resp: 16   Temp: 98.3 °F (36.8 °C)   TempSrc: Oral   SpO2: 98%   Weight: 58.3 kg (128 lb 10.2 oz)   Height: 5' 4" (1.626 m)       Physical Exam  Constitutional:       Appearance: Normal appearance. Thin female.  HENT:      Head: Normocephalic and atraumatic.      Mouth/Throat:      Mouth: Mucous membranes are moist.   Eyes:      Extraocular Movements: Extraocular movements intact.   Cardiovascular:      Rate and Rhythm: Normal rate and regular rhythm.      Pulses: Normal pulses.      Heart sounds: Normal heart sounds.   Pulmonary:      Effort: Pulmonary effort is normal.      Breath sounds: Normal breath sounds.   Abdominal:      Palpations: Abdomen is soft.   Musculoskeletal:      Cervical back: Normal range of motion and neck supple.   Lymphadenopathy:      Cervical: + cervical adenopathy.      Upper Body:      Right upper body: No supraclavicular or axillary adenopathy.      Left upper body: No " supraclavicular or axillary adenopathy.   Skin:     General: Skin is warm.   Neurological:      General: No focal deficit present.      Mental Status: She is alert.    Laboratory Data:  No visits with results within 1 Week(s) from this visit.   Latest known visit with results is:   Lab Visit on 01/20/2022   Component Date Value Ref Range Status    Blood Interpretation 01/20/2022    Final                    Value:A B cell lymphoproliferative disorder is present.  Chronic lymphocytic  leukemia/small lymphocytic lymphoma (CLL/SLL) is favored; correlation with  morphology and with any available cytogenetic or molecular studies is  recommended.      Interp By Kimberly Power MD, Signed on 01/24/2022 at 08:07    Blood Comment 01/20/2022    Final                    Value:Flow cytometric analysis of  peripheral blood  detects a kappa  light chain  restricted B lymphocyte population showing expression of CD19 and dim CD20  with aberrant expression of dim CD5 and CD23.  The population is negative for  FMC-7.  CD38 is  negative.    CD22 (FITC) is dim positive in a subset (29%).  Flow differential:  Lymphocytes 38.3 %, Monocytes 4.0 %, Granulocytes  57.1  %, Blast  0.4 %, Debris/nRBC 0.2 %,  Viability 99.3  %.  Extrapolating from the most recent CBC data (WBC 14.63 K/uL, absolute  lymphocytes 7.8 K/uL), the calculated absolute clonal B cell count is 5.90  K/uL based on the estimated clonal population at 75.6 % of total lymphocytes.      Blood Antibodies Analyzed 01/20/2022    Final                    Value:All analyzed: CD2, CD3, CD4, CD5, CD7, CD8, CD10, CD13, CD19, CD20, CD22,  CD23, CD34, CD38, , FMC7, KAPPA, LAMBDA,CD45 and 7AAD.      Comment: This test was developed and its performance characteristics   determined by [facility].    It has not been cleared or approved by the U.[place]. Food and Drug   Administration. The FDA has determined that such clearance or   approval is  not necessary.  This test is used for clinical purposes.    It should not be regarded as investigational or for research.  This   laboratory is certified under CLIA-88 as qualified to perform high   complexity clinical laboratory testing.      Beta-2 Microglobulin 01/20/2022 2.5  0.0 - 2.5 ug/mL Final    LD 01/20/2022 224  110 - 260 U/L Final    Results are increased in hemolyzed samples.         Imaging: All pertinent imaging reviewed    Assessment and Plan        1. CLL (chronic lymphocytic leukemia)      CLL, NEVILLE stage 1  -Will recommend treatment due to lymphadenopathy related pain   -Will start acalabrutinib 100mg BID  -Will obtain CLL FISH, IGHV status, TP53 status, serum IgGs  -Refilled medications for pain    Follow up   -follow up in 3 weeks 2/24/21     Lucina Lara MD  Hematology/Oncology Fellow PGY IV  [facility]

## 2022-02-04 NOTE — TELEPHONE ENCOUNTER
PA approved for [employer]. PA Case ID: [id number]  Approved 1/1/22 to 12/31/22    Copay $3030.19    Pt will likely need FA. Will send to BI team

## 2022-02-05 PROBLEM — D72.829 LEUKOCYTOSIS: Status: ACTIVE | Noted: 2022-01-01

## 2022-02-05 PROBLEM — E87.20 LACTIC ACIDOSIS: Status: ACTIVE | Noted: 2022-01-01

## 2022-02-05 PROBLEM — R73.9 HYPERGLYCEMIA: Status: ACTIVE | Noted: 2022-01-01

## 2022-02-05 PROBLEM — D64.9 ANEMIA: Status: ACTIVE | Noted: 2022-01-01

## 2022-02-05 PROBLEM — C91.10 CLL (CHRONIC LYMPHOCYTIC LEUKEMIA): Status: ACTIVE | Noted: 2022-01-01

## 2022-02-05 NOTE — ED PROVIDER NOTES
Encounter date: 2:46 PM 02/05/2022    Source of History   Patient    Chief Complaint   Pt presents with:   Hyperglycemia (Patient presents today from home for further evaluation of elevated blood glucose, > 400 at home. Patient is also reporting uncontrolled pain and new CLL diagnosis on 02/03. Patient is A&ox4 and following commands. )      History Of Present Illness   Cary Middleton is a 86 y.o. female with History of type 2 diabetes on Januvia and metformin, HTN, HLD, recent diagnosis of CLL who presents to the ED with chief complaint abdominal pain and vomiting as well elevated glucose.  Story provided by patient and her daughter.  They state that the patient was recently diagnosed with CLL which they believed to be the cause of her abdominal pain.  She has been taking hydrocodone acetaminophen for pain.  Until today her pain was controlled.  She states she woke up this morning with severe abdominal pain which has been consistent since she began the workup finding her abdominal malignancy.  She describes this pain as severe crampy pain in the epigastric region that wraps around the left flank.  She also has had nausea and few bouts of emesis over the last 3-4 days.  She states today she had 3 bouts of nonbloody nonbilious emesis this morning prior to arrival.  She states that she took her pain medicine but they would up.  She notes that her pain is currently uncontrolled.  The noted that her glucose was greater than 400 and she has noted compliance with her oral antihyperglycemics.  She denies any chest pain or shortness of breath but does state that she feels generally weak.  Denies any falls or trauma, focal deficits or weaknesses.  Prior to today's presentation she states that she has been eating, drinking, urinating and stooling well.  She denies any dysuria pyuria or blood in urine or stool.    This is the extent to the patients complaints today here in the emergency department.    Review Of Systems   As  per HPI and below:  Positive for:  Abdominal pain, elevated glucose, nonbloody nonbilious emesis  Constitutional: No fever.   HEENT: No voice change.   Eyes:  No visual disturbances. No eye pain.   Respiratory:  No shortness of breath. No wheezing. No cough.   Cardio:  No chest pain. No leg swelling. No palpitations.   GI:  + abdominal pain. + nausea + vomiting. + diarrhea.   :  No blood in urine. No difficulty urinating.   MSK:  No back pain. No neck pain.   Skin: No rash.   Neurologic: No headache. No dizziness.       Review of patient's allergies indicates:   Allergen Reactions    Zoster vaccine live Rash    Lisinopril Swelling       Current Facility-Administered Medications on File Prior to Encounter   Medication Dose Route Frequency Provider Last Rate Last Admin    0.9%  NaCl infusion  500 mL Intravenous Continuous Elif Lares MD         Current Outpatient Medications on File Prior to Encounter   Medication Sig Dispense Refill    amLODIPine (NORVASC) 10 MG tablet Take 1 tablet (10 mg total) by mouth once daily. 90 tablet 3    aspirin (ECOTRIN) 81 MG EC tablet Take 81 mg by mouth once daily.      atorvastatin (LIPITOR) 20 MG tablet Take 1 tablet (20 mg total) by mouth once daily. 90 tablet 3    cholecalciferol, vitamin D3, 1,000 unit capsule Take 1,000 Units by mouth once daily.      diclofenac sodium (VOLTAREN) 1 % Gel Apply 2 g topically daily as needed. 50 g 1    EScitalopram oxalate (LEXAPRO) 10 MG tablet Take 1 tablet (10 mg total) by mouth once daily. 90 tablet 3    glimepiride (AMARYL) 1 MG tablet Take 1 tablet (1 mg total) by mouth before breakfast. 90 tablet 3    HYDROcodone-acetaminophen (NORCO)  mg per tablet Take 1 tablet by mouth every 6 (six) hours as needed. 28 tablet 0    LIDOcaine (LIDODERM) 5 % Place 1 patch onto the skin.      metFORMIN (GLUCOPHAGE) 1000 MG tablet Take 1 tablet (1,000 mg total) by mouth 2 (two) times daily with meals. 180 tablet 3    methylcellulose  (ARTIFICIAL TEARS) 1 % ophthalmic solution Place 1 drop into both eyes as needed.      predniSONE (DELTASONE) 20 MG tablet Take 2 tablets (40 mg total) by mouth once daily. for 5 days (Patient taking differently: Take 10 mg by mouth 2 (two) times daily.) 10 tablet 0    SITagliptin (JANUVIA) 100 MG Tab Take 1 tablet (100 mg total) by mouth once daily. 90 tablet 3    acalabrutinib (CALQUENCE) 100 mg Cap Take 100 mg by mouth 2 (two) times a day. 60 capsule 3    lancets Misc To check BG 2 times daily, to use with insurance preferred meter 100 each 12    SITagliptin (JANUVIA) 100 MG Tab Take 1 tablet by mouth.      TRUE METRIX GLUCOSE TEST STRIP Strp TEST BLOOD SUGAR TWICE A  each 12    TRUEPLUS LANCETS 30 gauge Misc USE ONE LANCET TWICE DAILY         Past History   As per HPI and below:  Past Medical History:   Diagnosis Date    Anemia     Anxiety     Cataract     Depression     Diabetes mellitus     Hyperlipidemia     Hypertension     Kidney stone     Macular degeneration     Osteoarthritis of left hip     Osteoporosis     Recurrent nephrolithiasis     Type 2 diabetes mellitus     Type 2 diabetes mellitus with ophthalmic manifestations      Past Surgical History:   Procedure Laterality Date    BLEPHAROPLASTY, QUAD      GANGLION CYST EXCISION      HYSTERECTOMY      INTRACAPSULAR CATARACT EXTRACTION      left eye       Social History     Socioeconomic History    Marital status:    Tobacco Use    Smoking status: Never Smoker    Smokeless tobacco: Never Used   Substance and Sexual Activity    Alcohol use: No    Drug use: No    Sexual activity: Never     Partners: Male     Social Determinants of Health     Financial Resource Strain: Low Risk     Difficulty of Paying Living Expenses: Not hard at all   Food Insecurity: No Food Insecurity    Worried About Running Out of Food in the Last Year: Never true    Ran Out of Food in the Last Year: Never true   Transportation Needs: No  "Transportation Needs    Lack of Transportation (Medical): No    Lack of Transportation (Non-Medical): No   Housing Stability: Unknown    Unable to Pay for Housing in the Last Year: No    Unstable Housing in the Last Year: No       Family History   Problem Relation Age of Onset    Colon cancer Mother     Cancer Mother     Heart disease Father     Cataracts Father     Diabetes Father     Pancreatic cancer Sister     Strabismus Daughter     Hypertension Daughter     Transient ischemic attack Daughter         x 3    Diabetes Brother     Hypertension Brother     Cancer Son         lung cancer    Hypertension Daughter     Arthritis Daughter     No Known Problems Daughter     Aneurysm Sister     Hypertension Brother     Glaucoma Neg Hx     Blindness Neg Hx        Physical Exam     Vitals:    02/05/22 1333 02/05/22 1555 02/05/22 1732 02/05/22 1934   BP: (!) 161/81  (!) 171/78 (!) 170/81   BP Location: Right arm      Patient Position: Sitting      Pulse: 92  74 82   Resp: 17 20 (!) 23    Temp: 98.9 °F (37.2 °C)      TempSrc: Oral      SpO2: 98%  98% 98%   Weight: 58.4 kg (128 lb 10.2 oz)      Height: 5' 4" (1.626 m)        Physical Exam:   Nursing note and vitals reviewed.  Appearance: non-toxic elderly female in no acute respiratory distress.  Making purposeful movements.  Speaking in full sentences.  Skin: No rashes seen.  Good turgor.  No abrasions.  No ecchymoses.  Eyes: No conjunctival injection. EOMI, PERRL.  Head: NC/AT  ENT: Oropharynx clear.    Chest: CTAB. No increased work of breathing, bilateral chest rise.  Cardiovascular: Regular rate and rhythm.  Normal equal bilateral radial pulses.  Abdomen: Soft.  Not distended.  Nontender.  No guarding.  No rebound. No Masses. No CVA tenderness bilaterally. No tenderness to palpation of midline cervical through lumbar spine.  No step-offs or deformities.    Musculoskeletal: Good range of motion all joints.  No deformities.  Neck supple, full range of " motion, no obvious deformity.  Neurologic: Moves all extremities.  Normal sensation.  No facial droop.  Normal speech.    Mental Status:  Alert and oriented x 3.  Appropriate, conversant.      Results and Medications    Procedures    Labs Reviewed   CBC W/ AUTO DIFFERENTIAL - Abnormal; Notable for the following components:       Result Value    WBC 24.38 (*)     Hemoglobin 11.9 (*)     Hematocrit 35.8 (*)     MCV 67 (*)     MCH 22.1 (*)     RDW 16.5 (*)     All other components within normal limits   COMPREHENSIVE METABOLIC PANEL - Abnormal; Notable for the following components:    Glucose 288 (*)     All other components within normal limits   URINALYSIS, REFLEX TO URINE CULTURE - Abnormal; Notable for the following components:    Glucose, UA 3+ (*)     All other components within normal limits    Narrative:     Specimen Source->Urine   LACTIC ACID, PLASMA - Abnormal; Notable for the following components:    Lactate (Lactic Acid) 4.0 (*)     All other components within normal limits   POCT GLUCOSE - Abnormal; Notable for the following components:    POCT Glucose 362 (*)     All other components within normal limits   ISTAT PROCEDURE - Abnormal; Notable for the following components:    POC PCO2 45.6 (*)     POC PO2 21 (*)     POC HCO3 31.5 (*)     POC SATURATED O2 35 (*)     POC TCO2 33 (*)     All other components within normal limits   CULTURE, BLOOD   CULTURE, BLOOD   LIPASE   TROPONIN I   BETA - HYDROXYBUTYRATE, SERUM   URINALYSIS MICROSCOPIC    Narrative:     Specimen Source->Urine   LACTATE DEHYDROGENASE   URIC ACID   LACTIC ACID, PLASMA   URIC ACID   HEMOGLOBIN A1C   LACTIC ACID, PLASMA   SARS-COV-2 RDRP GENE    Narrative:     This test utilizes isothermal nucleic acid amplification   technology to detect the SARS-CoV-2 RdRp nucleic acid segment.   The analytical sensitivity (limit of detection) is 125 genome   equivalents/mL.   A POSITIVE result implies infection with the SARS-CoV-2 virus;   the patient is  presumed to be contagious.     A NEGATIVE result means that SARS-CoV-2 nucleic acids are not   present above the limit of detection. A NEGATIVE result should be   treated as presumptive. It does not rule out the possibility of   COVID-19 and should not be the sole basis for treatment decisions.   If COVID-19 is strongly suspected based on clinical and exposure   history, re-testing using an alternate molecular assay should be   considered.   This test is only for use under the Food and Drug   Administration s Emergency Use Authorization (EUA).   Commercial kits are provided by [employer] ([email or web address]).   Performance characteristics of the EUA have been independently   verified by [facility] Department of   Pathology and Laboratory Medicine.   _________________________________________________________________   The authorized Fact Sheet for Healthcare Providers and the authorized Fact   Sheet for Patients of the ID NOW COVID-19 are available on the FDA   website:     https://www.fda.gov/media/064760/download  https://www.fda.gov/media/809856/download             Imaging Results          X-Ray Chest 1 View (Final result)  Result time 02/05/22 17:20:24    Final result by Micha Leung MD (02/05/22 17:20:24)                 Impression:      1. No convincing acute cardiopulmonary process, hypoventilatory exam.      Electronically signed by: Micha Leung MD  Date:    02/05/2022  Time:    17:20             Narrative:    EXAMINATION:  XR CHEST 1 VIEW    CLINICAL HISTORY:  Nausea    TECHNIQUE:  Single frontal view of the chest was performed.    COMPARISON:  01/13/2022    FINDINGS:  The cardiomediastinal silhouette is prominent, magnified by technique, stable.  There is elevation of the left hemidiaphragm..  There is no pleural effusion.  The trachea is midline.  The lungs are symmetrically expanded bilaterally with left basilar subsegmental atelectasis or scarring, stable..  No large focal consolidation seen.   There is no pneumothorax.  The osseous structures are remarkable for degenerative changes.  There is osteopenia..                               CT Abdomen Pelvis With Contrast (Final result)  Result time 02/05/22 16:49:57    Final result by Arun Tyler MD (02/05/22 16:49:57)                 Impression:      1. Mesenteric and retroperitoneal lymphadenopathy with significant interval increase in size of largest delmis focus abutting the celiac plexus which may be contributing to patient's abdominal pain.  The etiology of lymphadenopathy likely secondary to recent diagnosis of CLL.  2. Borderline splenomegaly.  3. Pancreatic ductal dilatation without signs of obstruction or mass.    Electronically signed by resident: Brenden Mcneill  Date:    02/05/2022  Time:    16:11    Electronically signed by: Arun Tyler MD  Date:    02/05/2022  Time:    16:49             Narrative:    EXAMINATION:  CT ABDOMEN PELVIS WITH CONTRAST    CLINICAL HISTORY:  Abdominal pain, acute, nonlocalized;    TECHNIQUE:  Low dose axial images, sagittal and coronal reformations were obtained from the lung bases to the pubic symphysis following the IV administration of 75 mL of Omnipaque 350 .  Oral contrast was not administered.    COMPARISON:  CT abdomen pelvis 01/06/2022, CT renal stone study 08/11/2018.    FINDINGS:  LUNG BASES: Slight patchy pleural thickening in the left lung base, unchanged from prior.    HEPATOBILIARY: No focal hepatic lesions. No biliary ductal dilatation. Normal gallbladder.    SPLEEN: Spleen size at upper limit of normal.    PANCREAS: Pancreatic ductal dilatation measuring up to 0.6 cm at the pancreatic head and 0.4 cm in the body.  Scattered, punctate calcifications in the tail the pancreas.  No focal masses.    ADRENALS: No adrenal nodules.    KIDNEYS/URETERS: At least 2 small hypodensities in the left midpole, too small to characterize.  No stones or solid mass lesions.    PELVIC ORGANS/BLADDER:  Postsurgical changes of hysterectomy.  Bladder is distended.    PERITONEUM / RETROPERITONEUM: No free air or fluid.    LYMPH NODES: Interval evolution of mesenteric and retroperitoneal lymphadenopathy with increase in largest focus encasing the left renal artery and abutting the aorta, celiac axis and SMA measuring 6.4 x 6.4 cm x 6.9 cm AP by TV by CC (axial series 2, image 60 and coronal series 601, image 68).  Multiple para-aortic nodes identified.    VESSELS: Patchy calcific atherosclerosis in the aorta and its major branches.    GI TRACT: No distention or wall thickening. Normal appendix.    BONES AND SOFT TISSUES: No fractures or focal osseous lesions.                                    Medications   sodium chloride 0.9% flush 10 mL (has no administration in time range)   naloxone 0.4 mg/mL injection 0.02 mg (has no administration in time range)   glucose chewable tablet 16 g (has no administration in time range)   glucose chewable tablet 24 g (has no administration in time range)   dextrose 10% bolus 125 mL (has no administration in time range)   dextrose 10% bolus 250 mL (has no administration in time range)   glucagon (human recombinant) injection 1 mg (has no administration in time range)   acetaminophen tablet 650 mg (has no administration in time range)   morphine injection 2 mg (has no administration in time range)   morphine injection 4 mg (has no administration in time range)   ondansetron injection 4 mg (has no administration in time range)   polyethylene glycol packet 17 g (has no administration in time range)   amLODIPine tablet 10 mg (has no administration in time range)   aspirin EC tablet 81 mg (has no administration in time range)   atorvastatin tablet 20 mg (has no administration in time range)   vitamin D 1000 units tablet 1,000 Units (has no administration in time range)   EScitalopram oxalate tablet 10 mg (has no administration in time range)   LIDOcaine 5 % patch 1 patch (has no  administration in time range)   artificial tears 0.5 % ophthalmic solution 2 drop (has no administration in time range)   predniSONE tablet 10 mg (has no administration in time range)   insulin aspart U-100 pen 0-5 Units (has no administration in time range)   ketorolac injection 15 mg (has no administration in time range)   enoxaparin injection 40 mg (has no administration in time range)   sodium chloride 0.9% bolus 500 mL (0 mLs Intravenous Stopped 2/5/22 1715)   morphine injection 2 mg (2 mg Intravenous Given 2/5/22 1555)   iohexoL (OMNIPAQUE 350) injection 75 mL (75 mLs Intravenous Given 2/5/22 1608)   sodium chloride 0.9% bolus 500 mL (0 mLs Intravenous Stopped 2/5/22 1816)       MDM, Impression and Plan   Previous Records:  I decided to obtain old medical records which showed:  Recent diagnosis of CLL on prednisone    Initial Assessment:   Urgent evaluation of 86 y.o. female with history as above who presents the ED with chief complaint of abdominal pain, 3 bouts of nonbloody nonbilious emesis and elevated glucose.  On arrival to the ED she is noted to be afebrile, hemodynamically stable though hypertensive and in no acute respiratory distress.  Differential Diagnosis:   -COVID  -UTI  -perforated viscus masked by a immunosuppression  -increased or advancing malignancy  -steroid induced hyperglycemia  -euglycemic DKA verses DKA  -ACS  -pancreatitis  Independently Interpreted Test(s):   EKG:  I independently reviewed and interpreted the EKG and my findings are as follows:   Please see ED course.  EKG shows normal sinus rhythm with ventricular rate of 74 beats per minute.  Narrow QRS.  No ST segment elevations depressions.  No STEMI.  IMAGING:  I have ordered and independently interpreted X-rays and my findings are as follow:  Please see ED course.  Chest x-ray with no pneumonia, pneumothorax, pleural effusions    Clinical Tests:   Lab Tests: Ordered and Reviewed  Radiological Study: Ordered and Reviewed  Medical  Tests: Ordered and Reviewed    ED Management:    COVID negative.  Chest x-ray with no focal findings.  Urinalysis with no signs of UTI.  Presentation not consistent with DKA based off of beta hydroxybutyrate and VBG.  She was noted to have an elevated lactic acid for which she received a L of fluids and her pain was controlled with morphine.  CT abdomen and pelvis ordered due to concern for intra-abdominal pathology in the setting of CLL on immunosuppression.  It did show increasing lymphadenopathy with a large nodule focus in the celiac plexus which could be contributing to her pain as well as borderline splenomegaly.  EKG and troponin were obtained and making ACS unlikely.  Normal liver enzymes appreciated.  Lipase within normal limits making pancreatitis unlikely.  Blood cultures were ordered due to concern for infectious etiology.  Lactic acid was noted to be elevated which will be repeated after fluids.  Due to her increasing lymphadenopathy and leukocytosis which could be due to her steroids or worsening of her CLL I called and discussed the case with hematology oncology who recommended admission to Medicine with concern for tumor lysis syndrome.  I then called discussed case with Hospital Medicine who is agreeable condition.  Patient updated on plans for admission.  Patient deemed stable for admission to hospital medicine.  Please see ED course for discussion of labs.     I called and discussed the case with:  Hospital Medicine, Hematology/Oncology         Final diagnoses:  [R10.9] Abdominal pain  [R11.0] Nausea  [R59.0] Abdominal lymphadenopathy - Worsening  [C91.10] CLL (chronic lymphocytic leukemia) (Primary)          ED Disposition Condition    Admit              ED Course as of 02/05/22 1945   Sat Feb 05, 2022   1452 Pt not roomed.    [HM]   1514 POCT Glucose(!): 362 [HM]   1603 SARS-CoV-2 RNA, Amplification, Qual: Negative [HM]   1659 Lactate, Fito(!!): 4.0 [HM]   1659 Troponin I: 0.011 [HM]   1702  Patient noted to have a elevated lactic acid.  Will add on chest x-ray and blood cultures.  CT abdomen shows concerning signs for increasing lymphadenopathy consult placed a Hematology Oncology. []   1818 Discussed the case with oncology fellow who recommends admission to Medicine as patient has not started chemo []      ED Course User Index  [HM] Laury Wong MD           Attending Attestation:   Physician Attestation Statement for Resident:  As the supervising MD   Physician Attestation Statement: I have personally seen and examined this patient.   I agree with the above history. -:   As the supervising MD I agree with the above PE.    As the supervising MD I agree with the above treatment, course, plan, and disposition.  I have reviewed and agree with the residents interpretation of the following: lab data and EKG.                    Laury Wong MD   Emergency Resident      Laury Wong MD  Resident  02/05/22 [id number]       Tony Nguyen MD  02/06/22 0476

## 2022-02-06 NOTE — ASSESSMENT & PLAN NOTE
Patient has Hgb of 11.8 with MCV 67    Plan  - CBC daily, transfuse hgb < 7  - iron, ferritin, TIBC are WNL

## 2022-02-06 NOTE — HPI
Patient is an 86-year-old woman with recent diagnosis of CLL/SLL, presenting to hospital with abdominal pain and nausea, BMT on consult to assist with management. Pt underwent CT abd 1/6 for flank pain which revealed retroperitoneal and mesenteric lymphadenopathy, also with leukocytosis to 14.63 and flow cytometry confirming the diagnosis of CLL. Pt established care with hematology 2/3/22 with plans to initiate Acalabrutinib, and steroids were prescribed as temporizing measure for abdominal pain attributed to lymphadenopathy. Pt had progressive abdominal pain and nausea, hence presentation to ER 2/5. Trial of [place] at home. In ED she was noted to have hyperglycemia, bloodwork was not indicative of TLS. This morning pt does not complain of abdominal pain, reports symptoms improved since being in the hospital. No nausea, fevers, but reports fatigue.

## 2022-02-06 NOTE — ED NOTES
Patient identifiers verified and correct for Cary Middleton    Pt to ED c/o L side and back pain. Recently dx with CLL (Thursday) and pain was well maintained on regimen; however, today pain became so bad that pt had 3 episodes of emesis. Also c/o high bg >400 at home.     LOC: The patient is awake, alert, and aware of environment. The patient is AOX4 and speaking appropriately.   APPEARANCE: No acute distress noted. Pt reports pain of 10/10   HEENT: WDL, PERRLA  PSYCHOSOCIAL: Patient is calm and cooperative. Denies SI/HI.  SKIN: The skin is warm, dry, color consistent with ethnicity. No breakdown or brusing visible.  RESPIRATORY: Airway is open and patent. Bilateral chest rise and fall. Respiratory rate even and unlabored.  No accessory muscle use noted.  CARDIAC: Patient has a normal rate and rhythm. No complaints of chest pain.  ABDOMEN/GI: Soft, non tender. No distention noted. Denies n/v/d.   URINARY:  Voids independently without difficulty. No complaints of frequency, urgency, burning, or blood in urine.   NEUROLOGIC: Eyes open spontaneously. Speech clear.  Able to follow commands, demonstrating ability to actively and appropriately communicate within context of current conversation. Symmetrical facial muscles. Movement is purposeful. Denies dizziness/lightheadedness.  MUSCULOSKELETAL: No obvious deformities noted. Full ROM in all extremities.  PERIPHERAL VASCULAR: Cap refill <3 secs bilaterally. No peripheral edema noted. Denies numbness and tingling in extremities.

## 2022-02-06 NOTE — HPI
Cary Middleton is a 86 y.o. female with History of type 2 diabetes on Januvia and metformin, HTN, HLD, recent diagnosis of CLL who presents to the ED with chief complaint abdominal pain and vomiting as well elevated glucose.  Story provided by patient and her daughter.  They state that the patient was recently diagnosed with CLL which they believed to be the cause of her abdominal pain.  Her pain started Jan. 1, 2022. She has been taking hydrocodone acetaminophen for pain.  Until today her pain was controlled.  She states she woke up this morning with severe abdominal pain which has been consistent since she began the workup finding her abdominal malignancy.  She describes this pain as severe crampy pain in the epigastric region that wraps around the left flank.  She also has had nausea and few bouts of emesis over the last 3-4 days when the pain is especially bad.  She states today she had 3 bouts of nonbloody nonbilious emesis this morning prior to arrival.  She states that she took her pain medicine but they were vomited up.  She notes that her pain is currently uncontrolled.  The noted that her glucose was greater than 400 and she has noted compliance with her oral antihyperglycemics.  She denies any chest pain or shortness of breath but does state that she feels generally weak.  Denies any falls or trauma, focal deficits or weaknesses.  Prior to today's presentation she states that she has been eating, drinking, urinating and stooling well.  She denies any dysuria pyuria or blood in urine or stool.

## 2022-02-06 NOTE — PLAN OF CARE
POC reviewed with patient. Pt was a new admission to the floor and she and her daughter were oriented to the staff and the floor. BP was elevated PRN medication was given with moderate effect. Ambulates with assist and daughter is at the bedside assiting. Patient complained of pain and PRN medication was given with moderate effect. Pt has remained free from injury this shift. Bed in low locked position. Call light and personal belongings within reach. Side rails up x2. Nonskid socks in place. Pt instructed to call with any needs. Will continue to monitor.

## 2022-02-06 NOTE — ASSESSMENT & PLAN NOTE
Patient's last A1C of 7 in 10/2021    Plan  - repeat A1C  - diabetic diet  - POCT glu AC HS  - weight based regimen of detemir 6 QHS and aspart 2 TIDWM in setting of hyperglycemia, can up titrate based on needs  - LDSSI

## 2022-02-06 NOTE — SUBJECTIVE & OBJECTIVE
Past Medical History:   Diagnosis Date    Anemia     Anxiety     Cataract     Depression     Diabetes mellitus     Hyperlipidemia     Hypertension     Kidney stone     Macular degeneration     Osteoarthritis of left hip     Osteoporosis     Recurrent nephrolithiasis     Type 2 diabetes mellitus     Type 2 diabetes mellitus with ophthalmic manifestations        Past Surgical History:   Procedure Laterality Date    BLEPHAROPLASTY, QUAD      GANGLION CYST EXCISION      HYSTERECTOMY      INTRACAPSULAR CATARACT EXTRACTION      left eye       Review of patient's allergies indicates:   Allergen Reactions    Zoster vaccine live Rash    Lisinopril Swelling       Current Facility-Administered Medications on File Prior to Encounter   Medication    0.9%  NaCl infusion     Current Outpatient Medications on File Prior to Encounter   Medication Sig    amLODIPine (NORVASC) 10 MG tablet Take 1 tablet (10 mg total) by mouth once daily.    aspirin (ECOTRIN) 81 MG EC tablet Take 81 mg by mouth once daily.    atorvastatin (LIPITOR) 20 MG tablet Take 1 tablet (20 mg total) by mouth once daily.    cholecalciferol, vitamin D3, 1,000 unit capsule Take 1,000 Units by mouth once daily.    diclofenac sodium (VOLTAREN) 1 % Gel Apply 2 g topically daily as needed.    EScitalopram oxalate (LEXAPRO) 10 MG tablet Take 1 tablet (10 mg total) by mouth once daily.    glimepiride (AMARYL) 1 MG tablet Take 1 tablet (1 mg total) by mouth before breakfast.    HYDROcodone-acetaminophen (NORCO)  mg per tablet Take 1 tablet by mouth every 6 (six) hours as needed.    LIDOcaine (LIDODERM) 5 % Place 1 patch onto the skin.    metFORMIN (GLUCOPHAGE) 1000 MG tablet Take 1 tablet (1,000 mg total) by mouth 2 (two) times daily with meals.    methylcellulose (ARTIFICIAL TEARS) 1 % ophthalmic solution Place 1 drop into both eyes as needed.    predniSONE (DELTASONE) 20 MG tablet Take 2 tablets (40 mg total) by mouth once daily. for  5 days (Patient taking differently: Take 10 mg by mouth 2 (two) times daily.)    SITagliptin (JANUVIA) 100 MG Tab Take 1 tablet (100 mg total) by mouth once daily.    acalabrutinib (CALQUENCE) 100 mg Cap Take 100 mg by mouth 2 (two) times a day.    lancets Misc To check BG 2 times daily, to use with insurance preferred meter    SITagliptin (JANUVIA) 100 MG Tab Take 1 tablet by mouth.    TRUE METRIX GLUCOSE TEST STRIP Strp TEST BLOOD SUGAR TWICE A DAY    TRUEPLUS LANCETS 30 gauge Misc USE ONE LANCET TWICE DAILY     Family History     Problem Relation (Age of Onset)    Aneurysm Sister    Arthritis Daughter    Cancer Mother, Son    Cataracts Father    Colon cancer Mother    Diabetes Father, Brother    Heart disease Father    Hypertension Daughter, Brother, Daughter, Brother    No Known Problems Daughter    Pancreatic cancer Sister    Strabismus Daughter    Transient ischemic attack Daughter        Tobacco Use    Smoking status: Never Smoker    Smokeless tobacco: Never Used   Substance and Sexual Activity    Alcohol use: No    Drug use: No    Sexual activity: Never     Partners: Male     Review of Systems   Constitutional: Positive for activity change (since new years, less activity) and appetite change. Negative for chills and fever.   HENT: Negative for mouth sores and sore throat.    Eyes: Negative for pain and visual disturbance.   Respiratory: Negative for cough, chest tightness and shortness of breath.    Cardiovascular: Positive for chest pain (substernal, radiating from left lumbar wrapping around ). Negative for palpitations and leg swelling.   Gastrointestinal: Positive for diarrhea, nausea and vomiting. Negative for abdominal pain and anal bleeding.   Genitourinary: Positive for flank pain (left). Negative for difficulty urinating and dysuria.   Musculoskeletal: Positive for back pain. Negative for arthralgias and neck pain.   Neurological: Positive for dizziness and weakness. Negative for  headaches.   Psychiatric/Behavioral: Positive for confusion and sleep disturbance.     Objective:     Vital Signs (Most Recent):  Temp: 98.9 °F (37.2 °C) (02/05/22 1333)  Pulse: 82 (02/05/22 1934)  Resp: (!) 23 (02/05/22 1732)  BP: (!) 170/81 (02/05/22 1934)  SpO2: 98 % (02/05/22 1934) Vital Signs (24h Range):  Temp:  [98.9 °F (37.2 °C)] 98.9 °F (37.2 °C)  Pulse:  [74-92] 82  Resp:  [17-23] 23  SpO2:  [98 %] 98 %  BP: (161-171)/(78-81) 170/81     Weight: 58.4 kg (128 lb 10.2 oz)  Body mass index is 22.08 kg/m².    Physical Exam  Vitals and nursing note reviewed. Exam conducted with a chaperone present.   Constitutional:       General: She is not in acute distress.     Appearance: Normal appearance. She is not ill-appearing or diaphoretic.   HENT:      Head: Normocephalic and atraumatic.      Right Ear: External ear normal.      Left Ear: External ear normal.      Nose: Nose normal. No congestion or rhinorrhea.      Mouth/Throat:      Mouth: Mucous membranes are moist.      Pharynx: Oropharynx is clear.      Comments: Upper dentures  Eyes:      General: No scleral icterus.     Extraocular Movements: Extraocular movements intact.      Pupils: Pupils are equal, round, and reactive to light.   Cardiovascular:      Rate and Rhythm: Normal rate and regular rhythm.      Pulses: Normal pulses.      Heart sounds: Normal heart sounds.   Pulmonary:      Effort: Pulmonary effort is normal.      Breath sounds: Normal breath sounds. No wheezing, rhonchi or rales.   Abdominal:      General: Bowel sounds are normal. There is no distension.      Palpations: Abdomen is soft.      Tenderness: There is no abdominal tenderness. There is left CVA tenderness. There is no right CVA tenderness.   Musculoskeletal:         General: Tenderness (Left L1 wrappy up and around anteriorly to xiphoid process. ) present. Normal range of motion.      Cervical back: Normal range of motion and neck supple. No tenderness.      Right lower leg: No edema.       Left lower leg: No edema.   Lymphadenopathy:      Head:      Right side of head: No submental, submandibular, preauricular or posterior auricular adenopathy.      Left side of head: No submental, submandibular, preauricular or posterior auricular adenopathy.      Cervical: Cervical adenopathy present.      Right cervical: Superficial cervical adenopathy present. No deep or posterior cervical adenopathy.     Left cervical: Deep cervical adenopathy present. No superficial or posterior cervical adenopathy.   Skin:     General: Skin is warm and dry.   Neurological:      Mental Status: She is alert and oriented to person, place, and time.   Psychiatric:         Mood and Affect: Mood normal.         Behavior: Behavior normal.         Thought Content: Thought content normal.         Judgment: Judgment normal.           CRANIAL NERVES     CN III, IV, VI   Pupils are equal, round, and reactive to light.       Significant Labs: All pertinent labs within the past 24 hours have been reviewed.    Significant Imaging: I have reviewed all pertinent imaging results/findings within the past 24 hours.

## 2022-02-06 NOTE — ASSESSMENT & PLAN NOTE
Patient's last A1C of 7 in 10/2021      Plan  - repeat A1C 7.9  - diabetic diet  - POCT glu AC HS  - weight based regimen of detemir 8 QHS and aspart 3 TIDWM in setting of hyperglycemia, can up titrate based on needs  - LDSSI

## 2022-02-06 NOTE — ASSESSMENT & PLAN NOTE
Patient recently diagnosed with CLL, was planning to start oral chemo shortly  - recently prescribed prednisone by H/O    Plan  - H/O consulted to assist in care, appreciate recs  - labs are not indicative of TLS, continuing to trend  - CT head and neck pending

## 2022-02-06 NOTE — PLAN OF CARE
PT evaluation complete. No goals established as pt is baseline with mobility and has no acute PT needs at this time. D/C from PT services.    2/6/2022    Problem: Physical Therapy Goal  Goal: Physical Therapy Goal  Outcome: Met

## 2022-02-06 NOTE — PT/OT/SLP EVAL
Physical Therapy Co-Evaluation and Discharge Note    Patient Name:  Cary Middleton   MRN:  [id number]    Recommendations:     Discharge Recommendations:  home   Discharge Equipment Recommendations: none   Barriers to discharge: None    Assessment:     Cary Middleton is a 86 y.o. female admitted with a medical diagnosis of Hyperglycemia. Pt completing functional mobility without physical assist or use of DME. Ambulated extended household distance without LOB or major deviations noted.  At this time, patient is functioning at their prior level of function and does not require further acute PT services.     Recent Surgery: * No surgery found *      Plan:     During this hospitalization, patient does not require further acute PT services.  Please re-consult if situation changes.      Subjective     Chief Complaint: none noted   Patient/Family Comments/goals: return home  Pain/Comfort:  · Pain Rating 1: 0/10    Patients cultural, spiritual, Yazdanism conflicts given the current situation: no    Living Environment:  Pt lives with her  and son in a 1SH with  to enter.  Prior to admission, patients level of function was independent with ambulation and ADLs. Drives. Performs household duties including cooking, cleaning, laundry, etc.  Equipment used at home: none.  DME owned (not currently used): rolling walker.  Upon discharge, patient will have assistance from family.    Objective:     Communicated with RN prior to session.  Patient found supine with  (lines intact) upon PT entry to room.    General Precautions: Standard, fall   Orthopedic Precautions:N/A   Braces: N/A   Respiratory Status: Room air    Exams:  · Cognitive Exam:  Patient is oriented to Person, Place, Time and Situation  · Sensation:    · -       Intact  · RLE ROM: WFL  · RLE Strength: WFL  · LLE ROM: WFL  · LLE Strength: WFL    Functional Mobility:  · Bed Mobility:     · Supine to Sit: modified independence with HOB elevated  · Transfers:      · Sit to Stand:  supervision with no AD from EOB  · Gait: ~200 ft. with supervision without AD  · Mask donned prior to ambulation in hallway  · demo'd decreased step length and decreased deanna; otherwise, no LOB or major gait deviations noted  · Cues provided for self-pacing and safety awareness     AM-PAC 6 CLICK MOBILITY  Total Score:23       Therapeutic Activities and Exercises:  Pt educated on role of PT and PT POC, including plan to d/c IP PT services at this time. Pt instructed to contact medical team if therapy needs arise during hospital admission. Pt verbalized understanding.   Pt educated on the effects of bed rest and the importance of OOB activity. Pt encouraged to sit UIC majority of day as tolerated and continue daily ambulation with family or nursing assist. Pt verbalized understanding.  Pt oriented to call bell and instructed to call for staff assist with standing tasks/transfers. Pt verbalized understanding.     AM-PAC 6 CLICK MOBILITY  Total Score:23     Patient left up in chair with all lines intact, call button in reach and PCT and pt's daughter present.    GOALS:   Multidisciplinary Problems     Physical Therapy Goals     Not on file          Multidisciplinary Problems (Resolved)        Problem: Physical Therapy Goal    Goal Priority Disciplines Outcome Goal Variances Interventions   Physical Therapy Goal   (Resolved)     PT, PT/OT Met                     History:     Past Medical History:   Diagnosis Date    Anemia     Anxiety     Cataract     CLL (chronic lymphocytic leukemia) 2/5/2022    Depression     Diabetes mellitus     Hyperlipidemia     Hypertension     Kidney stone     Macular degeneration     Osteoarthritis of left hip     Osteoporosis     Recurrent nephrolithiasis     Type 2 diabetes mellitus     Type 2 diabetes mellitus with ophthalmic manifestations        Past Surgical History:   Procedure Laterality Date    BLEPHAROPLASTY, QUAD      GANGLION CYST EXCISION       HYSTERECTOMY      INTRACAPSULAR CATARACT EXTRACTION      left eye       Time Tracking:     PT Received On: 02/06/22  PT Start Time: 1236     PT Stop Time: 1248  PT Total Time (min): 12 min     Billable Minutes: Evaluation 12  (co-adelita performed this date due to need for 2 skilled therapists in order to ensure pt safety, accomodate for pt activity tolerance, and maximize functional potential)     02/06/2022

## 2022-02-06 NOTE — ASSESSMENT & PLAN NOTE
Patient reports she was having sugars in the 400s at home with abdominal pain and n/v  - sugar on admission is 288  - VBG with pH of 7.44, beta hydroxybutarate negative    Plan  - this is likely hyperglycemia in the setting of recent steroid use  - after discussion with H/O, would like to continue steroids  - POCT glu AC HS  - weight based regimen of detemir 8 QHS and aspart 3 TIDWM in setting of hyperglycemia, can up titrate based on needs  - diabetic diet

## 2022-02-06 NOTE — H&P
[facility] - Emergency Dept  [facility] Medicine  History & Physical    Patient Name: Cary Middleton  MRN: [id number]  Patient Class: IP- Inpatient  Admission Date: 2/5/2022  Attending Physician: Delia Thompson MD   Primary Care Provider: Arlene Costa MD         Patient information was obtained from patient, relative(s), past medical records and ER records.     Subjective:     Principal Problem:Hyperglycemia    Chief Complaint:   Chief Complaint   Patient presents with    Hyperglycemia     Patient presents today from home for further evaluation of elevated blood glucose, > 400 at home. Patient is also reporting uncontrolled pain and new CLL diagnosis on 02/03. Patient is A&ox4 and following commands.         HPI: Cary Middleton is a 86 y.o. female with History of type 2 diabetes on Januvia and metformin, HTN, HLD, recent diagnosis of CLL who presents to the ED with chief complaint abdominal pain and vomiting as well elevated glucose.  Story provided by patient and her daughter.  They state that the patient was recently diagnosed with CLL which they believed to be the cause of her abdominal pain.  Her pain started Jan. 1, 2022. She has been taking hydrocodone acetaminophen for pain.  Until today her pain was controlled.  She states she woke up this morning with severe abdominal pain which has been consistent since she began the workup finding her abdominal malignancy.  She describes this pain as severe crampy pain in the epigastric region that wraps around the left flank.  She also has had nausea and few bouts of emesis over the last 3-4 days when the pain is especially bad.  She states today she had 3 bouts of nonbloody nonbilious emesis this morning prior to arrival.  She states that she took her pain medicine but they were vomited up.  She notes that her pain is currently uncontrolled.  The noted that her glucose was greater than 400 and she has noted compliance with her oral antihyperglycemics.  She denies  any chest pain or shortness of breath but does state that she feels generally weak.  Denies any falls or trauma, focal deficits or weaknesses.  Prior to today's presentation she states that she has been eating, drinking, urinating and stooling well.  She denies any dysuria pyuria or blood in urine or stool.         Past Medical History:   Diagnosis Date    Anemia     Anxiety     Cataract     Depression     Diabetes mellitus     Hyperlipidemia     Hypertension     Kidney stone     Macular degeneration     Osteoarthritis of left hip     Osteoporosis     Recurrent nephrolithiasis     Type 2 diabetes mellitus     Type 2 diabetes mellitus with ophthalmic manifestations        Past Surgical History:   Procedure Laterality Date    BLEPHAROPLASTY, QUAD      GANGLION CYST EXCISION      HYSTERECTOMY      INTRACAPSULAR CATARACT EXTRACTION      left eye       Review of patient's allergies indicates:   Allergen Reactions    Zoster vaccine live Rash    Lisinopril Swelling       Current Facility-Administered Medications on File Prior to Encounter   Medication    0.9%  NaCl infusion     Current Outpatient Medications on File Prior to Encounter   Medication Sig    amLODIPine (NORVASC) 10 MG tablet Take 1 tablet (10 mg total) by mouth once daily.    aspirin (ECOTRIN) 81 MG EC tablet Take 81 mg by mouth once daily.    atorvastatin (LIPITOR) 20 MG tablet Take 1 tablet (20 mg total) by mouth once daily.    cholecalciferol, vitamin D3, 1,000 unit capsule Take 1,000 Units by mouth once daily.    diclofenac sodium (VOLTAREN) 1 % Gel Apply 2 g topically daily as needed.    EScitalopram oxalate (LEXAPRO) 10 MG tablet Take 1 tablet (10 mg total) by mouth once daily.    glimepiride (AMARYL) 1 MG tablet Take 1 tablet (1 mg total) by mouth before breakfast.    HYDROcodone-acetaminophen (NORCO)  mg per tablet Take 1 tablet by mouth every 6 (six) hours as needed.    LIDOcaine (LIDODERM) 5 % Place 1 patch onto the skin.    metFORMIN  (GLUCOPHAGE) 1000 MG tablet Take 1 tablet (1,000 mg total) by mouth 2 (two) times daily with meals.    methylcellulose (ARTIFICIAL TEARS) 1 % ophthalmic solution Place 1 drop into both eyes as needed.    predniSONE (DELTASONE) 20 MG tablet Take 2 tablets (40 mg total) by mouth once daily. for 5 days (Patient taking differently: Take 10 mg by mouth 2 (two) times daily.)    SITagliptin (JANUVIA) 100 MG Tab Take 1 tablet (100 mg total) by mouth once daily.    acalabrutinib (CALQUENCE) 100 mg Cap Take 100 mg by mouth 2 (two) times a day.    lancets Misc To check BG 2 times daily, to use with insurance preferred meter    SITagliptin (JANUVIA) 100 MG Tab Take 1 tablet by mouth.    TRUE METRIX GLUCOSE TEST STRIP Strp TEST BLOOD SUGAR TWICE A DAY    TRUEPLUS LANCETS 30 gauge Misc USE ONE LANCET TWICE DAILY     Family History       Problem Relation (Age of Onset)    Aneurysm Sister    Arthritis Daughter    Cancer Mother, Son    Cataracts Father    Colon cancer Mother    Diabetes Father, Brother    Heart disease Father    Hypertension Daughter, Brother, Daughter, Brother    No Known Problems Daughter    Pancreatic cancer Sister    Strabismus Daughter    Transient ischemic attack Daughter          Tobacco Use    Smoking status: Never Smoker    Smokeless tobacco: Never Used   Substance and Sexual Activity    Alcohol use: No    Drug use: No    Sexual activity: Never     Partners: Male     Review of Systems   Constitutional: Positive for activity change (since new years, less activity) and appetite change. Negative for chills and fever.   HENT: Negative for mouth sores and sore throat.    Eyes: Negative for pain and visual disturbance.   Respiratory: Negative for cough, chest tightness and shortness of breath.    Cardiovascular: Positive for chest pain (substernal, radiating from left lumbar wrapping around ). Negative for palpitations and leg swelling.   Gastrointestinal: Positive for diarrhea, nausea and vomiting. Negative for  abdominal pain and anal bleeding.   Genitourinary: Positive for flank pain (left). Negative for difficulty urinating and dysuria.   Musculoskeletal: Positive for back pain. Negative for arthralgias and neck pain.   Neurological: Positive for dizziness and weakness. Negative for headaches.   Psychiatric/Behavioral: Positive for confusion and sleep disturbance.     Objective:     Vital Signs (Most Recent):  Temp: 98.9 °F (37.2 °C) (02/05/22 1333)  Pulse: 82 (02/05/22 1934)  Resp: (!) 23 (02/05/22 1732)  BP: (!) 170/81 (02/05/22 1934)  SpO2: 98 % (02/05/22 1934) Vital Signs (24h Range):  Temp:  [98.9 °F (37.2 °C)] 98.9 °F (37.2 °C)  Pulse:  [74-92] 82  Resp:  [17-23] 23  SpO2:  [98 %] 98 %  BP: (161-171)/(78-81) 170/81     Weight: 58.4 kg (128 lb 10.2 oz)  Body mass index is 22.08 kg/m².    Physical Exam  Vitals and nursing note reviewed. Exam conducted with a chaperone present.   Constitutional:       General: She is not in acute distress.     Appearance: Normal appearance. She is not ill-appearing or diaphoretic.   HENT:      Head: Normocephalic and atraumatic.      Right Ear: External ear normal.      Left Ear: External ear normal.      Nose: Nose normal. No congestion or rhinorrhea.      Mouth/Throat:      Mouth: Mucous membranes are moist.      Pharynx: Oropharynx is clear.      Comments: Upper dentures  Eyes:      General: No scleral icterus.     Extraocular Movements: Extraocular movements intact.      Pupils: Pupils are equal, round, and reactive to light.   Cardiovascular:      Rate and Rhythm: Normal rate and regular rhythm.      Pulses: Normal pulses.      Heart sounds: Normal heart sounds.   Pulmonary:      Effort: Pulmonary effort is normal.      Breath sounds: Normal breath sounds. No wheezing, rhonchi or rales.   Abdominal:      General: Bowel sounds are normal. There is no distension.      Palpations: Abdomen is soft.      Tenderness: There is no abdominal tenderness. There is left CVA tenderness. There  is no right CVA tenderness.   Musculoskeletal:         General: Tenderness (Left L1 wrappy up and around anteriorly to xiphoid process. ) present. Normal range of motion.      Cervical back: Normal range of motion and neck supple. No tenderness.      Right lower leg: No edema.      Left lower leg: No edema.   Lymphadenopathy:      Head:      Right side of head: No submental, submandibular, preauricular or posterior auricular adenopathy.      Left side of head: No submental, submandibular, preauricular or posterior auricular adenopathy.      Cervical: Cervical adenopathy present.      Right cervical: Superficial cervical adenopathy present. No deep or posterior cervical adenopathy.     Left cervical: Deep cervical adenopathy present. No superficial or posterior cervical adenopathy.   Skin:     General: Skin is warm and dry.   Neurological:      Mental Status: She is alert and oriented to person, place, and time.   Psychiatric:         Mood and Affect: Mood normal.         Behavior: Behavior normal.         Thought Content: Thought content normal.         Judgment: Judgment normal.           CRANIAL NERVES     CN III, IV, VI   Pupils are equal, round, and reactive to light.       Significant Labs: All pertinent labs within the past 24 hours have been reviewed.    Significant Imaging: I have reviewed all pertinent imaging results/findings within the past 24 hours.    Assessment/Plan:     * Hyperglycemia  Patient reports she was having sugars in the 400s at home with abdominal pain and n/v  - sugar on admission is 288  - VBG with pH of 7.44, beta hydroxybutarate negative    Plan  - this is likely hyperglycemia in the setting of recent steroid use  - after discussion with H/O, would like to continue steroids  - POCT glu AC HS  - weight based regimen of detemir 6 QHS and aspart 2 TIDWM in setting of hyperglycemia, can up titrate based on needs  - diabetic diet    CLL (chronic lymphocytic leukemia)  Patient recently  diagnosed with CLL, was planning to start oral chemo shortly  - recently prescribed prednisone by H/O    Plan  - H/O consulted to assist in care, appreciate recs  - labs are not indicative of TLS, continuing to trend      Lactic acidosis  Patient has lactate of 4 on admission  - likely in setting of dehydration and hyperglycemia    Plan  - septic work up in process, low suspicion in absence of fever, tachycardia, or hypotension.  - CXR shows no acute findings  - Urinalysis negative for UTI  - patient received 1L bolus in ED, continuing 100cc/hr for 10 hours  - strict I/Os  - see hyperglycemia assessment and plan  - lactate q4 until downtrending      Anemia  Patient has Hgb of 11.8 with MCV 67    Plan  - CBC daily, transfuse hgb < 7  - iron, ferritin, TIBC pending      Leukocytosis  Patient has leukocytosis of 24 on admission, up from 16 on 1/10  - likely in the setting of steroid use    Plan  - CBC daily to trend  - appreciate input from H/O in setting of newly diagnosed CLL as well      Type 2 diabetes mellitus without complication, without long-term current use of insulin  Patient's last A1C of 7 in 10/2021    Plan  - repeat A1C  - diabetic diet  - POCT glu AC HS  - weight based regimen of detemir 6 QHS and aspart 2 TIDWM in setting of hyperglycemia, can up titrate based on needs  - LDSSI      Chronic pain  Morphine 2 q4 PRN for moderate pain  Morphine 4 q6 PRN for severe pain        Osteopenia  Continue home vitamin D      Major depressive disorder, single episode, in full remission  Patient on lexapro daily, continue      Essential hypertension  Patient on amlodipine 10 at home    Plan  - continue home meds  - hydralazine 25 PO q8 PRN for SBP > 180, DP > 90      Hyperlipidemia  Lipid panel pending  - continue home atorvastatin        VTE Risk Mitigation (From admission, onward)           Ordered     enoxaparin injection 40 mg  Daily         02/05/22 1945     Place sequential compression device  Until  discontinued         02/05/22 1945     IP VTE HIGH RISK PATIENT  Once         02/05/22 1941     Place sequential compression device  Until discontinued         02/05/22 1941     IP VTE HIGH RISK PATIENT  Once         02/05/22 1945                       Tin Cervantes MD  Department of Hospital Medicine   [facility] Emergency Dept                      02/06/2022                             STAFF PHYSICIAN NOTE                                   Attending Attestation for Rounds with Resident  I have reviewed and concur with the resident's history, physical, assessment, and plan.  I have personally interviewed and examined the patient at bedside and agree with the resident's findings.                                     Delia Thompson MD  Senior Hospitalist  [id number], [phone]

## 2022-02-06 NOTE — PLAN OF CARE
Problem: Occupational Therapy Goal  Goal: Occupational Therapy Goal      Description: Patient was evaluated and does not need OT services at this time due to patient improvement and return to PLOF.       Outcome: Adequate for Care Transition

## 2022-02-06 NOTE — HOSPITAL COURSE
Admitted for hyperglycemia. Sugar has been well controlled with insulin. Plan to discharge with insulin. Heme/onc consulted for concern of tumor lysis syndrome. Workup negative for TLS. Will get TTE and CT head and neck while inpatient. A1c elevated at 7.9. Added losartan to BP regimen. Patient discharge home with insulin regimen. Pain well controlled with morphine PO. Will followup with heme/onc.

## 2022-02-06 NOTE — PROGRESS NOTES
[facility] - Oncology ([facility])  [facility] Medicine  Progress Note    Patient Name: Cary Middleton  MRN: [id number]  Patient Class: IP- Inpatient   Admission Date: 2/5/2022  Length of Stay: 1 days  Attending Physician: Delia Thompson MD  Primary Care Provider: Arlene Costa MD        Subjective:     Principal Problem:Hyperglycemia        HPI:  Cary Middleton is a 86 y.o. female with History of type 2 diabetes on Januvia and metformin, HTN, HLD, recent diagnosis of CLL who presents to the ED with chief complaint abdominal pain and vomiting as well elevated glucose.  Story provided by patient and her daughter.  They state that the patient was recently diagnosed with CLL which they believed to be the cause of her abdominal pain.  Her pain started Jan. 1, 2022. She has been taking hydrocodone acetaminophen for pain.  Until today her pain was controlled.  She states she woke up this morning with severe abdominal pain which has been consistent since she began the workup finding her abdominal malignancy.  She describes this pain as severe crampy pain in the epigastric region that wraps around the left flank.  She also has had nausea and few bouts of emesis over the last 3-4 days when the pain is especially bad.  She states today she had 3 bouts of nonbloody nonbilious emesis this morning prior to arrival.  She states that she took her pain medicine but they were vomited up.  She notes that her pain is currently uncontrolled.  The noted that her glucose was greater than 400 and she has noted compliance with her oral antihyperglycemics.  She denies any chest pain or shortness of breath but does state that she feels generally weak.  Denies any falls or trauma, focal deficits or weaknesses.  Prior to today's presentation she states that she has been eating, drinking, urinating and stooling well.  She denies any dysuria pyuria or blood in urine or stool.         Overview/Hospital Course:  Admitted for hyperglycemia. Sugar  has been well controlled with insulin. Plan to discharge with insulin. Heme/onc consulted for concern of tumor lysis syndrome. Workup negative for TLS. Will get TTE and CT head and neck while inpatient. A1c elevated at 7.9. Added losartan to BP regimen.       Interval History: NAEON. Afebrile and VSS. Hypertension noted with high of 186/95, requiring PRN hydralazine. WCTM and added losartan 25mg. Patient was seen resting comfortably in bed eating breakfast with daughter at bedside. They reported an episode of pain that brought on vomiting last night. She was feeling her pain is well controlled on morphine in morning. She denies CP, SOB, diarrhea, constipation, chills, fever.     Review of Systems   Constitutional: Negative for chills and fever.   HENT: Negative for congestion and rhinorrhea.    Eyes: Negative for pain and visual disturbance.   Respiratory: Negative for chest tightness and shortness of breath.    Cardiovascular: Negative for chest pain and palpitations.   Gastrointestinal: Positive for abdominal pain, nausea and vomiting. Negative for abdominal distention, constipation and diarrhea.   Genitourinary: Negative for difficulty urinating and dysuria.   Musculoskeletal: Positive for back pain. Negative for neck pain.   Skin: Negative for rash and wound.   Neurological: Positive for weakness. Negative for dizziness and headaches.   Psychiatric/Behavioral: Negative for confusion and sleep disturbance.     Objective:     Vital Signs (Most Recent):  Temp: 98.4 °F (36.9 °C) (02/06/22 1116)  Pulse: 76 (02/06/22 1116)  Resp: 18 (02/06/22 1116)  BP: (!) 173/83 (02/06/22 1116)  SpO2: (!) 93 % (02/06/22 1116) Vital Signs (24h Range):  Temp:  [98 °F (36.7 °C)-99.2 °F (37.3 °C)] 98.4 °F (36.9 °C)  Pulse:  [69-86] 76  Resp:  [16-23] 18  SpO2:  [93 %-98 %] 93 %  BP: (167-186)/(78-95) 173/83     Weight: 55.3 kg (121 lb 12.9 oz)  Body mass index is 20.91 kg/m².  No intake or output data in the 24 hours ending 02/06/22  1510   Physical Exam  Vitals and nursing note reviewed. Exam conducted with a chaperone present.   Constitutional:       General: She is not in acute distress.     Appearance: Normal appearance. She is not ill-appearing or diaphoretic.   HENT:      Head: Normocephalic and atraumatic.      Right Ear: External ear normal.      Left Ear: External ear normal.      Nose: Nose normal. No congestion or rhinorrhea.      Mouth/Throat:      Mouth: Mucous membranes are moist.      Pharynx: Oropharynx is clear.      Comments: Upper dentures  Eyes:      General: No scleral icterus.     Extraocular Movements: Extraocular movements intact.      Pupils: Pupils are equal, round, and reactive to light.   Cardiovascular:      Rate and Rhythm: Normal rate and regular rhythm.      Pulses: Normal pulses.      Heart sounds: Normal heart sounds.   Pulmonary:      Effort: Pulmonary effort is normal.      Breath sounds: Normal breath sounds. No wheezing, rhonchi or rales.   Abdominal:      General: Bowel sounds are normal. There is no distension.      Palpations: Abdomen is soft.      Tenderness: There is no abdominal tenderness. There is no right CVA tenderness or left CVA tenderness.   Musculoskeletal:         General: Normal range of motion.      Cervical back: Normal range of motion and neck supple. No tenderness.      Right lower leg: No edema.      Left lower leg: No edema.   Lymphadenopathy:      Cervical: Cervical adenopathy present.   Skin:     General: Skin is warm and dry.   Neurological:      Mental Status: She is alert and oriented to person, place, and time.   Psychiatric:         Mood and Affect: Mood normal.         Behavior: Behavior normal.         Thought Content: Thought content normal.         Judgment: Judgment normal.         Significant Labs: All pertinent labs within the past 24 hours have been reviewed.    Significant Imaging: I have reviewed all pertinent imaging results/findings within the past 24  hours.      Assessment/Plan:      * Hyperglycemia  Patient reports she was having sugars in the 400s at home with abdominal pain and n/v  - sugar on admission is 288  - VBG with pH of 7.44, beta hydroxybutarate negative    Plan  - this is likely hyperglycemia in the setting of recent steroid use  - after discussion with H/O, would like to continue steroids  - POCT glu AC HS  - weight based regimen of detemir 8 QHS and aspart 3 TIDWM in setting of hyperglycemia, can up titrate based on needs  - diabetic diet    CLL (chronic lymphocytic leukemia)  Patient recently diagnosed with CLL, was planning to start oral chemo shortly  - recently prescribed prednisone by H/O    Plan  - H/O consulted to assist in care, appreciate recs  - labs are not indicative of TLS, continuing to trend  - CT head and neck pending      Lactic acidosis  Patient has lactate of 4 on admission  - likely in setting of dehydration and hyperglycemia    Plan  - septic work up in process, low suspicion in absence of fever, tachycardia, or hypotension.  - CXR shows no acute findings  - Urinalysis negative for UTI  - patient received 1L bolus in ED, continuing 100cc/hr for 10 hours  - strict I/Os  - see hyperglycemia assessment and plan  - lactate q4 until downtrending      Anemia  Patient has Hgb of 11.8 with MCV 67    Plan  - CBC daily, transfuse hgb < 7  - iron, ferritin, TIBC are WNL    Leukocytosis  Patient has leukocytosis of 24 on admission, up from 16 on 1/10  - likely in the setting of steroid use    Plan  - CBC daily to trend  - appreciate input from H/O in setting of newly diagnosed CLL as well      Type 2 diabetes mellitus without complication, without long-term current use of insulin  Patient's last A1C of 7 in 10/2021      Plan  - repeat A1C 7.9  - diabetic diet  - POCT glu AC HS  - weight based regimen of detemir 8 QHS and aspart 3 TIDWM in setting of hyperglycemia, can up titrate based on needs  - LDSSI      Chronic pain  Morphine 2 q4 PRN  for moderate pain  Morphine 4 q6 PRN for severe pain        Osteopenia  Continue home vitamin D      Major depressive disorder, single episode, in full remission  Patient on lexapro daily, continue      Essential hypertension  Patient on amlodipine 10 at home    Plan  - continue home meds  - hydralazine 25 PO q8 PRN for SBP > 180, DP > 90      Hyperlipidemia  Lipid panel pending  - continue home atorvastatin        VTE Risk Mitigation (From admission, onward)           Ordered     enoxaparin injection 40 mg  Daily         02/05/22 1945     Place sequential compression device  Until discontinued         02/05/22 1945     IP VTE HIGH RISK PATIENT  Once         02/05/22 1941     Place sequential compression device  Until discontinued         02/05/22 1941     IP VTE HIGH RISK PATIENT  Once         02/05/22 1945                    Discharge Planning   JUNIE: 2/7/2022     Code Status: Full Code   Is the patient medically ready for discharge?: No    Reason for patient still in hospital (select all that apply): Patient trending condition, Laboratory test, Treatment, Imaging and Consult recommendations       Tin Cervantes MD  Department of Hospital Medicine   [facility] - Oncology ([facility])                      02/06/2022                             STAFF PHYSICIAN NOTE                                   Attending Attestation for Rounds with Resident  I have reviewed and concur with the resident's history, physical, assessment, and plan.  I have personally interviewed and examined the patient at bedside and agree with the resident's findings.          86 y.o. female with History of type 2 diabetes on Januvia and metformin, HTN, HLD, recent diagnosis of CLL here for hyperglycemia due to recently started steroids for CLL. Work up for TLS in process but likely negative. She had a flare of pain, anxiety and vomiting, with high BS. Unable to keep pain meds down. Treating with insulin and fluids for dehydration. Need Tm plan from  oncology. Will need insulin if she stays on prednisone.                               Delia Thompson MD  Senior Hospitalist  [id number], [phone]

## 2022-02-06 NOTE — PT/OT/SLP DISCHARGE
Occupational Therapy Discharge Summary    Cary Middleton  MRN: [id number]   Principal Problem: Hyperglycemia      Patient Discharged from acute Occupational Therapy on 2/6/22.  Please refer to prior OT note dated 2/6/22 for functional status.    Assessment:      Patient appropriate for care in another setting.    Objective:     GOALS:   Multidisciplinary Problems     Occupational Therapy Goals        Problem: Occupational Therapy Goal    Goal Priority Disciplines Outcome Interventions   Occupational Therapy Goal     OT, PT/OT Adequate for Care Transition    Description: Patient was evaluated and does not need OT services at this time due to patient improvement and return to PLOF.                    Reasons for Discontinuation of Therapy Services  Transfer to alternate level of care.      Plan:     Patient Discharged to: Home no OT services needed    2/6/2022

## 2022-02-06 NOTE — SUBJECTIVE & OBJECTIVE
Oncology Treatment Plan:   [No treatment plan]    Medications:  Continuous Infusions:  Scheduled Meds:   amLODIPine  10 mg Oral Daily    artificial tears  2 drop Both Eyes Daily    aspirin  81 mg Oral Daily    atorvastatin  20 mg Oral Daily    enoxaparin  40 mg Subcutaneous Daily    EScitalopram oxalate  10 mg Oral Daily    insulin aspart U-100  2 Units Subcutaneous TIDWM    insulin detemir U-100  6 Units Subcutaneous QHS    LIDOcaine  1 patch Transdermal QHS    losartan  12.5 mg Oral Daily    magnesium sulfate IVPB  2 g Intravenous Q2H    polyethylene glycol  17 g Oral Daily    potassium, sodium phosphates  2 packet Oral Q4H    predniSONE  10 mg Oral BID    vitamin D  1,000 Units Oral Daily     PRN Meds:acetaminophen, dextrose 10%, dextrose 10%, glucagon (human recombinant), glucose, glucose, hydrALAZINE, insulin aspart U-100, methocarbamoL, morphine, morphine, naloxone, ondansetron, sodium chloride 0.9%     Review of patient's allergies indicates:   Allergen Reactions    Zoster vaccine live Rash    Lisinopril Swelling        Past Medical History:   Diagnosis Date    Anemia     Anxiety     Cataract     CLL (chronic lymphocytic leukemia) 2/5/2022    Depression     Diabetes mellitus     Hyperlipidemia     Hypertension     Kidney stone     Macular degeneration     Osteoarthritis of left hip     Osteoporosis     Recurrent nephrolithiasis     Type 2 diabetes mellitus     Type 2 diabetes mellitus with ophthalmic manifestations      Past Surgical History:   Procedure Laterality Date    BLEPHAROPLASTY, QUAD      GANGLION CYST EXCISION      HYSTERECTOMY      INTRACAPSULAR CATARACT EXTRACTION      left eye     Family History     Problem Relation (Age of Onset)    Aneurysm Sister    Arthritis Daughter    Cancer Mother, Son    Cataracts Father    Colon cancer Mother    Diabetes Father, Brother    Heart disease Father    Hypertension Daughter, Brother, Daughter, Brother    No Known Problems  Daughter    Pancreatic cancer Sister    Strabismus Daughter    Transient ischemic attack Daughter        Tobacco Use    Smoking status: Never Smoker    Smokeless tobacco: Never Used   Substance and Sexual Activity    Alcohol use: No    Drug use: No    Sexual activity: Never     Partners: Male       Review of Systems   Constitutional: Positive for fatigue. Negative for activity change, appetite change, chills and fever.   HENT: Negative for congestion, drooling and tinnitus.    Respiratory: Negative for apnea, cough, chest tightness and shortness of breath.    Cardiovascular: Negative for chest pain, palpitations and leg swelling.   Gastrointestinal: Negative for abdominal distention and abdominal pain.   Endocrine: Negative for cold intolerance and heat intolerance.   Genitourinary: Negative for difficulty urinating and hematuria.   Musculoskeletal: Negative for arthralgias, back pain and gait problem.   Skin: Negative for color change and rash.   Neurological: Negative for syncope and weakness.   Hematological: Negative for adenopathy. Does not bruise/bleed easily.   Psychiatric/Behavioral: Negative for agitation and confusion.     Objective:     Vital Signs (Most Recent):  Temp: 98.3 °F (36.8 °C) (02/06/22 0724)  Pulse: 69 (02/06/22 0724)  Resp: 18 (02/06/22 0724)  BP: (!) 172/87 (172/87) (02/06/22 0724)  SpO2: 98 % (02/06/22 0724) Vital Signs (24h Range):  Temp:  [98 °F (36.7 °C)-99.2 °F (37.3 °C)] 98.3 °F (36.8 °C)  Pulse:  [69-92] 69  Resp:  [16-23] 18  SpO2:  [93 %-98 %] 98 %  BP: (161-186)/(78-95) 172/87     Weight: 58.1 kg (128 lb)  Body mass index is 21.97 kg/m².  Body surface area is 1.62 meters squared.    No intake or output data in the 24 hours ending 02/06/22 1043    Physical Exam  Constitutional:       Appearance: Normal appearance.   HENT:      Head: Normocephalic and atraumatic.      Mouth/Throat:      Mouth: Mucous membranes are moist.   Eyes:      Extraocular Movements: Extraocular movements  intact.      Pupils: Pupils are equal, round, and reactive to light.   Cardiovascular:      Rate and Rhythm: Normal rate and regular rhythm.      Pulses: Normal pulses.      Heart sounds: No murmur heard.      Pulmonary:      Effort: Pulmonary effort is normal. No respiratory distress.      Breath sounds: Normal breath sounds.   Chest:   Breasts:      Right: No axillary adenopathy.      Left: No axillary adenopathy.       Abdominal:      General: Abdomen is flat. There is no distension.      Palpations: Abdomen is soft.      Tenderness: There is no abdominal tenderness.   Musculoskeletal:         General: No swelling or tenderness. Normal range of motion.      Cervical back: Normal range of motion. No rigidity.   Lymphadenopathy:      Cervical: No cervical adenopathy.      Upper Body:      Right upper body: No axillary adenopathy.      Left upper body: No axillary adenopathy.   Skin:     General: Skin is warm and dry.      Coloration: Skin is not jaundiced.   Neurological:      General: No focal deficit present.      Mental Status: She is alert and oriented to person, place, and time.   Psychiatric:         Mood and Affect: Mood normal.         Behavior: Behavior normal.         Significant Labs:   All pertinent labs from the last 24 hours have been reviewed.    Diagnostic Results:  I have reviewed all pertinent imaging results/findings within the past 24 hours.

## 2022-02-06 NOTE — ASSESSMENT & PLAN NOTE
Patient has lactate of 4 on admission  - likely in setting of dehydration and hyperglycemia    Plan  - septic work up in process, low suspicion in absence of fever, tachycardia, or hypotension.  - CXR shows no acute findings  - Urinalysis negative for UTI  - patient received 1L bolus in ED, continuing 100cc/hr for 10 hours  - strict I/Os  - see hyperglycemia assessment and plan  - lactate q4 until downtrending

## 2022-02-06 NOTE — CONSULTS
[facility] - Oncology ([facility])  Hematology/Oncology  Consult Note    Patient Name: Cary Middleton  MRN: [id number]  Admission Date: 2/5/2022  Hospital Length of Stay: 1 days  Code Status: Full Code   Attending Provider: Delia Thompson MD  Consulting Provider: Ekaterina Scherer MD  Primary Care Physician: Arlene Costa MD  Principal Problem:Hyperglycemia    Inpatient consult to Hematology/Oncology  Consult performed by: Ekaterina Scherer MD  Consult ordered by: Laury Wong MD        Subjective:     HPI:  Patient is an 86-year-old woman with recent diagnosis of CLL/SLL, presenting to hospital with abdominal pain and nausea, BMT on consult to assist with management. Pt underwent CT abd 1/6 for flank pain which revealed retroperitoneal and mesenteric lymphadenopathy, also with leukocytosis to 14.63 and flow cytometry confirming the diagnosis of CLL. Pt established care with hematology 2/3/22 with plans to initiate Acalabrutinib, and steroids were prescribed as temporizing measure for abdominal pain attributed to lymphadenopathy. Pt had progressive abdominal pain and nausea, hence presentation to ER 2/5. Trial of [place] at home. In ED she was noted to have hyperglycemia, bloodwork was not indicative of TLS. This morning pt does not complain of abdominal pain, reports symptoms improved since being in the hospital. No nausea, fevers, but reports fatigue.       Oncology Treatment Plan:   [No treatment plan]    Medications:  Continuous Infusions:  Scheduled Meds:   amLODIPine  10 mg Oral Daily    artificial tears  2 drop Both Eyes Daily    aspirin  81 mg Oral Daily    atorvastatin  20 mg Oral Daily    enoxaparin  40 mg Subcutaneous Daily    EScitalopram oxalate  10 mg Oral Daily    insulin aspart U-100  2 Units Subcutaneous TIDWM    insulin detemir U-100  6 Units Subcutaneous QHS    LIDOcaine  1 patch Transdermal QHS    losartan  12.5 mg Oral Daily    magnesium sulfate IVPB  2 g Intravenous Q2H     polyethylene glycol  17 g Oral Daily    potassium, sodium phosphates  2 packet Oral Q4H    predniSONE  10 mg Oral BID    vitamin D  1,000 Units Oral Daily     PRN Meds:acetaminophen, dextrose 10%, dextrose 10%, glucagon (human recombinant), glucose, glucose, hydrALAZINE, insulin aspart U-100, methocarbamoL, morphine, morphine, naloxone, ondansetron, sodium chloride 0.9%     Review of patient's allergies indicates:   Allergen Reactions    Zoster vaccine live Rash    Lisinopril Swelling        Past Medical History:   Diagnosis Date    Anemia     Anxiety     Cataract     CLL (chronic lymphocytic leukemia) 2/5/2022    Depression     Diabetes mellitus     Hyperlipidemia     Hypertension     Kidney stone     Macular degeneration     Osteoarthritis of left hip     Osteoporosis     Recurrent nephrolithiasis     Type 2 diabetes mellitus     Type 2 diabetes mellitus with ophthalmic manifestations      Past Surgical History:   Procedure Laterality Date    BLEPHAROPLASTY, QUAD      GANGLION CYST EXCISION      HYSTERECTOMY      INTRACAPSULAR CATARACT EXTRACTION      left eye     Family History     Problem Relation (Age of Onset)    Aneurysm Sister    Arthritis Daughter    Cancer Mother, Son    Cataracts Father    Colon cancer Mother    Diabetes Father, Brother    Heart disease Father    Hypertension Daughter, Brother, Daughter, Brother    No Known Problems Daughter    Pancreatic cancer Sister    Strabismus Daughter    Transient ischemic attack Daughter        Tobacco Use    Smoking status: Never Smoker    Smokeless tobacco: Never Used   Substance and Sexual Activity    Alcohol use: No    Drug use: No    Sexual activity: Never     Partners: Male       Review of Systems   Constitutional: Positive for fatigue. Negative for activity change, appetite change, chills and fever.   HENT: Negative for congestion, drooling and tinnitus.    Respiratory: Negative for apnea, cough, chest tightness and shortness of  breath.    Cardiovascular: Negative for chest pain, palpitations and leg swelling.   Gastrointestinal: Negative for abdominal distention and abdominal pain.   Endocrine: Negative for cold intolerance and heat intolerance.   Genitourinary: Negative for difficulty urinating and hematuria.   Musculoskeletal: Negative for arthralgias, back pain and gait problem.   Skin: Negative for color change and rash.   Neurological: Negative for syncope and weakness.   Hematological: Negative for adenopathy. Does not bruise/bleed easily.   Psychiatric/Behavioral: Negative for agitation and confusion.     Objective:     Vital Signs (Most Recent):  Temp: 98.3 °F (36.8 °C) (02/06/22 0724)  Pulse: 69 (02/06/22 0724)  Resp: 18 (02/06/22 0724)  BP: (!) 172/87 (172/87) (02/06/22 0724)  SpO2: 98 % (02/06/22 0724) Vital Signs (24h Range):  Temp:  [98 °F (36.7 °C)-99.2 °F (37.3 °C)] 98.3 °F (36.8 °C)  Pulse:  [69-92] 69  Resp:  [16-23] 18  SpO2:  [93 %-98 %] 98 %  BP: (161-186)/(78-95) 172/87     Weight: 58.1 kg (128 lb)  Body mass index is 21.97 kg/m².  Body surface area is 1.62 meters squared.    No intake or output data in the 24 hours ending 02/06/22 1043    Physical Exam  Constitutional:       Appearance: Normal appearance.   HENT:      Head: Normocephalic and atraumatic.      Mouth/Throat:      Mouth: Mucous membranes are moist.   Eyes:      Extraocular Movements: Extraocular movements intact.      Pupils: Pupils are equal, round, and reactive to light.   Cardiovascular:      Rate and Rhythm: Normal rate and regular rhythm.      Pulses: Normal pulses.      Heart sounds: No murmur heard.      Pulmonary:      Effort: Pulmonary effort is normal. No respiratory distress.      Breath sounds: Normal breath sounds.   Chest:   Breasts:      Right: No axillary adenopathy.      Left: No axillary adenopathy.       Abdominal:      General: Abdomen is flat. There is no distension.      Palpations: Abdomen is soft.      Tenderness: There is no  abdominal tenderness.   Musculoskeletal:         General: No swelling or tenderness. Normal range of motion.      Cervical back: Normal range of motion. No rigidity.   Lymphadenopathy:      Cervical: No cervical adenopathy.      Upper Body:      Right upper body: No axillary adenopathy.      Left upper body: No axillary adenopathy.   Skin:     General: Skin is warm and dry.      Coloration: Skin is not jaundiced.   Neurological:      General: No focal deficit present.      Mental Status: She is alert and oriented to person, place, and time.   Psychiatric:         Mood and Affect: Mood normal.         Behavior: Behavior normal.         Significant Labs:   All pertinent labs from the last 24 hours have been reviewed.    Diagnostic Results:  I have reviewed all pertinent imaging results/findings within the past 24 hours.    Assessment/Plan:     CLL (chronic lymphocytic leukemia)  Patient is an 86-year-old woman with recent diagnosis of Gaines stage II CLL/SLL, presenting to hospital with abdominal pain and nausea, BMT on consult to assist with management. Pt underwent CT abd 1/6 for flank pain which revealed retroperitoneal and mesenteric lymphadenopathy, also with leukocytosis to 14.63 and flow cytometry confirming the diagnosis of CLL.  Presenting to hospital with abdominal pain and nausea attributed to disease burden.     Recommendations:  - continue steroids, will inform outpt oncologist of hospitalization to potentially accelerate chemo initiation with progressive sx  - BG control, which can also contribute to nausea, per primary team  - adjust IP and OP sx mgmt, would recommend po oxy vs MSIR on discharge rather than home [place] to avoid masking fever + ensure Zofran at home  - please obtain CT neck/chest while in-house and to identify rest of tumor burden and complete staging  - TP53 and IGHV status pending for further prognostication of malignancy         Thank you for your consult. I will follow-up with patient.  Please contact us if you have any additional questions. Patient discussed with attending physician, Dr. Rutledge. Recommendations will be finalized with attending addendum.       Ekaterina Scherer MD  Hematology/Oncology  [facility] - Oncology ([facility])

## 2022-02-06 NOTE — PLAN OF CARE
Patient Diagnosed is with: Hyperglycemia and Hypertension (new recent diagnosis of CLL).  Blood Sugar greater than 400 at home.  Highest BP is 188/90 2100 on 2/5.    Consults: Hematology/Oncology, PT/OT  K+ 3.4 (po replacement given)  Mag 1.3 (iv replacement running)  Phos 2.1 (po replacement ordered)    Imaging: TTE and CT neck/chest    Patient is alert and oriented x 4 and ambulatory with adult daughter in the room with her. Patient and daughter has been educated to call for assistance with ADL's. Patient is a new overnight admit awaiting attending's plan.    Hem/Onc Recommendations:  - continue steroids, will inform outpt oncologist of   hospitalization to potentially accelerate chemo initiation with  progressive sx  - BG control, which can also contribute to nausea, per primary team  - adjust IP and OP sx mgmt, would recommend po oxy vs MSIR on discharge rather than home [place] to avoid masking fever + ensure Zofran at home  - please obtain CT neck/chest while in-house and to identify rest of tumor burden and complete staging  - TP53 and IGHV status pending for further prognostication of malignancy.    PT/OT Recommendations:   Patient was evaluated and does not need OT services at this time due to patient improvement and return to PLOF.     PT evaluation complete. No goals established as pt is baseline with mobility and has no acute PT needs at this time. D/C from PT services.    Poss JUNIE: within the next 24 to 48 hrs

## 2022-02-06 NOTE — ASSESSMENT & PLAN NOTE
Patient is an 86-year-old woman with recent diagnosis of Gaines stage II CLL/SLL, presenting to hospital with abdominal pain and nausea, BMT on consult to assist with management. Pt underwent CT abd 1/6 for flank pain which revealed retroperitoneal and mesenteric lymphadenopathy, also with leukocytosis to 14.63 and flow cytometry confirming the diagnosis of CLL.  Presenting to hospital with abdominal pain and nausea attributed to disease burden.     Recommendations:  - continue steroids, will inform outpt oncologist of hospitalization to potentially accelerate chemo initiation with progressive sx  - BG control, which can also contribute to nausea, per primary team  - adjust IP and OP sx mgmt, would recommend po oxy vs MSIR on discharge rather than home [place] to avoid masking fever + ensure Zofran at home  - please obtain CT neck/chest while in-house and to identify rest of tumor burden and complete staging  - TP53 and IGHV status pending for further prognostication of malignancy

## 2022-02-06 NOTE — ASSESSMENT & PLAN NOTE
Patient has Hgb of 11.8 with MCV 67    Plan  - CBC daily, transfuse hgb < 7  - iron, ferritin, TIBC pending

## 2022-02-06 NOTE — SUBJECTIVE & OBJECTIVE
Interval History: NAEON. Afebrile and VSS. Hypertension noted with high of 186/95, requiring PRN hydralazine. WCTM and added losartan 25mg. Patient was seen resting comfortably in bed eating breakfast with daughter at bedside. They reported an episode of pain that brought on vomiting last night. She was feeling her pain is well controlled on morphine in morning. She denies CP, SOB, diarrhea, constipation, chills, fever.     Review of Systems   Constitutional: Negative for chills and fever.   HENT: Negative for congestion and rhinorrhea.    Eyes: Negative for pain and visual disturbance.   Respiratory: Negative for chest tightness and shortness of breath.    Cardiovascular: Negative for chest pain and palpitations.   Gastrointestinal: Positive for abdominal pain, nausea and vomiting. Negative for abdominal distention, constipation and diarrhea.   Genitourinary: Negative for difficulty urinating and dysuria.   Musculoskeletal: Positive for back pain. Negative for neck pain.   Skin: Negative for rash and wound.   Neurological: Positive for weakness. Negative for dizziness and headaches.   Psychiatric/Behavioral: Negative for confusion and sleep disturbance.     Objective:     Vital Signs (Most Recent):  Temp: 98.4 °F (36.9 °C) (02/06/22 1116)  Pulse: 76 (02/06/22 1116)  Resp: 18 (02/06/22 1116)  BP: (!) 173/83 (02/06/22 1116)  SpO2: (!) 93 % (02/06/22 1116) Vital Signs (24h Range):  Temp:  [98 °F (36.7 °C)-99.2 °F (37.3 °C)] 98.4 °F (36.9 °C)  Pulse:  [69-86] 76  Resp:  [16-23] 18  SpO2:  [93 %-98 %] 93 %  BP: (167-186)/(78-95) 173/83     Weight: 55.3 kg (121 lb 12.9 oz)  Body mass index is 20.91 kg/m².  No intake or output data in the 24 hours ending 02/06/22 1510   Physical Exam  Vitals and nursing note reviewed. Exam conducted with a chaperone present.   Constitutional:       General: She is not in acute distress.     Appearance: Normal appearance. She is not ill-appearing or diaphoretic.   HENT:      Head:  Normocephalic and atraumatic.      Right Ear: External ear normal.      Left Ear: External ear normal.      Nose: Nose normal. No congestion or rhinorrhea.      Mouth/Throat:      Mouth: Mucous membranes are moist.      Pharynx: Oropharynx is clear.      Comments: Upper dentures  Eyes:      General: No scleral icterus.     Extraocular Movements: Extraocular movements intact.      Pupils: Pupils are equal, round, and reactive to light.   Cardiovascular:      Rate and Rhythm: Normal rate and regular rhythm.      Pulses: Normal pulses.      Heart sounds: Normal heart sounds.   Pulmonary:      Effort: Pulmonary effort is normal.      Breath sounds: Normal breath sounds. No wheezing, rhonchi or rales.   Abdominal:      General: Bowel sounds are normal. There is no distension.      Palpations: Abdomen is soft.      Tenderness: There is no abdominal tenderness. There is no right CVA tenderness or left CVA tenderness.   Musculoskeletal:         General: Normal range of motion.      Cervical back: Normal range of motion and neck supple. No tenderness.      Right lower leg: No edema.      Left lower leg: No edema.   Lymphadenopathy:      Cervical: Cervical adenopathy present.   Skin:     General: Skin is warm and dry.   Neurological:      Mental Status: She is alert and oriented to person, place, and time.   Psychiatric:         Mood and Affect: Mood normal.         Behavior: Behavior normal.         Thought Content: Thought content normal.         Judgment: Judgment normal.         Significant Labs: All pertinent labs within the past 24 hours have been reviewed.    Significant Imaging: I have reviewed all pertinent imaging results/findings within the past 24 hours.

## 2022-02-06 NOTE — ASSESSMENT & PLAN NOTE
Patient reports she was having sugars in the 400s at home with abdominal pain and n/v  - sugar on admission is 288  - VBG with pH of 7.44, beta hydroxybutarate negative    Plan  - this is likely hyperglycemia in the setting of recent steroid use  - after discussion with H/O, would like to continue steroids  - POCT glu AC HS  - weight based regimen of detemir 6 QHS and aspart 2 TIDWM in setting of hyperglycemia, can up titrate based on needs  - diabetic diet

## 2022-02-06 NOTE — PT/OT/SLP EVAL
"Occupational Therapy   Co-Evaluation and Discharge Note    Name: Cary Middleton  MRN: [id number]  Admitting Diagnosis:  Hyperglycemia   Recent Surgery: * No surgery found *      Recommendations:     Discharge Recommendations: home  Discharge Equipment Recommendations:  none  Barriers to discharge:  None    Assessment:     Cary Middleton is a 86 y.o. female with a medical diagnosis of Hyperglycemia. At this time, patient is functioning at their prior level of function and does not require further acute OT services.     Plan:     During this hospitalization, patient does not require further acute OT services.  Please re-consult if situation changes.    · Plan of Care Reviewed with: patient,daughter    Subjective     Chief Complaint: abdominal pain day before.  "I feel much better now, I don't have any pain"   Patient/Family Comments/goals: Get better, go home    Occupational Profile:  Living Environment: Pt lives with  and son in [facility] with small threshold  Previous level of function: Independent in ADLs and IADLs  Roles and Routines: wife, mother, grandmother, great grandmother, great-great grandmother  Equipment Used at home:  none  Assistance upon Discharge: , son, daughter    Pain/Comfort:  · Pain Rating 1: 0/10    Patients cultural, spiritual, Yarsani conflicts given the current situation: no    Objective:     Communicated with: NIC Pyle prior to session.  Patient found HOB elevated with peripheral IV (no lines attached) upon OT entry to room.    General Precautions: Standard, fall   Orthopedic Precautions:N/A   Braces: N/A  Respiratory Status: Room air     Occupational Performance:    Bed Mobility:    · Patient completed Rolling/Turning to Left with  modified independence  · Patient completed Rolling/Turning to Right with modified independence  · Patient completed Scooting/Bridging with modified independence  · Patient completed Supine to Sit with modified independence  · Patient completed Sit to " Supine with modified independence    Functional Mobility/Transfers:  · Patient completed Sit <> Stand Transfer with supervision  with  no assistive device   · Functional Mobility: Pt engaging in functional mobility to simulate household/community distances with no AD in order to maximize functional activity tolerance and standing balance required for engagement in occupations of choice.       Activities of Daily Living:  · Declined    Cognitive/Visual Perceptual:  Cognitive/Psychosocial Skills:     -       Oriented to: AOx4   -       Follows Commands/attention:Follows multistep  commands  -       Communication: clear/fluent  -       Memory: No Deficits noted  -       Safety awareness/insight to disability: intact   -       Mood/Affect/Coping skills/emotional control: Appropriate to situation and Pleasant  Visual/Perceptual:      -Intact      Physical Exam:  Balance:    -       Intact  Postural examination/scapula alignment:    -       Rounded shoulders  Dominant hand:    -       Left  Upper Extremity Range of Motion:   WFL  Upper Extremity Strength:  WFL   Strength:  WFL  Fine Motor Coordination:    -       Intact  Gross motor coordination:   WFL  Neurological:    -       Intact    AMPAC 6 Click ADL:  AMPAC Total Score: 20    Treatment & Education:   Patient and family aware of patient's deficits and therapy progression.    Time provided for therapeutic counseling and discussion of health disposition.    Educated on importance of EOB/OOB mobility, maintaining routine, sitting up in chair, and maximizing independence with ADLs during admission    Pt completed ADLs and functional mobility for treatment session as noted above    Pt/caregiver verbalized understanding and expressed no further concerns/questions.    Education:    Patient left up in chair with call button in reach, RN notified and daughter and tech present    GOALS:   Multidisciplinary Problems     Occupational Therapy Goals        Problem:  Occupational Therapy Goal    Goal Priority Disciplines Outcome Interventions   Occupational Therapy Goal     OT, PT/OT Adequate for Care Transition    Description: Patient was evaluated and does not need OT services at this time due to patient improvement and return to PLOF.                    History:     Past Medical History:   Diagnosis Date    Anemia     Anxiety     Cataract     CLL (chronic lymphocytic leukemia) 2/5/2022    Depression     Diabetes mellitus     Hyperlipidemia     Hypertension     Kidney stone     Macular degeneration     Osteoarthritis of left hip     Osteoporosis     Recurrent nephrolithiasis     Type 2 diabetes mellitus     Type 2 diabetes mellitus with ophthalmic manifestations        Past Surgical History:   Procedure Laterality Date    BLEPHAROPLASTY, QUAD      GANGLION CYST EXCISION      HYSTERECTOMY      INTRACAPSULAR CATARACT EXTRACTION      left eye       Time Tracking:     OT Date of Treatment: 02/06/22  OT Start Time: 1237  OT Stop Time: 1249  OT Total Time (min): 12 min    Billable Minutes:Evaluation 12    2/6/2022

## 2022-02-06 NOTE — ASSESSMENT & PLAN NOTE
Patient has leukocytosis of 24 on admission, up from 16 on 1/10  - likely in the setting of steroid use    Plan  - CBC daily to trend  - appreciate input from H/O in setting of newly diagnosed CLL as well

## 2022-02-06 NOTE — ASSESSMENT & PLAN NOTE
Patient on amlodipine 10 at home    Plan  - continue home meds  - hydralazine 25 PO q8 PRN for SBP > 180, DP > 90

## 2022-02-06 NOTE — ASSESSMENT & PLAN NOTE
Patient recently diagnosed with CLL, was planning to start oral chemo shortly  - recently prescribed prednisone by H/O    Plan  - H/O consulted to assist in care, appreciate recs  - labs are not indicative of TLS, continuing to trend

## 2022-02-07 NOTE — PLAN OF CARE
02/07/22 1130   Post-Acute Status   Post-Acute Authorization Other   Other Status No Post-Acute Service Needs       Patient is being D/C today with no Social Service needs identified at this time.       11:38 AM  Schedule Hospital f/u appointment with DR. Joselyn Arciniega on 2/10/2022 @ 10:00am  Arlene Costa MD    Hospital follow up with DR. Joselyn Arciniega on Thurs. 2/10/2022@10:00am  [address]  [facility] [address]  [phone]         Shakira Guerrero LMSW  PRN - [facility]  [phone]

## 2022-02-07 NOTE — TELEPHONE ENCOUNTER
Spoke to daughter tiana, pt regime changed inpatient for better pain control, will use SS insulin with prednisone to adjust CBG while on tx

## 2022-02-07 NOTE — PLAN OF CARE
Patient discharged home via personal ride with family; escorted out of hospital via wheelchair by transport staff. PIV removed prior. Vital signs stable. Medications and supplied delivered to bedside. AVS reviewed and discharge education completed. Blood glucose monitoring and insulin administration education completed.  All needs addressed and questions answered. Instructed to follow-up as scheduled and PRN.

## 2022-02-07 NOTE — PLAN OF CARE
[facility] ([facility])  Discharge Final Note    Primary Care Provider: Arlene Costa MD    Expected Discharge Date: 2/7/2022    Final Discharge Note (most recent)     Final Note - 02/07/22 1140        Final Note    Assessment Type Final Discharge Note     Anticipated Discharge Disposition Home or Self Care     What phone number can be called within the next 1-3 days to see how you are doing after discharge? [phone]     Hospital Resources/Appts/Education Provided Appointments scheduled and added to AVS        Post-Acute Status    Post-Acute Authorization Other     Other Status No Post-Acute Service Needs     Discharge Delays None known at this time                 Important Message from Medicare             Contact Info     Arlene Costa MD   Specialty: Internal Medicine   Relationship: PCP - General    [address]  [facility] [address]   Phone: [phone]       Next Steps: Follow up    Instructions: Hospital follow up with DR. Joselyn Arciniega on Thurs. 2/10/2022@10:00am        Patient medically ready for discharge to home. Any necessary transport setup by . This SW scheduled or requested necessary follow-up appointments. Family/patient aware of discharge.    Future Appointments   Date Time Provider Department Center   2/10/2022 10:00 AM Anaid Arciniega PA-C [facility] IM [facility]   2/10/2022  1:45 PM [facility] OIC-CT2 500 LB LIMIT [facility]   2/24/2022  2:00 PM [facility] LAB BMT [facility] LAB[facility] [place]   2/24/2022  3:00 PM Lucina Lara MD [facility] HC BMT [place]   3/31/2022  9:00 AM LAB, [facility] SAT [facility] LAB [place]   4/7/2022 11:00 AM Saniya Hernandez DNP, NP [facility] ENDOCRN [place]     DC Plan Home with self care, family to provide transportation home.      Shakira Guerrero LMSW  PRN - [facility]  [phone]

## 2022-02-07 NOTE — PLAN OF CARE
[facility] ([facility])  Initial Discharge Assessment       Primary Care Provider: Arlene Costa MD    Admission Diagnosis: Nausea [R11.0]  CLL (chronic lymphocytic leukemia) [C91.10]  Heart failure [I50.9]  Abdominal pain [R10.9]  Chest pain [R07.9]  Abdominal lymphadenopathy [R59.0]    Admission Date: 2/5/2022  Expected Discharge Date: 2/7/2022    Discharge Barriers Identified: None    Payor: HUMANA MANAGED MEDICARE / Plan: HUMANA MEDICARE HMO / Product Type: Capitation /     Extended Emergency Contact Information  Primary Emergency Contact: Alexei Middleton  Address: [address] [place] [address] [facility]  Home Phone: [phone]  Mobile Phone: [phone]  Relation: Spouse  Preferred language: English   needed? No    Discharge Plan A: Home with family  Discharge Plan B: Home with family      [facility] - [facility] [address] [facility]  [address] [place] [address]  Phone: [phone] Fax: [phone]    [facility] - [place], [place] - [address]  [address]  [facility] [address]  Phone: [phone] Fax: [phone]      Initial Assessment (most recent)     Adult Discharge Assessment - 02/07/22 1112        Discharge Assessment    Assessment Type Discharge Planning Assessment     Confirmed/corrected address, phone number and insurance Yes     Confirmed Demographics Correct on Facesheet     Source of Information patient;family   Kye Church -daughter- [phone]    Communicated JUNIE with patient/caregiver Yes     Reason For Admission Hyperglycemia     Lives With spouse;child(pedro), adult   Kye Church -daughter- [phone]; Alexei Middleton -spouse -[phone]    Facility Arrived From: Home     Do you expect to return to your current living situation? Yes     Do you have help at home or someone to help you manage your care at home? Yes     Who are your caregiver(s) and their  phone number(s)? Kye Riveradaughter- [phone]; Alexei Middleton -spouse -[phone]     Prior to hospitilization cognitive status: Alert/Oriented     Current cognitive status: Alert/Oriented     Walking or Climbing Stairs Difficulty none     Dressing/Bathing Difficulty none     Do you have any problems with: --   Kye willingham- [phone]    Home Accessibility stairs within home     Stairs, Within Home, Primary none     Number of Stairs, Within Home, Primary none     Surface of Stairs, Within Home, Primary hardwood;tile     Stair Railings, Within Home, Primary none     Landing, Stairs, Within Home, Primary no railings     Stairs Comment, Within Home, Primary zero steps     Home Layout Able to live on 1st floor     Equipment Currently Used at Home none     Readmission within 30 days? No     Patient currently being followed by outpatient case management? No     Do you currently have service(s) that help you manage your care at home? No   Kye willingham- [phone]; Alexei Middleton -spouse -[phone]    Do you take prescription medications? Yes     Do you have prescription coverage? Yes     Coverage HU[employer]NA MANAGED MEDICARE - HUMANA MEDICARE HMO     Do you have any problems affording any of your prescribed medications? No     Is the patient taking medications as prescribed? yes     Who is going to help you get home at discharge? Kye willingham- [phone]; Alexei Middleton -spouse -[phone]     How do you get to doctors appointments? family or friend will provide   Kye willingham- [phone]    Are you on dialysis? No     Do you take coumadin? No     Discharge Plan A Home with family     Discharge Plan B Home with family     DME Needed Upon Discharge  none     Discharge Plan discussed with: Patient;Adult children   Kye willingham- [phone]    Discharge Barriers Identified None                 SW spoke with patient/daughter at bedside in 808 for Discharge  Planning Assessment. Per patient/daughter, patient  Lives spouse and adult son in a single family home on a slab foundation with zero steps to porch and point of entry.  Patient was independent with ADLS and DID NOT use DME or in-home assistive equipment. Patient is not on dialysis  or coumadin,  takes medications as prescribed / keeps refilled / has resources for all daily and prescriptive needs. Preferred pharmacy is [facility] - Agreeable to bedside delivery. Will have help from daughter/spouse/son and other immediate family upon discharge - Son to provide transportation home.  All questions addressed. Unit and SW direct numbers provided. Will continue to follow for course of hospitalization    2/5/2022  2:45 PM  Nausea [R11.0]  CLL (chronic lymphocytic leukemia) [C91.10]  Heart failure [I50.9]  Abdominal pain [R10.9]  Chest pain [R07.9]  Abdominal lymphadenopathy [R59.0]    PCP: Arlene Costa MD    PHARMACY:   [facility] [address] - [facility] [address] [address]  Phone: [phone] Fax: [phone]    [facility] - [facility] [address] [facility] [address]  [address] [facility] [address]  [place] [address]  Phone: [phone] Fax: [phone]      Payor: HUMANA MANAGED MEDICARE / Plan: HUMANA MEDICARE HMO / Product Type: Capitation /       Shakira Guerrero LMSW  PRN - [facility]  [phone]

## 2022-02-08 NOTE — TELEPHONE ENCOUNTER
Pt's daughter Rekha is advised to keep appt with Anaid and she will follow up with Dr Costa after wards

## 2022-02-08 NOTE — TELEPHONE ENCOUNTER
----- Message from Amanda Covington sent at 2/8/2022  2:26 PM CST -----  Contact: [phone] Rekha(daughter)  Pt daughter states the pt was admitted to the hospital on 02/05/22 and diagnosed with  CLL. Pt daughter also states the pt had  high blood pressure and high blood sugar. Rekha states the pt is now taking insulin. Pt daughter was advised of the [facility] f/up appt on 02/10 with Anaid Arciniega. Pt daughter states she would like to have the [facility] f/up appt with Dr Costa instead. Please call and advise.

## 2022-02-08 NOTE — DISCHARGE SUMMARY
[facility] Stroud - Oncology ([facility])  Hospital Medicine  Discharge Summary      Patient Name: Cary Middleton  MRN: [id number]  Patient Class: IP- Inpatient  Admission Date: 2/5/2022  Hospital Length of Stay: 2 days  Discharge Date and Time:  02/07/2022 6:57 PM  Attending Physician: Katiana att. providers found   Discharging Provider: Tin Cervantes MD  Primary Care Provider: Arlene Costa MD  [facility] Medicine Team: [facility] HOSP MED 2 Tin Cervantes MD    HPI:   Cary Middleton is a 86 y.o. female with History of type 2 diabetes on Januvia and metformin, HTN, HLD, recent diagnosis of CLL who presents to the ED with chief complaint abdominal pain and vomiting as well elevated glucose.  Story provided by patient and her daughter.  They state that the patient was recently diagnosed with CLL which they believed to be the cause of her abdominal pain.  Her pain started Jan. 1, 2022. She has been taking hydrocodone acetaminophen for pain.  Until today her pain was controlled.  She states she woke up this morning with severe abdominal pain which has been consistent since she began the workup finding her abdominal malignancy.  She describes this pain as severe crampy pain in the epigastric region that wraps around the left flank.  She also has had nausea and few bouts of emesis over the last 3-4 days when the pain is especially bad.  She states today she had 3 bouts of nonbloody nonbilious emesis this morning prior to arrival.  She states that she took her pain medicine but they were vomited up.  She notes that her pain is currently uncontrolled.  The noted that her glucose was greater than 400 and she has noted compliance with her oral antihyperglycemics.  She denies any chest pain or shortness of breath but does state that she feels generally weak.  Denies any falls or trauma, focal deficits or weaknesses.  Prior to today's presentation she states that she has been eating, drinking, urinating and stooling well.  She denies any  dysuria pyuria or blood in urine or stool.         * No surgery found *      Physical Exam  Vitals and nursing note reviewed. Exam conducted with a chaperone present.   Constitutional:       General: She is not in acute distress.     Appearance: Normal appearance. She is not ill-appearing or diaphoretic.   HENT:      Head: Normocephalic and atraumatic.      Right Ear: External ear normal.      Left Ear: External ear normal.      Nose: Nose normal. No congestion or rhinorrhea.      Mouth/Throat:      Mouth: Mucous membranes are moist.      Pharynx: Oropharynx is clear.      Comments: Upper dentures  Eyes:      General: No scleral icterus.     Extraocular Movements: Extraocular movements intact.      Pupils: Pupils are equal, round, and reactive to light.   Cardiovascular:      Rate and Rhythm: Normal rate and regular rhythm.      Pulses: Normal pulses.      Heart sounds: Normal heart sounds.   Pulmonary:      Effort: Pulmonary effort is normal.      Breath sounds: Normal breath sounds. No wheezing, rhonchi or rales.   Abdominal:      General: Bowel sounds are normal. There is no distension.      Palpations: Abdomen is soft.      Tenderness: There is no abdominal tenderness. There is no right CVA tenderness or left CVA tenderness.   Musculoskeletal:         General: Normal range of motion.      Cervical back: Normal range of motion and neck supple. No tenderness.      Right lower leg: No edema.      Left lower leg: No edema.   Lymphadenopathy:      Cervical: Cervical adenopathy present.   Skin:     General: Skin is warm and dry.   Neurological:      Mental Status: She is alert and oriented to person, place, and time.   Psychiatric:         Mood and Affect: Mood normal.         Behavior: Behavior normal.         Thought Content: Thought content normal.         Judgment: Judgment normal.       Hospital Course:   Admitted for hyperglycemia. Sugar has been well controlled with insulin. Plan to discharge with insulin.  Heme/onc consulted for concern of tumor lysis syndrome. Workup negative for TLS. Will get TTE and CT head and neck while inpatient. A1c elevated at 7.9. Added losartan to BP regimen. Patient discharge home with insulin regimen. Pain well controlled with morphine PO. Will followup with heme/onc.       Goals of Care Treatment Preferences:  Code Status: Full Code      Consults:   Consults (From admission, onward)        Status Ordering Provider     Inpatient consult to Hematology/Oncology  Once        Provider:  (Not yet assigned)    Completed SHANEL PERDOMO          * Hyperglycemia  Patient reports she was having sugars in the 400s at home with abdominal pain and n/v  - sugar on admission is 288  - VBG with pH of 7.44, beta hydroxybutarate negative    Plan  - this is likely hyperglycemia in the setting of recent steroid use  - after discussion with H/O, would like to continue steroids  - POCT glu AC HS  - weight based regimen of detemir 8 QHS and aspart 3 TIDWM in setting of hyperglycemia, can up titrate based on needs  - diabetic diet  - Did insulin teaching prior to discharge for safe administration of insulin when home    CLL (chronic lymphocytic leukemia)  Patient recently diagnosed with CLL, was planning to start oral chemo shortly  - recently prescribed prednisone by H/O    Plan  - H/O consulted to assist in care, appreciate recs  - labs are not indicative of TLS, continuing to trend  - Outpatient fup with H/O  - CTchest with   1.  Two lung micro nodules, one which may represent entry intrathoracic lymph node due to a juxta fissural location.  These are nonspecific findings and indeterminate.  Attention on follow-up surveillance recommended.  No specific evidence of metastatic disease with identified in the chest.    2.  Hyperdense, subcentimeter focus in the thyroid isthmus, unclear whether enhancing versus calcified.  Consider further evaluation with thyroid ultrasound.     Lactic acidosis  Patient has  lactate of 4 on admission  - likely in setting of dehydration and hyperglycemia    Plan  - septic work up in process, low suspicion in absence of fever, tachycardia, or hypotension.  - CXR shows no acute findings  - Urinalysis negative for UTI  - patient received 1L bolus in ED, continuing 100cc/hr for 10 hours  - strict I/Os  - see hyperglycemia assessment and plan  - lactate q4 until downtrending      Anemia  Patient has Hgb of 11.8 with MCV 67    Plan  - CBC daily, transfuse hgb < 7  - iron, ferritin, TIBC are WNL    Leukocytosis  Patient has leukocytosis of 24 on admission, up from 16 on 1/10  - likely in the setting of steroid use    Plan  - CBC daily to trend  - appreciate input from H/O in setting of newly diagnosed CLL as well      Type 2 diabetes mellitus without complication, without long-term current use of insulin  Patient's last A1C of 7 in 10/2021      Plan  - repeat A1C 7.9  - diabetic diet  - POCT glu AC HS  - weight based regimen of detemir 8 QHS and aspart 3 TIDWM in setting of hyperglycemia, can up titrate based on needs  - LDSSI      Chronic pain  Morphine 15mg PO q4 PRN for moderate pain  Norco 5 PO q6 PRN for severe pain  Methocarbamol 500mg PO TID PRN  Acetominophen 650mg PRN     Osteopenia  Continue home vitamin D      Major depressive disorder, single episode, in full remission  Patient on lexapro daily, continue      Essential hypertension  Patient on amlodipine 10 at home    Plan  - continue home meds  - hydralazine 25 PO q8 PRN for SBP > 180, DP > 90      Hyperlipidemia  Lipid panel pending  - continue home atorvastatin        Final Active Diagnoses:    Diagnosis Date Noted POA    PRINCIPAL PROBLEM:  Hyperglycemia [R73.9] 02/05/2022 Yes    Leukocytosis [D72.829] 02/05/2022 Yes    Anemia [D64.9] 02/05/2022 Yes    Lactic acidosis [E87.2] 02/05/2022 Yes    CLL (chronic lymphocytic leukemia) [C91.10] 02/05/2022 Yes    Type 2 diabetes mellitus without complication, without long-term  "current use of insulin [E11.9] 04/07/2021 Yes    Chronic pain [G89.29] 09/26/2018 Yes    Essential hypertension [I10] 06/29/2016 Yes    Major depressive disorder, single episode, in full remission [F32.5] 06/29/2016 Yes    Osteopenia [M85.80] 06/29/2016 Yes    Hyperlipidemia [E78.5] 07/12/2012 Yes     Chronic      Problems Resolved During this Admission:       Discharged Condition: stable    Disposition: Home or Self Care    Follow Up:   Follow-up Information     Arlene Costa MD.    Specialty: Internal Medicine  Why: Hospital follow up with DR. Joselyn Arciniega on Thurs. 2/10/2022@10:00am  Contact information:  [id number]  [facility] [id number]  [phone]                       Patient Instructions:   No discharge procedures on file.    Significant Diagnostic Studies: Labs: All labs within the past 24 hours have been reviewed  Radiology: X-Ray: CXR: X-Ray Chest PA and Lateral (CXR): No results found for this visit on 02/05/22.  CT scan: Neck and Chest    Pending Diagnostic Studies:     None         Medications:  Reconciled Home Medications:      Medication List      START taking these medications    ACCU-CHEK GUIDE GLUCOSE METER Misc  Generic drug: blood-glucose meter  Use to test blood glucose 2 (two) times daily with meals.     acetaminophen 325 MG tablet  Commonly known as: TYLENOL  Take 2 tablets (650 mg total) by mouth every 4 (four) hours as needed.     BD ULTRA-FINE SHORT PEN NEEDLE 31 gauge x 5/16" Ndle  Generic drug: pen needle, diabetic  Use to inject insulin four times daily     FIASP FLEXTOUCH U-100 INSULIN 100 unit/mL (3 mL) Inpn  Generic drug: insulin aspart (niacinamide)  Inject 3 Units into the skin 3 (three) times daily with meals.     FREESTYLE NIMCO 14 DAY SENSOR Kit  Generic drug: flash glucose sensor  Use one sensor every 2 weeks to check blood sugar ICD-10-CM: E11.9     LEVEMIR FLEXTOUCH U-100 INSULN 100 unit/mL (3 mL) Inpn pen  Generic drug: insulin detemir U-100  Inject 8 " Units into the skin every evening.     losartan 25 MG tablet  Commonly known as: COZAAR  Take 1 tablet (25 mg total) by mouth once daily.     methocarbamoL 500 MG Tab  Commonly known as: ROBAXIN  Take 1 tablet (500 mg total) by mouth 3 (three) times daily as needed.     mirtazapine 7.5 MG Tab  Commonly known as: REMERON  Take 1 tablet (7.5 mg total) by mouth every evening.     morphine 15 MG tablet  Commonly known as: MSIR  Take 1 tablet (15 mg total) by mouth every 4 (four) hours as needed for Pain.     ondansetron 4 MG Tbdl  Commonly known as: ZOFRAN-ODT  Dissolve 1 tablet (4 mg total) by mouth every 6 (six) hours as needed.     oxyCODONE 5 MG immediate release tablet  Commonly known as: ROXICODONE  Take 1 tablet (5 mg total) by mouth every 6 (six) hours as needed for Pain.        CHANGE how you take these medications    * lancets Misc  To check BG 2 times daily, to use with insurance preferred meter  What changed: Another medication with the same name was added. Make sure you understand how and when to take each.     * TRUEPLUS LANCETS 30 gauge Misc  Generic drug: lancets  USE ONE LANCET TWICE DAILY  What changed: Another medication with the same name was added. Make sure you understand how and when to take each.     * ACCU-CHEK SOFTCLIX LANCETS Misc  Generic drug: lancets  Test blood glucose twice daily with meals  What changed: You were already taking a medication with the same name, and this prescription was added. Make sure you understand how and when to take each.     LIDOcaine 5 %  Commonly known as: LIDODERM  Place 1 patch onto the skin once daily.  What changed: when to take this     predniSONE 10 MG tablet  Commonly known as: DELTASONE  Take 1 tablet (10 mg total) by mouth 2 (two) times daily.  What changed:   · medication strength  · how much to take  · when to take this     * TRUE METRIX GLUCOSE TEST STRIP Strp  Generic drug: blood sugar diagnostic  TEST BLOOD SUGAR TWICE A DAY  What changed: Another  medication with the same name was added. Make sure you understand how and when to take each.     * ACCU-CHEK GUIDE TEST STRIPS Strp  Generic drug: blood sugar diagnostic  Use to test blood glucose 2 (two) times daily with meals.  What changed: You were already taking a medication with the same name, and this prescription was added. Make sure you understand how and when to take each.         * This list has 5 medication(s) that are the same as other medications prescribed for you. Read the directions carefully, and ask your doctor or other care provider to review them with you.            CONTINUE taking these medications    acalabrutinib 100 mg Cap  Commonly known as: CALQUENCE  Take 100 mg by mouth 2 (two) times a day.     amLODIPine 10 MG tablet  Commonly known as: NORVASC  Take 1 tablet (10 mg total) by mouth once daily.     aspirin 81 MG EC tablet  Commonly known as: ECOTRIN  Take 81 mg by mouth once daily.     atorvastatin 20 MG tablet  Commonly known as: LIPITOR  Take 1 tablet (20 mg total) by mouth once daily.     cholecalciferol (vitamin D3) 25 mcg (1,000 unit) capsule  Commonly known as: VITAMIN D3  Take 1,000 Units by mouth once daily.     diclofenac sodium 1 % Gel  Commonly known as: VOLTAREN  Apply 2 g topically daily as needed.     EScitalopram oxalate 10 MG tablet  Commonly known as: LEXAPRO  Take 1 tablet (10 mg total) by mouth once daily.     methylcellulose 1 % ophthalmic solution  Commonly known as: ARTIFICIAL TEARS  Place 1 drop into both eyes as needed.        STOP taking these medications    glimepiride 1 MG tablet  Commonly known as: AMARYL     HYDROcodone-acetaminophen  mg per tablet  Commonly known as: NORCO     metFORMIN 1000 MG tablet  Commonly known as: GLUCOPHAGE     SITagliptin 100 MG Tab  Commonly known as: JANUVIA            Indwelling Lines/Drains at time of discharge:   Lines/Drains/Airways     None                 Time spent on the discharge of patient: 35 minutes          Tin Cervantes MD  Department of Hospital Medicine  [facility] - Oncology ([facility])

## 2022-02-08 NOTE — ASSESSMENT & PLAN NOTE
Patient reports she was having sugars in the 400s at home with abdominal pain and n/v  - sugar on admission is 288  - VBG with pH of 7.44, beta hydroxybutarate negative    Plan  - this is likely hyperglycemia in the setting of recent steroid use  - after discussion with H/O, would like to continue steroids  - POCT glu AC HS  - weight based regimen of detemir 8 QHS and aspart 3 TIDWM in setting of hyperglycemia, can up titrate based on needs  - diabetic diet  - Did insulin teaching prior to discharge for safe administration of insulin when home

## 2022-02-08 NOTE — ASSESSMENT & PLAN NOTE
Morphine 15mg PO q4 PRN for moderate pain  Norco 5 PO q6 PRN for severe pain  Methocarbamol 500mg PO TID PRN  Acetominophen 650mg PRN

## 2022-02-08 NOTE — ASSESSMENT & PLAN NOTE
Patient recently diagnosed with CLL, was planning to start oral chemo shortly  - recently prescribed prednisone by H/O    Plan  - H/O consulted to assist in care, appreciate recs  - labs are not indicative of TLS, continuing to trend  - Outpatient fup with H/O  - CTchest with   1.  Two lung micro nodules, one which may represent entry intrathoracic lymph node due to a juxta fissural location.  These are nonspecific findings and indeterminate.  Attention on follow-up surveillance recommended.  No specific evidence of metastatic disease with identified in the chest.    2.  Hyperdense, subcentimeter focus in the thyroid isthmus, unclear whether enhancing versus calcified.  Consider further evaluation with thyroid ultrasound.

## 2022-02-09 NOTE — PATIENT INSTRUCTIONS
Patient Education       Hyperglycemia Discharge Instructions, Adult   About this topic   High blood sugar is also known as hyperglycemia. Sugar is in the food you eat. Your body needs insulin to use the sugar in your bloodstream. Having the right amount of insulin controls the amount of sugar in your blood. If you do not have enough insulin, or if the body cannot detect the insulin, the glucose or sugar stays in your blood instead of going into your cells. This causes your blood sugar levels to be too high.  Many things can cause high blood sugar. Some are serious things like problems with your pancreas or an infection. Less serious things, like stress or certain medicines, can also cause high blood sugar.     What care is needed at home?   · Ask your doctor what you need to do when you go home. Make sure you ask questions if you do not understand what the doctor says.  · Take all your medicines as ordered.  · Drink water whenever you are thirsty. Water helps remove the extra sugar from your blood. Avoid sugary drinks like fruit juice, sports drinks, and regular soda.  · Exercise can help lower your blood sugar. Check with your regular doctor to find out what types of physical activity are best for you.  · If you have been given an eating plan, try to follow it more carefully. Ask for help from a dietitian.  · If you are feeling worried or anxious, try to find ways to manage your stress. Some people find methods like meditation, deep breathing, and relaxation helpful. Activities like yoga, exercise, and vineet chi can also help with stress.  · If you were told to check your blood sugar at home, make sure you know how and when to check it.  What follow-up care is needed?   · Your doctor may ask you to make visits to the office to check on your progress. Be sure to keep these visits.  · Your doctor may order a blood test to check how your sugar levels have been over time. This is a hemoglobin A1C level. You may need to  have these done every 3 to 6 months. Be sure to have this test done as ordered.  Will physical activity be limited?   Talk with your doctor about an exercise plan that is right for you.  What problems could happen?   · High levels of acids called ketones build up in the blood. This is a very serious problem.  · Infection  · Injury to blood vessels and nerves  What can be done to prevent this health problem?   · Control your weight.  · Limit beer, wine, and mixed drinks (alcohol).  · Check your blood sugar levels as instructed.  · Know the signs of high blood sugar and get help when needed.  When do I need to call the doctor?   · You have a seizure.  · You are having so much trouble breathing that you can only say 1 or 2 words at a time.  · You need to sit upright at all times to be able to breathe and or cannot lie down.  · You are very tired from working to catch your breath or you are sweating from trying to breathe.  · You have belly pain, an upset stomach, or are throwing up.  · You become very confused or lethargic, or cannot be awakened.  · You are urinating much more than usual and eating much more than usual but losing weight.  · You have signs of severe fluid loss, such as:  ? No urine for more than 8 hours.  ? You feel very light-headed or like you are going to pass out.  ? You feel weak like you are going to fall.  · You have a fever of 100.4°F (38°C) or higher or chills for more than 1 day.  · You develop early signs of fluid loss, such as:  ? Your urine is very dark-colored.  ? Your mouth is dry.  ? You have muscle cramps.  ? You have a lack of energy.  ? You feel light-headed when you get up.  · You lose weight without trying.  · Signs of low blood sugar. These include anger, paleness, shaking, a fast heartbeat, confusion, or sweating. Keep hard candies, glucose tablets, liquid glucose, or juice on hand for low blood sugar. Ask your doctor how much you need to take to treat low blood sugar.  · Signs of  high blood sugar. These include sleepiness, blurry eyesight, passing urine more often, increased thirst, breath has a fruity sweet smell, fast breathing, upset stomach and throwing up, dizziness, or passing out. If you test for ketones, these are usually high.  Teach Back: Helping You Understand   The Teach Back Method helps you understand the information we are giving you. After you talk with the staff, tell them in your own words what you learned. This helps to make sure the staff has described each thing clearly. It also helps to explain things that may have been confusing. Before going home, make sure you can do these:  · I can tell you about my condition.  · I can tell you how often I need to check my blood sugar.  · I can tell you the signs of high blood sugar and what I will do if I have them.  · I can tell you the signs of low blood sugar and what I will do if I have them.  Where can I learn more?   American Academy of Family Physicians  https://familydoctor.org/condition/diabetes/   American Diabetes Association  http://www.diabetes.org/living-with-diabetes/treatment-and-care/blood-glucose-control/hyperglycemia.html   Last Reviewed Date   2021-06-08  Consumer Information Use and Disclaimer   This information is not specific medical advice and does not replace information you receive from your health care provider. This is only a brief summary of general information. It does NOT include all information about conditions, illnesses, injuries, tests, procedures, treatments, therapies, discharge instructions or life-style choices that may apply to you. You must talk with your health care provider for complete information about your health and treatment options. This information should not be used to decide whether or not to accept your health care providers advice, instructions or recommendations. Only your health care provider has the knowledge and training to provide advice that is right for you.  Copyright    Copyright © 2021 [employer] and its affiliates and/or licensors. All rights reserved.  [employer] teaching reviewed with Cary Middleton . She verbalized understanding.    Education was provided based on the patient's discharge diagnosis using the attached [employer] patient education as a reference.

## 2022-02-09 NOTE — PROGRESS NOTES
C3 nurse spoke with Cary Middleton for a TCC post hospital discharge follow up call. The patient has a scheduled [facility] appointment with Dr. Arciniega on 2/10/2022 @ 10:00 am.

## 2022-02-10 NOTE — TELEPHONE ENCOUNTER
----- Message from Jeffry Carey sent at 2/10/2022  3:14 PM CST -----  Contact: [phone] tiana Newman was seen today an stated that the insurance needed an explanation on why the medication that was prescribed is needed and also her daughter would like to get a call back when the insurance is contacted.Please advise

## 2022-02-10 NOTE — PROGRESS NOTES
Subjective:       Patient ID: Cary Middleton is a 86 y.o. female with PMH of HTN, HLD, DM and newly diagnosed CLL.    Chief Complaint: Hospital Follow Up    HPI     Established pt of Arlene Costa MD (new to me)      Pt attended by her dtr.     Here for hospital discharge follow up after admission five days ago  for hyperglycemia, abdominal pain and nausea.     Recently diagnosed with CLL. Pain med regimen adjusted while inpatient and started on insulin for hyperglycemia likely steroid induced as her DM has been previously controlled. See hospital course below.     Today she states she is feeling well. No acute complaints. Good appetite, sleeping well.  having BMS, and pain is well controled with morphine, Robaxin and lidocaine patch.  Her glucose has been high  2/8 154 AM / 361 PM  2/9  250 AM / 413 PM  2/10 264 AM    She reports adherence with Levermir 8units nightly and aspart 3units TIDWM.         Transitional Care Note    Family and/or Caretaker present at visit?  Yes.  Diagnostic tests reviewed/disposition: No diagnosic tests pending after this hospitalization.  Disease/illness education: DM  Home health/community services discussion/referrals: Patient does not have home health established from hospital visit.  They do not need home health.  If needed, we will set up home health for the patient.   Establishment or re-establishment of referral orders for community resources: No other necessary community resources.   Discussion with other health care providers: No discussion with other health care providers necessary.               Admission Date: 2/5/2022  Discharge Date and Time:  02/07/2022   Hospital Course:   Admitted for hyperglycemia. Sugar has been well controlled with insulin. Plan to discharge with insulin. Heme/onc consulted for concern of tumor lysis syndrome. Workup negative for TLS. Will get TTE and CT head and neck while inpatient. A1c elevated at 7.9. Added losartan to BP regimen. Patient  "discharge home with insulin regimen. Pain well controlled with morphine PO. Will follow up with heme/onc.      Past Medical History:   Diagnosis Date    Anemia     Anxiety     Cataract     CLL (chronic lymphocytic leukemia) 2/5/2022    Depression     Diabetes mellitus     Hyperlipidemia     Hypertension     Kidney stone     Macular degeneration     Osteoarthritis of left hip     Osteoporosis     Recurrent nephrolithiasis     Type 2 diabetes mellitus     Type 2 diabetes mellitus with ophthalmic manifestations      Social History     Tobacco Use    Smoking status: Never Smoker    Smokeless tobacco: Never Used   Substance Use Topics    Alcohol use: No    Drug use: No     Review of patient's allergies indicates:   Allergen Reactions    Zoster vaccine live Rash    Lisinopril Swelling         Review of Systems   Constitutional: Negative for chills, fever and unexpected weight change.   Respiratory: Negative for cough and shortness of breath.    Cardiovascular: Negative for chest pain and leg swelling.   Gastrointestinal: Negative for abdominal pain, nausea and vomiting.   Integumentary:  Negative for rash.   Neurological: Negative for weakness and headaches.         Objective: /60 (BP Location: Right arm, Patient Position: Sitting, BP Method: Medium (Manual))   Pulse 79   Ht 5' 4" (1.626 m)   Wt 57.2 kg (126 lb 1.7 oz)   SpO2 96%   BMI 21.65 kg/m²         Physical Exam  Vitals reviewed.   Constitutional:       General: She is not in acute distress.     Appearance: She is well-developed.   HENT:      Head: Normocephalic and atraumatic.   Cardiovascular:      Rate and Rhythm: Normal rate and regular rhythm.      Heart sounds: No murmur heard.      Pulmonary:      Effort: Pulmonary effort is normal.      Breath sounds: Normal breath sounds. No wheezing or rales.   Abdominal:      General: Bowel sounds are normal.      Palpations: Abdomen is soft.      Tenderness: There is no abdominal " tenderness.   Skin:     General: Skin is warm and dry.      Findings: No rash.   Neurological:      Mental Status: She is alert.   Psychiatric:         Mood and Affect: Mood normal.         Assessment:       Problem List Items Addressed This Visit        Cardiac/Vascular    Essential hypertension       Oncology    CLL (chronic lymphocytic leukemia)       Endocrine    Type 2 diabetes mellitus without complication, without long-term current use of insulin    Relevant Medications    flash glucose scanning reader (FREESTYLE DEACON 14 DAY READER) [facility]      Other Visit Diagnoses     Hospital discharge follow-up    -  Primary          Plan:       Cary was seen today for hospital follow up.    Diagnoses and all orders for this visit:    Hospital discharge follow-up  Doing well since discharge but still with hyperglycemia,   insulin adjustments as below.     CLL (chronic lymphocytic leukemia)  Management per Oncology, has f/u in 2 weeks.     Type 2 diabetes mellitus with hyperglycemia, with long-term current use of insulin  Lab Results   Component Value Date    HGBA1C 7.9 (H) 02/05/2022   Increased Levermir 8-->10units  Send log in 2 weeks, may need further adjustment  Continue aspart 3units TIDWM  Keep Endo follow up  -     flash glucose scanning reader (FREESTYLE DEACON 14 DAY READER) Misc; One freestyle deacon reader to monitor blood glucose 4 times    Essential hypertension  BP well controlled   Continue amlodipine 10mg and losartan 25mg (added during recent admisson)      Schedule PCP f/u in 1 to 2 months or sooner if needed.   Future Appointments   Date Time Provider Department Center   2/24/2022  2:00 PM [facility] LAB [facility]   2/24/2022  3:00 PM Lucina Lara MD [facility] BMT [facility]   3/31/2022  9:00 AM LAB, [facility] LAB [place]   4/7/2022 11:00 AM Saniya Hernandez, ERIS, NP [facility] [place]     Anaid Arciniega PA-C

## 2022-02-10 NOTE — TELEPHONE ENCOUNTER
Spoke to [facility] staff, informed that FreeStyle Dharmesh 14 Day Stacy device requires prior authorization  Prior authorization for device performed at this time  Decision to be sent via fax   See copied reply below        Cary Middleton Key: [id number] - PA Case ID: [id number] help? Call us at [phone]  Status  Sent to [employer]  Drug  FreeStyle Dharmesh 14 Day Stacy device  Form  [employer] PA Form

## 2022-02-10 NOTE — TELEPHONE ENCOUNTER
BI: COMPLETE    MEDICARE PLAN: [employer]     Estimated copay: $2,890.38    Benefits Stage: copay will put push pt into catastrophic phase     IN NETWORK: yes    LIS level: N/A  Forwarding to FA

## 2022-02-11 NOTE — TELEPHONE ENCOUNTER
Spoke with pt.'s daughter who reports Ms. Middleton was hospitalized from 2/5/22-2/7/22 at [facility] on [address]. & that she had a f/u visit on 2/10/22 with SHAYNA Randall. All documentation is in Epic for your review. Please advise when to schedule with you. Your 1st available is 2/24/22 but Ms. Middleton has 2 other appts. on that day.

## 2022-02-11 NOTE — TELEPHONE ENCOUNTER
Outgoing call regarding calquence prescription sent to [facility]. Spoke with pt's daughter and informed medication has been approved through insurance with high copay. José Miguel is currently available, pt's daughter provided HH size and annual income. Cancer care josé miguel secured to [facility] with high copay. calquence processes for $0 with josé miguel on file.     FOR DOCUMENTATION ONLY:  Financial Assistance for Calquence approved from 2/11/22 to 2/11/23  [facility]  BIN: [id number]  PCN: [id number]  Id: [id number]  GRP: [facility]      Sent a staff message to [facility] regarding diagnosis verification form for [facility] josé miguel and faxed form to [phone] for provider review and signature. Form must be submitted to [facility] 90 days from the effective start date of 2/11/2022 .

## 2022-02-11 NOTE — TELEPHONE ENCOUNTER
Daughter states that patient is hospitalized with cll. Do you want to see her sooner. Blood sugars have been running high. Using levemir 10 units and flex touch 3 units 3 times a day and 10 units at night.

## 2022-02-11 NOTE — TELEPHONE ENCOUNTER
FreeStyle Dharmesh 14 Day Carlyle device denied   Form  [employer] PA Form            Cary Middleton Key: [id number] - PA Case ID: [id number]   Outcome  Deniedon February 10  No notification sent/No se envi notificacin  Drug  FreeStyle Dharmesh 14 Day Carlyle device  Form  [employer] PA Form

## 2022-02-14 NOTE — TELEPHONE ENCOUNTER
Initial Calquence consult scheduled with patient's daughter, Rekha, for tomorrow 2/15 @ 9AM. Call back number is [phone].

## 2022-02-14 NOTE — TELEPHONE ENCOUNTER
pts daughter calling with pt having low then high blood sugar. Reports at 8:30 BG was 63 and she ate breakfast and juice and at 9:56 BG was 234. States that she wasn't told what to do about insulin and would like call back with instructions on what to do about insulin when sugar gets low. Per protocol advised to call back by PCP. Verbalized understanding. Will route message.     Reason for Disposition   Caller has NON-URGENT medication question about med that PCP prescribed and triager unable to answer question    Additional Information   Negative: Unconscious or difficult to awaken   Negative: Acting confused (e.g., disoriented, slurred speech)   Negative: Very weak (can't stand)   Negative: Sounds like a life-threatening emergency to the triager   Negative: Vomiting and signs of dehydration (e.g., very dry mouth, lightheaded, dark urine)   Negative: Blood glucose > 240 mg/dL (13.3 mmol/L) and rapid breathing   Negative: Blood glucose > 500 mg/dL (27.8 mmol/L)   Negative: Blood glucose > 240 mg/dL (13.3 mmol/L) AND urine ketones moderate-large (or more than 1+)   Negative: Blood glucose > 240 mg/dL (13.3 mmol/L) and blood ketones > 1.4 mmol/L   Negative: Blood glucose > 240 mg/dL (13.3 mmol/L) AND vomiting AND unable to check for ketones (in blood or urine)   Negative: Vomiting lasting > 4 hours   Negative: Patient sounds very sick or weak to the triager   Negative: Fever > 100.4 F (38.0 C)   Negative: Caller has URGENT medication or insulin pump question and triager unable to answer question   Negative: Blood glucose > 400 mg/dL (22.2 mmol/L)   Negative: Blood glucose > 300 mg/dL (16.7 mmol/L) AND two or more times in a row   Negative: Urine ketones moderate - large (or blood ketones > 1.4 mmol/L)   Negative: New-onset diabetes mellitus suspected (e.g., frequent urination, weak, weight loss)   Negative: Symptoms of high blood sugar (e.g., frequent urination, weak, weight loss) and not able to  test blood glucose   Negative: Patient wants to be seen    Protocols used: DIABETES - HIGH BLOOD SUGAR-A-OH

## 2022-02-14 NOTE — TELEPHONE ENCOUNTER
"Spoke with Rekha Moore (Daughter) who reports Ms. Middleton's Accu-Chek & Dharmesh CGM's readings are not synchronized so she called the Nurse Hotline. At 2:40 PM the Accu-Chek read 287 & the Dharmesh 230. She reports when she called the Nurse Hotline the numbers varied greatly. I asked if she had performed  checks on the Accu-Chek & she had no idea what I was talking about. She reports the hospital gave her mother this glucometer & all she knows is that it is new, they set it up, & showed her how to use it.     The daughter was told by the PCP's office to check her mother's BS TID before meals & at bedtime with both the  Accu-Chek & Dharmesh. Rekha Moore noted that the time was inaccurate in the Accu-Check & she will update.    Rekha Moore goes into panic mode if the BS is in the 60's & I reiterated to her that the average BS range is  & that every patients "average" is a little different & specific to each individual. She verbalized an understanding & expressed a need for diabetic education for her & Ms. Middleton since this is her 1st time on insulin.    "

## 2022-02-14 NOTE — TELEPHONE ENCOUNTER
pts daughter calling with pt again with new deacon monitor in place and large discrepancy in blood sugar, the deacon said her BG is 73 and with the finger prick it read 263. Denies any symptoms at this time. Per protocol advised to try to contact with PCP. Attempting to get office on the line. Per secure chat with Dr. Costa pt will receive call back by end of clinic. Informed pts daughter and pt and verbalized understanding. Will route message.     Reason for Disposition   Caller has URGENT medication or insulin pump question and triager unable to answer question    Protocols used: DIABETES - HIGH BLOOD SUGAR-A-OH

## 2022-02-14 NOTE — TELEPHONE ENCOUNTER
Patient just started on Insulin in the hospital a week ago - 4 injections daily. She (daughter) is calling the Triage Nurse On Call asking how to adjust the insulin based on alternating high and low readings. Called and spoke with Cary and daughter Rekha: taking Levemir 8 units nightly and Novolog 3 units TID AC as prescribed. Glucose was initially near 250 fasting and 400 in the evening before dinner, currently on daily Prednisone. Over the week since she's been home from the hospital, glucose has come down to about 150 fasting, 250 in the evening. Today it was unusually low at 63 fasting after not eating much yesterday. Oral intake fluctuates, explain that this will cause her insulin needs to fluctuate. She has a Freestyle Glucose Sensor/[place] - I advised more regular monitoring - before every meal, prior to taking the Novolog, and hold bolus insulin for glucose <100. Continue Levemir at 8 units, avoid skipping meals, reduce to 6 units for fasting glucose repeatedly <100.   Message me on the portal with glucose trends in one week. Adequate to keep glucose under 250 for now, will gradually work toward tighter control with med titration, depending on her need for continued steroid treatment. Follow up with Endocrinology as scheduled in two weeks.

## 2022-02-14 NOTE — TELEPHONE ENCOUNTER
Spoke with pt.'s daughter who reports Ms. Middleton's BS was 63 this AM before breakfast so she gave her eggs, a pork chop, apple juice, & tea with splenda & a teaspoon of granulated sugar & her BS went to 234 & that she has spoken with the pharmacy to get her Dharmesh Hermon today to better assess her BS levels.    I informed her daughter that a BS of 63 is not a panic number & the average range is  & that she should slowly introduce foods with high sugar/carbohydrate content & not give them to her all at the same time & she verbalized an understanding.    An appt. is scheduled on 3/3/22 @ 10:30 AM with RUBEN Hernandez NP & the daughter has been instructed to call the clinic for any questions or concerns.

## 2022-02-14 NOTE — TELEPHONE ENCOUNTER
Advised pt daughter, Rekha, that Demarcus has been approved by insurance and is ready to be picked up or delivered. Gave pt daughter contact number for [facility]. Pt daughter was appreciative of call, will call back if she has any further questions.

## 2022-02-14 NOTE — TELEPHONE ENCOUNTER
"----- Message from Jada Knight sent at 2/14/2022  4:13 PM CST -----  Regarding: Consult/Advisory:  Name Of Caller: Mark-daughter    Contact Preference?: [phone]    What is the nature of the call?: inquiring about an update on chemo           Additional Notes:  "Thank you for all that you do for our patients'"     "

## 2022-02-15 NOTE — TELEPHONE ENCOUNTER
Specialty Pharmacy - Initial Clinical Assessment    Specialty Medication Orders Linked to Encounter    Flowsheet Row Most Recent Value   Medication #1 acalabrutinib (CALQUENCE) 100 mg Cap (Order#[id number], Rx#[id number])        Subjective    Cary Middleton is a 86 y.o. female, who is followed by the specialty pharmacy service for management and education.    Recent Encounters     Date Type Provider Description    02/14/2022 Specialty Pharmacy Timoteo Zepeda PharmD Initial Clinical Assessment    02/04/2022 Specialty Pharmacy Julia Carey PharmD Referral Authorization        Clinical call attempts since last clinical assessment   No call attempts found.     Today she received education before her first fill with [facility].    Current Outpatient Medications   Medication Sig    acetaminophen (TYLENOL) 325 MG tablet Take 2 tablets (650 mg total) by mouth every 4 (four) hours as needed.    amLODIPine (NORVASC) 10 MG tablet Take 1 tablet (10 mg total) by mouth once daily.    aspirin (ECOTRIN) 81 MG EC tablet Take 81 mg by mouth once daily.    atorvastatin (LIPITOR) 20 MG tablet Take 1 tablet (20 mg total) by mouth once daily.    cholecalciferol, vitamin D3, 1,000 unit capsule Take 1,000 Units by mouth once daily.    EScitalopram oxalate (LEXAPRO) 10 MG tablet Take 1 tablet (10 mg total) by mouth once daily.    insulin aspart, niacinamide, 100 unit/mL (3 mL) InPn Inject 3 Units into the skin 3 (three) times daily with meals.    insulin detemir U-100 (LEVEMIR FLEXTOUCH U-100 INSULN) 100 unit/mL (3 mL) InPn pen Inject 8 Units into the skin every evening.    LIDOcaine (LIDODERM) 5 % Place 1 patch onto the skin once daily.    losartan (COZAAR) 25 MG tablet Take 1 tablet (25 mg total) by mouth once daily.    methocarbamoL (ROBAXIN) 500 MG Tab Take 1 tablet (500 mg total) by mouth 3 (three) times daily as needed.    methylcellulose (ARTIFICIAL TEARS) 1 % ophthalmic solution Place 1 drop  "into both eyes as needed.    mirtazapine (REMERON) 7.5 MG Tab Take 1 tablet (7.5 mg total) by mouth every evening.    morphine (MSIR) 15 MG tablet Take 1 tablet (15 mg total) by mouth every 4 (four) hours as needed for Pain.    predniSONE (DELTASONE) 10 MG tablet Take 1 tablet (10 mg total) by mouth 2 (two) times daily.    acalabrutinib (CALQUENCE) 100 mg Cap Take 1 capsule (100 mg) by mouth 2 (two) times a day.    blood sugar diagnostic Strp Use to test blood glucose 2 (two) times daily with meals.    blood-glucose meter Misc Use to test blood glucose 2 (two) times daily with meals.    diclofenac sodium (VOLTAREN) 1 % Gel Apply 2 g topically daily as needed.    flash glucose scanning reader (FREESTYLE NIMCO 14 DAY READER) Misc Use as directed    flash glucose sensor (FREESTYLE NIMCO 14 DAY SENSOR) Kit Use one sensor every 2 weeks to check blood sugar ICD-10-CM: E11.9    lancets (ACCU-CHEK SOFTCLIX LANCETS) Misc Test blood glucose twice daily with meals    lancets Misc To check BG 2 times daily, to use with insurance preferred meter    ondansetron (ZOFRAN-ODT) 4 MG TbDL Dissolve 1 tablet (4 mg total) by mouth every 6 (six) hours as needed.    oxyCODONE (ROXICODONE) 5 MG immediate release tablet Take 1 tablet (5 mg total) by mouth every 6 (six) hours as needed for Pain.    pen needle, diabetic 31 gauge x 5/16" Ndle Use to inject insulin four times daily    TRUE METRIX GLUCOSE TEST STRIP Strp TEST BLOOD SUGAR TWICE A DAY    TRUEPLUS LANCETS 30 gauge Misc USE ONE LANCET TWICE DAILY   Last reviewed on 2/15/2022  9:45 AM by Julia Carey, PharmD    Review of patient's allergies indicates:   Allergen Reactions    Zoster vaccine live Rash    Lisinopril Swelling   Last reviewed on  2/15/2022 9:39 AM by Julia Carey    Drug Interactions    Drug interactions evaluated: yes  Clinically relevant drug interactions identified: yes   Interactions list: Cat C: Lexapro + Calquence- calquence can " enhance the anti-platelet properties of Lexapro  Cat C: Aspirin + Calquence- calquence can enhance the anti-platelet properties of aspirin     Drug management plan: Monitor patients for signs of bleeding if acalabrutinib and antiplatelets are used concomitantly     Provided the patient with educational material regarding drug interactions: not applicable         Adverse Effects    Fatigue: Pos  Incontinence: Pos (Comment: 1st episode of urinary incontinence last night (2/14/22))  Postnasal drip: Pos  Dizziness: Pos  Weakness: Pos  Nervous/anxious: Pos  *All other systems reviewed and are negative       Assessment Questions - Documented Responses    Flowsheet Row Most Recent Value   Assessment    Medication Reconciliation completed for patient Yes   During the past 4 weeks, has patient missed any activities due to condition or medication? No   During the past 4 weeks, did patient have any of the following urgent care visits? None   Goals of Therapy Status Discussed (new start)   Welcome packet contents reviewed and discussed with patient? Yes   Assesment completed? Yes   Plan Therapy being initiated   Do you need to open a clinical intervention (i-vent)? No   Do you want to schedule first shipment? Yes   Medication #1 Assessment Info    Patient status New medication, New to [facility]   Is this medication appropriate for the patient? Yes   Is this medication effective? Not yet started        Refill Questions - Documented Responses    Flowsheet Row Most Recent Value   Patient Availability and HIPAA Verification    Does patient want to proceed with activity? Yes   HIPAA/medical authority confirmed? Yes   Relationship to patient of person spoken to? Child   Refill Screening Questions    When does the patient need to receive the medication? 02/16/22   Refill Delivery Questions    How will the patient receive the medication? Delivery Amber   When does the patient need to receive the medication? 02/16/22   Shipping Address Home  "  Address in [facility] confirmed and updated if neccessary? Yes   Expected Copay ($) 0   Is the patient able to afford the medication copay? Yes   Payment Method zero copay   Days supply of Refill 30   Supplies needed? No supplies needed   Refill activity completed? Yes   Refill activity plan Refill scheduled   Shipment/Pickup Date: 02/16/22          Objective    She has a past medical history of Anemia, Anxiety, Cataract, CLL (chronic lymphocytic leukemia) (2/5/2022), Depression, Diabetes mellitus, Hyperlipidemia, Hypertension, Kidney stone, Macular degeneration, Osteoarthritis of left hip, Osteoporosis, Recurrent nephrolithiasis, Type 2 diabetes mellitus, and Type 2 diabetes mellitus with ophthalmic manifestations.    Tried/failed medications: None    BP Readings from Last 4 Encounters:   02/07/22 139/76   02/03/22 (!) 142/73   01/20/22 136/72   01/10/22 118/68     Ht Readings from Last 4 Encounters:   02/06/22 5' 4" (1.626 m)   02/03/22 5' 4" (1.626 m)   01/20/22 5' 4" (1.626 m)   01/10/22 5' 4" (1.626 m)     Wt Readings from Last 4 Encounters:   02/06/22 55.3 kg (121 lb 12.9 oz)   02/03/22 58.3 kg (128 lb 10.2 oz)   01/20/22 58 kg (127 lb 15.6 oz)   01/10/22 60.9 kg (134 lb 4.2 oz)     Recent Labs   Lab Result Units 02/07/22  0327 02/06/22  0403 02/05/22  1558 01/10/22  1158 01/04/22  1036 01/04/22  1036   RBC M/uL 5.20 5.31 5.38  --   --  5.62 H   Hemoglobin g/dL 11.4 L 11.7 L 11.9 L  --   --  12.3   Hematocrit % 34.2 L 35.5 L 35.8 L  --   --  38.5   WBC K/uL 19.42 H 22.95 H 24.38 H 16.02 H   < > 14.63 H   Gran # (ANC) K/uL  --  11.4 H  --   --   --  6.1   Gran % % 52.0 49.8 50.0  --    < > 41.5   Platelets K/uL 285 306 291  --   --  256   Sodium mmol/L 137 135 L 136 135 L   < > 136   Potassium mmol/L 4.0 3.4 L 4.9 4.6   < > 4.8   Chloride mmol/L 99 97 95 100   < > 102   Glucose mg/dL 184 H 269 H 288 H 233 H   < > 141 H   BUN mg/dL 10 7 L 13 15   < > 12   Creatinine mg/dL 0.7 0.6 0.7 0.8   < > 0.7 "   Calcium mg/dL 9.0 9.2 9.9 10.0   < > 9.9   Total Protein g/dL 6.3 6.6 7.1  --   --  7.7   Albumin g/dL 3.2 L 3.5 3.6  --   --  4.0   Total Bilirubin mg/dL 0.6 0.6 0.3  --   --  0.5   Alkaline Phosphatase U/L 66 67 67  --   --  76   AST U/L 13 13 16  --   --  21   ALT U/L 7 L 9 L 12  --   --  8 L    < > = values in this interval not displayed.     The goals of cancer treatment include:  · Achieving remission of cancer, if possible  · Reducing tumor size and spread of cancer, if remission is not possible  · Minimizing pain and symptoms of the cancer  · Preventing infection and other complications of treatment  · Promoting adequate nutrition  · Encouraging proper hydration  · Improving or maintaining quality of life  · Maintaining optimal therapy adherence  · Minimizing and managing side effects    Goals of Therapy Status: Discussed (new start)    Assessment/Plan  Patient plans to start therapy on 02/16/22      Indication, dosage, appropriateness, effectiveness, safety and convenience of her specialty medication(s) were reviewed today.       Tasks added this encounter   3/11/2022 - Refill Call (Auto Added)  5/9/2022 - Clinical - Follow Up Assesement (90 day)  2/23/2022 - 7 Day Post Start Touchbase   Tasks due within next 3 months   No tasks due.     Julia Carey, PharmD, BCPS  [facility]  [address] [place] [id number]  Phone: [phone]  Fax: [phone]

## 2022-02-15 NOTE — TELEPHONE ENCOUNTER
Informed Rekha Martinez of message from RUBEN Hernandez NP & she verbalized an understanding & will comply. Diabetic Education is scheduled on 3/3/22.

## 2022-02-16 NOTE — TELEPHONE ENCOUNTER
"----- Message from Valeriy Sampson sent at 2/16/2022  1:25 PM CST -----  Consult/Advisory:          Name Of Caller: Rekha (Daughter)      Contact Preference?: [phone]      Provider Name: Jane      Does patient feel the need to be seen today? No      What is the nature of the call?: Calling to speak w/ nurse about administering acalabrutinib (CALQUENCE) 100 mg Cap medication.        Additional Notes:  "Thank you for all that you do for our patients'"      "

## 2022-02-16 NOTE — TELEPHONE ENCOUNTER
"Spoke to daughter, pt had 1 episode of n/v "feels cold" also reports high cbg as 380-405. Dr. Correa made aware pt is in the ER  "

## 2022-02-16 NOTE — TELEPHONE ENCOUNTER
Spoke with pt's daughter and confirmed they received calquence today. Pt had n/v x 1 earlier today, plan to begin doses leo and schedule, Dr. Correa made aware and agrees with poc

## 2022-02-16 NOTE — TELEPHONE ENCOUNTER
[place]    PCP:  Dr. Costa    Spoke with Dtr, Rekha Moore, on pts behalf.  Pt is currently taking Prednisone and Insulin (Levemir and Fiasp).  At 1536 BG was 406.  For lunch she had a Glucerna and [place] soup at 1330.  Dtr monitors BG Q 1 hr.  BG has been trending up today.  She does not check for ketones.  H/O CLL and Type II DM.  Denies fever, frequent urination, difficulty breathing, and dizziness.  + for weakness, pain, intermittent confusion (not currently confused), and vomiting.  Per protocol, care advised is go to the ED now.  Dtr agrees and VU.  Instructed to call for questions/concerns.  VU.    Reason for Disposition   Blood glucose > 240 mg/dL (13.3 mmol/L) AND vomiting AND unable to check for ketones (in blood or urine)    Additional Information   Negative: Unconscious or difficult to awaken   Negative: Acting confused (e.g., disoriented, slurred speech)   Negative: Very weak (can't stand)   Negative: Sounds like a life-threatening emergency to the triager   Negative: Vomiting and signs of dehydration (e.g., very dry mouth, lightheaded, dark urine)   Negative: Blood glucose > 240 mg/dL (13.3 mmol/L) and rapid breathing   Negative: Blood glucose > 500 mg/dL (27.8 mmol/L)   Negative: Blood glucose > 240 mg/dL (13.3 mmol/L) AND urine ketones moderate-large (or more than 1+)   Negative: Blood glucose > 240 mg/dL (13.3 mmol/L) and blood ketones > 1.4 mmol/L    Protocols used: DIABETES - HIGH BLOOD SUGAR-A-OH

## 2022-02-17 PROBLEM — E11.65 TYPE 2 DIABETES MELLITUS WITH HYPERGLYCEMIA, WITH LONG-TERM CURRENT USE OF INSULIN: Status: ACTIVE | Noted: 2022-01-01

## 2022-02-17 PROBLEM — E11.9 TYPE 2 DIABETES MELLITUS WITHOUT COMPLICATION, WITHOUT LONG-TERM CURRENT USE OF INSULIN: Status: RESOLVED | Noted: 2021-04-07 | Resolved: 2022-01-01

## 2022-02-17 PROBLEM — R73.9 HYPERGLYCEMIA: Status: RESOLVED | Noted: 2022-01-01 | Resolved: 2022-01-01

## 2022-02-17 PROBLEM — Z79.4 TYPE 2 DIABETES MELLITUS WITH HYPERGLYCEMIA, WITH LONG-TERM CURRENT USE OF INSULIN: Status: ACTIVE | Noted: 2022-01-01

## 2022-02-17 NOTE — ED PROVIDER NOTES
Encounter Date: 2/16/2022     History     Chief Complaint   Patient presents with    Hyperglycemia     Pt stated her CBG was 414 today at home. PT daughter stating pt was repetitive and having dry mouth today. Hx CLL.   in triage      86 year old female with T2DM, HTN, HLD, CLL, chronic pain, osteopenia, and depression presenting with hyperglycemia. History obtained from both Pt and daughter. Recent diagnosis CLL, started on prednisone 10mg BID. Has had issues with hyperglycemia in the setting of T2DM and steroid use. Previously took metformin and januvia but was recently started on basal/bolus insulin - detemir 8u nightly and aspart 3u TIDWM. Daughter reports her mother is compliant with her insulin and measures her blood glucose regularly. Readings usually in the 200s. Pt had a low reading of about 60 on Monday; spoke with her endocrinologist who told her to decrease her detemir to 5 units. Has had high blood glucose readings today. Denies abdominal pain. Had some nausea and vomiting earlier in the day that resolved with an antiemetic. Denies diarrhea/loose stools. Denies dysuria or hematuria. Denies fevers, cough, or rhinorrhea.              Review of patient's allergies indicates:   Allergen Reactions    Zoster vaccine live Rash    Lisinopril Swelling     Past Medical History:   Diagnosis Date    Anemia     Anxiety     Cataract     CLL (chronic lymphocytic leukemia) 2/5/2022    Depression     Diabetes mellitus     Hyperlipidemia     Hypertension     Kidney stone     Macular degeneration     Osteoarthritis of left hip     Osteoporosis     Recurrent nephrolithiasis     Type 2 diabetes mellitus     Type 2 diabetes mellitus with ophthalmic manifestations      Past Surgical History:   Procedure Laterality Date    BLEPHAROPLASTY, QUAD      GANGLION CYST EXCISION      HYSTERECTOMY      INTRACAPSULAR CATARACT EXTRACTION      left eye     Family History   Problem Relation Age of Onset     Colon cancer Mother     Cancer Mother     Heart disease Father     Cataracts Father     Diabetes Father     Pancreatic cancer Sister     Strabismus Daughter     Hypertension Daughter     Transient ischemic attack Daughter         x 3    Diabetes Brother     Hypertension Brother     Cancer Son         lung cancer    Hypertension Daughter     Arthritis Daughter     No Known Problems Daughter     Aneurysm Sister     Hypertension Brother     Glaucoma Neg Hx     Blindness Neg Hx      Social History     Tobacco Use    Smoking status: Never Smoker    Smokeless tobacco: Never Used   Substance Use Topics    Alcohol use: No    Drug use: No     Review of Systems   Constitutional: Negative for fatigue and fever.   HENT: Negative.    Eyes: Negative.    Respiratory: Negative for cough, shortness of breath and wheezing.    Cardiovascular: Negative for chest pain, palpitations and leg swelling.   Gastrointestinal: Positive for nausea and vomiting. Negative for abdominal pain and diarrhea.   Genitourinary: Negative for dysuria and hematuria.   Musculoskeletal: Positive for back pain.   Skin: Negative.    Neurological: Negative for dizziness, light-headedness and headaches.   Psychiatric/Behavioral: Negative.        Physical Exam     Initial Vitals [02/16/22 1712]   BP Pulse Resp Temp SpO2   (!) 158/78 78 20 98.9 °F (37.2 °C) 98 %      MAP       --         Physical Exam    Nursing note and vitals reviewed.  Constitutional: She appears well-developed and well-nourished.   HENT:   Head: Normocephalic and atraumatic.   Eyes: EOM are normal. Pupils are equal, round, and reactive to light.   Cardiovascular: Normal rate and regular rhythm.   Pulmonary/Chest: She has no wheezes. She has no rhonchi. She has no rales.   Abdominal: Abdomen is soft. Bowel sounds are normal. She exhibits no distension. There is no abdominal tenderness.   Musculoskeletal:         General: No tenderness or edema.     Neurological: She is  alert and oriented to person, place, and time.   Skin: Skin is warm. Capillary refill takes less than 2 seconds. No rash noted. No erythema.         ED Course   Procedures  Labs Reviewed   CBC W/ AUTO DIFFERENTIAL - Abnormal; Notable for the following components:       Result Value    WBC 18.53 (*)     MCV 69 (*)     MCH 22.2 (*)     RDW 16.6 (*)     Lymph % 56.0 (*)     Mono % 1.0 (*)     All other components within normal limits   COMPREHENSIVE METABOLIC PANEL - Abnormal; Notable for the following components:    Sodium 132 (*)     Glucose 308 (*)     Albumin 3.4 (*)     ALT 8 (*)     All other components within normal limits   URINALYSIS, REFLEX TO URINE CULTURE - Abnormal; Notable for the following components:    Appearance, UA Hazy (*)     Glucose, UA 2+ (*)     Occult Blood UA 1+ (*)     Leukocytes, UA 3+ (*)     All other components within normal limits    Narrative:     Specimen Source->Urine   URINALYSIS MICROSCOPIC - Abnormal; Notable for the following components:    RBC, UA 13 (*)     WBC, UA 42 (*)     Bacteria Moderate (*)     Non-Squam Epith 1 (*)     All other components within normal limits    Narrative:     Specimen Source->Urine   POCT GLUCOSE - Abnormal; Notable for the following components:    POCT Glucose 339 (*)     All other components within normal limits   ISTAT PROCEDURE - Abnormal; Notable for the following components:    POC PCO2 46.5 (*)     POC PO2 23 (*)     POC HCO3 28.8 (*)     POC SATURATED O2 40 (*)     POC TCO2 30 (*)     All other components within normal limits   CULTURE, URINE   BETA - HYDROXYBUTYRATE, SERUM   LIPASE   LACTIC ACID, PLASMA   SARS-COV-2 RDRP GENE    Narrative:     This test utilizes isothermal nucleic acid amplification   technology to detect the SARS-CoV-2 RdRp nucleic acid segment.   The analytical sensitivity (limit of detection) is 125 genome   equivalents/mL.   A POSITIVE result implies infection with the SARS-CoV-2 virus;   the patient is presumed to be  contagious.     A NEGATIVE result means that SARS-CoV-2 nucleic acids are not   present above the limit of detection. A NEGATIVE result should be   treated as presumptive. It does not rule out the possibility of   COVID-19 and should not be the sole basis for treatment decisions.   If COVID-19 is strongly suspected based on clinical and exposure   history, re-testing using an alternate molecular assay should be   considered.   This test is only for use under the Food and Drug   Administration s Emergency Use Authorization (EUA).   Commercial kits are provided by [employer].   Performance characteristics of the EUA have been independently   verified by [facility] Department of   Pathology and Laboratory Medicine.   _________________________________________________________________   The authorized Fact Sheet for Healthcare Providers and the authorized Fact   Sheet for Patients of the ID NOW COVID-19 are available on the FDA   website:     https://www.fda.gov/media/784371/download  https://www.fda.gov/media/775216/download           EKG Readings: (Independently Interpreted)   Initial Reading: No STEMI. Previous EKG: Compared with most recent EKG Rhythm: Normal Sinus Rhythm. Ectopy: No Ectopy. Conduction: Normal. ST Segments: Normal ST Segments. T Waves: Normal.     ECG Results          EKG 12-lead (Final result)  Result time 02/17/22 11:04:01    Final result by Interface, [facility] [place] (02/17/22 11:04:01)                 Narrative:    Test Reason : R73.9,    Vent. Rate : 068 BPM     Atrial Rate : 068 BPM     P-R Int : 144 ms          QRS Dur : 086 ms      QT Int : 390 ms       P-R-T Axes : 058 -37 012 degrees     QTc Int : 414 ms    Normal sinus rhythm  Left axis deviation  Abnormal R wave progression in the precordial leads  Abnormal ECG  When compared with ECG of 05-FEB-2022 16:26,  No significant change was found  Confirmed by Bartolo Madrid MD ([id number]) on 2/17/2022 11:03:49 AM    Referred By:  [email or web address]   SELF           Confirmed By:Bartolo Madrid MD                            Imaging Results          X-Ray Chest AP Portable (Final result)  Result time 02/16/22 19:24:37    Final result by Micha Leung MD (02/16/22 19:24:37)                 Impression:      1. No acute cardiopulmonary process, stable chronic findings.      Electronically signed by: Micha Leung MD  Date:    02/16/2022  Time:    19:24             Narrative:    EXAMINATION:  XR CHEST AP PORTABLE    CLINICAL HISTORY:  hyperglycemia;    TECHNIQUE:  Single frontal view of the chest was performed.    COMPARISON:  02/05/2022    FINDINGS:  The cardiomediastinal silhouette is prominent, magnified by technique.  There is no pleural effusion.  The trachea is midline.  The lungs are symmetrically expanded bilaterally with left basilar subsegmental atelectasis or scarring..  No large focal consolidation seen.  There is no pneumothorax.  The osseous structures are remarkable for degenerative changes..                              X-Rays:   Independently Interpreted Readings:   Chest X-Ray: Normal heart size.  No infiltrates.  No acute abnormalities. No pneumothorax or free     Medications   sodium chloride 0.9% bolus 500 mL (0 mLs Intravenous Stopped 2/16/22 2054)     Medical Decision Making:   History:   Old Medical Records: I decided to obtain old medical records.  Differential Diagnosis:   HHS  Steroid induced hyperglycemia  Sepsis 2/2 PNA, UTI, bacteremia   Independently Interpreted Test(s):   I have ordered and independently interpreted X-rays - see prior notes.  I have ordered and independently interpreted EKG Reading(s) - see prior notes  Clinical Tests:   Lab Tests: Ordered and Reviewed  The following lab test(s) were unremarkable: CBC, CMP, Lactate and Lipase  Radiological Study: Ordered and Reviewed  Medical Tests: Ordered and Reviewed  ED Management:  85 yo F with T2DM and CLL on steroids presenting with hyperglycemia. Has had high  blood glucoses today. Takes detemir and aspart; decreased detemi dose 2 days ago following hypoglycemic episode. No abdominal pain but did have some nausea/vomiting earlier today that resolved after taking anti-emetics. On arrival to ED, hypertensive but hemodynamically stable, afebrile. Exam is unremarkable, no tenderness to palpation on abdominal exam, chest clear to auscultation. CBC remarkable for elevated WBC near baseline in setting of CLL. CMP pseudohyponatremia likely in setting of hyperglycemia. Lactate upper limit of normal. Lipase normal. Beta hydroxy normal. No acidosis. No anion gap. COVID negative. EKG - NSR, no ischemic changes. CXR - no consolidation, PTX, or pulmonary edema. Will give 500mL bolus NaCl and advise Pt to take detemir 8units this evening and monitor blood glucose level. Hyperglycemia likely related to steroid use and reduced dose of detemir. Follow-up with PCP in the next 7 days.           Other:   I discussed test(s) with the performing physician.  I have discussed this case with another health care provider.            Attending Attestation:   Physician Attestation Statement for Resident:  As the supervising MD   Physician Attestation Statement: I have personally seen and examined this patient.   I agree with the above history. -:   As the supervising MD I agree with the above PE.    As the supervising MD I agree with the above treatment, course, plan, and disposition.                       I have reviewed and concur with the resident's history, physical, assessment, and plan.  I have personally interviewed and examined the patient at bedside.  See below addendum for my evaluation and additional findings. I did supervise any and all procedures and was present for any critical portion, and was always immediately available for help and as a resource.     Briefly,  this is a 86 y.o.female with a history of type 2 diabetes, hypertension, hyperlipidemia, CLL and depression presenting with  hyperglycemia.  She recently had a change in her basal insulin and oral hypoglycemic agents.  She is currently afebrile vital signs are stable.  Physical exam signs with no focal deficits, no infectious etiology, lung sounds clear, cardiac exam unremarkable.  Patient does not complain of cough, infectious etiology or urinary symptoms.  Infectious workup conducted with chest x-ray showing no acute findings on my read.  ECG without ischemia or STEMI.  UA does show some bacteria and 3+ leukocytes, will obtain urine culture, and call in appropriate antibiotic once culture shows any growth.  Patient's blood sugars improved with IV fluids, will recommend following up with Endocrine team for further adjustments of her insulin.  No evidence of acidosis, no beta hydroxybutyrate elevation, and no anion gap metabolic acidosis. Patient agreeable to discharge plan. Strict ED precautions and return instructions discussed at length and patient verbalized understanding. All questions were answered and ample time was given for questions.      Complexity: High - level 5    Final diagnoses:  [R73.9] Hyperglycemia       Tad Dunbar DO, [facility]  Emergency Staff Physician   Dept of Emergency Medicine   [facility]  Spectralink: [id number]        Disclaimer: This note has been generated using voice-recognition software. There may be typographical errors that have been missed during proof-reading.          Clinical Impression:   Final diagnoses:  [R73.9] Hyperglycemia          ED Disposition Condition    Discharge Stable        ED Prescriptions     None        Follow-up Information    None          Rafaela Sweeney MD  Resident  02/16/22 2134       Tad Dunbar DO  02/17/22 0822

## 2022-02-17 NOTE — TELEPHONE ENCOUNTER
Spoke with Ms. CehnRekha reeder (Daughter) & scheduled a virtual visit on 2/17/22 @ 2:00 PM but the visit will occur at 1:30 PM. She was instructed to make sure all glucose logs & current medications with doses are available for this visit & she verbalized an understanding. I stayed on the phone with her to make sure she can access the virtual portal.

## 2022-02-17 NOTE — PROGRESS NOTES
The patient location is: home  The chief complaint leading to consultation is: diabetes     Visit type: audiovisual    Face to Face time with patient: 25 mins  31 mins minutes of total time spent on the encounter, which includes face to face time and non-face to face time preparing to see the patient (eg, review of tests), Obtaining and/or reviewing separately obtained history, Documenting clinical information in the electronic or other health record, Independently interpreting results (not separately reported) and communicating results to the patient/family/caregiver, or Care coordination (not separately reported).     Each patient to whom he or she provides medical services by telemedicine is:  (1) informed of the relationship between the physician and patient and the respective role of any other health care provider with respect to management of the patient; and (2) notified that he or she may decline to receive medical services by telemedicine and may withdraw from such care at any time.     CC: This 86 y.o. female presents for management of diabetes mellitus  and chronic conditions pending review including HTN, HLP, vitamin d deficiency, osteopenia, depression     HPI: She was diagnosed with T2DM in since age 65. Has never been hospitalized r/t DM.  Family hx of DM: father   She has had her COVID vaccines,     Since last visit in October she has been diagnosed w CLL, started on steroids she was prescribed insulin on discharge from hospital on 2022  Had one episode of  hypoglycemia fasting Monday am, otherwise bg in 200s, did have  BG upwards of 400 after vomiting episode- seen in ER yesterday- given IVF and sent home  Currently taking prednisone 10 mg bid- started chemo med today- takes     Bg are checked Via Dharmesh  Fastin, 213, 224, 211  Lunch: 230, 313, 299  Dinner: 287, 234, 245  BT: 293, 295, 265  Eats 3 Meals a day, snacks- rarely - may have a glucerna  Exercise: none  CURRENT DM MEDS:  Levemir 5u  qhs,  Novolog 3 u AC  Glucometer type:  True Metrix 2018    Standards of Care:  Eye exam: 11/2020  + h/o retinopathy -  Dr Finch    ROS:   Gen: Appetite good,    Eyes: Denies visual disturbances  Resp: no SOB or MORELOS, no cough  Cardiac: No palpitations, chest pain, no edema   GI: + nausea, +vomiting- yesterday ow resolved , diarrhea, constipation, or abdominal pain. +GERD  /GYN: 2+ nocturia,no burning or pain.   MS/Neuro: no numbness/burning/tingling in BLE; speech clear  Psych:  +  depression.  Other systems: negative.     PE:  GENERAL: Well developed, well nourished.  PSYCH: AAOx3, appropriate mood and affect, pleasant expression, conversant, appears relaxed, well groomed.   EYES: Conjunctiva, corneas clear  NECK: Supple, trachea midline  SKIN:   no acanthosis nigracans.  FOOT EXAMINATION: 10/7/2021  No foot deformity, corns or callus formation,  nails in good condition and well trimmed, no interspace maceration or ulceration noted.  Decreased hair growth present over toes/feet.  Positive vibratory response to 128 Hz tuning fork bilaterally.    Personally reviewed Past Medical, Surgical, Social History.    There were no vitals taken for this visit.     Personally reviewed the below labs:      Chemistry        Component Value Date/Time     (L) 02/16/2022 1851    K 4.6 02/16/2022 1851    CL 97 02/16/2022 1851    CO2 24 02/16/2022 1851    BUN 13 02/16/2022 1851    CREATININE 0.8 02/16/2022 1851     (H) 02/16/2022 1851        Component Value Date/Time    CALCIUM 9.3 02/16/2022 1851    ALKPHOS 79 02/16/2022 1851    AST 19 02/16/2022 1851    ALT 8 (L) 02/16/2022 1851    BILITOT 0.4 02/16/2022 1851    ESTGFRAFRICA >60.0 02/16/2022 1851    EGFRNONAA >60.0 02/16/2022 1851            Lab Results   Component Value Date    TSH 1.443 10/01/2021       Recent Labs   Lab 02/06/22  0403   LDL Cholesterol 64.8   HDL 59   Cholesterol 144        Results for orders placed or performed in visit on 10/01/21   Vitamin D    Result Value Ref Range    Vit D, 25-Hydroxy 47 30 - 96 ng/mL     Results for orders placed or performed in visit on 11/10/04   Calcitriol   Result Value Ref Range    Vit D, 1,25-Dihydroxy 72 (H) 22 - 67 pg/mL       Lab Results   Component Value Date    MICALBCREAT 35.8 (H) 10/01/2021           Hemoglobin A1C   Date Value Ref Range Status   02/05/2022 7.9 (H) 4.0 - 5.6 % Final     Comment:     ADA Screening Guidelines:  5.7-6.4%  Consistent with prediabetes  >or=6.5%  Consistent with diabetes    High levels of fetal hemoglobin interfere with the HbA1C  assay. Heterozygous hemoglobin variants (HbS, HgC, etc)do  not significantly interfere with this assay.   However, presence of multiple variants may affect accuracy.     10/01/2021 7.0 (H) 4.0 - 5.6 % Final     Comment:     ADA Screening Guidelines:  5.7-6.4%  Consistent with prediabetes  >or=6.5%  Consistent with diabetes    High levels of fetal hemoglobin interfere with the HbA1C  assay. Heterozygous hemoglobin variants (HbS, HgC, etc)do  not significantly interfere with this assay.   However, presence of multiple variants may affect accuracy.     03/31/2021 6.7 (H) 4.0 - 5.6 % Final     Comment:     ADA Screening Guidelines:  5.7-6.4%  Consistent with prediabetes  >or=6.5%  Consistent with diabetes    High levels of fetal hemoglobin interfere with the HbA1C  assay. Heterozygous hemoglobin variants (HbS, HgC, etc)do  not significantly interfere with this assay.   However, presence of multiple variants may affect accuracy.          ASSESSMENT and PLAN:    1. T2DM with retinopathy controlled-  Change  levemir to 7 units qhs, novolog 5 u Ac+sliding scale as below  180-231 +1  231-280 +2  281-330 +3  331-380 +4  > 380  +5   Continue Dharmesh for now, may consider Dexcom G6 in the future, has f/u on 3/3  Will see edu on 3/3 as well    2. HTN -  continue meds as previously prescribed and monitor.     3. HLP - on statin therapy    4. Depression- stable, chronic on lexapro      5. CLL- prednisone impacting bg control; following w oncology

## 2022-02-17 NOTE — DISCHARGE INSTRUCTIONS
Your infectious workup is negative. Your elevated blood glucose level is likely related to your steroid use and your decreased dose of detemir. Take 8 units of detemir this evening and continue to monitor your blood glucose level. Follow-up with you PCP within the next 7 days. Speak with your PCP or endocrinologist about changing your insulin regiment to better control your blood glucose levels.We will contact you if your urinalysis results are abnormal.

## 2022-02-18 NOTE — TELEPHONE ENCOUNTER
No new care gaps identified.  Powered by [employer]. Reference number: [id number].   2/18/2022 8:37:02 AM CST

## 2022-02-18 NOTE — TELEPHONE ENCOUNTER
Methocarbamol 500mg: she got 30 tabs on Feb. 6th - how is she taking this and what for? What else is she taking for pain?

## 2022-02-24 NOTE — PROGRESS NOTES
[facility]  [facility]  Hematology/Medical Oncology Clinic       PATIENT: Cary Middleton  MRN: [id number]  DATE: 2/24/2022    Reason for referral: CLL    Initial History: Patient is a 85 y/o female with hx of HTN, Depression, T2DM who presents to clinic after found to have abd LAD on CT abd/pel found incidentally for flank pain.     Labs with leukocytosis, absolute lymphocytosis since 8/2018. Normal renal function.      CT Abd/Pel w/o con 1/6/2022: Normal liver size. Normal spleen size. Mesenteric and retroperitoneal LAD. Multiple para-aortic lymph nodes. Largest lymph node is near the left renal hilu, 3.2 cm in greatest dimension.      MRI 1/2022: Left periaortic retroperitoneal mass 5.1cm in greatest dimension.      Flow cytometry: Consistent with CLL    Interval History:   Admitted from 2/5/22-2/7/22 for steriod induced hyperglycemia and flank pain. Started on morphine 15mg po q4 prn, methocarbamol 500mg tid prn, tylenol 650mg prn.     She presents with her daughter and reports pain is somewhat improved. Has nausea and vomiting as well. Recently dx with a UTI on cephalexin.     Started acalabrutinib 2/17/22.   Currently on Prednisone 10mg twice a day.     CT neck: No LAD seen.   CT Chest: Two lung micronodules. Perhaps intrathoracic lymph node.     Past Medical History:   Past Medical History:   Diagnosis Date    Anemia     Anxiety     Cataract     CLL (chronic lymphocytic leukemia) 2/5/2022    Depression     Diabetes mellitus     Hyperlipidemia     Hypertension     Kidney stone     Macular degeneration     Osteoarthritis of left hip     Osteoporosis     Recurrent nephrolithiasis     Type 2 diabetes mellitus     Type 2 diabetes mellitus with ophthalmic manifestations        Past Surgical HIstory:   Past Surgical History:   Procedure Laterality Date    BLEPHAROPLASTY, QUAD      GANGLION CYST EXCISION      HYSTERECTOMY      INTRACAPSULAR CATARACT EXTRACTION       left eye       Family History:   Family History   Problem Relation Age of Onset    Colon cancer Mother     Cancer Mother     Heart disease Father     Cataracts Father     Diabetes Father     Pancreatic cancer Sister     Strabismus Daughter     Hypertension Daughter     Transient ischemic attack Daughter         x 3    Diabetes Brother     Hypertension Brother     Cancer Son         lung cancer    Hypertension Daughter     Arthritis Daughter     No Known Problems Daughter     Aneurysm Sister     Hypertension Brother     Glaucoma Neg Hx     Blindness Neg Hx        Social History:  reports that she has never smoked. She has never used smokeless tobacco. She reports that she does not drink alcohol and does not use drugs.    Allergies:  Review of patient's allergies indicates:   Allergen Reactions    Zoster vaccine live Rash    Lisinopril Swelling       Medications:  Current Outpatient Medications   Medication Sig Dispense Refill    acalabrutinib (CALQUENCE) 100 mg Cap Take 1 capsule (100 mg) by mouth 2 (two) times a day. 60 capsule 3    acetaminophen (TYLENOL) 325 MG tablet Take 2 tablets (650 mg total) by mouth every 4 (four) hours as needed. 30 tablet 3    amLODIPine (NORVASC) 10 MG tablet Take 1 tablet (10 mg total) by mouth once daily. 90 tablet 3    aspirin (ECOTRIN) 81 MG EC tablet Take 81 mg by mouth once daily.      atorvastatin (LIPITOR) 20 MG tablet Take 1 tablet (20 mg total) by mouth once daily. 90 tablet 3    blood sugar diagnostic Strp Use to test blood glucose 2 (two) times daily with meals. 100 each 11    blood-glucose meter Misc Use to test blood glucose 2 (two) times daily with meals. 1 each 0    cephALEXin (KEFLEX) 500 MG capsule Take 1 capsule (500 mg total) by mouth every 6 (six) hours. for 5 days 20 capsule 0    cholecalciferol, vitamin D3, 1,000 unit capsule Take 1,000 Units by mouth once daily.      diclofenac sodium (VOLTAREN) 1 % Gel Apply 2 g topically daily as  "needed. 50 g 1    EScitalopram oxalate (LEXAPRO) 10 MG tablet Take 1 tablet (10 mg total) by mouth once daily. 90 tablet 3    flash glucose scanning reader (FREESTYLE NIMCO 14 DAY READER) Misc Use as directed 1 each 0    flash glucose sensor (FREESTYLE NIMCO 14 DAY SENSOR) Kit Use one sensor every 2 weeks to check blood sugar ICD-10-CM: E11.9 1 kit 3    insulin aspart, niacinamide, 100 unit/mL (3 mL) InPn 5 u Ac   + correction  Max TDD 25 u 5 pen 3    insulin detemir U-100 (LEVEMIR FLEXTOUCH U-100 INSULN) 100 unit/mL (3 mL) InPn pen Inject 7 Units into the skin every evening. 15 mL 4    lancets (ACCU-CHEK SOFTCLIX LANCETS) Misc Test blood glucose twice daily with meals 100 each 11    lancets Misc To check BG 2 times daily, to use with insurance preferred meter 100 each 12    LIDOcaine (LIDODERM) 5 % Place 1 patch onto the skin once daily. 30 patch 1    losartan (COZAAR) 25 MG tablet Take 1 tablet (25 mg total) by mouth once daily. 90 tablet 3    methocarbamoL (ROBAXIN) 500 MG Tab Take 1 tablet (500 mg total) by mouth 3 (three) times daily as needed (Muscle Spasm.). 30 tablet 0    methylcellulose (ARTIFICIAL TEARS) 1 % ophthalmic solution Place 1 drop into both eyes as needed.      mirtazapine (REMERON) 7.5 MG Tab Take 1 tablet (7.5 mg total) by mouth every evening. 30 tablet 11    ondansetron (ZOFRAN-ODT) 4 MG TbDL Dissolve 1 tablet (4 mg total) by mouth every 6 (six) hours as needed. 60 tablet 0    oxyCODONE (ROXICODONE) 5 MG immediate release tablet Take 1 tablet (5 mg total) by mouth every 6 (six) hours as needed for Pain. 5 tablet 0    pen needle, diabetic (BD ROWENA 2ND GEN PEN NEEDLE) 32 gauge x 5/32" Ndle Uses 4 daily 150 each 4    predniSONE (DELTASONE) 10 MG tablet Take 1 tablet (10 mg total) by mouth 2 (two) times daily. 60 tablet 0    TRUE METRIX GLUCOSE TEST STRIP Strp TEST BLOOD SUGAR TWICE A  each 12    TRUEPLUS LANCETS 30 gauge Misc USE ONE LANCET TWICE DAILY       No current " "facility-administered medications for this visit.     Facility-Administered Medications Ordered in Other Visits   Medication Dose Route Frequency Provider Last Rate Last Admin    0.9%  NaCl infusion  500 mL Intravenous Continuous Elif Lares MD           Review of Systems  Constitutional: Reports fatigue.   HENT: Negative for nosebleeds and sore throat.    Respiratory: Negative for cough, chest tightness, shortness of breath and wheezing.    Cardiovascular: Negative for leg swelling. Negative for palpitations.   Gastrointestinal: Negative for constipation.Reports n/v, right and left flank pain.    ECOG Performance Status: 3  Objective:      Vitals:   Vitals:    02/24/22 1442   BP: (!) 104/59   Pulse: 84   Resp: 16   SpO2: 100%   Weight: 56 kg (123 lb 5.6 oz)   Height: 5' 4" (1.626 m)       Physical Exam  Constitutional:       Appearance: Normal appearance.   HENT:      Head: Normocephalic and atraumatic.      Mouth/Throat:      Mouth: Mucous membranes are moist.   Eyes:      Extraocular Movements: Extraocular movements intact.   Cardiovascular:      Rate and Rhythm: Normal rate and regular rhythm.      Pulses: Normal pulses.      Heart sounds: Normal heart sounds.   Pulmonary:      Effort: Pulmonary effort is normal.      Breath sounds: Normal breath sounds.   Abdominal:      Palpations: Abdomen is soft.   Musculoskeletal:      Cervical back: Normal range of motion and neck supple.   Lymphadenopathy:      Cervical: No cervical adenopathy.   Skin:     General: Skin is warm.   Neurological:      General: No focal deficit present.      Mental Status: She is alert.    Laboratory Data:  No visits with results within 1 Week(s) from this visit.   Latest known visit with results is:   Admission on 02/16/2022, Discharged on 02/16/2022   Component Date Value Ref Range Status    POCT Glucose 02/16/2022 339 (A) 70 - 110 mg/dL Final    WBC 02/16/2022 18.53 (A) 3.90 - 12.70 K/uL Final    RBC 02/16/2022 5.40  4.00 - 5.40 " M/uL Final    Hemoglobin 02/16/2022 12.0  12.0 - 16.0 g/dL Final    Hematocrit 02/16/2022 37.3  37.0 - 48.5 % Final    MCV 02/16/2022 69 (A) 82 - 98 fL Final    MCH 02/16/2022 22.2 (A) 27.0 - 31.0 pg Final    MCHC 02/16/2022 32.2  32.0 - 36.0 g/dL Final    RDW 02/16/2022 16.6 (A) 11.5 - 14.5 % Final    Platelets 02/16/2022 334  150 - 450 K/uL Final    MPV 02/16/2022 10.2  9.2 - 12.9 fL Final    Immature Granulocytes 02/16/2022 CANCELED  0.0 - 0.5 % Final    Result canceled by the ancillary.    Immature Grans (Abs) 02/16/2022 CANCELED  0.00 - 0.04 K/uL Final    Comment: Mild elevation in immature granulocytes is non specific and   can be seen in a variety of conditions including stress response,   acute inflammation, trauma and pregnancy. Correlation with other   laboratory and clinical findings is essential.    Result canceled by the ancillary.      nRBC 02/16/2022 0  0 /100 WBC Final    Gran % 02/16/2022 43.0  38.0 - 73.0 % Final    Lymph % 02/16/2022 56.0 (A) 18.0 - 48.0 % Final    Mono % 02/16/2022 1.0 (A) 4.0 - 15.0 % Final    Eosinophil % 02/16/2022 0.0  0.0 - 8.0 % Final    Basophil % 02/16/2022 0.0  0.0 - 1.9 % Final    Platelet Estimate 02/16/2022 Appears normal   Final    Aniso 02/16/2022 Slight   Final    Smudge Cells 02/16/2022 Present   Final    Comment: Smudge cells present;Substantial numbers may affect the   accuracy of the differential.      Differential Method 02/16/2022 Manual   Final    Sodium 02/16/2022 132 (A) 136 - 145 mmol/L Final    Potassium 02/16/2022 4.6  3.5 - 5.1 mmol/L Final    Chloride 02/16/2022 97  95 - 110 mmol/L Final    CO2 02/16/2022 24  23 - 29 mmol/L Final    Glucose 02/16/2022 308 (A) 70 - 110 mg/dL Final    BUN 02/16/2022 13  8 - 23 mg/dL Final    Creatinine 02/16/2022 0.8  0.5 - 1.4 mg/dL Final    Calcium 02/16/2022 9.3  8.7 - 10.5 mg/dL Final    Total Protein 02/16/2022 7.2  6.0 - 8.4 g/dL Final    Albumin 02/16/2022 3.4 (A) 3.5 - 5.2 g/dL  Final    Total Bilirubin 02/16/2022 0.4  0.1 - 1.0 mg/dL Final    Comment: For infants and newborns, interpretation of results should be based  on gestational age, weight and in agreement with clinical  observations.    Premature Infant recommended reference ranges:  Up to 24 hours.............<8.0 mg/dL  Up to 48 hours............<12.0 mg/dL  3-5 days..................<15.0 mg/dL  6-29 days.................<15.0 mg/dL      Alkaline Phosphatase 02/16/2022 79  55 - 135 U/L Final    AST 02/16/2022 19  10 - 40 U/L Final    *Result may be interfered by visible hemolysis    ALT 02/16/2022 8 (A) 10 - 44 U/L Final    Anion Gap 02/16/2022 11  8 - 16 mmol/L Final    eGFR if African American 02/16/2022 >60.0  >60 mL/min/1.73 m^2 Final    eGFR if non African American 02/16/2022 >60.0  >60 mL/min/1.73 m^2 Final    Comment: Calculation used to obtain the estimated glomerular filtration  rate (eGFR) is the CKD-EPI equation.       Beta-Hydroxybutyrate 02/16/2022 0.1  0.0 - 0.5 mmol/L Final    Specimen UA 02/16/2022 Urine, Clean Catch   Final    Color, UA 02/16/2022 Straw  Yellow, Straw, Alysha Final    Appearance, UA 02/16/2022 Hazy (A) Clear Final    pH, UA 02/16/2022 7.0  5.0 - 8.0 Final    Specific Gravity, UA 02/16/2022 1.005  1.005 - 1.030 Final    Protein, UA 02/16/2022 Negative  Negative Final    Comment: Recommend a 24 hour urine protein or a urine   protein/creatinine ratio if globulin induced proteinuria is  clinically suspected.      Glucose, UA 02/16/2022 2+ (A) Negative Final    Ketones, UA 02/16/2022 Negative  Negative Final    Bilirubin (UA) 02/16/2022 Negative  Negative Final    Occult Blood UA 02/16/2022 1+ (A) Negative Final    Nitrite, UA 02/16/2022 Negative  Negative Final    Leukocytes, UA 02/16/2022 3+ (A) Negative Final    Lipase 02/16/2022 31  4 - 60 U/L Final    Lactate (Lactic Acid) 02/16/2022 1.9  0.5 - 2.2 mmol/L Final    Comment: Falsely low lactic acid results can be found in  samples   containing >=13.0 mg/dL total bilirubin and/or >=3.5 mg/dL   direct bilirubin.      POC Rapid COVID 02/16/2022 Negative  Negative Final     Acceptable 02/16/2022 Yes   Final    POC PH 02/16/2022 7.400  7.35 - 7.45 Final    POC PCO2 02/16/2022 46.5 (A) 35 - 45 mmHg Final    POC PO2 02/16/2022 23 (A) 40 - 60 mmHg Final    POC HCO3 02/16/2022 28.8 (A) 24 - 28 mmol/L Final    POC BE 02/16/2022 4  -2 to 2 mmol/L Final    POC SATURATED O2 02/16/2022 40 (A) 95 - 100 % Final    POC TCO2 02/16/2022 30 (A) 24 - 29 mmol/L Final    Sample 02/16/2022 VENOUS   Final    Site 02/16/2022 Other   Final    Allens Test 02/16/2022 N/A   Final    RBC, UA 02/16/2022 13 (A) 0 - 4 /hpf Final    WBC, UA 02/16/2022 42 (A) 0 - 5 /hpf Final    Bacteria 02/16/2022 Moderate (A) None-Occ /hpf Final    Squam Epithel, UA 02/16/2022 25  /hpf Final    Non-Squam Epith 02/16/2022 1 (A) <1/hpf /hpf Final    Microscopic Comment 02/16/2022 SEE COMMENT   Final    Comment: Other formed elements not mentioned in the report are not   present in the microscopic examination.       Urine Culture, Routine 02/16/2022  (A)  Final                    Value:STREPTOCOCCUS AGALACTIAE (GROUP B)  10,000 - 49,999 cfu/ml  In case of Penicillin allergy, call lab for further testing.  Beta-hemolytic streptococci are routinely susceptible to   penicillins,cephalosporins and carbapenems.           Imaging: All pertinent imaging reviewed    Assessment and Plan        1. CLL (chronic lymphocytic leukemia)    2. Pain      CLL, NEVILLE stage 1  -Will recommend treatment due to lymphadenopathy related pain   -Started acalabrutinib 100mg BID on 2/17/22  - CLL FISH w/ trisomy 12 and 13q del, mutated IGHV , TP53 status  -Refilled MSIR 15mg q4h prn for pain  -Zofran (1st line) and compazine (2nd line) for nausea     Follow up   -follow up in 2 weeks 3/10/21      Lucina Lara MD  Hematology/Oncology Fellow PGY IV  [facility]

## 2022-03-03 NOTE — PROGRESS NOTES
"HPI: She was diagnosed with T2DM in since age 65. Has never been hospitalized r/t DM.  Family hx of DM: father   She has had her COVID vaccines,   October 2021 she has been diagnosed w CLL,     Since last visit, Having good and bad days, has been throwing up at ~ every othr night- usually r/t pain  Bg are checked via Dharmesh- see download in Media tab  Time in range;58%  Hypoglycemia: none  Small pp excursions noted after small meals but not daily  Steroid doses have been tapered down and will be completed this Sunday  Eats 3 Meals a day, snacks- rarely - may have a glucerna  Exercise: none  CURRENT DM MEDS:  Levemir 7u qhs,  Novolog 5 u AC + correction   Glucometer type:  True Metrix 2018    Standards of Care:  Eye exam:  2021 + h/o retinopathy -  Dr Finch    ROS:   Gen: Appetite good,  Weight loss 5 lbs   Eyes: Denies visual disturbances  Resp: no SOB or MORELOS, no cough  Cardiac: No palpitations, chest pain, no edema   GI: + nausea, +vomiting, no diarrhea, constipation, or + abdominal pain. +GERD  /GYN: 1-2+ nocturia,no burning or pain.   MS/Neuro: no numbness/burning/tingling in BLE; speech clear  Psych:  +  depression.  Other systems: negative.     PE:  GENERAL: Well developed, well nourished.  PSYCH: AAOx3, appropriate mood and affect, pleasant expression, conversant, appears relaxed, well groomed.   EYES: Conjunctiva, corneas clear  NECK: Supple, trachea midline  SKIN:   no acanthosis nigracans.  FOOT EXAMINATION: 10/7/2021  No foot deformity, corns or callus formation,  nails in good condition and well trimmed, no interspace maceration or ulceration noted.  Decreased hair growth present over toes/feet.  Positive vibratory response to 128 Hz tuning fork bilaterally.    Personally reviewed Past Medical, Surgical, Social History.    /60 (BP Location: Left arm, Patient Position: Sitting, BP Method: Medium (Manual))   Pulse 92   Temp 98.2 °F (36.8 °C) (Oral)   Ht 5' 4" (1.626 m)   Wt 58.1 kg (128 lb 1.4 " oz)   SpO2 96%   BMI 21.99 kg/m²      Personally reviewed the below labs:      Chemistry        Component Value Date/Time     02/24/2022 1405    K 4.9 02/24/2022 1405    CL 99 02/24/2022 1405    CO2 27 02/24/2022 1405    BUN 22 02/24/2022 1405    CREATININE 0.9 02/24/2022 1405     (H) 02/24/2022 1405        Component Value Date/Time    CALCIUM 10.1 02/24/2022 1405    ALKPHOS 74 02/24/2022 1405    AST 13 02/24/2022 1405    ALT 9 (L) 02/24/2022 1405    BILITOT 0.6 02/24/2022 1405    ESTGFRAFRICA >60.0 02/24/2022 1405    EGFRNONAA 58.1 (A) 02/24/2022 1405            Lab Results   Component Value Date    TSH 1.443 10/01/2021       Recent Labs   Lab 02/06/22  0403   LDL Cholesterol 64.8   HDL 59   Cholesterol 144        Results for orders placed or performed in visit on 10/01/21   Vitamin D   Result Value Ref Range    Vit D, 25-Hydroxy 47 30 - 96 ng/mL     Results for orders placed or performed in visit on 11/10/04   Calcitriol   Result Value Ref Range    Vit D, 1,25-Dihydroxy 72 (H) 22 - 67 pg/mL       Lab Results   Component Value Date    MICALBCREAT 35.8 (H) 10/01/2021           Hemoglobin A1C   Date Value Ref Range Status   02/05/2022 7.9 (H) 4.0 - 5.6 % Final     Comment:     ADA Screening Guidelines:  5.7-6.4%  Consistent with prediabetes  >or=6.5%  Consistent with diabetes    High levels of fetal hemoglobin interfere with the HbA1C  assay. Heterozygous hemoglobin variants (HbS, HgC, etc)do  not significantly interfere with this assay.   However, presence of multiple variants may affect accuracy.     10/01/2021 7.0 (H) 4.0 - 5.6 % Final     Comment:     ADA Screening Guidelines:  5.7-6.4%  Consistent with prediabetes  >or=6.5%  Consistent with diabetes    High levels of fetal hemoglobin interfere with the HbA1C  assay. Heterozygous hemoglobin variants (HbS, HgC, etc)do  not significantly interfere with this assay.   However, presence of multiple variants may affect accuracy.     03/31/2021 6.7 (H)  4.0 - 5.6 % Final     Comment:     ADA Screening Guidelines:  5.7-6.4%  Consistent with prediabetes  >or=6.5%  Consistent with diabetes    High levels of fetal hemoglobin interfere with the HbA1C  assay. Heterozygous hemoglobin variants (HbS, HgC, etc)do  not significantly interfere with this assay.   However, presence of multiple variants may affect accuracy.          ASSESSMENT and PLAN:    1. T2DM with retinopathy controlled-  Continue levemir  7 units qhs, decrease  novolog to 4 u Ac +sliding scale as below  180-231 +1  231-280 +2  281-330 +3  331-380 +4  > 380  +5   RX for Continuous Glucose Monitor   -Recommend Dexcom  Continuous Glucose monitor                         Pt tests glucoses a minimum of 4 x a day.                          Pt would benefit from therapeutic continuous glucose monitor to be able                         to make frequent insulin adjustments, based on current glucoses.   Download Dharmesh  again on Wednesday may be able to titrate Novolog w cessation of steroids     2. HTN -  continue meds as previously prescribed and monitor.     3. HLP - on statin therapy    4. CLL- prednisone impacting bg control- last dose Sunday; following w oncology

## 2022-03-04 NOTE — PROGRESS NOTES
Diabetes Care Specialist Progress Note  Author: Kim Dixon RD, CDE  Date: 3/4/2022         Lab Results   Component Value Date    HGBA1C 7.9 (H) 02/05/2022       Clinical    Patient Health Rating  Compared to other people your age, how would you rate your health?: Good    Problem Review  Reviewed Problem List with Patient: yes  Active comorbidities affecting diabetes self-care.: no  Reviewed health maintenance: yes    Clinical Assessment  Current Diabetes Treatment: Insulin  Have you ever experienced hypoglycemia (low blood sugar)?: yes  In the last month, how often have you experienced low blood sugar?: once every other week  Are you able to tell when your blood sugar is low?: Yes  What symptoms do you experience?: Tiredness  Have you ever been hospitalized because your blood sugar was too low?: no  How do you treat hypoglycemia (low blood sugar)?: 1/2 can soda/fruit juice  Have you ever experienced hyperglycemia (high blood sugar)?: yes  In the last month, how often have you experienced high blood sugar?: more than once a day  Are you able to tell when your blood sugar is high?: Yes  What are your symptoms?: fatigue  Have you ever been hospitalized because your blood sugar was high?: no    Medication Information  How do you obtain your medications?: Family picks up  How many days a week do you miss your medications?: Never  Do you use a pill box or medication chart to help you manage your medications?: No  Do you sometimes have difficulty refilling your medications?: No  Medication adherence impacting ability to self-manage diabetes?: No    Labs  Do you have regular lab work to monitor your medications?: Yes  Type of Regular Lab Work: A1c  Where do you get your labs drawn?: [facility]  Lab Compliance Barriers: No    Nutritional Status  Diet: Regular, Diabetic diet  Meal Plan 24 Hour Recall: Breakfast, Lunch, Dinner, Snack  Meal Plan 24 Hour Recall - Breakfast: Yogurt, oatmeal, or cheerios with hot tea and  splenda  Meal Plan 24 Hour Recall - Lunch: Yesterday had shrimp etouffee with saiad and water to drink  Meal Plan 24 Hour Recall - Dinner: Last night had ham and cheese sandwich  Meal Plan 24 Hour Recall - Snack: SF ice cream or ria cracker  Change in appetite?: No  Dentation:: Intact  Recent Changes in Weight: No Recent Weight Change  Current nutritional status an area of need that is impacting patient's ability to self-manage diabetes?: Yes    Additional Social History    Support  Does anyone support you with your diabetes care?: yes  Who supports you?: family member  Who takes you to your medical appointments?: family member  Does the current support meet the patient's needs?: Yes  Is Support an area impacting ability to self-manage diabetes?: No    Access to Mass Media & Technology  Does the patient have access to any of the following devices or technologies?: Smart phone, Home computer, Internet Access  Media or technology needs impacting ability to self-manage diabetes?: No    Cognitive/Behavioral Health  Alert and Oriented: Yes  Difficulty Thinking: No  Requires Prompting: No  Requires assistance for routine expression?: No  Cognitive or behavioral barriers impacting ability to self-manage diabetes?: No    Culture/Baptist  Culture or Taoism beliefs that may impact ability to access healthcare: No    Communication  Language preference: English  Hearing Problems: No  Vision Problems: No  Communication needs impacting ability to self-manage diabetes?: No    Health Literacy  Preferred Learning Method: Face to Face  How often do you need to have someone help you read instructions, pamphlets, or written material from your doctor or pharmacy?: Never  Health literacy needs impacting ability to self-manage diabetes?: No      Diabetes Self-Management Skills Assessment    Diabetes Disease Process/Treatment Options  Patient/caregiver able to state what happens when someone has diabetes.: yes  Patient/caregiver  knows what type of diabetes they have.: yes  Diabetes Type : Type I  Patient/caregiver able to identify at least three signs and symptoms of diabetes.: yes  Identified signs and symptoms:: fatigue, increased thirst, frequent urination  Patient able to identify at least three risk factors for diabetes.: yes  Identified risk factors:: age over 40, family history, reduced activity, ethnicity  Diabetes Disease Process/Treatment Options: Skills Assessment Completed: Yes  Assessment indicates:: Adequate understanding  Area of need?: No    Nutrition/Healthy Eating  Challenges to healthy eating:: snacking between meals and at night, other (see comments) (not eating enough, skipping meals d/t not being hungry)  Method of carbohydrate measurement:: portion plate, eyeballing/guessing  Patient can identify foods that impact blood sugar.: yes  Patient-identified foods:: starches (bread, pasta, rice, cereal), sweets  Nutrition/Healthy Eating Skills Assessment Completed:: Yes  Assessment indicates:: Instruction Needed  Area of need?: Yes    Physical Activity/Exercise  Patient's daily activity level:: sedentary  Patient formally exercises outside of work.: no  Reasons for not exercising:: unsafe to exercise at home, physically unable to exercise currently  Physical Activity/Exercise Skills Assessment Completed: : Yes  Assessment indicates:: Adequate understanding  Area of need?: No    Medications  Patient is able to describe current diabetes management routine.: yes  Diabetes management routine:: insulin (Tresiba and Novolog)  Patient is able to identify current diabetes medications, dosages, and appropriate timing of medications.: yes  Patient understands the purpose of the medications taken for diabetes.: yes  Patient reports problems or concerns with current medication regimen.: no  Medication Skills Assessment Completed:: Yes  Assessment indicates:: Knowledge deficit  Area of need?: Yes    Home Blood Glucose Monitoring  Patient  states that blood sugar is checked at home daily.: yes  Monitoring Method:: personal continuous glucose monitor  Personal CGM type:: Dharmesh  Patient is able to use personal CGM appropriately.: yes  CGM Report reviewed?: yes  Home Blood Glucose Monitoring Skills Assessment Completed: : Yes  Assessment indicates:: Adequate understanding  Area of need?: No    Acute Complications  Acute Complications Skills Assessment Completed: : No  Deffered due to:: Time  Area of need?: No    Chronic Complications  Chronic Complications Skills Assessment Completed: : No  Deferred due to:: Time  Area of need?: No    Psychosocial/Coping  Patient can identify ways of coping with chronic disease.: yes  Patient-stated ways of coping with chronic disease:: support from loved ones  Psychosocial/Coping Skills Assessment Completed: : Yes  Assessment indicates:: Adequate understanding  Area of need?: No      Diabetes Self Support Plan    Assessment Summary and Plan    Based on today's diabetes care assessment, the following areas of need were identified:      Social 3/3/2022   Support No   Access to Mass Media/Tech No   Cognitive/Behavioral Health No   Culture/Gnosticism No   Communication No   Health Literacy No        Clinical 3/3/2022   Medication Adherence No   Lab Compliance No   Nutritional Status Yes        Diabetes Self-Management Skills 3/3/2022   Diabetes Disease Process/Treatment Options No   Nutrition/Healthy Eating Yes See Care Plan   Physical Activity/Exercise No   Medication Yes See Care Plan   Home Blood Glucose Monitoring No   Acute Complications No   Chronic Complications No   Psychosocial/Coping No          Today's interventions were provided through individual discussion, instruction, and written materials were provided.      Patient verbalized understanding of instruction and written materials.  Pt was able to return back demonstration of instructions today. Patient understood key points, needs reinforcement and further  instruction.     Diabetes Self-Management Care Plan:    Today's Diabetes Self-Management Care Plan was developed with Cary's input. Cary has agreed to work toward the following goal(s) to improve his/her overall diabetes control.      Care Plan: Diabetes Management   Updates made since 2/2/2022 12:00 AM      Problem: Healthy Eating       Goal: Eat 3 meals daily with 30-45g/2-3 servings of Carbohydrate per meal.    Start Date: 3/3/2022   Expected End Date: 6/3/2022   Priority: High   Note:    Educated patient on plate method with carb limit set to 30-45 grams per meal and 15 grams or less per snack.  Educated patient on how to read nutrition labels for carb, protein, fiber and fat content.  Provided Carb counting and meal planning book for reference. Instructed patient on the importance of eating three times per day.      Task: Reviewed the sources and role of Carbohydrate, Protein, and Fat and how each nutrient impacts blood sugar. Completed 3/4/2022         Task: Explained how to count carbohydrates using the food label and the use of dry measuring cups for accurate carb counting. Completed 3/4/2022            Task: Provided Sample plate method and reviewed the use of the plate to estimate amounts of carbohydrate per meal. Completed 3/4/2022      Problem: Medications       Goal: Patient Agrees to take Diabetes Medications as prescribed.    Start Date: 3/3/2022   Expected End Date: 6/3/2022   Priority: High   Note:    Spent a lot of time on how basal and prandial insulins work.     Task: Reviewed with patient all current diabetes medications and provided basic review of the purpose, dosage, frequency, side effects, and storage of both oral and injectable diabetes medications. Completed 3/4/2022         Task: Discussed guidelines for preventing, detecting and treating hypoglycemia and hyperglycemia and reviewed the importance of meal and medication timing with diabetes mediations for prevention of hypoglycemia and  maximum drug benefit. Completed 3/4/2022          Follow Up Plan     Today's care plan and follow up schedule was discussed with patient.  Cary verbalized understanding of the care plan, goals, and agrees to follow up plan.        The patient was encouraged to communicate with his/her health care provider/physician and care team regarding his/her condition(s) and treatment.  I provided the patient with my contact information today and encouraged to contact me via phone or [facility] Patient Portal as needed.     Length of Visit   Total Time: 60 Minutes

## 2022-03-08 NOTE — TELEPHONE ENCOUNTER
"----- Message from Valeriy Sampson sent at 3/8/2022  4:27 PM CST -----  RX Name and Strength:  morphine (MSIR) 15 MG tablet           How is the patient currently taking it?  Take 1 tablet (15 mg total) by mouth every 4 (four) hours as needed for Pain. - Oral          Is this a 30 day or 90 day Rx?  30 tablet        Preferred Pharmacy with phone number:    [id number] - [place] [id number] [facility] [address]  Phone: [phone] Fax: [phone]          Local or Mail Order: Local        Ordering Provider: Jane        Contact Preference: [phone] (home) [phone] (work)              Additional Information:  "Thank you for all that you do for our patients"     "

## 2022-03-09 NOTE — TELEPHONE ENCOUNTER
Please have patent stop scheduled Novolog  Use SSI for bg > 180 prior to meals only    180-230 +1  231- 280 +2  281-330 +3  331-380 +4  > 380 +5    Dharmesh download in 1 week   Routine  care and anticipatory guidance

## 2022-03-09 NOTE — TELEPHONE ENCOUNTER
Informed Rekha Moore (Daughter) of message from RUBEN Hernandez NP. Daughter verbalized an understanding. The Sliding Scale was sent to the patient portal & a Dharmesh download has been scheduled on 3/16/22.

## 2022-03-11 NOTE — TELEPHONE ENCOUNTER
Daughter tiana reports pt's pain is increasing in severity and frequency with less relief with current meds. Frequently refusing to eat and has intermittent nausea, is aware of palliative care apt next month, just concerned about now

## 2022-03-14 NOTE — TELEPHONE ENCOUNTER
Specialty Pharmacy - Refill Coordination  Specialty Pharmacy - Clinical Intervention    Specialty Medication Orders Linked to Encounter    Flowsheet Row Most Recent Value   Medication #1 acalabrutinib (CALQUENCE) 100 mg Cap (Order#[id number], Rx#[id number])          Refill Questions - Documented Responses    Flowsheet Row Most Recent Value   Patient Availability and HIPAA Verification    Does patient want to proceed with activity? Yes   HIPAA/medical authority confirmed? Yes   Relationship to patient of person spoken to? Child   Refill Screening Questions    Changes to allergies? No   Changes to medications? Yes  [Taking oxycodone 5 mg q6h prn p and miralax 17 g daily prn]   New conditions since last clinic visit? No   Unplanned office visit, urgent care, ED, or hospital admission in the last 4 weeks? Yes  [ED visit and hospitalization d/t BG>400 and severe pain]   How does patient/caregiver feel medication is working? Good   Financial problems or insurance changes? No   How many doses of your specialty medications were missed in the last 4 weeks? 0   Would patient like to speak to a pharmacist? No   When does the patient need to receive the medication? 03/23/22   Refill Delivery Questions    How will the patient receive the medication? Delivery Amber   When does the patient need to receive the medication? 03/23/22   Shipping Address Home   Address in [facility] confirmed and updated if neccessary? Yes   Expected Copay ($) 0   Is the patient able to afford the medication copay? Yes   Payment Method zero copay   Days supply of Refill 30   Supplies needed? No supplies needed   Refill activity completed? Yes   Refill activity plan Refill scheduled   Shipment/Pickup Date: 03/18/22          Current Outpatient Medications   Medication Sig    acalabrutinib (CALQUENCE) 100 mg Cap Take 1 capsule (100 mg) by mouth 2 (two) times a day.    acetaminophen (TYLENOL) 325 MG tablet Take 2 tablets (650 mg total) by mouth  every 4 (four) hours as needed.    amLODIPine (NORVASC) 10 MG tablet Take 1 tablet (10 mg total) by mouth once daily.    aspirin (ECOTRIN) 81 MG EC tablet Take 81 mg by mouth once daily.    atorvastatin (LIPITOR) 20 MG tablet Take 1 tablet (20 mg total) by mouth once daily.    blood sugar diagnostic Strp Use to test blood glucose 2 (two) times daily with meals.    blood-glucose meter Misc Use to test blood glucose 2 (two) times daily with meals.    cholecalciferol, vitamin D3, 1,000 unit capsule Take 1,000 Units by mouth once daily.    diclofenac sodium (VOLTAREN) 1 % Gel Apply 2 g topically daily as needed.    EScitalopram oxalate (LEXAPRO) 10 MG tablet Take 1 tablet (10 mg total) by mouth once daily.    flash glucose scanning reader (FREESTYLE NIMCO 14 DAY READER) Misc Use as directed (Patient taking differently: Use as directed)    flash glucose sensor (FREESTYLE NIMCO 14 DAY SENSOR) Kit Use one sensor every 2 weeks to check blood sugar ICD-10-CM: E11.9    glucagon (BAQSIMI) 3 mg/actuation Spry Use in case of severe hypoglycemia    insulin aspart, niacinamide, 100 unit/mL (3 mL) InPn 4 u Ac   + correction  Max TDD 25 u    insulin detemir U-100 (LEVEMIR FLEXTOUCH U-100 INSULN) 100 unit/mL (3 mL) InPn pen Inject 7 Units into the skin every evening.    lancets (ACCU-CHEK SOFTCLIX LANCETS) Misc Test blood glucose twice daily with meals    lancets Misc To check BG 2 times daily, to use with insurance preferred meter    losartan (COZAAR) 25 MG tablet Take 1 tablet (25 mg total) by mouth once daily.    methocarbamoL (ROBAXIN) 500 MG Tab Take 1 tablet (500 mg total) by mouth 3 (three) times daily as needed (Muscle Spasm.).    methylcellulose (ARTIFICIAL TEARS) 1 % ophthalmic solution Place 1 drop into both eyes as needed.    mirtazapine (REMERON) 7.5 MG Tab Take 1 tablet (7.5 mg total) by mouth every evening.    morphine (MSIR) 15 MG tablet Take 1 tablet (15 mg total) by mouth every 4 (four) hours as  "needed for Pain.    naloxone (NARCAN) 4 mg/actuation Spry 4mg by nasal route as needed for opioid overdose; may repeat every 2-3 minutes in alternating nostrils until medical help arrives. Call 911    ondansetron (ZOFRAN-ODT) 4 MG TbDL Dissolve 1 tablet (4 mg total) by mouth every 6 (six) hours as needed.    pen needle, diabetic (BD ROWENA 2ND GEN PEN NEEDLE) 32 gauge x 5/32" Ndle Uses 4 daily    predniSONE (DELTASONE) 10 MG tablet Take 1 tablet (10 mg total) by mouth 2 (two) times daily.    prochlorperazine (COMPAZINE) 10 MG tablet Take 1 tablet (10 mg total) by mouth every 6 (six) hours as needed (nausea).    TRUE METRIX GLUCOSE TEST STRIP Strp TEST BLOOD SUGAR TWICE A DAY    TRUEPLUS LANCETS 30 gauge Misc USE ONE LANCET TWICE DAILY   Last reviewed on 3/3/2022 10:21 AM by Sylwia Hunt MA    Review of patient's allergies indicates:   Allergen Reactions    Zoster vaccine live Rash    Lisinopril Swelling    Last reviewed on  3/11/2022 4:00 PM by Medhat Zadii      Tasks added this encounter   4/15/2022 - Refill Call (Auto Added)   Tasks due within next 3 months   5/9/2022 - Clinical - Follow Up Assesement (90 day)  2/23/2022 - 7 Day Post Start Touchbase     Julia Carey, PharmD  [facility]  [address]  [facility] [address]  Phone: [phone]  Fax: [phone]      "

## 2022-03-15 NOTE — TELEPHONE ENCOUNTER
7 Day Touchbase - Documented Responses    Flowsheet Row Most Recent Value   Have you started taking your medication? Yes   What day did you start? 02/17/22   How are you feeling?  Pt is feeling ok today but having a few issues   How are you taking your medication? Are you having any problems taking your medication? Taking medication as directed 100 mg caps BID at 8:30a and 8:30p. Having some SEs. Increased abdominal pain that it not controlled by tylenol or morphine and contributing to nausea and occasional vomiting. Also, having back pain that radiates down groin area. Has occasional constipation that is managed with OTC stool softer and miralax. Pt's daughter has also noticed a decreased appetitie   Do you have any questions or concerns that we can help you with today? Noticed urine is bubbly/foamy over the last week and notified provider. Denied dysuria or increased urinary frequency. Has an appt with provider on 3/17 to check labs and office visit.        Pt previously had a rx for oxycodone 5 mg q6h prn. Daughter has been alternativing oxycodone and morphine to control pt's pain and noticed an improvement. Notified pt's daughter to always check with provider prior to starting other medications and to let the provider know prior to initiating to make sure it is safe and okay to start. She voiced understanding    Has mentioned pt's uncontrollable pain to provider and started conversation about a palliative or pain consultation    Nausea is controlled with antiemetics and seems to be related to pt's abdominal pain. Reported that using a heating pad helps with pain    Will send a message to provider's office about pt's pain and urinary concern. Pt does have appt and labs on 3/17 with provider. Provider is aware of pt's pain and urinary issues.     Julia Carey, PharmD  [facility]  [address] [facility] [address]  Phone: [phone]  Fax: [phone]

## 2022-03-17 NOTE — PROGRESS NOTES
[facility]  [facility]  Hematology/Medical Oncology Clinic       PATIENT: Cary Middleton  MRN: [id number]  DATE: 3/16/2022    Reason for referral: CLL    Initial History: Patient is a 87 y/o female with hx of HTN, Depression, T2DM who presents to clinic after found to have abd LAD on CT abd/pel found incidentally for flank pain.     Labs with leukocytosis, absolute lymphocytosis since 8/2018. Normal renal function.      CT Abd/Pel w/o con 1/6/2022: Normal liver size. Normal spleen size. Mesenteric and retroperitoneal LAD. Multiple para-aortic lymph nodes. Largest lymph node is near the left renal hilu, 3.2 cm in greatest dimension.      MRI 1/2022: Left periaortic retroperitoneal mass 5.1cm in greatest dimension.      Flow cytometry: Consistent with CLL    Admitted from 2/5/22-2/7/22 for steriod induced hyperglycemia and flank pain. Started on morphine 15mg po q4 prn, methocarbamol 500mg tid prn, tylenol 650mg prn.     CT neck: No LAD seen.   CT Chest: Two lung micronodules. Perhaps intrathoracic lymph node.      Interval History:   She presents with her daughter and reports pain is  improved. Not needing MSIR as frequently. She was having constipation, treated with colace and miralax and now has overflow incontinence. Complicated by hemorrhoids. She is using a cream. Stools are more semi-solid and improved.      Started acalabrutinib 2/17/22.   Labs with leukocytosis, microcytic anemia, leukocytosis. Normal CMP.     Past Medical History:   Past Medical History:   Diagnosis Date    Anemia     Anxiety     Cataract     CLL (chronic lymphocytic leukemia) 2/5/2022    Depression     Diabetes mellitus     Hyperlipidemia     Hypertension     Kidney stone     Macular degeneration     Osteoarthritis of left hip     Osteoporosis     Recurrent nephrolithiasis     Type 2 diabetes mellitus     Type 2 diabetes mellitus with ophthalmic manifestations        Past Surgical HIstory:    Past Surgical History:   Procedure Laterality Date    BLEPHAROPLASTY, QUAD      GANGLION CYST EXCISION      HYSTERECTOMY      INTRACAPSULAR CATARACT EXTRACTION      left eye       Family History:   Family History   Problem Relation Age of Onset    Colon cancer Mother     Cancer Mother     Heart disease Father     Cataracts Father     Diabetes Father     Pancreatic cancer Sister     Strabismus Daughter     Hypertension Daughter     Transient ischemic attack Daughter         x 3    Diabetes Brother     Hypertension Brother     Cancer Son         lung cancer    Hypertension Daughter     Arthritis Daughter     No Known Problems Daughter     Aneurysm Sister     Hypertension Brother     Glaucoma Neg Hx     Blindness Neg Hx        Social History:  reports that she has never smoked. She has never used smokeless tobacco. She reports that she does not drink alcohol and does not use drugs.    Allergies:  Review of patient's allergies indicates:   Allergen Reactions    Zoster vaccine live Rash    Lisinopril Swelling       Medications:  Current Outpatient Medications   Medication Sig Dispense Refill    acalabrutinib (CALQUENCE) 100 mg Cap Take 1 capsule (100 mg) by mouth 2 (two) times a day. 60 capsule 3    acetaminophen (TYLENOL) 325 MG tablet Take 2 tablets (650 mg total) by mouth every 4 (four) hours as needed. 30 tablet 3    amLODIPine (NORVASC) 10 MG tablet Take 1 tablet (10 mg total) by mouth once daily. 90 tablet 3    aspirin (ECOTRIN) 81 MG EC tablet Take 81 mg by mouth once daily.      atorvastatin (LIPITOR) 20 MG tablet Take 1 tablet (20 mg total) by mouth once daily. 90 tablet 3    blood sugar diagnostic Strp Use to test blood glucose 2 (two) times daily with meals. 100 each 11    blood-glucose meter Misc Use to test blood glucose 2 (two) times daily with meals. 1 each 0    cholecalciferol, vitamin D3, 1,000 unit capsule Take 1,000 Units by mouth once daily.      diclofenac sodium  "(VOLTAREN) 1 % Gel Apply 2 g topically daily as needed. 50 g 1    EScitalopram oxalate (LEXAPRO) 10 MG tablet Take 1 tablet (10 mg total) by mouth once daily. 90 tablet 3    flash glucose scanning reader (FREESTYLE NIMCO 14 DAY READER) Misc Use as directed (Patient taking differently: Use as directed) 1 each 0    flash glucose sensor (FREESTYLE NIMCO 14 DAY SENSOR) Kit Use one sensor every 2 weeks to check blood sugar ICD-10-CM: E11.9 1 kit 3    glucagon (BAQSIMI) 3 mg/actuation Spry Use in case of severe hypoglycemia 1 each 3    insulin aspart, niacinamide, 100 unit/mL (3 mL) InPn 4 u Ac   + correction  Max TDD 25 u 5 pen 3    insulin detemir U-100 (LEVEMIR FLEXTOUCH U-100 INSULN) 100 unit/mL (3 mL) InPn pen Inject 7 Units into the skin every evening. 15 mL 4    lancets (ACCU-CHEK SOFTCLIX LANCETS) Misc Test blood glucose twice daily with meals 100 each 11    lancets Misc To check BG 2 times daily, to use with insurance preferred meter 100 each 12    losartan (COZAAR) 25 MG tablet Take 1 tablet (25 mg total) by mouth once daily. 90 tablet 3    methocarbamoL (ROBAXIN) 500 MG Tab Take 1 tablet (500 mg total) by mouth 3 (three) times daily as needed (Muscle Spasm.). 30 tablet 0    methylcellulose (ARTIFICIAL TEARS) 1 % ophthalmic solution Place 1 drop into both eyes as needed.      mirtazapine (REMERON) 7.5 MG Tab Take 1 tablet (7.5 mg total) by mouth every evening. 30 tablet 11    morphine (MSIR) 15 MG tablet Take 1 tablet (15 mg total) by mouth every 4 (four) hours as needed for Pain. 30 tablet 0    naloxone (NARCAN) 4 mg/actuation Spry 4mg by nasal route as needed for opioid overdose; may repeat every 2-3 minutes in alternating nostrils until medical help arrives. Call 911 1 each 11    ondansetron (ZOFRAN-ODT) 4 MG TbDL Dissolve 1 tablet (4 mg total) by mouth every 6 (six) hours as needed. 60 tablet 0    pen needle, diabetic (BD ROWENA 2ND GEN PEN NEEDLE) 32 gauge x 5/32" Ndle Uses 4 daily 150 each 4 " "   pramoxine-hydrocortisone (ANALPRAM HC) cream Apply topically 3 (three) times daily. For Hemorrhoid pain. 28 g 2    predniSONE (DELTASONE) 10 MG tablet Take 1 tablet (10 mg total) by mouth 2 (two) times daily. 60 tablet 0    prochlorperazine (COMPAZINE) 10 MG tablet Take 1 tablet (10 mg total) by mouth every 6 (six) hours as needed (nausea). 30 tablet 1    TRUE METRIX GLUCOSE TEST STRIP Strp TEST BLOOD SUGAR TWICE A  each 12    TRUEPLUS LANCETS 30 gauge Misc USE ONE LANCET TWICE DAILY       No current facility-administered medications for this visit.     Facility-Administered Medications Ordered in Other Visits   Medication Dose Route Frequency Provider Last Rate Last Admin    0.9%  NaCl infusion  500 mL Intravenous Continuous Elif Lares MD           Review of Systems  Constitutional: Reports fatigue. Decreased appetite.   HENT: Negative for nosebleeds and sore throat.    Respiratory: Negative for cough, chest tightness, shortness of breath and wheezing.    Cardiovascular: Negative for leg swelling. Negative for palpitations.   Gastrointestinal: Overflow incontinence    ECOG Performance Status: 3  Objective:      Vitals:   Vitals:    03/17/22 1329   BP: (!) 100/57   BP Location: Left arm   Patient Position: Sitting   BP Method: Medium (Automatic)   Pulse: 100   Resp: 17   Temp: 98.1 °F (36.7 °C)   TempSrc: Oral   SpO2: 97%   Weight: 56 kg (123 lb 7.3 oz)   Height: 5' 4" (1.626 m)       Physical Exam  Constitutional:       Appearance: Appears weak and fatigued   HENT:      Head: Normocephalic and atraumatic.      Mouth/Throat:      Mouth: Mucous membranes are dry.   Eyes:      Extraocular Movements: Extraocular movements intact.   Cardiovascular:      Rate and Rhythm: Normal rate and regular rhythm.      Pulses: Normal pulses.      Heart sounds: Normal heart sounds.   Pulmonary:      Effort: Pulmonary effort is normal.      Breath sounds: Normal breath sounds.   Abdominal:      Palpations: " Abdomen is soft.   Rectal Exam: No skin tear or anal fissures. Possible external hemorrhoid w/ erythema.   Musculoskeletal:      Cervical back: Normal range of motion and neck supple.   Skin:     General: Skin is warm.   Neurological:      General: No focal deficit present.      Mental Status: She is alert.    Laboratory Data:  No visits with results within 1 Week(s) from this visit.   Latest known visit with results is:   Lab Visit on 02/24/2022   Component Date Value Ref Range Status    WBC 02/24/2022 27.03 (A) 3.90 - 12.70 K/uL Final    RBC 02/24/2022 5.35  4.00 - 5.40 M/uL Final    Hemoglobin 02/24/2022 12.0  12.0 - 16.0 g/dL Final    Hematocrit 02/24/2022 37.3  37.0 - 48.5 % Final    MCV 02/24/2022 70 (A) 82 - 98 fL Final    MCH 02/24/2022 22.4 (A) 27.0 - 31.0 pg Final    MCHC 02/24/2022 32.2  32.0 - 36.0 g/dL Final    RDW 02/24/2022 17.6 (A) 11.5 - 14.5 % Final    Platelets 02/24/2022 319  150 - 450 K/uL Final    MPV 02/24/2022 10.4  9.2 - 12.9 fL Final    Immature Granulocytes 02/24/2022 CANCELED  0.0 - 0.5 % Final    Result canceled by the ancillary.    Immature Grans (Abs) 02/24/2022 CANCELED  0.00 - 0.04 K/uL Final    Comment: Mild elevation in immature granulocytes is non specific and   can be seen in a variety of conditions including stress response,   acute inflammation, trauma and pregnancy. Correlation with other   laboratory and clinical findings is essential.    Result canceled by the ancillary.      nRBC 02/24/2022 0  0 /100 WBC Final    Gran % 02/24/2022 36.0 (A) 38.0 - 73.0 % Final    Lymph % 02/24/2022 62.0 (A) 18.0 - 48.0 % Final    Atypical Lymphs present.    Mono % 02/24/2022 2.0 (A) 4.0 - 15.0 % Final    Eosinophil % 02/24/2022 0.0  0.0 - 8.0 % Final    Basophil % 02/24/2022 0.0  0.0 - 1.9 % Final    Platelet Estimate 02/24/2022 Appears normal   Final    Aniso 02/24/2022 Slight   Final    Poik 02/24/2022 Slight   Final    Poly 02/24/2022 Occasional   Final    Hypo  02/24/2022 Occasional   Final    Ovalocytes 02/24/2022 Occasional   Final    Target Cells 02/24/2022 Occasional   Final    Smudge Cells 02/24/2022 Present   Final    Comment: Smudge cells present;Substantial numbers may affect the   accuracy of the differential.      Fragmented Cells 02/24/2022 Occasional   Final    Differential Method 02/24/2022 Manual   Final    Sodium 02/24/2022 139  136 - 145 mmol/L Final    Potassium 02/24/2022 4.9  3.5 - 5.1 mmol/L Final    Chloride 02/24/2022 99  95 - 110 mmol/L Final    CO2 02/24/2022 27  23 - 29 mmol/L Final    Glucose 02/24/2022 137 (A) 70 - 110 mg/dL Final    BUN 02/24/2022 22  8 - 23 mg/dL Final    Creatinine 02/24/2022 0.9  0.5 - 1.4 mg/dL Final    Calcium 02/24/2022 10.1  8.7 - 10.5 mg/dL Final    Total Protein 02/24/2022 6.6  6.0 - 8.4 g/dL Final    Albumin 02/24/2022 3.2 (A) 3.5 - 5.2 g/dL Final    Total Bilirubin 02/24/2022 0.6  0.1 - 1.0 mg/dL Final    Comment: For infants and newborns, interpretation of results should be based  on gestational age, weight and in agreement with clinical  observations.    Premature Infant recommended reference ranges:  Up to 24 hours.............<8.0 mg/dL  Up to 48 hours............<12.0 mg/dL  3-5 days..................<15.0 mg/dL  6-29 days.................<15.0 mg/dL      Alkaline Phosphatase 02/24/2022 74  55 - 135 U/L Final    AST 02/24/2022 13  10 - 40 U/L Final    ALT 02/24/2022 9 (A) 10 - 44 U/L Final    Anion Gap 02/24/2022 13  8 - 16 mmol/L Final    eGFR if  02/24/2022 >60.0  >60 mL/min/1.73 m^2 Final    eGFR if non  02/24/2022 58.1 (A) >60 mL/min/1.73 m^2 Final    Comment: Calculation used to obtain the estimated glomerular filtration  rate (eGFR) is the CKD-EPI equation.            Imaging: All pertinent imaging reviewed    Assessment and Plan        CLL, NEVILLE stage 1  -Will recommend treatment due to lymphadenopathy related pain   -Started acalabrutinib 100mg BID on  2/17/22  - CLL FISH w/ trisomy 12 and 13q del, mutated IGHV , TP53 negative  -Refilled MSIR 15mg q4h prn for pain  -Zofran (1st line) and compazine (2nd line) for nausea    Constipation with overflow incontinence  -Likely due to opioid use. Encouraged stool regimen.     Follow up  -MD visit with cbc and cmp in 3 weeks    Lucina Lara MD  Hematology/Oncology Fellow PGY IV  [facility]

## 2022-03-18 NOTE — TELEPHONE ENCOUNTER
These look great! I'm fine with her staying off the insulin    ----- Message -----   From: Arianna Caldera MA   Sent: 3/18/2022   1:25 PM CDT   To: Saniya Hernandez DNP, NP   Subject: Import                                             The document below was attached by Arianna Caldera MA [[id number]] on 3/18/2022 at 1:24 PM to the following: Appointment on 3/18/2022 with DEREK, ENDOCRINE NURSE.     Pt. Verbalized an understanding.

## 2022-03-19 NOTE — ED NOTES
"Cary Mdidleton, an 86 y.o. female presents to the ED reporting several episodes of "explosive" diarrhea and abd discomfort since Monday. Pt was initially constipated due to her pain medications but was started on laxatives. Due to the subsequent diarrhea, pt also c/o generalized weakness. Also reports painful hemorrhoids. Hx of CLL, on chemo. Denies chest pain, SOB, cough, fever.      Review of patient's allergies indicates:   Allergen Reactions    Zoster vaccine live Rash    Lisinopril Swelling     Chief Complaint   Patient presents with    Diarrhea     Weakness, hx of CLL, diarrhea started monday, painful hemorrhoids      Past Medical History:   Diagnosis Date    Anemia     Anxiety     Cataract     CLL (chronic lymphocytic leukemia) 2/5/2022    Depression     Diabetes mellitus     Hyperlipidemia     Hypertension     Kidney stone     Macular degeneration     Osteoarthritis of left hip     Osteoporosis     Recurrent nephrolithiasis     Type 2 diabetes mellitus     Type 2 diabetes mellitus with ophthalmic manifestations        "

## 2022-03-19 NOTE — FIRST PROVIDER EVALUATION
"Medical screening exam completed.  I have conducted a focused provider triage encounter, findings are as follows:    Brief history of present illness:  Diarrhea for 4 days, pasty and thick.  3-4 stools per day.  No fever and chills.  one episode of explosive diarrhea today.  CLL patient, on therapy.    Vitals:    03/18/22 1932 03/18/22 2133   BP: (!) 118/55 (!) 143/71   BP Location: Right arm    Patient Position: Sitting    Pulse: 84 82   Resp: 18 18   Temp: 98.6 °F (37 °C)    TempSrc: Oral    SpO2: 99% 95%   Weight: 55.8 kg (123 lb)    Height: 5' 4" (1.626 m)        Pertinent physical exam:  Minimal suprapubic tenderness, otherwise benign.    Brief workup plan:  Labs, fluids.    Preliminary workup initiated; this workup will be continued and followed by the physician or advanced practice provider that is assigned to the patient when roomed.  "

## 2022-03-19 NOTE — ED PROVIDER NOTES
Encounter Date: 3/18/2022       History     Chief Complaint   Patient presents with    Diarrhea     Weakness, hx of CLL, diarrhea started monday, painful hemorrhoids      86-year-old woman with comorbidities of diabetes, depression, hypertension, hyperlipidemia, as well as anxiety and CLL presents to the ED for evaluation of ongoing diarrhea and generalized fatigue.  The patient reports experiencing subacute chronic constipation in the setting of her chronic opiate therapy for which she began taking MiraLax.  She reports that 5 days ago she began experiencing voluminous diarrhea a few episodes per day, and the ongoing oozing and diarrheal episodes have continued.  She describes a single large voluminous episode earlier today as well as ongoing rectal losing with minimal anterior abdominal cramping without associated nausea or vomiting.  Per        Review of patient's allergies indicates:   Allergen Reactions    Zoster vaccine live Rash    Lisinopril Swelling     Past Medical History:   Diagnosis Date    Anemia     Anxiety     Cataract     CLL (chronic lymphocytic leukemia) 2/5/2022    Depression     Diabetes mellitus     Hyperlipidemia     Hypertension     Kidney stone     Macular degeneration     Osteoarthritis of left hip     Osteoporosis     Recurrent nephrolithiasis     Type 2 diabetes mellitus     Type 2 diabetes mellitus with ophthalmic manifestations      Past Surgical History:   Procedure Laterality Date    BLEPHAROPLASTY, QUAD      GANGLION CYST EXCISION      HYSTERECTOMY      INTRACAPSULAR CATARACT EXTRACTION      left eye     Family History   Problem Relation Age of Onset    Colon cancer Mother     Cancer Mother     Heart disease Father     Cataracts Father     Diabetes Father     Pancreatic cancer Sister     Strabismus Daughter     Hypertension Daughter     Transient ischemic attack Daughter         x 3    Diabetes Brother     Hypertension Brother     Cancer Son          lung cancer    Hypertension Daughter     Arthritis Daughter     No Known Problems Daughter     Aneurysm Sister     Hypertension Brother     Glaucoma Neg Hx     Blindness Neg Hx      Social History     Tobacco Use    Smoking status: Never Smoker    Smokeless tobacco: Never Used   Substance Use Topics    Alcohol use: No    Drug use: No     Review of Systems   Constitutional: Positive for fatigue. Negative for chills and fever.   HENT: Negative for facial swelling, sinus pressure and trouble swallowing.    Respiratory: Negative for chest tightness and shortness of breath.    Cardiovascular: Negative for chest pain.   Gastrointestinal: Positive for diarrhea and rectal pain. Negative for abdominal pain, nausea and vomiting.   Genitourinary: Positive for difficulty urinating. Negative for flank pain and frequency.   Musculoskeletal: Negative for neck pain and neck stiffness.   Skin: Negative for rash and wound.   Neurological: Negative for seizures, syncope and numbness.       Physical Exam     Initial Vitals [03/18/22 1932]   BP Pulse Resp Temp SpO2   (!) 118/55 84 18 98.6 °F (37 °C) 99 %      MAP       --         Physical Exam    Vitals reviewed.  Constitutional:   86-year-old  woman, mild discomfort noted   HENT:   Head: Normocephalic and atraumatic.   Moderately desiccated mucous membranes noted without evidence of additional acute intraoral injury   Eyes: EOM are normal. Pupils are equal, round, and reactive to light.   Neck: No tracheal deviation present.   Cardiovascular: Normal rate, regular rhythm and intact distal pulses.   Pulmonary/Chest: Breath sounds normal. No stridor. No respiratory distress.   Abdominal: Abdomen is soft.   Nondistended, nontender   Musculoskeletal:         General: No edema. Normal range of motion.     Neurological: She is alert and oriented to person, place, and time.   Skin: Skin is warm and dry.   Psychiatric: Her behavior is normal. Thought content normal.          ED Course   Procedures  Labs Reviewed   CBC W/ AUTO DIFFERENTIAL - Abnormal; Notable for the following components:       Result Value    WBC 17.02 (*)     Hemoglobin 10.1 (*)     Hematocrit 30.5 (*)     MCV 69 (*)     MCH 22.7 (*)     RDW 16.4 (*)     Gran % 29.0 (*)     Lymph % 67.0 (*)     Mono % 1.0 (*)     All other components within normal limits   COMPREHENSIVE METABOLIC PANEL - Abnormal; Notable for the following components:    Sodium 135 (*)     CO2 22 (*)     Glucose 134 (*)     Albumin 2.6 (*)     ALT 9 (*)     Anion Gap 17 (*)     All other components within normal limits   URINALYSIS, REFLEX TO URINE CULTURE - Abnormal; Notable for the following components:    Appearance, UA Cloudy (*)     Ketones, UA Trace (*)     Leukocytes, UA 3+ (*)     All other components within normal limits    Narrative:     Specimen Source->Urine   URINALYSIS MICROSCOPIC - Abnormal; Notable for the following components:    RBC, UA 7 (*)     WBC, UA 27 (*)     WBC Clumps, UA Few (*)     Bacteria Moderate (*)     Hyaline Casts, UA 9 (*)     All other components within normal limits    Narrative:     Specimen Source->Urine   CULTURE, URINE    Narrative:     Specimen Source->Urine   MAGNESIUM   PHOSPHORUS   TYPE & SCREEN          Imaging Results    None          Medications   lactated ringers bolus 1,000 mL (0 mLs Intravenous Stopped 3/18/22 2350)   hyoscyamine ODT 0.125 mg (0.125 mg Oral Given 3/18/22 2245)     Medical Decision Making:   History:   Old Medical Records: I decided to obtain old medical records.  Old Records Summarized: records from clinic visits.  Initial Assessment:   86-year-old woman with comorbidities of CLL as well as anxiety and hypertension presents to the ED for evaluation of ongoing diarrhea and generalized fatigue.  Differential Diagnosis:   Electrolyte derangement, a KI, diarrhea, UTI  Clinical Tests:   Lab Tests: Ordered and Reviewed            Attending Attestation:   Physician Attestation  Statement for Resident:  As the supervising MD   Physician Attestation Statement: I have personally seen and examined this patient.   I agree with the above history. -:   As the supervising MD I agree with the above PE.    As the supervising MD I agree with the above treatment, course, plan, and disposition.  I have reviewed and agree with the residents interpretation of the following: lab data.  I have reviewed the following: old records at this facility.                ED Course as of 03/21/22 1140   Fri Mar 18, 2022   2340 CBC auto differential(!)  White count downternding. [RK]   2340 Urinalysis Microscopic(!)  Consistent with UTI [RK]   2340 Urinalysis, Reflex to Urine Culture Urine, Catheterized(!)  Consistent with uti [RK]   2341 Comprehensive metabolic panel(!)  Wnl, no MIK [RK]   Sat Mar 19, 2022   0012 Patient is feeling much improved at this time.  She is tolerating p.o. and has had no episodes of diarrhea since her initial presentation.  She feels comfortable with discharge at this time.  I reviewed the findings of possible urinary tract infection and they understand that the urine will be sent for culture as possible will call in antibiotics.  She was given Keflex at her last visit and states this caused GI upset.  Given her presentation was for diarrhea do not want to cause any worsening GI upset at this time.  Patient adamantly denies any urinary symptoms at this time.  Strict ED return precautions given. [RK]      ED Course User Index  [RK] Nicolas Merino MD             Clinical Impression:   Final diagnoses:  [R19.7] Diarrhea, unspecified type (Primary)          ED Disposition Condition    Discharge Stable        ED Prescriptions     None        Follow-up Information     Follow up With Specialties Details Why Contact Info    Arlene Costa MD Internal Medicine Schedule an appointment as soon as possible for a visit in 2 days  [address]  [place] [address]  [phone]       [facility] Emergency Medicine  As needed, If symptoms worsen [address]  [facility] [id number] [phone]           Nicolas Merino MD  Resident  03/19/22 0014       Daron Spicer MD  03/21/22 1143

## 2022-03-23 NOTE — PROGRESS NOTES
Spoke with patient's daughter, Rekha, regarding Monday's appointment to start and train for the Dexcmo G6.  Instructed daughter to put 2 apps on patient's phone--Dexcom G6 and Clarity--making sure to note user name and password.  Using phone will allow pt to share info with daughter which will be a source of comfort for both and alert daughter when/if pt has low or high BG levels.

## 2022-03-23 NOTE — PATIENT INSTRUCTIONS
Hold losartan nightly    Work on increasing oral hydration, glucerna, smoothies, gatorade zero.    Monitor BP.

## 2022-03-25 NOTE — PLAN OF CARE
1440 Pt tolerated IV fluids infusion well today, no complaints or complications,. VSS through duration of treatment. Pt aware to call provider with any questions or concerns, plan of care reviewed, aware of upcoming appts. Pt ambulatory from clinic with steady gait, no distress noted.

## 2022-03-28 NOTE — PROGRESS NOTES
"Diabetes Care Specialist Progress Note  Author: Margi Rodriguez RD  Date: 3/28/2022    Program Intake  Reason for Diabetes Program Visit:: Intervention  Type of Intervention:: Individual  Individual: Device Training  Device Training: Personal CGM (Dexcom G6)  Current diabetes risk level:: moderate  Permission to speak with others about care:: yes (daughter, Rekha)    Lab Results   Component Value Date    HGBA1C 7.9 (H) 02/05/2022     Diabetes Self-Management Skills Assessment  Home Blood Glucose Monitoring  Patient states that blood sugar is checked at home daily.: yes  Monitoring Method:: personal continuous glucose monitor (Here today to train and start Dexcom G6)  Home Blood Glucose Monitoring Skills Assessment Completed: : Yes  Assessment indicates:: Instruction Needed, Knowledge deficit  Area of need?: Yes    Dharmesh 14 day DOWNLOAD: See media file for details. THIS IS THE LAST READING FOR THE DHARMESH 14 DAY;  PT HAS CHANGED TO DEXCOM G6 TODAY.  Average glucose: 98 mg/dL    0% CGM readings greater than 250 mg/dL  2% CGM readings between 181-250 mg/dL  98% CGM readings in target glucose range of  mg/dL   0% CGM readings between 54-79%  0% CGM readings less than 54 mg/dL          DIABETES EDUCATOR NOTE   PLACEMENT OF DEXCOM G6 PERSONAL CONTINOUS GLUCOSE MONITORING SYSTEM (CGMS)     REFERRING PROVIDER: Saniya Hernandez DNP, NP    Patient and caregiver/daughter ( Rekha) is here in clinic today for placement of personal continuous glucose monitoring system (CGMS).   Patient has Dexcom G6  and Transmitter. Patient will use PHONE to receive continuous glucose readings.  Pt verbalizes understanding to change sensor every 10 days. Patient is also aware that each time a new sensor is placed there is a 2 hour warm up period.  Calibration is not necessary.     A detailed explanation of Dexcom G6 Continuous Glucose Monitoring System was provided. Reviewed the Dexcom "Getting Started Guide" with patient and patient " has a written copy for home use.  Patient was allowed to practice and time allowed to ask questions. Patient shared with [facility] using Dexcom Clarity. Pt also sharing with daughter, Rekha.  Pt will notify Endocrinology if glucose levels are above 300 mg/dL consistently or if episode of glucose less than 70 mg/dL presents.  Pt verbalizes understanding.        High alert set at 250 mg/dL  Low alert set at 85 mg/dL   Repeat low: 30 minutes  High rising alert: off  Low dropping alert: off     An appropriate site was selected and prepared. Patient inserted sensor into RIGHT abdomen. Sensor will be changed every 10 days. Patient provided with Dexcom [place]. Pt has set up personal Dexcom account, and linked data sharing to [facility].  All questions answered.  Pt encouraged to contact Endocrinology department with any further questions or concerns.    SUPPLIES FROM:   [facility]  [phone]    Diabetes Self Support Plan  Assessment Summary and Plan    Based on today's diabetes care assessment, the following areas of need were identified:      Social 3/3/2022   Support No   Access to Mass Media/Tech No   Cognitive/Behavioral Health No   Culture/Taoism No   Communication No   Health Literacy No        Clinical 3/3/2022   Medication Adherence No   Lab Compliance No   Nutritional Status Yes        Diabetes Self-Management Skills 3/28/2022   Diabetes Disease Process/Treatment Options -   Nutrition/Healthy Eating -   Physical Activity/Exercise -   Medication -   Home Blood Glucose Monitoring Yes-see Care Plan   Acute Complications -   Chronic Complications -   Psychosocial/Coping -          Today's interventions were provided through individual discussion, instruction, and written materials were provided.      Patient verbalized understanding of instruction and written materials.  Pt was able to return back demonstration of instructions today. Patient understood key points, needs reinforcement and further  instruction.     Diabetes Self-Management Care Plan:    Today's Diabetes Self-Management Care Plan was developed with Cary's input. Cary has agreed to work toward the following goal(s) to improve his/her overall diabetes control.      Care Plan: Diabetes Management   Updates made since 2/26/2022 12:00 AM      Problem: Healthy Eating       Goal: Eat 3 meals daily with 30-45g/2-3 servings of Carbohydrate per meal.    Start Date: 3/3/2022   Expected End Date: 6/3/2022   Priority: High   Note:    Educated patient on plate method with carb limit set to 30-45 grams per meal and 15 grams or less per snack.  Educated patient on how to read nutrition labels for carb, protein, fiber and fat content.  Provided Carb counting and meal planning book for reference. Instructed patient on the importance of eating three times per day.      Task: Reviewed the sources and role of Carbohydrate, Protein, and Fat and how each nutrient impacts blood sugar. Completed 3/4/2022      Task: Provided visual examples using dry measuring cups, food models, and other familiar objects such as computer mouse, deck or cards, tennis ball etc. to help with visualization of portions.       Task: Explained how to count carbohydrates using the food label and the use of dry measuring cups for accurate carb counting. Completed 3/4/2022      Task: Discussed strategies for choosing healthier menu options when dining out.       Task: Recommended replacing beverages containing high sugar content with noncaloric/sugar free options and/or water.       Task: Review the importance of balancing carbohydrates with each meal using portion control techniques to count servings of carbohydrate and label reading to identify serving size and amount of total carbs per serving.       Task: Provided Sample plate method and reviewed the use of the plate to estimate amounts of carbohydrate per meal. Completed 3/4/2022      Problem: Medications       Goal: Patient Agrees to  take Diabetes Medications as prescribed.    Start Date: 3/3/2022   Expected End Date: 6/3/2022   Priority: High   Note:    Spent a lot of time on how basal and prandial insulins work.     Task: Reviewed with patient all current diabetes medications and provided basic review of the purpose, dosage, frequency, side effects, and storage of both oral and injectable diabetes medications. Completed 3/4/2022      Task: Reviewed possible resources for acquiring cost prohibitive medication.       Task: Instructed patient on how to self-administer        Task: Discussed guidelines for preventing, detecting and treating hypoglycemia and hyperglycemia and reviewed the importance of meal and medication timing with diabetes mediations for prevention of hypoglycemia and maximum drug benefit. Completed 3/4/2022      Problem: Blood Glucose Self-Monitoring       Goal: Patient will use Dexcom G6 CGM to continuously monitor blood glucose levels daily    Start Date: 3/28/2022   Expected End Date: 2022   Priority: High   Barriers: No Barriers Identified    Pt switching from using Freestyle Dharmesh 14 day (with reader; no alarms) to using Dexcom G6 today.  Dharmesh will  today--daughter knows to remove but will wait until after the 2 hour warm up from Dexcom G6      Task: Patient and daughter agree to use and understands  the importance of changing sensor every 10 days and transmitter every 90 days, using PHONE BHARAT to follow steps for reinsertion and activation   Task:  Pt and daughter will use Dexcom G6 bharat and Clarity bharat to share info with providers.     Task:   Pt and daughter aware that 2 hour warm up period required before CGM will provide BG levels and will check BG as needed manually            Follow Up Plan     Follow up if symptoms worsen or fail to improve.    Today's care plan and follow up schedule was discussed with patient.  Cary verbalized understanding of the care plan, goals, and agrees to follow up plan.         The patient was encouraged to communicate with his/her health care provider/physician and care team regarding his/her condition(s) and treatment.  I provided the patient with my contact information today and encouraged to contact me via phone or [facility] Portal as needed.     Length of Visit   Total Time: 60 Minutes

## 2022-03-30 NOTE — PROGRESS NOTES
"Subjective:       Patient ID: Cary Middleton is a 86 y.o. female with PMH of HTN, HLD, DM, anxiety and newly diagnosed CLL.    Chief Complaint: Follow-up    HPI     Established pt of Arlene Costa MD     Pt attended by her dtr.     Here for ED follow up.     ED visit on 3/18, about 1 week ago for diarrhea and fatigue. Lab/urine obtained which appeared stable. She was treated with IV fluids and hyoscyamine then discharge home.     Today she states diarrhea has resolved but still feels weak and fatigued. Eating a bland diet, had toast and yogurt this morning. snacking on popsickles, Pedialyte and chicken broth but only small amts. Last BM this am was solid.     Request refill of robaxin.     BP low normal today in clinic at 106/50    Past Medical History:   Diagnosis Date    Anemia     Anxiety     Cataract     CLL (chronic lymphocytic leukemia) 2/5/2022    Depression     Diabetes mellitus     Hyperlipidemia     Hypertension     Kidney stone     Macular degeneration     Osteoarthritis of left hip     Osteoporosis     Recurrent nephrolithiasis     Type 2 diabetes mellitus     Type 2 diabetes mellitus with ophthalmic manifestations      Social History     Tobacco Use    Smoking status: Never Smoker    Smokeless tobacco: Never Used   Substance Use Topics    Alcohol use: No    Drug use: No     Review of patient's allergies indicates:   Allergen Reactions    Zoster vaccine live Rash    Lisinopril Swelling         Review of Systems   Constitutional: Positive for fatigue. Negative for chills, diaphoresis and fever.   Respiratory: Negative for cough and shortness of breath.    Cardiovascular: Negative for chest pain and leg swelling.   Gastrointestinal: Negative for diarrhea, nausea and vomiting.   Genitourinary: Negative for dysuria and hematuria.   Neurological: Positive for weakness.         Objective: BP (!) 106/50   Pulse 99   Resp 16   Ht 5' 4" (1.626 m)   Wt 55.2 kg (121 lb 11.1 oz)   " SpO2 (!) 94%   BMI 20.89 kg/m²         Physical Exam  Vitals reviewed.   Constitutional:       General: She is not in acute distress.     Appearance: She is well-developed.   HENT:      Head: Normocephalic and atraumatic.      Mouth/Throat:      Mouth: Mucous membranes are moist.      Pharynx: Oropharynx is clear.   Cardiovascular:      Rate and Rhythm: Normal rate and regular rhythm.      Heart sounds: No murmur heard.  Pulmonary:      Effort: Pulmonary effort is normal.      Breath sounds: Normal breath sounds. No wheezing or rales.   Abdominal:      General: Bowel sounds are normal.      Palpations: Abdomen is soft.      Tenderness: There is no abdominal tenderness.   Musculoskeletal:      Right lower leg: No edema.      Left lower leg: No edema.   Skin:     General: Skin is warm and dry.      Findings: No rash.   Neurological:      Mental Status: She is alert.   Psychiatric:         Mood and Affect: Mood is depressed.         Assessment:       Problem List Items Addressed This Visit        Oncology    CLL (chronic lymphocytic leukemia) - Primary      Other Visit Diagnoses     Fatigue, unspecified type              Plan:         Cary was seen today for follow-up.    Diagnoses and all orders for this visit:    CLL (chronic lymphocytic leukemia)  -     Discontinue: sodium chloride 0.9% bolus 1,000 mL    Fatigue, unspecified type  -     Discontinue: sodium chloride 0.9% bolus 1,000 mL    Other orders  -     methocarbamoL (ROBAXIN) 500 MG Tab; Take 1 tablet (500 mg total) by mouth 3 (three) times daily as needed (Muscle Spasm.).  -     acetaminophen (TYLENOL) 325 MG tablet; Take 2 tablets (650 mg total) by mouth every 4 (four) hours as needed.    Diarrhea has resolved  Offered IV fluids today in clnic  Pt declined, prefers to go home   Discussed increasing oral hydration.   Advised to HOLD losartan 25mg, since BP low normal  Keep f/u with Hem/Onc, advised to discussed IV fluids at their infusion center if  desired  Follow up with PCP      Anaid Arciniega PA-C      Future Appointments   Date Time Provider Department Center   3/31/2022  1:00 PM [facility] LAB [facility]   3/31/2022  2:00 PM Lucina Lara MD [facility]   4/7/2022 11:00 AM Saniya Hernandez DNP, NP [facility] [place]   4/13/2022  2:00 PM Rafaela Lewis MD [facility] BRITTANY [place]

## 2022-03-31 NOTE — PROGRESS NOTES
[facility]  [facility]  Hematology/Medical Oncology Clinic       PATIENT: Cary Middleton  MRN: [id number]  DATE: 3/31/2022    Reason for referral: CLL    Initial History:  Patient is a 85 y/o female with hx of HTN, Depression, T2DM who presents to clinic after found to have abd LAD on CT abd/pel found incidentally for flank pain.     Labs with leukocytosis, absolute lymphocytosis since 8/2018. Normal renal function.      CT Abd/Pel w/o con 1/6/2022: Normal liver size. Normal spleen size. Mesenteric and retroperitoneal LAD. Multiple para-aortic lymph nodes. Largest lymph node is near the left renal hilu, 3.2 cm in greatest dimension.      MRI 1/2022: Left periaortic retroperitoneal mass 5.1cm in greatest dimension.      Flow cytometry: Consistent with CLL     Admitted from 2/5/22-2/7/22 for steriod induced hyperglycemia and flank pain. Started on morphine 15mg po q4 prn, methocarbamol 500mg tid prn, tylenol 650mg prn.      CT neck: No LAD seen.   CT Chest: Two lung micronodules. Perhaps intrathoracic lymph node.      Interval History:   She presents with her daughter and reports pain is  improved. Not needing MSIR as frequently.  Improved diarrhea.  Having more formed stools.  Reports chronic fatigue.  Reports night sweats.        Started acalabrutinib 2/17/22.     CMP with mild hyponatremia and hyperglycemia. Low albumin.  Leukocytosis.  Worsening anemia.  Normal iron panel.    Past Medical History:   Past Medical History:   Diagnosis Date    Anemia     Anxiety     Cataract     CLL (chronic lymphocytic leukemia) 2/5/2022    Depression     Diabetes mellitus     Hyperlipidemia     Hypertension     Kidney stone     Macular degeneration     Osteoarthritis of left hip     Osteoporosis     Recurrent nephrolithiasis     Type 2 diabetes mellitus     Type 2 diabetes mellitus with ophthalmic manifestations        Past Surgical HIstory:   Past Surgical History:   Procedure  Laterality Date    BLEPHAROPLASTY, QUAD      GANGLION CYST EXCISION      HYSTERECTOMY      INTRACAPSULAR CATARACT EXTRACTION      left eye       Family History:   Family History   Problem Relation Age of Onset    Colon cancer Mother     Cancer Mother     Heart disease Father     Cataracts Father     Diabetes Father     Pancreatic cancer Sister     Strabismus Daughter     Hypertension Daughter     Transient ischemic attack Daughter         x 3    Diabetes Brother     Hypertension Brother     Cancer Son         lung cancer    Hypertension Daughter     Arthritis Daughter     No Known Problems Daughter     Aneurysm Sister     Hypertension Brother     Glaucoma Neg Hx     Blindness Neg Hx        Social History:  reports that she has never smoked. She has never used smokeless tobacco. She reports that she does not drink alcohol and does not use drugs.    Allergies:  Review of patient's allergies indicates:   Allergen Reactions    Zoster vaccine live Rash    Lisinopril Swelling       Medications:  Current Outpatient Medications   Medication Sig Dispense Refill    acalabrutinib (CALQUENCE) 100 mg Cap Take 1 capsule (100 mg) by mouth 2 (two) times a day. 60 capsule 3    acetaminophen (TYLENOL) 325 MG tablet Take 2 tablets (650 mg total) by mouth every 4 (four) hours as needed. 30 tablet 3    amLODIPine (NORVASC) 10 MG tablet Take 1 tablet (10 mg total) by mouth once daily. 90 tablet 3    aspirin (ECOTRIN) 81 MG EC tablet Take 81 mg by mouth once daily.      atorvastatin (LIPITOR) 20 MG tablet Take 1 tablet (20 mg total) by mouth once daily. 90 tablet 3    blood sugar diagnostic Strp Use to test blood glucose 2 (two) times daily with meals. 100 each 11    blood-glucose meter Misc Use to test blood glucose 2 (two) times daily with meals. 1 each 0    cholecalciferol, vitamin D3, 1,000 unit capsule Take 1,000 Units by mouth once daily.      diclofenac sodium (VOLTAREN) 1 % Gel Apply 2 g topically  "daily as needed. 50 g 1    EScitalopram oxalate (LEXAPRO) 10 MG tablet Take 1 tablet (10 mg total) by mouth once daily. 90 tablet 3    flash glucose scanning reader (FREESTYLE NIMCO 14 DAY READER) Misc Use as directed (Patient taking differently: Use as directed) 1 each 0    flash glucose sensor (FREESTYLE NIMCO 14 DAY SENSOR) Kit Use one sensor every 2 weeks to check blood sugar ICD-10-CM: E11.9 1 kit 3    glucagon (BAQSIMI) 3 mg/actuation Spry Use in case of severe hypoglycemia 1 each 3    insulin aspart, niacinamide, 100 unit/mL (3 mL) InPn 4 u Ac   + correction  Max TDD 25 u 5 pen 3    insulin detemir U-100 (LEVEMIR FLEXTOUCH U-100 INSULN) 100 unit/mL (3 mL) InPn pen Inject 7 Units into the skin every evening. 15 mL 4    lancets (ACCU-CHEK SOFTCLIX LANCETS) Misc Test blood glucose twice daily with meals 100 each 11    lancets Misc To check BG 2 times daily, to use with insurance preferred meter 100 each 12    losartan (COZAAR) 25 MG tablet Take 1 tablet (25 mg total) by mouth once daily. 90 tablet 3    methocarbamoL (ROBAXIN) 500 MG Tab Take 1 tablet (500 mg total) by mouth 3 (three) times daily as needed (Muscle Spasm.). 30 tablet 0    methylcellulose (ARTIFICIAL TEARS) 1 % ophthalmic solution Place 1 drop into both eyes as needed.      mirtazapine (REMERON) 7.5 MG Tab Take 1 tablet (7.5 mg total) by mouth every evening. 30 tablet 11    morphine (MSIR) 15 MG tablet Take 1 tablet (15 mg total) by mouth every 4 (four) hours as needed for Pain. 30 tablet 0    naloxone (NARCAN) 4 mg/actuation Spry 4mg by nasal route as needed for opioid overdose; may repeat every 2-3 minutes in alternating nostrils until medical help arrives. Call 911 1 each 11    ondansetron (ZOFRAN-ODT) 4 MG TbDL Dissolve 1 tablet (4 mg total) by mouth every 6 (six) hours as needed. 60 tablet 0    pen needle, diabetic (BD ROWENA 2ND GEN PEN NEEDLE) 32 gauge x 5/32" Ndle Uses 4 daily 150 each 4    pramoxine-hydrocortisone (ANALPRAM " "HC) cream Apply topically 3 (three) times daily. For Hemorrhoid pain. 28 g 2    predniSONE (DELTASONE) 10 MG tablet Take 1 tablet (10 mg total) by mouth 2 (two) times daily. 60 tablet 0    TRUE METRIX GLUCOSE TEST STRIP Strp TEST BLOOD SUGAR TWICE A  each 12    TRUEPLUS LANCETS 30 gauge Misc USE ONE LANCET TWICE DAILY       No current facility-administered medications for this visit.     Facility-Administered Medications Ordered in Other Visits   Medication Dose Route Frequency Provider Last Rate Last Admin    0.9%  NaCl infusion  500 mL Intravenous Continuous Elif Lares MD           Review of Systems  Constitutional: Chronic fatigue. Decreased appetite.   HENT: Negative for nosebleeds and sore throat.    Respiratory: Negative for cough, chest tightness, shortness of breath and wheezing.    Cardiovascular: Negative for leg swelling. Negative for palpitations.   Gastrointestinal: More formed stools.     ECOG Performance Status: 3   Objective:      Vitals:   Vitals:    03/31/22 1344   BP: (!) 101/59   Pulse: 100   Resp: 16   Temp: 98.6 °F (37 °C)   SpO2: 97%   Weight: 56.1 kg (123 lb 9.1 oz)   Height: 5' 4" (1.626 m)       Physical Exam  Constitutional:       Appearance: Appears fatigued. In wheelchair.    HENT:      Head: Normocephalic and atraumatic.      Mouth/Throat:      Mouth: Mucous membranes are moist.   Eyes:      Extraocular Movements: Extraocular movements intact.   Cardiovascular:      Rate and Rhythm: Normal rate and regular rhythm.      Pulses: Normal pulses.      Heart sounds: Normal heart sounds.   Pulmonary:      Effort: Pulmonary effort is normal.      Breath sounds: Normal breath sounds.   Abdominal:      Palpations: Abdomen is soft.   Musculoskeletal:      Cervical back: Normal range of motion and neck supple.   Lymphadenopathy:      Cervical: No cervical adenopathy.      Upper Body:      Right upper body: No supraclavicular adenopathy.      Left upper body: No supraclavicular " adenopathy.   Skin:     General: Skin is warm.   Neurological:      General: No focal deficit present.      Mental Status: She is alert.    Laboratory Data:  No visits with results within 1 Week(s) from this visit.   Latest known visit with results is:   Admission on 03/18/2022, Discharged on 03/19/2022   Component Date Value Ref Range Status    WBC 03/18/2022 17.02 (A) 3.90 - 12.70 K/uL Final    RBC 03/18/2022 4.44  4.00 - 5.40 M/uL Final    Hemoglobin 03/18/2022 10.1 (A) 12.0 - 16.0 g/dL Final    Hematocrit 03/18/2022 30.5 (A) 37.0 - 48.5 % Final    MCV 03/18/2022 69 (A) 82 - 98 fL Final    MCH 03/18/2022 22.7 (A) 27.0 - 31.0 pg Final    MCHC 03/18/2022 33.1  32.0 - 36.0 g/dL Final    RDW 03/18/2022 16.4 (A) 11.5 - 14.5 % Final    Platelets 03/18/2022 395  150 - 450 K/uL Final    MPV 03/18/2022 11.1  9.2 - 12.9 fL Final    Immature Granulocytes 03/18/2022 CANCELED  0.0 - 0.5 % Final    Result canceled by the ancillary.    Immature Grans (Abs) 03/18/2022 CANCELED  0.00 - 0.04 K/uL Final    Comment: Mild elevation in immature granulocytes is non specific and   can be seen in a variety of conditions including stress response,   acute inflammation, trauma and pregnancy. Correlation with other   laboratory and clinical findings is essential.    Result canceled by the ancillary.      Lymph # 03/18/2022 CANCELED  1.0 - 4.8 K/uL Final    Result canceled by the ancillary.    Mono # 03/18/2022 CANCELED  0.3 - 1.0 K/uL Final    Result canceled by the ancillary.    Eos # 03/18/2022 CANCELED  0.0 - 0.5 K/uL Final    Result canceled by the ancillary.    Baso # 03/18/2022 CANCELED  0.00 - 0.20 K/uL Final    Result canceled by the ancillary.    nRBC 03/18/2022 0  0 /100 WBC Final    Gran % 03/18/2022 29.0 (A) 38.0 - 73.0 % Final    Lymph % 03/18/2022 67.0 (A) 18.0 - 48.0 % Final    Mono % 03/18/2022 1.0 (A) 4.0 - 15.0 % Final    Eosinophil % 03/18/2022 3.0  0.0 - 8.0 % Final    Basophil % 03/18/2022 0.0  0.0  - 1.9 % Final    Platelet Estimate 03/18/2022 Appears normal   Final    Aniso 03/18/2022 Slight   Final    Poik 03/18/2022 Slight   Final    Hypo 03/18/2022 Occasional   Final    Ovalocytes 03/18/2022 Occasional   Final    [place] Cells 03/18/2022 Occasional   Final    Smudge Cells 03/18/2022 Present   Final    Comment: Smudge cells present;Substantial numbers may affect the   accuracy of the differential.      Fragmented Cells 03/18/2022 Occasional   Final    Differential Method 03/18/2022 Manual   Final    Sodium 03/18/2022 135 (A) 136 - 145 mmol/L Final    Potassium 03/18/2022 3.5  3.5 - 5.1 mmol/L Final    Chloride 03/18/2022 96  95 - 110 mmol/L Final    CO2 03/18/2022 22 (A) 23 - 29 mmol/L Final    Glucose 03/18/2022 134 (A) 70 - 110 mg/dL Final    BUN 03/18/2022 17  8 - 23 mg/dL Final    Creatinine 03/18/2022 0.7  0.5 - 1.4 mg/dL Final    Calcium 03/18/2022 9.8  8.7 - 10.5 mg/dL Final    Total Protein 03/18/2022 6.1  6.0 - 8.4 g/dL Final    Albumin 03/18/2022 2.6 (A) 3.5 - 5.2 g/dL Final    Total Bilirubin 03/18/2022 0.4  0.1 - 1.0 mg/dL Final    Comment: For infants and newborns, interpretation of results should be based  on gestational age, weight and in agreement with clinical  observations.    Premature Infant recommended reference ranges:  Up to 24 hours.............<8.0 mg/dL  Up to 48 hours............<12.0 mg/dL  3-5 days..................<15.0 mg/dL  6-29 days.................<15.0 mg/dL      Alkaline Phosphatase 03/18/2022 74  55 - 135 U/L Final    AST 03/18/2022 26  10 - 40 U/L Final    ALT 03/18/2022 9 (A) 10 - 44 U/L Final    Anion Gap 03/18/2022 17 (A) 8 - 16 mmol/L Final    eGFR if African American 03/18/2022 >60.0  >60 mL/min/1.73 m^2 Final    eGFR if non African American 03/18/2022 >60.0  >60 mL/min/1.73 m^2 Final    Comment: Calculation used to obtain the estimated glomerular filtration  rate (eGFR) is the CKD-EPI equation.       Group & Rh 03/18/2022 A POS   Final     Indirect Dmitri 03/18/2022 NEG   Final    Magnesium 03/18/2022 1.7  1.6 - 2.6 mg/dL Final    Phosphorus 03/18/2022 3.2  2.7 - 4.5 mg/dL Final    Specimen UA 03/18/2022 Urine, Catheterized   Final    Color, UA 03/18/2022 Yellow  Yellow, Straw, Alysha Final    Appearance, UA 03/18/2022 Cloudy (A) Clear Final    pH, UA 03/18/2022 6.0  5.0 - 8.0 Final    Specific Gravity, UA 03/18/2022 1.015  1.005 - 1.030 Final    Protein, UA 03/18/2022 Negative  Negative Final    Comment: Recommend a 24 hour urine protein or a urine   protein/creatinine ratio if globulin induced proteinuria is  clinically suspected.      Glucose, UA 03/18/2022 Negative  Negative Final    Ketones, UA 03/18/2022 Trace (A) Negative Final    Bilirubin (UA) 03/18/2022 Negative  Negative Final    Occult Blood UA 03/18/2022 Negative  Negative Final    Nitrite, UA 03/18/2022 Negative  Negative Final    Leukocytes, UA 03/18/2022 3+ (A) Negative Final    RBC, UA 03/18/2022 7 (A) 0 - 4 /hpf Final    WBC, UA 03/18/2022 27 (A) 0 - 5 /hpf Final    WBC Clumps, UA 03/18/2022 Few (A) None-Rare Final    Bacteria 03/18/2022 Moderate (A) None-Occ /hpf Final    Squam Epithel, UA 03/18/2022 35  /hpf Final    Hyaline Casts, UA 03/18/2022 9 (A) 0-1/lpf /lpf Final    Microscopic Comment 03/18/2022 SEE COMMENT   Final    Comment: Other formed elements not mentioned in the report are not   present in the microscopic examination.       Urine Culture, Routine 03/18/2022 No growth   Final         Imaging: All pertinent imaging reviewed    Assessment and Plan        1. CLL (chronic lymphocytic leukemia)    2. Localized enlarged lymph nodes    3. Anemia in neoplastic disease        CLL, [place] stage 1  -Will recommend treatment due to lymphadenopathy related pain   -Started acalabrutinib 100mg BID on 2/17/22  - CLL FISH w/ trisomy 12 and 13q del, mutated IGHV , TP53 negative  -Refilled MSIR 15mg q4h prn for pain  -Zofran (1st line) and compazine (2nd line) for  nausea     Constipation with overflow incontinence  -Improved    Anemia  -Will order hemolytic panel and hgb electrophoresis      Follow up  -Please obtain labs (retic count, LDH, hapto, SUKHJINDER, hgb electrophoresis within 1 week  -Obtain CT C/A/P 4/27/22  -MD visit on 4/28/22 with CBC and CMP       Lucina Lara MD  Hematology/Oncology Fellow PGY IV  [facility]

## 2022-03-31 NOTE — Clinical Note
-Please obtain labs (retic count, LDH, hapto, SUKHJINDER, hgb electrophoresis) within 1 week -Obtain CT C/A/P 4/27/22 -MD visit on 4/28/22 with CBC and CMP

## 2022-04-07 NOTE — PROGRESS NOTES
"HPI: She was diagnosed with T2DM in since age 65. Has never been hospitalized r/t DM.  Family hx of DM: father   She has had her COVID vaccines,   October 2021 she has been diagnosed w CLL,     N/v has resolved  Currently on no DM meds  Reviewed Dexcom on daughter's phone unable to access report at this time  bg well controlled but she is having small pp excursions post meals     Eats 3 Meals a day, snacks- rarely - may have a glucerna  Exercise: none  CURRENT DM MEDS:  none  Glucometer type:  True Metrix 2018    Standards of Care:  Eye exam:  2021 + h/o retinopathy -  Dr Finch    ROS:   Gen: Appetite good,  Weight loss 5 lbs   Eyes: Denies visual disturbances  Resp: no SOB or MORELOS, no cough  Cardiac: No palpitations, chest pain, no edema   GI: no nausea, vomiting, no diarrhea, constipation, or + abdominal pain.  /GYN: 1-2+ nocturia,no burning or pain.   MS/Neuro: no numbness/burning/tingling in BLE; speech clear  Psych:  +  depression.  Other systems: negative.     PE:  GENERAL: Well developed, well nourished.  PSYCH: AAOx3, appropriate mood and affect, pleasant expression, conversant, appears relaxed, well groomed.   EYES: Conjunctiva, corneas clear  NECK: Supple, trachea midline  SKIN:   no acanthosis nigracans.  FOOT EXAMINATION: 10/7/2021  No foot deformity, corns or callus formation,  nails in good condition and well trimmed, no interspace maceration or ulceration noted.  Decreased hair growth present over toes/feet.  Positive vibratory response to 128 Hz tuning fork bilaterally.    Personally reviewed Past Medical, Surgical, Social History.    /60 (BP Location: Left arm, Patient Position: Sitting, BP Method: Medium (Manual))   Pulse 83   Temp 98.3 °F (36.8 °C) (Oral)   Ht 5' 4" (1.626 m)   Wt 55.8 kg (123 lb)   SpO2 95%   BMI 21.11 kg/m²      Personally reviewed the below labs:      Chemistry        Component Value Date/Time     (L) 03/31/2022 1254     (L) 03/31/2022 1254    K 3.6 " 03/31/2022 1254    K 3.6 03/31/2022 1254    CL 98 03/31/2022 1254    CL 98 03/31/2022 1254    CO2 24 03/31/2022 1254    CO2 24 03/31/2022 1254    BUN 15 03/31/2022 1254    BUN 15 03/31/2022 1254    CREATININE 0.8 03/31/2022 1254    CREATININE 0.8 03/31/2022 1254     (H) 03/31/2022 1254     (H) 03/31/2022 1254        Component Value Date/Time    CALCIUM 10.3 03/31/2022 1254    CALCIUM 10.3 03/31/2022 1254    ALKPHOS 73 03/31/2022 1254    ALKPHOS 73 03/31/2022 1254    AST 13 03/31/2022 1254    AST 13 03/31/2022 1254    ALT 6 (L) 03/31/2022 1254    ALT 6 (L) 03/31/2022 1254    BILITOT 0.5 03/31/2022 1254    BILITOT 0.5 03/31/2022 1254    ESTGFRAFRICA >60.0 03/31/2022 1254    ESTGFRAFRICA >60.0 03/31/2022 1254    EGFRNONAA >60.0 03/31/2022 1254    EGFRNONAA >60.0 03/31/2022 1254            Lab Results   Component Value Date    TSH 1.443 10/01/2021       Recent Labs   Lab 02/06/22  0403   LDL Cholesterol 64.8   HDL 59   Cholesterol 144        Results for orders placed or performed in visit on 10/01/21   Vitamin D   Result Value Ref Range    Vit D, 25-Hydroxy 47 30 - 96 ng/mL     Results for orders placed or performed in visit on 11/10/04   Calcitriol   Result Value Ref Range    Vit D, 1,25-Dihydroxy 72 (H) 22 - 67 pg/mL       Lab Results   Component Value Date    MICALBCREAT 35.8 (H) 10/01/2021           Hemoglobin A1C   Date Value Ref Range Status   03/31/2022 7.7 (H) 4.0 - 5.6 % Final     Comment:     ADA Screening Guidelines:  5.7-6.4%  Consistent with prediabetes  >or=6.5%  Consistent with diabetes    High levels of fetal hemoglobin interfere with the HbA1C  assay. Heterozygous hemoglobin variants (HbS, HgC, etc)do  not significantly interfere with this assay.   However, presence of multiple variants may affect accuracy.     02/05/2022 7.9 (H) 4.0 - 5.6 % Final     Comment:     ADA Screening Guidelines:  5.7-6.4%  Consistent with prediabetes  >or=6.5%  Consistent with diabetes    High levels of fetal  hemoglobin interfere with the HbA1C  assay. Heterozygous hemoglobin variants (HbS, HgC, etc)do  not significantly interfere with this assay.   However, presence of multiple variants may affect accuracy.     10/01/2021 7.0 (H) 4.0 - 5.6 % Final     Comment:     ADA Screening Guidelines:  5.7-6.4%  Consistent with prediabetes  >or=6.5%  Consistent with diabetes    High levels of fetal hemoglobin interfere with the HbA1C  assay. Heterozygous hemoglobin variants (HbS, HgC, etc)do  not significantly interfere with this assay.   However, presence of multiple variants may affect accuracy.          ASSESSMENT and PLAN:    1. T2DM with retinopathy controlled-  Continue Dexcom G6  Start januvia 50 mg qam, notify me for any issues or if steroids resumed    2. HTN -  continue meds as previously prescribed and monitor.     3. HLP - on statin therapy    4. CLL following w oncology

## 2022-04-13 NOTE — Clinical Note
Good morning,   I saw Ms. Middleton yesterday. She and her daughter have a lot of questions regarding the treatment and prognosis. Specifically she is concerned about if the treatment is curative in intent or not. She is also very weak. Would please order her HH PT? I am referring her to nutrition and started her on periactin. Lots of emotional support given yesterday.  Thanks, Rafaela

## 2022-04-13 NOTE — PROGRESS NOTES
Consult Note  Palliative Care      Consult Requested By: Dr. Lucina Lara  Reason for Consult: symptom management and ACP      ASSESSMENT/PLAN:     Plan/Recommendations:  Diagnoses and all orders for this visit:    CLL (chronic lymphocytic leukemia)  - followed by Dr. Lara  - currently on disease directed therapy    Encounter for palliative care/Advanced care planning  - patient decisional  - patient accompanied by her daughter to clinic today  - no ACP documents uploaded into EMR  - goals: life prolonging  - philosophy of Palliative Medicine reviewed with patient and family today  - new patient folder given to and reviewed with patient and family today  - ACP booklet given to and reviewed with patient and family including HCPOA and living will  - code status not specifically discussed this visit  - patient spoke about the unknowns surrounding prognosis and goals of treatment. She is interested in learning if the treatment is curative in intent or not. Will send message to oncologist about patient's desire for further information    Pain  - patient reporting well controlled abdominal/flank/back pain at this time  - she described the pain as the initial indicator that something was going on inside her body; she reports that pain was severe and limiting  - she rates the pain as 0/10 today in clinic and reports her medications are controlling pain  - current medication regimen is Morphine IR 15 mg q4h prn and Robaxin 500 mg TID prn  - patient uses one to two doses of morphine a day  - no need for refills at this time  - opioid safety sheet in new patient folder for patient and family to review  - narcan previously ordered    Anorexia/Nausea  - patient reporting intermittent nausea and very poor appetite  - patient reporting that food does not taste the same and she does not feel hungry throughout the day  - discussed using soups, oatmeal, etc to help improve intake  - patient does not like taste of glucerna anymore   -  she is very concerned about her glucose levels as well as she was previously on steroids which required her to use insulin multiple times a day  - periactin bid prn prescribed today  - referral to nutrition placed today as well    Neoplastic (malignant) related fatigue/Weakness/Dyspnea  - patient reporting her biggest symptoms are weakness and fatigue  - she reports her goals are to be able to get up and go get a bottle of water from the refrigerator by herself  - patient's daughter spoke about patient wanting to be more independent (as patient was fully independent in all ADLs and IADLs prior to diagnosis). Patient's daughter helps with ADLs and IADLs now  - discussed need for nutritional intake to regain strength  - will send message to oncologist about HH PT  - discussed some basic ROM exercises for patient to work on in home until PT evaluation  - patient reporting some dyspnea with exertion; likely due to malignancy and decreased level of activity  - tips for shortness of breath in new patient folder for patient to review  - discussed with patient and family about setting goals for the day for activities and nutrition.     Adjustment disorder with mixed anxiety and depressed mood  - patient reporting high levels of anxiety and depressed mood today  - patient discussed how quickly things have changed since diagnosis in mid to end of January 2022. Patient also worried about future and if treatment is curative in intent  - patient's daughter reporting loss of her  one year ago and then patient's diagnosis  - emotional support provided to both patient and family  - will discuss with Pall Med SW and ask that she reach out to family prior to next visit to introduce herself and provide additional support as needed  - continue lexapro 10 mg and mirtazapine 7.5 mg qhs    Insomnia  - patient reporting recent improvement in insomnia  - continue mirtazapine 7.5 mg qhs at this time  - tips for improved sleep in new  patient folder for patient to review  - will continue to monitor      Understanding of illness/Prognosis: patient and family have fair understanding of illness and they have further questions regarding the illness, treatment, and prognosis. Will send message to oncologist regarding these questions/concerns    Goals of care: life prolonging    Follow up: ~ 4 weeks    Patient's encounter and above plan of care discussed with patient's oncologist    SUBJECTIVE:     History of Present Illness:  Patient is a 86 y.o. year old female with anxiety, depression, DM, HTN, HLD, OA, and CLL presents to Palliative Medicine for symptom management and ACP. Please see oncology notes for full details of oncologic history and treatment course.      04/13/2022:  LA  reviewed and summarized:  03/31/2022 MS IR 15 mg Disp: 30 for 30 days    Patient presented today with her daughter. Patient reporting that her pain is well controlled with current regimen. Her biggest concern today is fatigue/weakness. Patient unable to complete activities that she was able to previously in December 2021. She has some anxiety and depression around recent diagnosis and unknown future. Patient also having poor appetite. Her sleep has improved with use of mirtazapine. She is open to learning more about ACP booklet.     Past Medical History:   Diagnosis Date    Anemia     Anxiety     Cataract     CLL (chronic lymphocytic leukemia) 2/5/2022    Depression     Diabetes mellitus     Hyperlipidemia     Hypertension     Kidney stone     Macular degeneration     Osteoarthritis of left hip     Osteoporosis     Recurrent nephrolithiasis     Type 2 diabetes mellitus     Type 2 diabetes mellitus with ophthalmic manifestations      Past Surgical History:   Procedure Laterality Date    BLEPHAROPLASTY, QUAD      GANGLION CYST EXCISION      HYSTERECTOMY      INTRACAPSULAR CATARACT EXTRACTION      left eye     Family History   Problem Relation Age of  Onset    Colon cancer Mother     Cancer Mother     Heart disease Father     Cataracts Father     Diabetes Father     Pancreatic cancer Sister     Strabismus Daughter     Hypertension Daughter     Transient ischemic attack Daughter         x 3    Diabetes Brother     Hypertension Brother     Cancer Son         lung cancer    Hypertension Daughter     Arthritis Daughter     No Known Problems Daughter     Aneurysm Sister     Hypertension Brother     Glaucoma Neg Hx     Blindness Neg Hx      Review of patient's allergies indicates:   Allergen Reactions    Zoster vaccine live Rash    Lisinopril Swelling       Medications:    Current Outpatient Medications:     acalabrutinib (CALQUENCE) 100 mg Cap, Take 1 capsule (100 mg) by mouth 2 (two) times a day., Disp: 60 capsule, Rfl: 3    acetaminophen (TYLENOL) 325 MG tablet, Take 2 tablets (650 mg total) by mouth every 4 (four) hours as needed., Disp: 30 tablet, Rfl: 3    amLODIPine (NORVASC) 10 MG tablet, Take 1 tablet (10 mg total) by mouth once daily., Disp: 90 tablet, Rfl: 3    aspirin (ECOTRIN) 81 MG EC tablet, Take 81 mg by mouth once daily., Disp: , Rfl:     atorvastatin (LIPITOR) 20 MG tablet, Take 1 tablet (20 mg total) by mouth once daily., Disp: 90 tablet, Rfl: 3    blood sugar diagnostic Strp, Use to test blood glucose 2 (two) times daily with meals., Disp: 100 each, Rfl: 11    blood-glucose meter Misc, Use to test blood glucose 2 (two) times daily with meals., Disp: 1 each, Rfl: 0    cholecalciferol, vitamin D3, 1,000 unit capsule, Take 1,000 Units by mouth once daily., Disp: , Rfl:     cyproheptadine (PERIACTIN) 4 mg tablet, Take 1 tablet (4 mg total) by mouth 2 (two) times daily with meals., Disp: 60 tablet, Rfl: 0    diclofenac sodium (VOLTAREN) 1 % Gel, Apply 2 g topically daily as needed., Disp: 50 g, Rfl: 1    EScitalopram oxalate (LEXAPRO) 10 MG tablet, Take 1 tablet (10 mg total) by mouth once daily., Disp: 90 tablet, Rfl:  "3    flash glucose scanning reader (FREESTYLE NIMCO 14 DAY READER) Misc, Use as directed (Patient taking differently: Use as directed), Disp: 1 each, Rfl: 0    flash glucose sensor (FREESTYLE NIMCO 14 DAY SENSOR) Kit, Use one sensor every 2 weeks to check blood sugar ICD-10-CM: E11.9, Disp: 1 kit, Rfl: 3    glucagon (BAQSIMI) 3 mg/actuation Spry, Use in case of severe hypoglycemia, Disp: 1 each, Rfl: 3    lancets (ACCU-CHEK SOFTCLIX LANCETS) Misc, Test blood glucose twice daily with meals, Disp: 100 each, Rfl: 11    lancets Misc, To check BG 2 times daily, to use with insurance preferred meter, Disp: 100 each, Rfl: 12    losartan (COZAAR) 25 MG tablet, Take 1 tablet (25 mg total) by mouth once daily., Disp: 90 tablet, Rfl: 3    methocarbamoL (ROBAXIN) 500 MG Tab, Take 1 tablet (500 mg total) by mouth 3 (three) times daily as needed (Muscle Spasm.)., Disp: 30 tablet, Rfl: 0    methylcellulose (ARTIFICIAL TEARS) 1 % ophthalmic solution, Place 1 drop into both eyes as needed., Disp: , Rfl:     mirtazapine (REMERON) 7.5 MG Tab, Take 1 tablet (7.5 mg total) by mouth every evening., Disp: 30 tablet, Rfl: 11    morphine (MSIR) 15 MG tablet, Take 1 tablet (15 mg total) by mouth every 4 (four) hours as needed for Pain., Disp: 30 tablet, Rfl: 0    naloxone (NARCAN) 4 mg/actuation Spry, 4mg by nasal route as needed for opioid overdose; may repeat every 2-3 minutes in alternating nostrils until medical help arrives. Call 911, Disp: 1 each, Rfl: 11    ondansetron (ZOFRAN-ODT) 4 MG TbDL, Dissolve 1 tablet (4 mg total) by mouth every 6 (six) hours as needed., Disp: 60 tablet, Rfl: 0    pen needle, diabetic (BD ROWENA 2ND GEN PEN NEEDLE) 32 gauge x 5/32" Ndle, Uses 4 daily, Disp: 150 each, Rfl: 4    pramoxine-hydrocortisone (ANALPRAM HC) cream, Apply topically 3 (three) times daily. For Hemorrhoid pain., Disp: 28 g, Rfl: 2    SITagliptin (JANUVIA) 50 MG Tab, Take 1 tablet (50 mg total) by mouth once daily., Disp: 90 " tablet, Rfl: 3    TRUE METRIX GLUCOSE TEST STRIP Strp, TEST BLOOD SUGAR TWICE A DAY, Disp: 100 each, Rfl: 12    TRUEPLUS LANCETS 30 gauge Misc, USE ONE LANCET TWICE DAILY, Disp: , Rfl:   No current facility-administered medications for this visit.    Facility-Administered Medications Ordered in Other Visits:     0.9%  NaCl infusion, 500 mL, Intravenous, Continuous, Elif Lares MD    OBJECTIVE:       ROS:  Review of Systems   Constitutional: Positive for activity change, appetite change, fatigue and unexpected weight change.   HENT: Negative.    Eyes: Negative.    Respiratory: Positive for shortness of breath (with exertion).    Cardiovascular: Negative.    Gastrointestinal: Positive for abdominal pain, diarrhea and nausea.   Genitourinary: Positive for flank pain.   Musculoskeletal: Positive for back pain.   Skin: Negative.    Neurological: Positive for weakness.   Psychiatric/Behavioral: Positive for dysphoric mood and sleep disturbance (controlled). The patient is nervous/anxious.    All other systems reviewed and are negative.      Review of Symptoms    Symptom Assessment (ESAS 0-10 Scale)  Pain:  0  Dyspnea:  2  Anxiety:  8  Nausea:  2  Depression:  8  Anorexia:  10  Fatigue:  10  Insomnia:  0  Restlessness:  0  Agitation:  0     CAM / Delirium:  Negative  Constipation:  Negative  Diarrhea:  Positive    Bowel Management Plan (BMP):  No      Pain Assessment:  OME in 24 hours:  30  Location(s): abdomen    Abdomen       Location: left and anterior        Quantity: 0/10 in intensity        Chronicity: Onset 4 month(s) ago, controlled since Radiated around to flank and back        Aggravating Factors: none        Alleviating Factors: opiates        Associated Symptoms: none    Modified Fernanda Scale:  1    ECOG Performance Status rdGrdrrdarddrderd:rd rd3rd Living Arrangements:  Lives with family    Psychosocial/Cultural: Patient lives with her , adult son, and adult daughter. She has large support  system    Spiritual:  F - Catherine and Belief:  Yes  I - Importance:  Very  C - Community:  Yes  A - Address in Care:  Needs met at this time      Advance Care Planning   Advance Directives:   Living Will: No    [place]: No    Do Not Resuscitate Status: No    Medical Power of : No    Agent's Name:  Alexei Middleton   Agent's Contact Number:  [phone]    Decision Making:  Patient answered questions and Family answered questions          Physical Exam:  Vitals: Pulse: 87 (04/13/22 1405)  BP: 120/62 (04/13/22 1405)  Physical Exam  Vitals reviewed.   Constitutional:       General: She is not in acute distress.     Appearance: She is not toxic-appearing.   HENT:      Head: Normocephalic and atraumatic.      Right Ear: External ear normal.      Left Ear: External ear normal.   Eyes:      General: No scleral icterus.        Right eye: No discharge.         Left eye: No discharge.   Neck:      Comments: Trachea midline  Pulmonary:      Effort: Pulmonary effort is normal. No respiratory distress.   Abdominal:      General: Abdomen is flat. There is no distension.   Musculoskeletal:         General: No deformity.      Cervical back: Normal range of motion.      Right lower leg: No edema.      Left lower leg: No edema.   Skin:     General: Skin is dry.      Coloration: Skin is not jaundiced.      Findings: No rash.   Neurological:      Mental Status: She is alert and oriented to person, place, and time.      Comments: In wheelchair for entire visit   Psychiatric:         Mood and Affect: Mood is anxious and depressed.         Behavior: Behavior normal.         Thought Content: Thought content normal.         Judgment: Judgment normal.         Labs:  CBC:   WBC   Date Value Ref Range Status   03/31/2022 19.93 (H) 3.90 - 12.70 K/uL Final     Hemoglobin   Date Value Ref Range Status   03/31/2022 9.3 (L) 12.0 - 16.0 g/dL Final     Hematocrit   Date Value Ref Range Status   03/31/2022 27.8 (L) 37.0 - 48.5 % Final     MCV   Date  "Value Ref Range Status   03/31/2022 67 (L) 82 - 98 fL Final     Platelets   Date Value Ref Range Status   03/31/2022 353 150 - 450 K/uL Final       LFT:   Lab Results   Component Value Date    AST 13 03/31/2022    AST 13 03/31/2022    ALKPHOS 73 03/31/2022    ALKPHOS 73 03/31/2022    BILITOT 0.5 03/31/2022    BILITOT 0.5 03/31/2022       Albumin:   Albumin   Date Value Ref Range Status   03/31/2022 2.4 (L) 3.5 - 5.2 g/dL Final   03/31/2022 2.4 (L) 3.5 - 5.2 g/dL Final     Protein:   Total Protein   Date Value Ref Range Status   03/31/2022 5.8 (L) 6.0 - 8.4 g/dL Final   03/31/2022 5.8 (L) 6.0 - 8.4 g/dL Final       Radiology:I have reviewed all pertinent imaging results/findings within the past 24 hours.    02/06/2022 CT chest: "1.  Two lung micro nodules, one which may represent entry intrathoracic lymph node due to a juxta fissural location.  These are nonspecific findings and indeterminate.  Attention on follow-up surveillance recommended.  No specific evidence of metastatic disease with identified in the chest.  2.  Hyperdense, subcentimeter focus in the thyroid isthmus, unclear whether enhancing versus calcified.  Consider further evaluation with thyroid ultrasound."    02/06/2022 CT neck: "No soft tissue mass or adenopathy is identified in the neck."    02/05/2022 CT A/P: "1. Mesenteric and retroperitoneal lymphadenopathy with significant interval increase in size of largest delmis focus abutting the celiac plexus which may be contributing to patient's abdominal pain.  The etiology of lymphadenopathy likely secondary to recent diagnosis of CLL. 2. Borderline splenomegaly. 3. Pancreatic ductal dilatation without signs of obstruction or mass."    Encounter occurred during period of COVID-19 emergency. Encounter performed under the concurrent guidelines, limitations and protocols.    16 minutes spent in discussing ACP    Signature: Rafaela Lewis MD      "

## 2022-04-16 NOTE — TELEPHONE ENCOUNTER
Specialty Pharmacy - Refill Coordination    Specialty Medication Orders Linked to Encounter    Flowsheet Row Most Recent Value   Medication #1 acalabrutinib (CALQUENCE) 100 mg Cap (Order#[id number], Rx#[id number])          Refill Questions - Documented Responses    Flowsheet Row Most Recent Value   Patient Availability and HIPAA Verification    Does patient want to proceed with activity? Yes   HIPAA/medical authority confirmed? Yes   Relationship to patient of person spoken to? Self   Refill Screening Questions    Changes to allergies? No   Changes to medications? Yes  [Started taking Periactin. No DDIs.]   New conditions since last clinic visit? No   Unplanned office visit, urgent care, ED, or hospital admission in the last 4 weeks? No   How does patient/caregiver feel medication is working? Very good   Financial problems or insurance changes? No   How many doses of your specialty medications were missed in the last 4 weeks? 0   Would patient like to speak to a pharmacist? No   When does the patient need to receive the medication? 04/17/22   Refill Delivery Questions    How will the patient receive the medication? Pickup   When does the patient need to receive the medication? 04/17/22   Address in [facility] confirmed and updated if neccessary? No   Expected Copay ($) 0   Is the patient able to afford the medication copay? Yes   Payment Method zero copay   Days supply of Refill 30   Supplies needed? No supplies needed   Refill activity completed? Yes   Refill activity plan Refill scheduled   Shipment/Pickup Date: 04/16/22          Current Outpatient Medications   Medication Sig    acalabrutinib (CALQUENCE) 100 mg Cap Take 1 capsule (100 mg) by mouth 2 (two) times a day.    acetaminophen (TYLENOL) 325 MG tablet Take 2 tablets (650 mg total) by mouth every 4 (four) hours as needed.    amLODIPine (NORVASC) 10 MG tablet Take 1 tablet (10 mg total) by mouth once daily.    aspirin (ECOTRIN) 81 MG EC tablet  Take 81 mg by mouth once daily.    atorvastatin (LIPITOR) 20 MG tablet Take 1 tablet (20 mg total) by mouth once daily.    blood sugar diagnostic Strp Use to test blood glucose 2 (two) times daily with meals.    blood-glucose meter Misc Use to test blood glucose 2 (two) times daily with meals.    cholecalciferol, vitamin D3, 1,000 unit capsule Take 1,000 Units by mouth once daily.    cyproheptadine (PERIACTIN) 4 mg tablet Take 1 tablet (4 mg total) by mouth 2 (two) times daily with meals.    diclofenac sodium (VOLTAREN) 1 % Gel Apply 2 g topically daily as needed.    EScitalopram oxalate (LEXAPRO) 10 MG tablet Take 1 tablet (10 mg total) by mouth once daily.    flash glucose scanning reader (Cinema OneSTYLE NIMCO 14 DAY READER) Misc Use as directed (Patient taking differently: Use as directed)    flash glucose sensor (FREESTYLE NIMCO 14 DAY SENSOR) Kit Use one sensor every 2 weeks to check blood sugar ICD-10-CM: E11.9    glucagon (BAQSIMI) 3 mg/actuation Spry Use in case of severe hypoglycemia    lancets (ACCU-CHEK SOFTCLIX LANCETS) Misc Test blood glucose twice daily with meals    lancets Misc To check BG 2 times daily, to use with insurance preferred meter    losartan (COZAAR) 25 MG tablet Take 1 tablet (25 mg total) by mouth once daily.    methocarbamoL (ROBAXIN) 500 MG Tab Take 1 tablet (500 mg total) by mouth 3 (three) times daily as needed (Muscle Spasm.).    methylcellulose (ARTIFICIAL TEARS) 1 % ophthalmic solution Place 1 drop into both eyes as needed.    mirtazapine (REMERON) 7.5 MG Tab Take 1 tablet (7.5 mg total) by mouth every evening.    morphine (MSIR) 15 MG tablet Take 1 tablet (15 mg total) by mouth every 4 (four) hours as needed for Pain.    naloxone (NARCAN) 4 mg/actuation Spry 4mg by nasal route as needed for opioid overdose; may repeat every 2-3 minutes in alternating nostrils until medical help arrives. Call 911    ondansetron (ZOFRAN-ODT) 4 MG TbDL Dissolve 1 tablet (4 mg total)  "by mouth every 6 (six) hours as needed.    pen needle, diabetic (BD ROWENA 2ND GEN PEN NEEDLE) 32 gauge x 5/32" Ndle Uses 4 daily    pramoxine-hydrocortisone (ANALPRAM HC) cream Apply topically 3 (three) times daily. For Hemorrhoid pain.    SITagliptin (JANUVIA) 50 MG Tab Take 1 tablet (50 mg total) by mouth once daily.    TRUE METRIX GLUCOSE TEST STRIP Strp TEST BLOOD SUGAR TWICE A DAY    TRUEPLUS LANCETS 30 gauge Misc USE ONE LANCET TWICE DAILY   Last reviewed on 4/14/2022  9:40 AM by Rafaela Lewis MD    Review of patient's allergies indicates:   Allergen Reactions    Zoster vaccine live Rash    Lisinopril Swelling    Last reviewed on  4/14/2022 9:40 AM by Rafaela Lewis      Tasks added this encounter   5/10/2022 - Refill Call (Auto Added)  4/17/2022 - Pickup Reminder   Tasks due within next 3 months   5/9/2022 - Clinical - Follow Up Assesement (90 day)     Dolores Colby, PharmD  [facility]  [address] [place] [id number]  Phone: [phone]  Fax: [phone]      "

## 2022-04-28 NOTE — PROGRESS NOTES
[facility]  [facility]  Hematology/Medical Oncology Clinic       PATIENT: Cary Middleton  MRN: [id number]  DATE: 4/27/2022    Reason for referral: CLL    Initial History:  Patient is a 85 y/o female with hx of HTN, Depression, T2DM who presents to clinic after found to have abd LAD on CT abd/pel found incidentally for flank pain.     Labs with leukocytosis, absolute lymphocytosis since 8/2018. Normal renal function.      CT Abd/Pel w/o con 1/6/2022: Normal liver size. Normal spleen size. Mesenteric and retroperitoneal LAD. Multiple para-aortic lymph nodes. Largest lymph node is near the left renal hilu, 3.2 cm in greatest dimension.      MRI 1/2022: Left periaortic retroperitoneal mass 5.1cm in greatest dimension.      Flow cytometry: Consistent with CLL     Admitted from 2/5/22-2/7/22 for steriod induced hyperglycemia and flank pain. Started on morphine 15mg po q4 prn, methocarbamol 500mg tid prn, tylenol 650mg prn.      CT neck: No LAD seen.   CT Chest: Two lung micronodules. Perhaps intrathoracic lymph node.      Interval History:   She presents with her daughter and reports pain is  improved. Reports improved fatigue. Appetite is ok. Regular BMs. Denies fevers or chills. + night sweats.     Started acalabrutinib 2/17/22.     Labs w/ Low albumin.  Leukocytosis improved. ALC pending at the time of visit.  Worsening anemia.  Normal iron panel.    CT C/A/P 4/27/22: left pleural effusion. Retroperitoneal mass measures 10.2 cm in greatest dimension previously 6.9 cm. Mass appears to encase the aorta, celiac axis, main hepatic artery, splenic artery, bilateral renal arteries and SMA. Partial encasement of the protal vein.     Past Medical History:   Past Medical History:   Diagnosis Date    Anemia     Anxiety     Cataract     CLL (chronic lymphocytic leukemia) 2/5/2022    Depression     Diabetes mellitus     Hyperlipidemia     Hypertension     Kidney stone     Macular  degeneration     Osteoarthritis of left hip     Osteoporosis     Recurrent nephrolithiasis     Type 2 diabetes mellitus     Type 2 diabetes mellitus with ophthalmic manifestations        Past Surgical HIstory:   Past Surgical History:   Procedure Laterality Date    BLEPHAROPLASTY, QUAD      GANGLION CYST EXCISION      HYSTERECTOMY      INTRACAPSULAR CATARACT EXTRACTION      left eye       Family History:   Family History   Problem Relation Age of Onset    Colon cancer Mother     Cancer Mother     Heart disease Father     Cataracts Father     Diabetes Father     Pancreatic cancer Sister     Strabismus Daughter     Hypertension Daughter     Transient ischemic attack Daughter         x 3    Diabetes Brother     Hypertension Brother     Cancer Son         lung cancer    Hypertension Daughter     Arthritis Daughter     No Known Problems Daughter     Aneurysm Sister     Hypertension Brother     Glaucoma Neg Hx     Blindness Neg Hx        Social History:  reports that she has never smoked. She has never used smokeless tobacco. She reports that she does not drink alcohol and does not use drugs.    Allergies:  Review of patient's allergies indicates:   Allergen Reactions    Zoster vaccine live Rash    Lisinopril Swelling       Medications:  Current Outpatient Medications   Medication Sig Dispense Refill    acalabrutinib (CALQUENCE) 100 mg Cap Take 1 capsule (100 mg) by mouth 2 (two) times a day. 60 capsule 3    acetaminophen (TYLENOL) 325 MG tablet Take 2 tablets (650 mg total) by mouth every 4 (four) hours as needed. 30 tablet 3    amLODIPine (NORVASC) 10 MG tablet Take 1 tablet (10 mg total) by mouth once daily. 90 tablet 3    aspirin (ECOTRIN) 81 MG EC tablet Take 81 mg by mouth once daily.      atorvastatin (LIPITOR) 20 MG tablet Take 1 tablet (20 mg total) by mouth once daily. 90 tablet 3    blood sugar diagnostic Strp Use to test blood glucose 2 (two) times daily with meals. 100 each  11    blood-glucose meter Misc Use to test blood glucose 2 (two) times daily with meals. 1 each 0    cholecalciferol, vitamin D3, 1,000 unit capsule Take 1,000 Units by mouth once daily.      cyproheptadine (PERIACTIN) 4 mg tablet Take 1 tablet (4 mg total) by mouth 2 (two) times daily with meals. 60 tablet 0    diclofenac sodium (VOLTAREN) 1 % Gel Apply 2 g topically daily as needed. 50 g 1    EScitalopram oxalate (LEXAPRO) 10 MG tablet Take 1 tablet (10 mg total) by mouth once daily. 90 tablet 3    flash glucose scanning reader (MarakanaSTYLE NIMCO 14 DAY READER) Misc Use as directed (Patient taking differently: Use as directed) 1 each 0    flash glucose sensor (FREESTYLE NIMCO 14 DAY SENSOR) Kit Use one sensor every 2 weeks to check blood sugar ICD-10-CM: E11.9 1 kit 3    glucagon (BAQSIMI) 3 mg/actuation Spry Use in case of severe hypoglycemia 1 each 3    lancets (ACCU-CHEK SOFTCLIX LANCETS) Misc Test blood glucose twice daily with meals 100 each 11    lancets Misc To check BG 2 times daily, to use with insurance preferred meter 100 each 12    losartan (COZAAR) 25 MG tablet Take 1 tablet (25 mg total) by mouth once daily. 90 tablet 3    methocarbamoL (ROBAXIN) 500 MG Tab Take 1 tablet (500 mg total) by mouth 3 (three) times daily as needed (Muscle Spasm.). 30 tablet 0    methylcellulose (ARTIFICIAL TEARS) 1 % ophthalmic solution Place 1 drop into both eyes as needed.      mirtazapine (REMERON) 7.5 MG Tab Take 1 tablet (7.5 mg total) by mouth every evening. 30 tablet 11    morphine (MSIR) 15 MG tablet Take 1 tablet (15 mg total) by mouth every 4 (four) hours as needed for Pain. 30 tablet 0    naloxone (NARCAN) 4 mg/actuation Spry 4mg by nasal route as needed for opioid overdose; may repeat every 2-3 minutes in alternating nostrils until medical help arrives. Call 911 1 each 11    ondansetron (ZOFRAN-ODT) 4 MG TbDL Dissolve 1 tablet (4 mg total) by mouth every 6 (six) hours as needed. 60 tablet 0     "pen needle, diabetic (BD ROWENA 2ND GEN PEN NEEDLE) 32 gauge x 5/32" Ndle Uses 4 daily 150 each 4    pramoxine-hydrocortisone (ANALPRAM HC) cream Apply topically 3 (three) times daily. For Hemorrhoid pain. 28 g 2    SITagliptin (JANUVIA) 50 MG Tab Take 1 tablet (50 mg total) by mouth once daily. 90 tablet 3    TRUE METRIX GLUCOSE TEST STRIP Strp TEST BLOOD SUGAR TWICE A  each 12    TRUEPLUS LANCETS 30 gauge Misc USE ONE LANCET TWICE DAILY       No current facility-administered medications for this visit.     Facility-Administered Medications Ordered in Other Visits   Medication Dose Route Frequency Provider Last Rate Last Admin    0.9%  NaCl infusion  500 mL Intravenous Continuous Elif Lares MD           Review of Systems  Constitutional: Chronic fatigue. Decreased appetite.   HENT: Negative for nosebleeds and sore throat.    Respiratory: Reports cough with sputum, shortness of breath w/ exertion.  Cardiovascular: Negative for leg swelling. Negative for palpitations.   Gastrointestinal: No n/v/abd pain. Normal bms.     ECOG Performance Status: 3   Objective:      Vitals:   Vitals:    04/28/22 1409   BP: (!) 105/58   BP Location: Right arm   Patient Position: Sitting   BP Method: Medium (Automatic)   Pulse: 88   Resp: 16   Temp: 98.5 °F (36.9 °C)   TempSrc: Oral   SpO2: 97%   Weight: 56.3 kg (124 lb 0.1 oz)   Height: 5' 4" (1.626 m)       Physical Exam  Constitutional:       Appearance: Appears less fatigued than prior visit. In wheelchair.    HENT:      Head: Normocephalic and atraumatic.      Mouth/Throat:      Mouth: Mucous membranes are moist.   Eyes:      Extraocular Movements: Extraocular movements intact.   Cardiovascular:      Rate and Rhythm: Normal rate and regular rhythm.      Pulses: Normal pulses.      Heart sounds: Normal heart sounds.   Pulmonary:      Effort: Pulmonary effort is normal.      Breath sounds: Decreased breath sounds in left lung.  Abdominal:      Palpations: Abdomen is " soft.   Musculoskeletal:      Cervical back: Normal range of motion and neck supple.   Lymphadenopathy:      Cervical: No cervical adenopathy.   Skin:     General: Skin is warm.   Neurological:      General: No focal deficit present.      Mental Status: She is alert.    Laboratory Data:  No visits with results within 1 Week(s) from this visit.   Latest known visit with results is:   Lab Visit on 03/31/2022   Component Date Value Ref Range Status    Hemoglobin A1C 03/31/2022 7.7 (A) 4.0 - 5.6 % Final    Comment: ADA Screening Guidelines:  5.7-6.4%  Consistent with prediabetes  >or=6.5%  Consistent with diabetes    High levels of fetal hemoglobin interfere with the HbA1C  assay. Heterozygous hemoglobin variants (HbS, HgC, etc)do  not significantly interfere with this assay.   However, presence of multiple variants may affect accuracy.      Estimated Avg Glucose 03/31/2022 174 (A) 68 - 131 mg/dL Final    Sodium 03/31/2022 133 (A) 136 - 145 mmol/L Final    Potassium 03/31/2022 3.6  3.5 - 5.1 mmol/L Final    Chloride 03/31/2022 98  95 - 110 mmol/L Final    CO2 03/31/2022 24  23 - 29 mmol/L Final    Glucose 03/31/2022 169 (A) 70 - 110 mg/dL Final    BUN 03/31/2022 15  8 - 23 mg/dL Final    Creatinine 03/31/2022 0.8  0.5 - 1.4 mg/dL Final    Calcium 03/31/2022 10.3  8.7 - 10.5 mg/dL Final    Total Protein 03/31/2022 5.8 (A) 6.0 - 8.4 g/dL Final    Albumin 03/31/2022 2.4 (A) 3.5 - 5.2 g/dL Final    Total Bilirubin 03/31/2022 0.5  0.1 - 1.0 mg/dL Final    Comment: For infants and newborns, interpretation of results should be based  on gestational age, weight and in agreement with clinical  observations.    Premature Infant recommended reference ranges:  Up to 24 hours.............<8.0 mg/dL  Up to 48 hours............<12.0 mg/dL  3-5 days..................<15.0 mg/dL  6-29 days.................<15.0 mg/dL      Alkaline Phosphatase 03/31/2022 73  55 - 135 U/L Final    AST 03/31/2022 13  10 - 40 U/L Final     ALT 03/31/2022 6 (A) 10 - 44 U/L Final    Anion Gap 03/31/2022 11  8 - 16 mmol/L Final    eGFR if African American 03/31/2022 >60.0  >60 mL/min/1.73 m^2 Final    eGFR if non African American 03/31/2022 >60.0  >60 mL/min/1.73 m^2 Final    Comment: Calculation used to obtain the estimated glomerular filtration  rate (eGFR) is the CKD-EPI equation.       WBC 03/31/2022 19.93 (A) 3.90 - 12.70 K/uL Final    RBC 03/31/2022 4.18  4.00 - 5.40 M/uL Final    Hemoglobin 03/31/2022 9.3 (A) 12.0 - 16.0 g/dL Final    Hematocrit 03/31/2022 27.8 (A) 37.0 - 48.5 % Final    MCV 03/31/2022 67 (A) 82 - 98 fL Final    MCH 03/31/2022 22.2 (A) 27.0 - 31.0 pg Final    MCHC 03/31/2022 33.5  32.0 - 36.0 g/dL Final    RDW 03/31/2022 15.9 (A) 11.5 - 14.5 % Final    Platelets 03/31/2022 353  150 - 450 K/uL Final    MPV 03/31/2022 11.4  9.2 - 12.9 fL Final    Immature Granulocytes 03/31/2022 CANCELED  0.0 - 0.5 % Final    Result canceled by the ancillary.    Immature Grans (Abs) 03/31/2022 CANCELED  0.00 - 0.04 K/uL Final    Comment: Mild elevation in immature granulocytes is non specific and   can be seen in a variety of conditions including stress response,   acute inflammation, trauma and pregnancy. Correlation with other   laboratory and clinical findings is essential.    Result canceled by the ancillary.      nRBC 03/31/2022 0  0 /100 WBC Final    Gran % 03/31/2022 41.0  38.0 - 73.0 % Final    Lymph % 03/31/2022 55.0 (A) 18.0 - 48.0 % Final    Mono % 03/31/2022 3.0 (A) 4.0 - 15.0 % Final    Eosinophil % 03/31/2022 1.0  0.0 - 8.0 % Final    Basophil % 03/31/2022 0.0  0.0 - 1.9 % Final    Platelet Estimate 03/31/2022 Appears normal   Final    Aniso 03/31/2022 Slight   Final    Poik 03/31/2022 Slight   Final    Hypo 03/31/2022 Occasional   Final    Ovalocytes 03/31/2022 Occasional   Final    Target Cells 03/31/2022 Occasional   Final    Smudge Cells 03/31/2022 Present   Final    Comment: Smudge cells  present;Substantial numbers may affect the   accuracy of the differential.  Differential performed on slide prepared with albumin due to the   presence of smudge cells.      Large/Giant Platelets 03/31/2022 Present   Final    Differential Method 03/31/2022 Manual   Final    Sodium 03/31/2022 133 (A) 136 - 145 mmol/L Final    Potassium 03/31/2022 3.6  3.5 - 5.1 mmol/L Final    Chloride 03/31/2022 98  95 - 110 mmol/L Final    CO2 03/31/2022 24  23 - 29 mmol/L Final    Glucose 03/31/2022 169 (A) 70 - 110 mg/dL Final    BUN 03/31/2022 15  8 - 23 mg/dL Final    Creatinine 03/31/2022 0.8  0.5 - 1.4 mg/dL Final    Calcium 03/31/2022 10.3  8.7 - 10.5 mg/dL Final    Total Protein 03/31/2022 5.8 (A) 6.0 - 8.4 g/dL Final    Albumin 03/31/2022 2.4 (A) 3.5 - 5.2 g/dL Final    Total Bilirubin 03/31/2022 0.5  0.1 - 1.0 mg/dL Final    Comment: For infants and newborns, interpretation of results should be based  on gestational age, weight and in agreement with clinical  observations.    Premature Infant recommended reference ranges:  Up to 24 hours.............<8.0 mg/dL  Up to 48 hours............<12.0 mg/dL  3-5 days..................<15.0 mg/dL  6-29 days.................<15.0 mg/dL      Alkaline Phosphatase 03/31/2022 73  55 - 135 U/L Final    AST 03/31/2022 13  10 - 40 U/L Final    ALT 03/31/2022 6 (A) 10 - 44 U/L Final    Anion Gap 03/31/2022 11  8 - 16 mmol/L Final    eGFR if African American 03/31/2022 >60.0  >60 mL/min/1.73 m^2 Final    eGFR if non African American 03/31/2022 >60.0  >60 mL/min/1.73 m^2 Final    Comment: Calculation used to obtain the estimated glomerular filtration  rate (eGFR) is the CKD-EPI equation.            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  Left pleural effusion  CLL, [place] stage III  -Will order IR guided biopsy of retroperitoneal mass w/ flow cytometry and  diagnostic thora of left pleural effusion w/ cx, gs, cell count, cytology, protein and ldh  -Started acalabrutinib 100mg BID on 2/17/22 - continue for now but hold a couple of days prior to intervention  - CLL FISH w/ trisomy 12 and 13q del, mutated IGHV , TP53 negative  -MSIR 15mg q4h prn for pain  -Zofran (1st line) and compazine (2nd line) for nausea     Constipation with overflow incontinence  -Improved    Anemia  -hemolytic panel and hgb electrophoresis pending     Follow up  -Can we add LDH to labs drawn today  -MD visit on 5/19/22 with CBC and CMP       Lucina Lara MD  Hematology/Oncology Fellow PGY IV  [facility]

## 2022-05-05 NOTE — PROGRESS NOTES
"Subjective:       Patient ID: Cary Middleton is a 86 y.o. female.    Chief Complaint: Lesion and Pleural Effusion    Virtual visit with patient referred to Interventional Radiology by Lucina Lara MD for evaluation of a left pleural effusion and a retroperitoneal mass. Patient has a history of chronic lymphocytic leukemia. CT scan obtained on 4/27/2022 noted "Interval development of moderate-sized left pleural effusion with associated compressive atelectasis of the left lower lobe" and "Enlarging heterogenous retroperitoneal mass, which may represent a conglomerate of lymph node, that measures up to 10.2 cm, previously up to 6.9 cm." Patient has complaints of decreased appetite and decreased activity level. She endorses fatigue and dizziness with exertion such as walking from bedroom to common areas of home. Her daughter is present for our visit.     Review of Systems   Constitutional: Positive for activity change (decreased), appetite change (decreased) and fatigue. Negative for chills and fever.   Respiratory: Negative for cough, shortness of breath, wheezing and stridor.    Cardiovascular: Negative for chest pain, palpitations and leg swelling.   Gastrointestinal: Positive for abdominal pain (every now and then). Negative for abdominal distention, constipation, diarrhea, nausea and vomiting.         Objective:      Physical Exam  Constitutional:       General: She is not in acute distress.     Appearance: She is well-developed. She is not diaphoretic.   HENT:      Head: Normocephalic and atraumatic.   Pulmonary:      Effort: Pulmonary effort is normal. No respiratory distress.   Neurological:      Mental Status: She is alert and oriented to person, place, and time.   Psychiatric:         Behavior: Behavior normal.         Thought Content: Thought content normal.         Judgment: Judgment normal.       CT scan 4/27/2022      Assessment:       Problem List Items Addressed This Visit        Oncology    CLL (chronic " lymphocytic leukemia)    Relevant Orders    IR CT Guidance    Protime-INR      Other Visit Diagnoses     Retroperitoneal mass    -  Primary    Relevant Orders    IR CT Guidance    Protime-INR    Neoplasm        Relevant Orders    Protime-INR          Plan:         Discussed case with Dr. Ramirez. Explained to patient can offer thoracentesis of left pleural effusion, and biopsy of retroperitoneal mass. Discussed how the thoracentesis will be performed, risks (including, but not limited to, pain, bleeding, infection, damage to nearby structures, potential to develop pneumothorax and subsequent need for chest tube placement, and the need for additional procedures), benefits, possible complications, pre-post procedure expectations, and alternatives.     Discussed how the biopsy will be performed, risks (including, but not limited to, pain, bleeding, infection, damage to nearby structures, and the need for additional procedures), benefits, possible complications, pre-post procedure expectations, and alternatives. The patient voices understanding and all questions have been answered.  The patient agrees to proceed as planned. Patient scheduled for 5/11/2022 at our [facility]. Clinic phone number provided.

## 2022-05-05 NOTE — Clinical Note
"Thank you for referring Ms. Middleton to Interventional Radiology at the [facility]. Please don't hesitate to contact us if there are any questions regarding this evaluation at [phone]. If you have any other patients for whom you would like a consultation, please place an order for "[id number]", and we will be happy to review their case.  Sincerely, DAYSI Moreau, FNP Interventional Radiology    "

## 2022-05-05 NOTE — PROGRESS NOTES
received a referral that the patient scored (5) on the Distress Management Scale.   reached-out to the patient by phone at [phone]. During the discussion the patient's score was related to her diagnosis and treatment. The patient thought that the treatment would have her feeling better but since the treatment she has experienced extreme fatigue.  Patient had recently lost her great great grandchild, which had her feeling sad.   ask, if the patient would like to speak to someone regarding her concerns? The response was no.  provided his contact information for future use by the patient if needed.  No other needs noted at this time.

## 2022-05-09 NOTE — PROGRESS NOTES
Progress Note  Palliative Care      Reason for Consult: symptom management and ACP      ASSESSMENT/PLAN:     Plan/Recommendations:  Diagnoses and all orders for this visit:    CLL (chronic lymphocytic leukemia)  - followed by Dr. Lara  - currently on disease-directed therapy  - recent imaging showed new pleural effusion and enlarging retroperitoneal mass; plans for thoracentesis and biopsy on 5/11/22    Encounter for palliative care/Advanced care planning  - patient decisional  - patient accompanied by her daughter to clinic today  - no ACP documents uploaded into EMR  - goals: life-prolonging  - philosophy of Palliative Medicine reviewed with patient and family at first visit  - new patient folder given to and reviewed with patient and family at first visit  - ACP booklet given to and reviewed with patient and family including HCPOA and living will  - code status not specifically discussed this visit  - at initial visit: patient spoke about the unknowns surrounding prognosis and goals of treatment. She is interested in learning if the treatment is curative in intent or not. Message sent to oncologist about patient's desire for further information.  - discussed with patient today about starting to think what her goals are overall (independence, family, etc.), so that once information is known from biopsy on Wednesday, further goal directed care conversations may continue.     Cancer related Pain  - patient reporting well controlled abdominal/flank/back pain at this time  - she described the pain as the initial indicator that something was going on inside her body; she reports that pain was severe and limiting  - she rates the pain as 0/10 today in clinic and reports her medications are controlling pain  - current medication regimen is Morphine IR 15 mg q4h prn and Robaxin 500 mg TID prn  - patient uses up to three doses of morphine a day  - no need for refills at this time  - opioid safety sheet in new patient  folder for patient and family to review  - narcan previously ordered    Anorexia/Nausea  - patient reporting intermittent nausea and improved though still decreased appetite  - she reports more nausea this morning due to rushing to get to appt today  - patient reporting that food does not taste the same and she does not feel hungry throughout the day  - discussed using soups, oatmeal, etc to help improve intake; patient able to eat more when socializing with family and eating over longer time   - patient does not like taste of glucerna anymore   - she is very concerned about her glucose levels as well as she was previously on steroids which required her to use insulin multiple times a day  - patient and daughter both notice improvement in appetite after starting periactin; refill provided today  - previously referred to nutrition    Neoplastic (malignant) related fatigue/Weakness/Dyspnea  - patient reporting her biggest symptoms are weakness and fatigue  - she reports her goals are to be able to get up and go get a bottle of water from the refrigerator by herself  - patient's daughter spoke at first visit about patient wanting to be more independent (as patient was fully independent in all ADLs and IADLs prior to diagnosis). Patient's daughter helps with ADLs and IADLs now  - discussed need for nutritional intake to regain strength  - patient has not worked with HH PT yet  - discussed at first visit about basic ROM exercises for patient to work on in home until PT evaluation  - patient reporting some dyspnea with exertion; likely due to malignancy and decreased level of activity  - tips for shortness of breath in new patient folder for patient to review  - discussed with patient and family about setting goals for the day for activities and nutrition.     Adjustment disorder with mixed anxiety and depressed mood  - patient reporting moderate to high levels of anxiety and depressed mood today  - patient discussed at  first visit how quickly things have changed since diagnosis in mid to end of January 2022. Patient also worried about future and if treatment is curative in intent. Patient's daughter reporting loss of her  one year ago and then patient's diagnosis  - today patient spoke of her anxiety around upcoming procedures given her age. She and her daughter also spoke of a recent death of a young baby (8 months old) in the family.   - emotional support provided to both patient and family  - will discuss with Pall Med SW and ask that she reach out to family prior to next visit to introduce herself and provide additional support as needed  - continue lexapro 10 mg and mirtazapine 7.5 mg qhs; no refills needed today    Insomnia  - patient reporting continued improvement in insomnia  - continue mirtazapine 7.5 mg qhs at this time  - tips for improved sleep in new patient folder for patient to review  - will continue to monitor    Constipation  - patient reporting intermittent constipation  - constipation tip sheet in new patient folder for patient to review  - will continue to monitor    Understanding of illness/Prognosis: patient and family have fair understanding of illness and they have further questions regarding the illness, treatment, and prognosis. Patient awaiting further work up to be completed on 5/11/22. Prognosis is guarded to poor at this time.     Goals of care: life-prolonging    Follow up: ~ 4 weeks    Patient's encounter and above plan of care discussed with patient's oncologist    SUBJECTIVE:     History of Present Illness:  Patient is a 86 y.o. year old female with anxiety, depression, DM, HTN, HLD, OA, and CLL presents to Palliative Medicine for symptom management and ACP. Please see oncology notes for full details of oncologic history and treatment course.      05/09/2022:  LA  reviewed and summarized:  04/29/2022 MS IR 15 mg disp: 30 for 5 days    Patient to undergo biopsy of retroperitoneal mass,  as oncology concerned about Solis's transformation. Plan for biopsy on 5/11/22. Today patient states she is still experiencing some fatigue and weakness. She also has mild dyspnea with exertion. She did not start working with HH PT yet. Patient having mild to moderate nausea, but that was due to rushing to get to early appt this morning. She and daughter both report improvement in appetite on periactin. Patient reporting increased anxiety around the upcoming procedures. She and daughter also spoke about recent loss of a family member today. She also has mild constipation.     04/13/2022:  LA  reviewed and summarized:  03/31/2022 MS IR 15 mg Disp: 30 for 30 days    Patient presented today with her daughter. Patient reporting that her pain is well controlled with current regimen. Her biggest concern today is fatigue/weakness. Patient unable to complete activities that she was able to previously in December 2021. She has some anxiety and depression around recent diagnosis and unknown future. Patient also having poor appetite. Her sleep has improved with use of mirtazapine. She is open to learning more about ACP booklet.     Past Medical History:   Diagnosis Date    Anemia     Anxiety     Cataract     CLL (chronic lymphocytic leukemia) 2/5/2022    Depression     Diabetes mellitus     Hyperlipidemia     Hypertension     Kidney stone     Macular degeneration     Osteoarthritis of left hip     Osteoporosis     Recurrent nephrolithiasis     Type 2 diabetes mellitus     Type 2 diabetes mellitus with ophthalmic manifestations      Past Surgical History:   Procedure Laterality Date    BLEPHAROPLASTY, QUAD      GANGLION CYST EXCISION      HYSTERECTOMY      INTRACAPSULAR CATARACT EXTRACTION      left eye     Family History   Problem Relation Age of Onset    Colon cancer Mother     Cancer Mother     Heart disease Father     Cataracts Father     Diabetes Father     Pancreatic cancer Sister      Strabismus Daughter     Hypertension Daughter     Transient ischemic attack Daughter         x 3    Diabetes Brother     Hypertension Brother     Cancer Son         lung cancer    Hypertension Daughter     Arthritis Daughter     No Known Problems Daughter     Aneurysm Sister     Hypertension Brother     Glaucoma Neg Hx     Blindness Neg Hx      Review of patient's allergies indicates:   Allergen Reactions    Zoster vaccine live Rash    Lisinopril Swelling       Medications:    Current Outpatient Medications:     acalabrutinib (CALQUENCE) 100 mg Cap, Take 1 capsule (100 mg) by mouth 2 (two) times a day., Disp: 60 capsule, Rfl: 3    acetaminophen (TYLENOL) 325 MG tablet, Take 2 tablets (650 mg total) by mouth every 4 (four) hours as needed. (Patient not taking: Reported on 4/28/2022), Disp: 30 tablet, Rfl: 3    amLODIPine (NORVASC) 10 MG tablet, Take 1 tablet (10 mg total) by mouth once daily. (Patient not taking: No sig reported), Disp: 90 tablet, Rfl: 3    aspirin (ECOTRIN) 81 MG EC tablet, Take 81 mg by mouth once daily., Disp: , Rfl:     atorvastatin (LIPITOR) 20 MG tablet, Take 1 tablet (20 mg total) by mouth once daily., Disp: 90 tablet, Rfl: 3    blood sugar diagnostic Strp, Use to test blood glucose 2 (two) times daily with meals. (Patient not taking: Reported on 4/28/2022), Disp: 100 each, Rfl: 11    blood-glucose meter Misc, Use to test blood glucose 2 (two) times daily with meals. (Patient not taking: Reported on 4/28/2022), Disp: 1 each, Rfl: 0    cholecalciferol, vitamin D3, 1,000 unit capsule, Take 1,000 Units by mouth once daily., Disp: , Rfl:     cyproheptadine (PERIACTIN) 4 mg tablet, TAKE ONE TABLET BY MOUTH TWICE DAILY WITH FOOD, Disp: 60 tablet, Rfl: 0    diclofenac sodium (VOLTAREN) 1 % Gel, Apply 2 g topically daily as needed. (Patient not taking: Reported on 4/28/2022), Disp: 50 g, Rfl: 1    EScitalopram oxalate (LEXAPRO) 10 MG tablet, Take 1 tablet (10 mg total) by  "mouth once daily., Disp: 90 tablet, Rfl: 3    flash glucose scanning reader (FREESTYLE NIMCO 14 DAY READER) Misc, Use as directed (Patient not taking: Reported on 4/28/2022), Disp: 1 each, Rfl: 0    flash glucose sensor (FREESTYLE NIMCO 14 DAY SENSOR) Kit, Use one sensor every 2 weeks to check blood sugar ICD-10-CM: E11.9 (Patient not taking: Reported on 4/28/2022), Disp: 1 kit, Rfl: 3    glucagon (BAQSIMI) 3 mg/actuation Spry, Use in case of severe hypoglycemia (Patient not taking: Reported on 4/28/2022), Disp: 1 each, Rfl: 3    lancets (ACCU-CHEK SOFTCLIX LANCETS) Misc, Test blood glucose twice daily with meals (Patient not taking: Reported on 4/28/2022), Disp: 100 each, Rfl: 11    lancets Misc, To check BG 2 times daily, to use with insurance preferred meter (Patient not taking: Reported on 4/28/2022), Disp: 100 each, Rfl: 12    losartan (COZAAR) 25 MG tablet, Take 1 tablet (25 mg total) by mouth once daily. (Patient not taking: No sig reported), Disp: 90 tablet, Rfl: 3    methocarbamoL (ROBAXIN) 500 MG Tab, Take 1 tablet (500 mg total) by mouth 3 (three) times daily as needed (Muscle Spasm.)., Disp: 30 tablet, Rfl: 0    methylcellulose (ARTIFICIAL TEARS) 1 % ophthalmic solution, Place 1 drop into both eyes as needed., Disp: , Rfl:     mirtazapine (REMERON) 7.5 MG Tab, Take 1 tablet (7.5 mg total) by mouth every evening., Disp: 30 tablet, Rfl: 11    morphine (MSIR) 15 MG tablet, Take 1 tablet (15 mg total) by mouth every 4 (four) hours as needed for Pain., Disp: 30 tablet, Rfl: 0    naloxone (NARCAN) 4 mg/actuation Spry, 4mg by nasal route as needed for opioid overdose; may repeat every 2-3 minutes in alternating nostrils until medical help arrives. Call 911, Disp: 1 each, Rfl: 11    ondansetron (ZOFRAN-ODT) 4 MG TbDL, Dissolve 1 tablet (4 mg total) by mouth every 6 (six) hours as needed., Disp: 60 tablet, Rfl: 0    pen needle, diabetic (BD ROWENA 2ND GEN PEN NEEDLE) 32 gauge x 5/32" Ndle, Uses 4 daily " (Patient not taking: Reported on 4/28/2022), Disp: 150 each, Rfl: 4    pramoxine-hydrocortisone (ANALPRAM HC) cream, Apply topically 3 (three) times daily. For Hemorrhoid pain. (Patient not taking: Reported on 4/28/2022), Disp: 28 g, Rfl: 2    SITagliptin (JANUVIA) 50 MG Tab, Take 1 tablet (50 mg total) by mouth once daily., Disp: 90 tablet, Rfl: 3    TRUE METRIX GLUCOSE TEST STRIP Strp, TEST BLOOD SUGAR TWICE A DAY (Patient not taking: Reported on 4/28/2022), Disp: 100 each, Rfl: 12    TRUEPLUS LANCETS 30 gauge Misc, USE ONE LANCET TWICE DAILY, Disp: , Rfl:   No current facility-administered medications for this visit.    Facility-Administered Medications Ordered in Other Visits:     0.9%  NaCl infusion, 500 mL, Intravenous, Continuous, Elif Lares MD    OBJECTIVE:       ROS:  Review of Systems   Constitutional: Positive for activity change, appetite change and fatigue.   HENT: Negative.    Eyes: Negative.    Respiratory: Positive for shortness of breath (with exertion).    Cardiovascular: Negative.    Gastrointestinal: Positive for abdominal pain (well controlled), constipation and nausea.   Genitourinary: Positive for flank pain (well controlled).   Musculoskeletal: Positive for back pain (well controlled).   Skin: Negative.    Neurological: Positive for weakness.   Psychiatric/Behavioral: Positive for dysphoric mood and sleep disturbance (improving). The patient is nervous/anxious.    All other systems reviewed and are negative.      Review of Symptoms    Symptom Assessment (ESAS 0-10 Scale)  Pain:  0  Dyspnea:  2  Anxiety:  5  Nausea:  4  Depression:  0  Anorexia:  3  Fatigue:  5  Insomnia:  0  Restlessness:  0  Agitation:  0     CAM / Delirium:  Negative  Constipation:  Positive  Diarrhea:  Negative    Bowel Management Plan (BMP):  No      Pain Assessment:  OME in 24 hours:  30  Location(s): abdomen    Abdomen       Location: left and anterior        Quantity: 0/10 in intensity        Chronicity:  Onset 5 month(s) ago, controlled since Radiated around to flank and back        Aggravating Factors: none        Alleviating Factors: opiates        Associated Symptoms: none    Modified Fernanda Scale:  1    ECOG Performance Status rdGrdrrdarddrderd:rd rd3rd Living Arrangements:  Lives with family    Psychosocial/Cultural: Patient lives with her , adult son, and adult daughter. She has large support system    Spiritual:  F - Catherine and Belief:  Yes  I - Importance:  Very  C - Community:  Yes  A - Address in Care:  Needs met at this time      Advance Care Planning   Advance Directives:   Living Will: No    LaPOST: No    Do Not Resuscitate Status: No    Medical Power of : No    Agent's Name:  Alexei Middleton   Agent's Contact Number:  [phone]    Decision Making:  Patient answered questions and Family answered questions          Physical Exam:  Vitals:     Vitals:    05/09/22 0759   BP: 127/66   Pulse: 94     Physical Exam  Vitals reviewed.   Constitutional:       General: She is not in acute distress.     Appearance: She is not toxic-appearing.   HENT:      Head: Normocephalic and atraumatic.      Right Ear: External ear normal.      Left Ear: External ear normal.   Eyes:      General: No scleral icterus.        Right eye: No discharge.         Left eye: No discharge.   Neck:      Comments: Trachea midline  Pulmonary:      Effort: Pulmonary effort is normal. No respiratory distress.   Abdominal:      General: Abdomen is flat. There is no distension.   Musculoskeletal:         General: No deformity.      Cervical back: Normal range of motion.      Right lower leg: No edema.      Left lower leg: No edema.   Skin:     General: Skin is dry.      Coloration: Skin is not jaundiced.      Findings: No rash.   Neurological:      Mental Status: She is alert and oriented to person, place, and time.      Comments: In wheelchair for entire visit   Psychiatric:         Mood and Affect: Mood is anxious and depressed.          "Behavior: Behavior normal.         Thought Content: Thought content normal.         Judgment: Judgment normal.         Labs:  CBC:   WBC   Date Value Ref Range Status   04/28/2022 11.57 3.90 - 12.70 K/uL Final     Hemoglobin   Date Value Ref Range Status   04/28/2022 8.2 (L) 12.0 - 16.0 g/dL Final     Hematocrit   Date Value Ref Range Status   04/28/2022 25.3 (L) 37.0 - 48.5 % Final     MCV   Date Value Ref Range Status   04/28/2022 69 (L) 82 - 98 fL Final     Platelets   Date Value Ref Range Status   04/28/2022 281 150 - 450 K/uL Final       LFT:   Lab Results   Component Value Date    AST 18 04/28/2022    ALKPHOS 78 04/28/2022    BILITOT 0.4 04/28/2022       Albumin:   Albumin   Date Value Ref Range Status   04/28/2022 2.8 (L) 3.5 - 5.2 g/dL Final     Protein:   Total Protein   Date Value Ref Range Status   04/28/2022 5.7 (L) 6.0 - 8.4 g/dL Final       Radiology:I have reviewed all pertinent imaging results/findings within the past 24 hours.    04/27/2022 CT C/A/P: "Enlarging heterogenous retroperitoneal mass, which may represent a conglomerate of lymph node, that measures up to 10.2 cm, previously up to 6.9 cm.  There is extension of the mass into the left retrocrural space.  Anteriorly it encases the aorta, celiac axis, main hepatic artery, splenic artery, bilateral renal arteries, SMA.  There is mild narrowing of the celiac trunk and left renal artery.  It also partially encases the portal vein, however the portal vein remains patent.  There is some narrowing of the superior mesenteric vein as well as the splenic vein. It appears to involve the superior pole of the left kidney with some prominence of the left renal collecting system and slightly increased left perinephric stranding.  Enlarged right retrocrural node noted as well. The mesenteric lymphadenopathy is similar to slightly improved from prior. Interval development of moderate-sized left pleural effusion with associated compressive atelectasis of the " "left lower lobe.  Stable pulmonary nodules without evidence of new nodules. Punctate hyperdensity at the gallbladder fundus, possibly a stone. Additional findings above."    65 minutes of total time spent on the encounter, which includes face to face time and non-face to face time preparing to see the patient (eg, review of tests), Obtaining and/or reviewing separately obtained history, Documenting clinical information in the electronic or other health record, Independently interpreting results (not separately reported) and communicating results to the patient/family/caregiver, or Care coordination (not separately reported).    Encounter occurred during period of COVID-19 emergency. Encounter performed under the concurrent guidelines, limitations and protocols.    Signature: Rafaela Lewis MD        "

## 2022-05-10 NOTE — TELEPHONE ENCOUNTER
Spoke with pt's daughter. Stated that pt had a COVID exposure recently and last took Calquence on 5/7    Was unaware to hold Calquence for 1 week prior to biopsy. Notified her that per provider's note on 4/28 directed to hold 1 week prior to biopsy and 1 week after biopsy    Advised her to reach out to provider's office about amount of time since pt last took it 5/7 and it will only be held for about 4 days if pt has biopsy tomorrow. Pt's daughter has reached out to provider's office and awaiting response    She also reached out to provider that will do biopsy to notify them of the COVID exposure. Pt has a cough, nausea, and an episode of vomiting. Spoke with that provider's office. They will give her a call back about if biopsy will need to be rescheduled.     Will open I-vent, pend refill to 5/30, and f/u on 5/12 with pt's daughter

## 2022-05-10 NOTE — TELEPHONE ENCOUNTER
Outgoing call regarding Calquence refill. Pt daughter stated she has 21 tablets on hand and pt will be missing 7 doses coming tomorrow. She stated the MDO told her to hold medication due to having a biopsy procedure on 5/11/22. Transferred to [facility].

## 2022-05-12 NOTE — TELEPHONE ENCOUNTER
Called pt's daughter to check in. Stated that pt was advised to have a covid test on Monday, 5/16 and biopsy scheduled for 5/17    Will pend refill to 5/30 as pt has 21 caps on hand and likely to resume 5/24 (1 week after biopsy)

## 2022-05-13 NOTE — TELEPHONE ENCOUNTER
"----- Message from Valeiry Sampson sent at 5/13/2022 10:30 AM CDT -----  Consult/Advisory:          Name Of Caller: Sunni ([facility] PT assistant)      Contact Preference?: [phone]      Provider Name: Jane      Does patient feel the need to be seen today? No      What is the nature of the call?: Calling to speak w/ nurse. Stating pt experienced episode of shortness of breath (minimal exertion) and by 9:15 am this morning all her vitals were normal, except her temperature - 99.3 (98.7 yesterday).  Also wanted to make note that pt has bilateral moderate to minimal swelling near both ankles and feet. Also noting that pt's right lower lung was diminished in exhalation. Inquiring if this diminishing is something new.          Additional Notes:  "Thank you for all that you do for our patients"      "

## 2022-05-13 NOTE — TELEPHONE ENCOUNTER
Spoke with patient home health PT.   PTA reported that patient stated she had new onset sob event this morning around 7am. She was feeling better at 9am upon PTA arrival. Walking exercises completed and PTA noticed that patient was breathing very shallow so they focused on controlled and deep breathing exercises. Patient reported feeling better. PTA took note that her bilateral LLE swelling was the same as yesterday but gained 1 pound when weighed. PTA also noted right lower lobe diminished breath sounds upon exhalation. PTA noted that temp was 99.3 today from 98.7 yesterday.     Spoke with patient daughter and she stated that patient was slightly winded after doing the exercises. She is doing okay now and is at her hair appointment. Daughter denied her mother having any chest pain/tightness or palpitations. She stated her mother might be back at 2pm.

## 2022-05-17 NOTE — DISCHARGE SUMMARY
Radiology Discharge Summary      Admit date: 5/17/2022  6:07 AM  Discharge date: May 17, 2022    Instructions Given to patient: YesVerbal    Diet: Regular    Activity:Restriction as listed: No heavy lifting or strenuous activities for 48 hours.    Medications on discharge (List): Refer to Discharge Medication List    Hospital Course: CT guided biopsy of retroperitoneal mass and CT guided left thoracentesis    Description of Condition on Discharge: stable    Discharge Disposition: Home    Discharge Diagnosis: Retroperitoneal mass and left pleural effusion.    Discharge Procedure Orders   Diet general     Call MD for:  redness, tenderness, or signs of infection (pain, swelling, redness, odor or green/yellow discharge around incision site)     Other restrictions (specify):   Order Comments: No heavy lifting or strenuous activities for 48 hours.     Call MD for:  temperature >100.4     Call MD for:  severe uncontrolled pain     Remove dressing in 24 hours

## 2022-05-17 NOTE — PROGRESS NOTES
Received request to check status of home health services.  Spoke to Kaleigh at [facility], who confirms patient is current following nursing admission on 5/3.  PT has been ongoing, but their nurse cancelled on 5/13.  Nursing visits will resume 5/19 and then be weekly on Tuesdays.  Will notify following Holly Power of plan on his return.

## 2022-05-17 NOTE — PLAN OF CARE
Cary Middleton has met all discharge criteria from Phase II. Vital Signs are stable, ambulating  With assistance. Discharge instructions given, patient verbalized understanding. Discharged from facility via wheelchair in stable condition.

## 2022-05-17 NOTE — TELEPHONE ENCOUNTER
Spoke with patient daughter. She stated she did not have questions or updates on HH orders. Patient daughter stated she wanted to touch base with Dr. Lara as she believes she reached out but things were lost in translation with other family members. Patient daughter also stated if dietician appointment can be moved to same day as MD appointment that would be helpful. If not, she will keep as scheduled.

## 2022-05-17 NOTE — H&P
[facility] ([place])  History & Physical - Short Stay  Interventional Radiology    SUBJECTIVE:     Chief Complaint/Reason for Admission: Retroperitoneal mass, left pleural effusion    Informant(s):  self and Electronic Health Record    History of Present Illness:  Cary Middleton is a 86 y.o. female with a history of CLL with enlarging retroperitoneal mass and left pleural effusion.    Patient presents for thoracentesis and CT guided biopsy of retroperitoneal mass.    Scheduled Meds:   Continuous Infusions:   PRN Meds:     Review of patient's allergies indicates:   Allergen Reactions    Zoster vaccine live Rash    Lisinopril Swelling       Past Medical History:   Diagnosis Date    Anemia     Anxiety     Cataract     CLL (chronic lymphocytic leukemia) 2/5/2022    Depression     Diabetes mellitus     Hyperlipidemia     Hypertension     Kidney stone     Macular degeneration     Osteoarthritis of left hip     Osteoporosis     Recurrent nephrolithiasis     Type 2 diabetes mellitus     Type 2 diabetes mellitus with ophthalmic manifestations      Past Surgical History:   Procedure Laterality Date    BLEPHAROPLASTY, QUAD      GANGLION CYST EXCISION      HYSTERECTOMY      INTRACAPSULAR CATARACT EXTRACTION      left eye     Family History   Problem Relation Age of Onset    Colon cancer Mother     Cancer Mother     Heart disease Father     Cataracts Father     Diabetes Father     Pancreatic cancer Sister     Strabismus Daughter     Hypertension Daughter     Transient ischemic attack Daughter         x 3    Diabetes Brother     Hypertension Brother     Cancer Son         lung cancer    Hypertension Daughter     Arthritis Daughter     No Known Problems Daughter     Aneurysm Sister     Hypertension Brother     Glaucoma Neg Hx     Blindness Neg Hx      Social History     Tobacco Use    Smoking status: Never Smoker    Smokeless tobacco: Never Used   Substance Use Topics    Alcohol  use: No    Drug use: No        Review of Systems:  ROS not obtained    OBJECTIVE:     Vital Signs (Most Recent):  Temp: 98.3 °F (36.8 °C) (05/17/22 0656)  Pulse: 81 (05/17/22 0656)  Resp: 16 (05/17/22 0656)  BP: (!) 153/79 (05/17/22 0656)  SpO2: (!) 93 % (05/17/22 0656)    Physical Exam:  Lungs: No respiratory distress  Cardiac: regular rate and rhythm    Laboratory  CBC:   Lab Results   Component Value Date/Time    WBC 11.57 04/28/2022 01:10 PM    RBC 3.66 (L) 04/28/2022 01:10 PM    HGB 8.2 (L) 04/28/2022 01:10 PM    HCT 25.3 (L) 04/28/2022 01:10 PM     04/28/2022 01:10 PM    MCV 69 (L) 04/28/2022 01:10 PM    MCH 22.4 (L) 04/28/2022 01:10 PM    MCHC 32.4 04/28/2022 01:10 PM     Coagulation:   Lab Results   Component Value Date/Time    INR 1.1 05/09/2022 09:04 AM         ASSESSMENT/PLAN:     Retroperitoneal mass and left pleural effusion.    Patient will undergo thoracentesis and CT guided biopsy of retroperitoneal mass.    Sedation/Anesthesia Assessment:  ASA Classification: III = Severe systemic disease limiting activity  Mallampati Score: II (hard and soft palate, upper portion of tonsils anduvula visible)    Sedation History: No problems    Sedation Plan: Conscious sedation

## 2022-05-17 NOTE — TELEPHONE ENCOUNTER
Returned call to daughter,  nurse to come Thursday and then every Tuesday in the future. Daughter is aware to call [facility] to find out possible time.

## 2022-05-17 NOTE — PROCEDURES
[facility] ([place])  Interventional Radiology  High Risk Procedure - Inpatient    Date: 05/17/2022 Time: 10:20 AM    Pre-Op Diagnosis: Retroperitoneal mass and left pleural effusion    Post-Op Diagnosis: same    Procedure Performed by: Jose Wang MD    Assistant: none    Procedure: CT guided core biopsy of retroperitoneal mass and CT guided left thoracentesis.    Specimen/Tissue Removed: 6 x 18 gauge cores retroperitoneal mass. 800 mL of clear reddish pleural fluid    Estimated Blood Loss: Less than 5 mL    Procedure Note/Findings: CT guided core biopsy of retroperitoneal mass performed with pathologist in attendance. Sample sent for flow cytometry and histology. Next thoracentesis performed with 5 Kyrgyz centesis needle. No immediate post-procedure complications noted. Sample sent for requested studies.            Please refer to dictated report for additional details.

## 2022-05-17 NOTE — TELEPHONE ENCOUNTER
Spoke to daughter, appointments rescheduled from the 19th to the 26th. Daughter voiced concern that HH nurse had not been out for a visit yet. Will follow up with social work about when nurse may be going out for a visit.

## 2022-05-17 NOTE — TELEPHONE ENCOUNTER
----- Message from Marilee Real sent at 5/17/2022  3:15 PM CDT -----  Contact: Daughter Rekha [phone]  Type:  Patient Returning Call    Who Called:Pt's daughter   Who Left Message for Patient:MD   Does the patient know what this is regarding?:update on  orders   Would the patient rather a call back or a response via [employer]? Call back   Best Call Back Number:[phone]  Additional Information: n/a

## 2022-05-20 NOTE — TELEPHONE ENCOUNTER
SW attempted to follow up with patient and offer support. SW left VM informing of availability to provide support as needed. SW to accompany NP at next scheduled clinic visit scheduled for 5/30.    Marialuisa Carr, SARAW  Outpatient   Palliative Medicine

## 2022-05-20 NOTE — TELEPHONE ENCOUNTER
----- Message from Rafaela Mancilla sent at 5/20/2022 12:12 PM CDT -----  Pt calling in regards to medication    Needs refill: morphine   Pharmacy: [id number] - [address] AT [place] [address] [facility] [address]  Phone: [phone] Fax: [phone]

## 2022-05-25 PROBLEM — D63.0 ANEMIA IN NEOPLASTIC DISEASE: Status: ACTIVE | Noted: 2022-01-01

## 2022-05-25 PROBLEM — J96.01 ACUTE HYPOXEMIC RESPIRATORY FAILURE: Status: ACTIVE | Noted: 2022-01-01

## 2022-05-25 PROBLEM — C85.80 LARGE CELL LYMPHOMA: Status: ACTIVE | Noted: 2022-01-01

## 2022-05-25 NOTE — HPI
Ms. Middleton is an 86-y-o patient of Dr. Lara with CLL on treatment with acalabrutinib. Other medical history includes HTN, DM2, HLD, arthritis, and depression. She has worsening retroperitoneal mass on imaging, and there is some concern for dangelo's transformation. She was referred by Dr. Lara to IR for biopsy of the mass and path consistent with large B-cell lymphoma. LEF 1 immunohistochemical staining and FISH studies sent by pathologist and pending. Sent to ED by Dr. Lara today due to hypoxia, AMS, and fall at home. Given symptoms and bulky disease, admitting inpatient. Dr. Lara would like to consider CHOP vs EPOCH. CT of head performed in ED given fall and showing no evidence of acute hemorrhage.

## 2022-05-25 NOTE — SUBJECTIVE & OBJECTIVE
Subjective:     doing slightly better today, still on 2-3 L of oxygen, will give calcitonin/zometa for hypercalcemia and lasix as well. Continue antibiotics with ceftriaxone/azithromycin. Blood cx/urine cx pending. IR thoracentesis for left pleural effusion today.     Objective:     Vital Signs (Most Recent):  Temp: 98.2 °F (36.8 °C) (05/25/22 1210)  Pulse: 94 (05/25/22 1210)  Resp: 16 (05/25/22 1210)  BP: (!) 124/56 (05/25/22 1210)  SpO2: (!) 94 % (05/25/22 1210)   Vital Signs (24h Range):  Temp:  [98.2 °F (36.8 °C)] 98.2 °F (36.8 °C)  Pulse:  [94] 94  Resp:  [16] 16  SpO2:  [94 %] 94 %  BP: (124)/(56) 124/56     Weight: 57.2 kg (126 lb)  Body mass index is 21.63 kg/m².  Body surface area is 1.61 meters squared.    ECOG SCORE           3    Lines/Drains/Airways       Peripheral Intravenous Line  Duration                  Peripheral IV - Single Lumen 03/18/22 2240 22 G Right Hand 67 days         Peripheral IV - Single Lumen 05/25/22 1422 22 G Right Forearm <1 day                    Physical Exam  Constitutional:       Appearance: She is well-developed.   HENT:      Head: Normocephalic and atraumatic.      Mouth/Throat:      Pharynx: No oropharyngeal exudate.   Eyes:      Conjunctiva/sclera: Conjunctivae normal.      Pupils: Pupils are equal, round, and reactive to light.   Cardiovascular:      Rate and Rhythm: Normal rate and regular rhythm.      Heart sounds: Normal heart sounds. No murmur heard.  Pulmonary:      Effort: Pulmonary effort is normal.      Breath sounds: Normal breath sounds.   Abdominal:      General: Bowel sounds are normal. There is no distension.      Palpations: Abdomen is soft.      Tenderness: There is no abdominal tenderness.   Musculoskeletal:         General: No deformity. Normal range of motion.      Cervical back: Normal range of motion and neck supple.      Right lower leg: Edema present.      Left lower leg: Edema present.   Skin:     General: Skin is warm and dry.      Findings: No  erythema or rash.   Neurological:      Mental Status: She is alert and oriented to person, place, and time.   Psychiatric:         Behavior: Behavior normal.         Thought Content: Thought content normal.         Judgment: Judgment normal.       Significant Labs:   All pertinent labs from the last 24 hours have been reviewed.    Diagnostic Results:  I have reviewed all pertinent imaging results/findings within the past 24 hours.

## 2022-05-25 NOTE — ASSESSMENT & PLAN NOTE
From Dr. Lara's most recent clinic note:  Patient is a 87 y/o female with hx of HTN, Depression, T2DM who presents to clinic after found to have abd LAD on CT abd/pel found incidentally for flank pain.     Labs with leukocytosis, absolute lymphocytosis since 8/2018. Normal renal function.      CT Abd/Pel w/o con 1/6/2022: Normal liver size. Normal spleen size. Mesenteric and retroperitoneal LAD. Multiple para-aortic lymph nodes. Largest lymph node is near the left renal hilu, 3.2 cm in greatest dimension.      MRI 1/2022: Left periaortic retroperitoneal mass 5.1cm in greatest dimension.      Flow cytometry: Consistent with CLL     Admitted from 2/5/22-2/7/22 for steriod induced hyperglycemia and flank pain. Started on morphine 15mg po q4 prn, methocarbamol 500mg tid prn, tylenol 650mg prn.      CT neck: No LAD seen.   CT Chest: Two lung micronodules. Perhaps intrathoracic lymph node.     CT CAP showing retroperitoneal mass measuring 8.9 x 9.5 x 10.2 cm, previously 6.4 x 6.4 x 6.9 cm.     S/p IR biopsy. Consistent with large B cell lymphoma. FISH and LEF 1 immunohistochemical staining studies sent and pending    Discussed with patient and her daughter today regarding diagnosis. We discussed that prognosis is poor and that with with most aggressive chemotherapy regimen, median survival ranges around 8 months. Patient/daughter want to discuss with rest of family and make a decision about pursuing treatment vs supportive care.

## 2022-05-25 NOTE — ED PROVIDER NOTES
Source of History:  pt daughter    Chief complaint:  Hypoxia (+SOB, +confused, hx of lung cancer thoracentesis last week, biopsy last week, last chemo pill 3 weeks family member states at home was in the 70s)      HPI:  Cary Middleton is a 86 y.o. female presenting with altered mental status and hypoxia.  Patient has history of CLL and is on acalabrutinib for this.  She recently had a thoracentesis and a biopsy, they are waiting the results.  Over the last week the patient has become more hypoxic, any exertion causes her saturations did drop into the 70s.  The patient is also more confused, her daughter states that she is also having difficulty speaking.  It appears that she understands things, but it is difficult for her to get her words out.  Sometimes it takes longer, and sometime she will say words that make no sense.  She has never had this issue before the last week.  Daughter denies any fevers or chills.  No new medications.    ROS: As per HPI and below:    General: No fever.  No chills.  Eyes: No visual changes.  Head: No headache.    Integument: No rashes or lesions.  Chest: shortness of breath.  Cardiovascular: No chest pain.  Abdomen: No abdominal pain.  No nausea or vomiting.  Urinary: No abnormal urination.  Neurologic: No focal weakness.  No numbness.  Hematologic: No easy bruising.  Endocrine: No excessive thirst or urination.      Review of patient's allergies indicates:   Allergen Reactions    Zoster vaccine live Rash    Lisinopril Swelling       Current Facility-Administered Medications on File Prior to Encounter   Medication Dose Route Frequency Provider Last Rate Last Admin    0.9%  NaCl infusion  500 mL Intravenous Continuous Elif Lares MD         Current Outpatient Medications on File Prior to Encounter   Medication Sig Dispense Refill    acalabrutinib (CALQUENCE) 100 mg Cap Take 1 capsule (100 mg) by mouth 2 (two) times a day. 60 capsule 3    acetaminophen (TYLENOL) 325 MG tablet  Take 2 tablets (650 mg total) by mouth every 4 (four) hours as needed. 30 tablet 3    amLODIPine (NORVASC) 10 MG tablet Take 1 tablet (10 mg total) by mouth once daily. 90 tablet 3    atorvastatin (LIPITOR) 20 MG tablet Take 1 tablet (20 mg total) by mouth once daily. 90 tablet 3    cyproheptadine (PERIACTIN) 4 mg tablet TAKE ONE TABLET BY MOUTH TWICE DAILY WITH FOOD 60 tablet 0    EScitalopram oxalate (LEXAPRO) 10 MG tablet Take 1 tablet (10 mg total) by mouth once daily. 90 tablet 3    losartan (COZAAR) 25 MG tablet Take 1 tablet (25 mg total) by mouth once daily. 90 tablet 3    mirtazapine (REMERON) 7.5 MG Tab Take 1 tablet (7.5 mg total) by mouth every evening. 30 tablet 11    morphine (MSIR) 15 MG tablet Take 1 tablet (15 mg total) by mouth every 4 (four) hours as needed for Pain. 30 tablet 0    SITagliptin (JANUVIA) 50 MG Tab Take 1 tablet (50 mg total) by mouth once daily. 90 tablet 3    aspirin (ECOTRIN) 81 MG EC tablet Take 81 mg by mouth once daily.      blood sugar diagnostic Strp Use to test blood glucose 2 (two) times daily with meals. 100 each 11    blood-glucose meter Misc Use to test blood glucose 2 (two) times daily with meals. 1 each 0    cholecalciferol, vitamin D3, 1,000 unit capsule Take 1,000 Units by mouth once daily.      diclofenac sodium (VOLTAREN) 1 % Gel Apply 2 g topically daily as needed. 50 g 1    flash glucose scanning reader (FREESTYLE NIMCO 14 DAY READER) Misc Use as directed (Patient not taking: Reported on 4/28/2022) 1 each 0    flash glucose sensor (FREESTYLE NIMCO 14 DAY SENSOR) Kit Use one sensor every 2 weeks to check blood sugar ICD-10-CM: E11.9 (Patient not taking: Reported on 4/28/2022) 1 kit 3    glucagon (BAQSIMI) 3 mg/actuation Spry Use in case of severe hypoglycemia 1 each 3    lancets (ACCU-CHEK SOFTCLIX LANCETS) Misc Test blood glucose twice daily with meals 100 each 11    lancets Misc To check BG 2 times daily, to use with insurance preferred meter  "100 each 12    methocarbamoL (ROBAXIN) 500 MG Tab Take 1 tablet (500 mg total) by mouth 3 (three) times daily as needed (Muscle Spasm.). 30 tablet 0    methylcellulose (ARTIFICIAL TEARS) 1 % ophthalmic solution Place 1 drop into both eyes as needed.      naloxone (NARCAN) 4 mg/actuation Spry 4mg by nasal route as needed for opioid overdose; may repeat every 2-3 minutes in alternating nostrils until medical help arrives. Call 911 1 each 11    ondansetron (ZOFRAN-ODT) 4 MG TbDL Dissolve 1 tablet (4 mg total) by mouth every 6 (six) hours as needed. 60 tablet 0    pen needle, diabetic (BD ROWENA 2ND GEN PEN NEEDLE) 32 gauge x 5/32" Ndle Uses 4 daily (Patient not taking: Reported on 4/28/2022) 150 each 4    pramoxine-hydrocortisone (ANALPRAM HC) cream Apply topically 3 (three) times daily. For Hemorrhoid pain. 28 g 2    TRUE METRIX GLUCOSE TEST STRIP Strp TEST BLOOD SUGAR TWICE A  each 12    TRUEPLUS LANCETS 30 gauge Misc USE ONE LANCET TWICE DAILY         PMH:  As per HPI and below:  Past Medical History:   Diagnosis Date    Anemia     Anxiety     Cataract     CLL (chronic lymphocytic leukemia) 2/5/2022    Depression     Diabetes mellitus     Hyperlipidemia     Hypertension     Kidney stone     Macular degeneration     Osteoarthritis of left hip     Osteoporosis     Recurrent nephrolithiasis     Type 2 diabetes mellitus     Type 2 diabetes mellitus with ophthalmic manifestations      Past Surgical History:   Procedure Laterality Date    BLEPHAROPLASTY, QUAD      GANGLION CYST EXCISION      HYSTERECTOMY      INTRACAPSULAR CATARACT EXTRACTION      left eye    THORACENTESIS N/A 5/17/2022    Procedure: THORACENTESIS;  Surgeon: Jose Wang MD;  [facility] C[facility];  Service: Radiology;  Laterality: N/A;       Social History     Socioeconomic History    Marital status:    Tobacco Use    Smoking status: Never Smoker    Smokeless tobacco: Never Used   Substance and Sexual " Activity    Alcohol use: No    Drug use: No    Sexual activity: Never     Partners: Male     Social Determinants of Health     Financial Resource Strain: Low Risk     Difficulty of Paying Living Expenses: Not hard at all   Food Insecurity: No Food Insecurity    Worried About Running Out of Food in the Last Year: Never true    Ran Out of Food in the Last Year: Never true   Transportation Needs: No Transportation Needs    Lack of Transportation (Medical): No    Lack of Transportation (Non-Medical): No   Housing Stability: Unknown    Unable to Pay for Housing in the Last Year: No    Unstable Housing in the Last Year: No       Family History   Problem Relation Age of Onset    Colon cancer Mother     Cancer Mother     Heart disease Father     Cataracts Father     Diabetes Father     Pancreatic cancer Sister     Strabismus Daughter     Hypertension Daughter     Transient ischemic attack Daughter         x 3    Diabetes Brother     Hypertension Brother     Cancer Son         lung cancer    Hypertension Daughter     Arthritis Daughter     No Known Problems Daughter     Aneurysm Sister     Hypertension Brother     Glaucoma Neg Hx     Blindness Neg Hx        Physical Exam:    Vitals:    05/25/22 1210   BP: (!) 124/56   Pulse: 94   Resp: 16   Temp: 98.2 °F (36.8 °C)     Appearance: No acute distress.  Skin: No rashes seen.  Good turgor.  No abrasions.  No ecchymoses.  Eyes: No conjunctival injection. EOMI, PERRL.  ENT: Oropharynx clear.    Chest:  No increased work of breathing, bilateral chest rise.  Cardiovascular: Regular rate and rhythm.  Normal equal bilateral radial pulses.  Abdomen: Soft.  Not distended.  Nontender.  No guarding.  No rebound. No Masses  Musculoskeletal: Good range of motion all joints.  No deformities.  Neck supple, full range of motion, no obvious deformity.  Neurologic: Moves all extremities.  Normal sensation.  No facial droop.  Follows commands appropriately.  Patient  has slow halting speech, and occasionally has trouble finishing her sentences, changing the subject mid sentence.    Mental Status:  Alert and oriented x 3.            Laboratory Studies:  Labs Reviewed   CBC W/ AUTO DIFFERENTIAL - Abnormal; Notable for the following components:       Result Value    Hemoglobin 8.6 (*)     Hematocrit 26.0 (*)     MCV 64 (*)     MCH 21.1 (*)     RDW 16.5 (*)     Immature Granulocytes 0.7 (*)     Gran # (ANC) 8.4 (*)     Immature Grans (Abs) 0.08 (*)     Gran % 76.9 (*)     Lymph % 12.5 (*)     All other components within normal limits    Narrative:     Release to patient->Immediate   LACTIC ACID, PLASMA - Abnormal; Notable for the following components:    Lactate (Lactic Acid) 2.5 (*)     All other components within normal limits    Narrative:     Release to patient->Immediate   HEPATITIS C ANTIBODY   COMPREHENSIVE METABOLIC PANEL   MAGNESIUM   URINALYSIS, REFLEX TO URINE CULTURE   HIV 1 / 2 ANTIBODY       I decided to obtain the old medical records.  Reviewed and summarized the old medical record and it showed recent IR drainage and biopsy of a retroperitoneal mass  I independently interpreted the CXR:  Left-sided pleural effusion      Imaging Results          X-Ray Chest 1 View (Final result)  Result time 05/25/22 14:58:26    Final result by Cristóbal Blankenship MD (05/25/22 14:58:26)                 Impression:      Interval increased left-sided pleural effusion now moderate to large with suspected underlying left basilar atelectasis/infiltrate.    Otherwise no change.      Electronically signed by: Cristóbal Blankenship MD  Date:    05/25/2022  Time:    14:58             Narrative:    EXAMINATION:  XR CHEST 1 VIEW    CLINICAL HISTORY:  Shortness of breath    TECHNIQUE:  Single frontal view of the chest was performed.    COMPARISON:  Chest radiograph 02/16/2022, CT thorax 04/27/2022    FINDINGS:  Patient is rotated.  Monitoring leads overlie the chest.    Chronic elevation of the left  hemidiaphragm increased from prior.  Interval increased opacification of the left mid to lower lung zone with fluid tracking along the major fissure consistent with increased pleural effusion now moderate to large with probable underlying atelectasis/infiltrate.  Left lung apex is clear.  Right hemithorax is well expanded without consolidation or large pleural effusion.  No pneumothorax.  Cardiomediastinal silhouette is relatively midline and stable.  No acute osseous process seen.                               CT Head Without Contrast (Final result)  Result time 05/25/22 14:02:01    Final result by Vinnie Estrada MD (05/25/22 14:02:01)                 Impression:      No evidence of acute hemorrhage or major vascular distribution infarct.    Moderate supratentorial leukoencephalopathy, nonspecific but likely reflecting chronic microvascular ischemic change in patients of this age.  For clinical correlation.    Mild cerebral volume loss.    Further evaluation as warranted clinically.      Electronically signed by: Vinnie Estrada MD  Date:    05/25/2022  Time:    14:02             Narrative:    EXAMINATION:  CT HEAD WITHOUT CONTRAST    CLINICAL HISTORY:  Mental status change, unknown cause;    TECHNIQUE:  Low dose axial CT images obtained throughout the head without the use of intravenous contrast.  Axial, sagittal and coronal reconstructions were performed.    COMPARISON:  None.    FINDINGS:  Intracranial compartment:    Prominence of the ventricles and sulci compatible with  cerebral volume loss. Configuration not suggestive of hydrocephalus.    Moderate patchy hypoattenuation in the supratentorial white matter, nonspecific but most likely reflecting chronic microvascular ischemic changes. No recent or remote major vascular distribution infarct. No acute hemorrhage.  No mass effect or midline shift.    Subcentimeter round calcified density likely benign dural ossification over the left frontal lobe.  No  extra-axial blood or fluid collections.    Scattered atherosclerotic calcification about the skull base.    Skull/extracranial contents (limited evaluation):    No displaced calvarial fracture.    The mastoid air cells and visualized paranasal sinuses are essentially clear.                                Medications Given:  Medications - No data to display    Discussed with: pt and family    MDM:    86 y.o. female with worsening hypoxia, dyspnea on exertion, word-finding difficulty at.  Initial differential is broad, but includes metabolic encephalopathy, metastasis, return of pleural effusion or pneumonia.  She is afebrile, no leukocytosis.  Other labs are currently pending.  Chest x-ray consistent with new effusion, head CT negative.  Patient will need to be admitted for further evaluation of hypoxia and likely drainage of pleural effusion.  Final disposition pending the rest of her labs.    Diagnostic Impression:    1. SOB (shortness of breath)             Jeremy Marcelino MD  05/25/22 2229

## 2022-05-25 NOTE — TELEPHONE ENCOUNTER
----- Message from Jackie Baez sent at 5/25/2022 11:35 AM CDT -----  Contact: Sunni/[facility]  PT/ [phone]  FYI: Nurse Sunni is calling to report that patient had a fall on May 21, 2022 with no injuries.

## 2022-05-25 NOTE — ED TRIAGE NOTES
Cary Middleton, a 86 y.o. female presents to the ED w/ complaint of hypoxia.     Triage note:  Chief Complaint   Patient presents with    Hypoxia     +SOB, +confused, hx of lung cancer thoracentesis last week, biopsy last week, last chemo pill 3 weeks family member states at home was in the 70s     Review of patient's allergies indicates:   Allergen Reactions    Zoster vaccine live Rash    Lisinopril Swelling     Past Medical History:   Diagnosis Date    Anemia     Anxiety     Cataract     CLL (chronic lymphocytic leukemia) 2/5/2022    Depression     Diabetes mellitus     Hyperlipidemia     Hypertension     Kidney stone     Macular degeneration     Osteoarthritis of left hip     Osteoporosis     Recurrent nephrolithiasis     Type 2 diabetes mellitus     Type 2 diabetes mellitus with ophthalmic manifestations      Cary Middleton, mike 86 y.o. female presents to the ED via personal transport from home with CC SOB      Patient identifiers verified verbally with daughter and correct for Cary Middleton.    LOC/ APPEARANCE: The patient is AAOx1. Pt is speaking appropriately, no slurred speech. Pt changed into hospital gown. Continuous cardiac monitor, cont pulse ox, and auto BP cuff applied to patient. Pt is clean and well groomed. No JVD visible. Pt reports pain level of 0. Pt updated on POC. Bed low and locked with side rails up x2, call bell in pt reach.  SKIN: Skin is warm dry and intact, and color is consistent with ethnicity. No breakdown or brusing visible. Mucus membranes moist, acyanotic.  RESPIRATORY: Airway is open and patent. Respirations- positive for SOB, labored when talking.   CARDIAC: Patient has regular heart rate.  No peripheral edema noted, and patient has no c/o chest pain.  ABDOMEN: Soft and non-tender to palpation with no distention noted. Pt positive for constipation, denies blood in stool. Pt reports decreased appetite.   NEUROLOGIC: Eyes open spontaneously and facial expression  symmetrical. Pt behavior appropriate to situation, and pt follows commands. Pt reports sensation present in all extremities when touched with a finger, denies any numbness or tingling.   MUSCULOSKELETAL: Spontaneous movement noted to all extremities.   : No complaints of frequency, burning, urgency or blood in the urine. No complaints of incontinence.

## 2022-05-25 NOTE — TELEPHONE ENCOUNTER
Returned call to Sunni with  PT. States patient had a fall this past Saturday with no known injuries sustained.Today she is complaining of acute moderate dyspnea, fatigue, difficulty with verbalization, and productive cough. SpO2 decreases to 75% with exertion such as walking for 45 seconds. Increases to 88-89% with rest. Last Friday SpO2 was 97% at rest. /60. Temp 97.6 F. Denies chest pain, fevers, unilateral weakness, facial drooping, dizziness, headache. Has oncology appt tomorrow and call was placed to oncology office by  without successful contact.    Advised ED referral now for evaluation of acute hypoxia and dyspnea. PT advised pt's family will take her to the ED now. Will copy PCP and Dr. Lara on this encounter.

## 2022-05-26 PROBLEM — E83.52 HYPERCALCEMIA: Status: ACTIVE | Noted: 2022-01-01

## 2022-05-26 NOTE — PLAN OF CARE
Problem: Adult Inpatient Plan of Care  Goal: Plan of Care Review  Outcome: Ongoing, Not Progressing  Goal: Patient-Specific Goal (Individualized)  Outcome: Ongoing, Not Progressing  Goal: Absence of Hospital-Acquired Illness or Injury  Outcome: Ongoing, Not Progressing  Goal: Optimal Comfort and Wellbeing  Outcome: Ongoing, Not Progressing  Goal: Readiness for Transition of Care  Outcome: Ongoing, Not Progressing     Problem: Diabetes Comorbidity  Goal: Blood Glucose Level Within Targeted Range  Outcome: Ongoing, Not Progressing     Problem: Skin Injury Risk Increased  Goal: Skin Health and Integrity  Outcome: Ongoing, Not Progressing     Problem: Fall Injury Risk  Goal: Absence of Fall and Fall-Related Injury  Outcome: Ongoing, Not Progressing

## 2022-05-26 NOTE — ASSESSMENT & PLAN NOTE
Patient with Hypoxic Respiratory failure which is Acute.  she is not on home oxygen. Supplemental oxygen was provided and noted-  .   Signs/symptoms of respiratory failure include- tachypnea and low SpO2 70s. Contributing diagnoses includes - Pleural effusion and Pneumonia Labs and images were reviewed. Patient Has not had a recent ABG. Will treat underlying causes and adjust management of respiratory failure as follows-     -- Thoracentesis done 5/17 for left pleural effusion that yield 800 mL of clear reddish pleural fluid. Cytology with lymphocytes but no malignant cells, no GS or culture were sent. no serum protein or LDH were collected, so uncleare whether transudate or exudate  -- IR consulted for thoracentesis with fluid analysis, and repeating cytology   -- Start CTX and Azithro for CAP, low threshold to broaden abx with MRSA coverage for recent hospitalization    -- continue supplemental O2 for SpO2 > 93%, wean as tolerated   -- consider pulm consult in the setting of recurrent pleural effusion and needing multiple therapeutic para

## 2022-05-26 NOTE — PROVIDER PROGRESS NOTES - EMERGENCY DEPT.
Encounter Date: 5/25/2022    ED Physician Progress Notes           ED Physician Hand-off Note:    ED Course: I assumed care of patient from off-going ED physician team. Briefly, Patient is a 87 yo F With history of CLL who recently underwent biopsy of retroperitoneal mass and left sided thoracentesis.  Biopsy positive for B cell lymphoma, was going to follow up tomorrow with her oncologist Dr. Lara to get these results  Today patient is short of breath and daughter reports some confusion  CT head was neg, may need MRI    At the time of signout plan was pending -CXR, labs, BMT consult    Medications given in the ED:    Medications   piperacillin-tazobactam 4.5 g in sodium chloride 0.9% 100 mL IVPB (ready to mix system) (0 g Intravenous Stopped 5/25/22 1936)   amLODIPine tablet 10 mg (10 mg Oral Given 5/25/22 1951)   acetaminophen tablet 1,000 mg (1,000 mg Oral Given 5/25/22 1951)     Imaging Results          X-Ray Chest 1 View (Final result)  Result time 05/25/22 14:58:26    Final result by Cristóbal Blankenship MD (05/25/22 14:58:26)                 Impression:      Interval increased left-sided pleural effusion now moderate to large with suspected underlying left basilar atelectasis/infiltrate.    Otherwise no change.      Electronically signed by: Cristóbal Blankenship MD  Date:    05/25/2022  Time:    14:58             Narrative:    EXAMINATION:  XR CHEST 1 VIEW    CLINICAL HISTORY:  Shortness of breath    TECHNIQUE:  Single frontal view of the chest was performed.    COMPARISON:  Chest radiograph 02/16/2022, CT thorax 04/27/2022    FINDINGS:  Patient is rotated.  Monitoring leads overlie the chest.    Chronic elevation of the left hemidiaphragm increased from prior.  Interval increased opacification of the left mid to lower lung zone with fluid tracking along the major fissure consistent with increased pleural effusion now moderate to large with probable underlying atelectasis/infiltrate.  Left lung apex is clear.  Right  hemithorax is well expanded without consolidation or large pleural effusion.  No pneumothorax.  Cardiomediastinal silhouette is relatively midline and stable.  No acute osseous process seen.                               CT Head Without Contrast (Final result)  Result time 05/25/22 14:02:01    Final result by Vinnie Estrada MD (05/25/22 14:02:01)                 Impression:      No evidence of acute hemorrhage or major vascular distribution infarct.    Moderate supratentorial leukoencephalopathy, nonspecific but likely reflecting chronic microvascular ischemic change in patients of this age.  For clinical correlation.    Mild cerebral volume loss.    Further evaluation as warranted clinically.      Electronically signed by: Vinnie Estrada MD  Date:    05/25/2022  Time:    14:02             Narrative:    EXAMINATION:  CT HEAD WITHOUT CONTRAST    CLINICAL HISTORY:  Mental status change, unknown cause;    TECHNIQUE:  Low dose axial CT images obtained throughout the head without the use of intravenous contrast.  Axial, sagittal and coronal reconstructions were performed.    COMPARISON:  None.    FINDINGS:  Intracranial compartment:    Prominence of the ventricles and sulci compatible with  cerebral volume loss. Configuration not suggestive of hydrocephalus.    Moderate patchy hypoattenuation in the supratentorial white matter, nonspecific but most likely reflecting chronic microvascular ischemic changes. No recent or remote major vascular distribution infarct. No acute hemorrhage.  No mass effect or midline shift.    Subcentimeter round calcified density likely benign dural ossification over the left frontal lobe.  No extra-axial blood or fluid collections.    Scattered atherosclerotic calcification about the skull base.    Skull/extracranial contents (limited evaluation):    No displaced calvarial fracture.    The mastoid air cells and visualized paranasal sinuses are essentially clear.                                 Pt with mod to large L pleural effusion  Now comfortable on 2 L NC with O2 sat of 95%  Discussed with BMT, they will admit    Disposition: admit    Patient comfortable with plan for admission Patient counseled regarding exam, results, diagnosis, treatment, and plan.    Impression: Final diagnoses:  [R06.02] SOB (shortness of breath)  [J90] Recurrent left pleural effusion (Primary)

## 2022-05-26 NOTE — ASSESSMENT & PLAN NOTE
Likely 2/2 malignancy     Will give calcitonin/zometa and lasix today  Holding off on IVF due to hypervolemia   Repeat CMP in am

## 2022-05-26 NOTE — ASSESSMENT & PLAN NOTE
Patient with Hypoxic Respiratory failure which is Acute.  she is not on home oxygen. Supplemental oxygen was provided and noted-  .   Signs/symptoms of respiratory failure include- tachypnea and low SpO2 70s. Contributing diagnoses includes - Pleural effusion and Pneumonia Labs and images were reviewed. Patient Has not had a recent ABG. Will treat underlying causes and adjust management of respiratory failure as follows-     -- Start CTX and Azithro for CAP, low threshold to broaden abx with MRSA coverage for recent hospitalization   -- Thoracentesis by IR with fluid analysis   -- continue supplemental O2 for SpO2 > 93%, wean as tolerated

## 2022-05-26 NOTE — ED NOTES
Pt awake and alert; resting quietly on stretcher.  Pt remains on continuous cardiac and pulse ox monitoring with non-invasive blood pressure to cycle every 30 minutes.  VS stable; NSR noted. Pt c/o 4/10 pain; no acute distress or discomfort reported or observed.  Pt denies restroom needs at this time; is able to reposition self on stretcher. Bed locked in lowest position; side rails up and locked x 2; call light, bedside table, and personal belongings within reach. Room assessed for safety measures and cleanliness; no action needed at this time. Plan of care discussed.  Pt instructed to alert nurse for assistance and before attempting to get out of bed; verbalizes understanding. Pt denies needs or complaints at this time; will continue to monitor.    Pt and family concerned about increasing B/P, MD notified.

## 2022-05-26 NOTE — PLAN OF CARE
CXR complete. Read by MAGY Fontenot MD. Per Dr. Simons, no pneumothorax noted on CXR. Pt may return to room.

## 2022-05-26 NOTE — H&P
Inpatient Radiology Pre-procedure Note    History of Present Illness:  Cary Middleton is a 86 y.o. female who presents for ultrasound guided thoracentesis.  Admission H&P reviewed.  Past Medical History:   Diagnosis Date    Anemia     Anxiety     Cataract     CLL (chronic lymphocytic leukemia) 2/5/2022    Depression     Diabetes mellitus     Hyperlipidemia     Hypertension     Kidney stone     Macular degeneration     Osteoarthritis of left hip     Osteoporosis     Recurrent nephrolithiasis     Type 2 diabetes mellitus     Type 2 diabetes mellitus with ophthalmic manifestations      Past Surgical History:   Procedure Laterality Date    BLEPHAROPLASTY, QUAD      GANGLION CYST EXCISION      HYSTERECTOMY      INTRACAPSULAR CATARACT EXTRACTION      left eye    THORACENTESIS N/A 5/17/2022    Procedure: THORACENTESIS;  Surgeon: Jose Wang MD;  Location: [facility] CATH LAB;  Service: Radiology;  Laterality: N/A;       Review of Systems:   As documented in primary team H&P    Home Meds:   Prior to Admission medications    Medication Sig Start Date End Date Taking? Authorizing Provider   acalabrutinib (CALQUENCE) 100 mg Cap Take 1 capsule (100 mg) by mouth 2 (two) times a day. 2/3/22  Yes Lucina Lara MD   acetaminophen (TYLENOL) 325 MG tablet Take 2 tablets (650 mg total) by mouth every 4 (four) hours as needed. 3/23/22  Yes Anaid Arciniega PA-C   amLODIPine (NORVASC) 10 MG tablet Take 1 tablet (10 mg total) by mouth once daily. 10/8/21  Yes Arlene Costa MD   atorvastatin (LIPITOR) 20 MG tablet Take 1 tablet (20 mg total) by mouth once daily. 4/5/21  Yes Arlene Costa MD   cyproheptadine (PERIACTIN) 4 mg tablet TAKE ONE TABLET BY MOUTH TWICE DAILY WITH FOOD 5/9/22  Yes Rafaela Lewis MD   EScitalopram oxalate (LEXAPRO) 10 MG tablet Take 1 tablet (10 mg total) by mouth once daily. 10/8/21  Yes Arlene Costa MD   losartan (COZAAR) 25 MG tablet Take 1 tablet (25 mg total)  by mouth once daily. 2/7/22 2/7/23 Yes Rebecca Whitmore MD   mirtazapine (REMERON) 7.5 MG Tab Take 1 tablet (7.5 mg total) by mouth every evening. 2/6/22 2/6/23 Yes Rebecca Whitmore MD   morphine (MSIR) 15 MG tablet Take 1 tablet (15 mg total) by mouth every 4 (four) hours as needed for Pain. 5/20/22  Yes Lucina Lara MD   SITagliptin (JANUVIA) 50 MG Tab Take 1 tablet (50 mg total) by mouth once daily. 5/4/22 5/4/23 Yes Saniya Hernandez DNP, NP   aspirin (ECOTRIN) 81 MG EC tablet Take 81 mg by mouth once daily.    Historical Provider   blood sugar diagnostic Strp Use to test blood glucose 2 (two) times daily with meals. 2/6/22   Rebecca Whitmore MD   blood-glucose meter Misc Use to test blood glucose 2 (two) times daily with meals. 2/6/22 2/6/23  Rebecca Whitmore MD   cholecalciferol, vitamin D3, 1,000 unit capsule Take 1,000 Units by mouth once daily.    Historical Provider   diclofenac sodium (VOLTAREN) 1 % Gel Apply 2 g topically daily as needed. 1/10/22   Radha David MD   flash glucose scanning reader (FREESTYLE NIMCO 14 DAY READER) Misc Use as directed  Patient not taking: Reported on 4/28/2022 2/10/22   Anaid Arciniega PA-C   flash glucose sensor (FREESTYLE NIMCO 14 DAY SENSOR) Kit Use one sensor every 2 weeks to check blood sugar ICD-10-CM: E11.9  Patient not taking: Reported on 4/28/2022 2/7/22   Delia Thompson MD   glucagon (BAQSIMI) 3 mg/actuation Spry Use in case of severe hypoglycemia 3/3/22   Saniya Hernandez DNP, NP   lancets (ACCU-CHEK SOFTCLIX LANCETS) Misc Test blood glucose twice daily with meals 2/6/22   Rebecca Whitmore MD   lancets Misc To check BG 2 times daily, to use with insurance preferred meter 1/29/20   Saniya Hernandez DNP, NP   methocarbamoL (ROBAXIN) 500 MG Tab Take 1 tablet (500 mg total) by mouth 3 (three) times daily as needed (Muscle Spasm.). 3/23/22   Anaid Arciniega PA-C   methylcellulose (ARTIFICIAL TEARS) 1 % ophthalmic solution Place 1 drop into both eyes as needed.    Historical  "Provider   naloxone (NARCAN) 4 mg/actuation Spry 4mg by nasal route as needed for opioid overdose; may repeat every 2-3 minutes in alternating nostrils until medical help arrives. Call 911 5/4/22   Lucina Lara MD   ondansetron (ZOFRAN-ODT) 4 MG TbDL Dissolve 1 tablet (4 mg total) by mouth every 6 (six) hours as needed. 2/6/22   Rebecca Whitmore MD   pen needle, diabetic (BD ROWENA 2ND GEN PEN NEEDLE) 32 gauge x 5/32" Ndle Uses 4 daily  Patient not taking: Reported on 4/28/2022 2/17/22   Saniya Hernandez DNP, NP   pramoxine-hydrocortisone (ANALPRAM HC) cream Apply topically 3 (three) times daily. For Hemorrhoid pain. 3/16/22   Arlene Costa MD   TRUE METRIX GLUCOSE TEST STRIP Strp TEST BLOOD SUGAR TWICE A DAY 7/13/21   Saniya Hernandez DNP, NP   TRUEPLUS LANCETS 30 gauge Misc USE ONE LANCET TWICE DAILY 11/23/21   Historical Provider     Scheduled Meds:    aspirin  81 mg Oral Daily    atorvastatin  20 mg Oral Daily    [START ON 5/27/2022] azithromycin  250 mg Oral Daily    calcitonin  4 Units/kg Subcutaneous Q12H    cefTRIAXone (ROCEPHIN) IVPB  2 g Intravenous Q24H    enoxaparin  40 mg Subcutaneous Daily    EScitalopram oxalate  10 mg Oral Daily    mirtazapine  7.5 mg Oral QHS    polyethylene glycol  17 g Oral BID    senna-docusate 8.6-50 mg  1 tablet Oral Daily     Continuous Infusions:   PRN Meds:dextrose 10%, dextrose 10%, glucagon (human recombinant), glucose, glucose, morphine, naloxone, sodium chloride 0.9%  Anticoagulants/Antiplatelets: no anticoagulation    Allergies:   Review of patient's allergies indicates:   Allergen Reactions    Zoster vaccine live Rash    Lisinopril Swelling     Sedation Hx: have not been any systemic reactions    Labs:  No results for input(s): INR in the last 168 hours.    Invalid input(s):  PT,  PTT    Recent Labs   Lab 05/26/22  0406   WBC 9.79   HGB 7.3*   HCT 22.6*   MCV 65*         Recent Labs   Lab 05/26/22  0406         K 3.6      CO2 23 "   BUN 17   CREATININE 0.8   CALCIUM 11.6*   MG 1.6   ALT <5*   AST 16   ALBUMIN 2.3*   BILITOT 0.5         Vitals:  Temp: 98.2 °F (36.8 °C) (05/26/22 1204)  Pulse: 98 (05/26/22 1204)  Resp: 17 (05/26/22 1204)  BP: 135/75 (05/26/22 1204)  SpO2: (!) 92 % (05/26/22 1204)     Physical Exam:  ASA: 3  Mallampati: n/a    General: no acute distress  Mental Status: alert and oriented to person, place and time  HEENT: normocephalic, atraumatic  Chest: unlabored breathing  Heart: regular heart rate  Abdomen: nondistended  Extremity: moves all extremities    Plan: ultrasound guided thoracentesis  Sedation Plan: local    Aby Macias PA-C  Interventional Radiology  Clinic [phone]

## 2022-05-26 NOTE — PLAN OF CARE
Thoracentesis complete. Pt tolerated. 1000cc removed. Site clean ,dry, intact, no bleeding, no hematoma. Site dressed with Vaseline gauze and Tegaderm. Report called to RN. Awaiting CXR to be completed before pt can return to room.

## 2022-05-26 NOTE — PROGRESS NOTES
[facility] - Oncology ([facility])  Hematology  Bone Marrow Transplant  Progress Note    Patient Name: Cary Middleton  Admission Date: 5/25/2022  Hospital Length of Stay: 1 days  Code Status: Full Code  Subjective:     doing slightly better today, still on 2-3 L of oxygen, will give calcitonin/zometa for hypercalcemia and lasix as well. Continue antibiotics with ceftriaxone/azithromycin. Blood cx/urine cx pending. IR thoracentesis for left pleural effusion today.     Objective:     Vital Signs (Most Recent):  Temp: 98.2 °F (36.8 °C) (05/25/22 1210)  Pulse: 94 (05/25/22 1210)  Resp: 16 (05/25/22 1210)  BP: (!) 124/56 (05/25/22 1210)  SpO2: (!) 94 % (05/25/22 1210)   Vital Signs (24h Range):  Temp:  [98.2 °F (36.8 °C)] 98.2 °F (36.8 °C)  Pulse:  [94] 94  Resp:  [16] 16  SpO2:  [94 %] 94 %  BP: (124)/(56) 124/56     Weight: 57.2 kg (126 lb)  Body mass index is 21.63 kg/m².  Body surface area is 1.61 meters squared.    ECOG SCORE           3    Lines/Drains/Airways       Peripheral Intravenous Line  Duration                  Peripheral IV - Single Lumen 03/18/22 2240 22 G Right Hand 67 days         Peripheral IV - Single Lumen 05/25/22 1422 22 G Right Forearm <1 day                    Physical Exam  Constitutional:       Appearance: She is well-developed.   HENT:      Head: Normocephalic and atraumatic.      Mouth/Throat:      Pharynx: No oropharyngeal exudate.   Eyes:      Conjunctiva/sclera: Conjunctivae normal.      Pupils: Pupils are equal, round, and reactive to light.   Cardiovascular:      Rate and Rhythm: Normal rate and regular rhythm.      Heart sounds: Normal heart sounds. No murmur heard.  Pulmonary:      Effort: Pulmonary effort is normal.      Breath sounds: Normal breath sounds.   Abdominal:      General: Bowel sounds are normal. There is no distension.      Palpations: Abdomen is soft.      Tenderness: There is no abdominal tenderness.   Musculoskeletal:         General: No deformity. Normal range of  motion.      Cervical back: Normal range of motion and neck supple.      Right lower leg: Edema present.      Left lower leg: Edema present.   Skin:     General: Skin is warm and dry.      Findings: No erythema or rash.   Neurological:      Mental Status: She is alert and oriented to person, place, and time.   Psychiatric:         Behavior: Behavior normal.         Thought Content: Thought content normal.         Judgment: Judgment normal.       Significant Labs:   All pertinent labs from the last 24 hours have been reviewed.    Diagnostic Results:  I have reviewed all pertinent imaging results/findings within the past 24 hours.    Assessment/Plan:     Hypercalcemia  Likely 2/2 malignancy     Will give calcitonin/zometa and lasix today  Holding off on IVF due to hypervolemia   Repeat CMP in am    Acute hypoxic respiratory failure  Due to left pleural effusion    NPO  Plan for IR thoracentesis today     Anemia in neoplastic disease  hgb 8.6  Daily cbc while inpatient  Transfuse for hgb < 7    Large cell lymphoma  From Dr. Lara's most recent clinic note:  Patient is a 85 y/o female with hx of HTN, Depression, T2DM who presents to clinic after found to have abd LAD on CT abd/pel found incidentally for flank pain.     Labs with leukocytosis, absolute lymphocytosis since 8/2018. Normal renal function.      CT Abd/Pel w/o con 1/6/2022: Normal liver size. Normal spleen size. Mesenteric and retroperitoneal LAD. Multiple para-aortic lymph nodes. Largest lymph node is near the left renal hilu, 3.2 cm in greatest dimension.      MRI 1/2022: Left periaortic retroperitoneal mass 5.1cm in greatest dimension.      Flow cytometry: Consistent with CLL     Admitted from 2/5/22-2/7/22 for steriod induced hyperglycemia and flank pain. Started on morphine 15mg po q4 prn, methocarbamol 500mg tid prn, tylenol 650mg prn.      CT neck: No LAD seen.   CT Chest: Two lung micronodules. Perhaps intrathoracic lymph node.     CT CAP showing  retroperitoneal mass measuring 8.9 x 9.5 x 10.2 cm, previously 6.4 x 6.4 x 6.9 cm.     S/p IR biopsy. Consistent with large B cell lymphoma. FISH and LEF 1 immunohistochemical staining studies sent and pending    Discussed with patient and her daughter today regarding diagnosis. We discussed that prognosis is poor and that with with most aggressive chemotherapy regimen, median survival ranges around 8 months. Patient/daughter want to discuss with rest of family and make a decision about pursuing treatment vs supportive care.     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  See large cell lymphoma    Chronic pain  Continue home MS contin, voltaren gel, and robaxin    Major depressive disorder, single episode, in full remission  Continue home lexapro    Essential hypertension  Continue home losartan and amlodipine    Hyperlipidemia  - hold home statin while inpatient        VTE Risk Mitigation (From admission, onward)         Ordered     enoxaparin injection 40 mg  Daily         05/25/22 2147     IP VTE HIGH RISK PATIENT  Once         05/25/22 2147     Place sequential compression device  Until discontinued         05/25/22 2147              Abigail Chase MD  Bone Marrow Transplant  [facility] - Oncology ([facility])

## 2022-05-26 NOTE — H&P
[facility]  Hematology/Oncology  H&P    Patient Name: Cary Middleton  MRN: [id number]  Admission Date: 5/25/2022  Code Status: Full Code   Attending Provider: Raghavendra Ashley MD  Primary Care Physician: Arlene Costa MD  Principal Problem:<principal problem not specified>    Subjective:     HPI: Ms. Middleton is a 87 y/o lady with hx of HTN, Depression, T2DM, CLL ( on acalabrutinib ). Present to ED with worsening SOB and hypoxia. History was taken from the daughter at bedside. She has been having cough with white sputum in the last couple of days and feeling more SOB and sating joy 80s to 90s. She was working with PT yesterday when she felt SOB and desat to 70s. Denies fever, chills, chest pain, hemoptysis,  LLE. She has also bee confused and slow in response in the last 2 days. Denies abdominal pain, N/V, dysuria or urinary sx. She had thoracentesis and a biopsy of retroperitoneal mass done last week. She has fatigue and poor PO with weight loss  since she was started on chemo 2/2022.     In ED, she was A&O x3 but slow in answering questions. She was afebrile, SpO2 94% on 2L BP 140s/80s, otherwise HDS. Lab with elevated lactate but no leukocytosis. CXR with worsening left pleural effusion size.        Oncology Treatment Plan:   [Could not find a treatment plan. This SmartLink may be configured incorrectly. Contact a  for help.]    Medications:  Continuous Infusions:  Scheduled Meds:   [START ON 5/26/2022] aspirin  81 mg Oral Daily    [START ON 5/26/2022] atorvastatin  20 mg Oral Daily    [START ON 5/27/2022] azithromycin  250 mg Oral Daily    [START ON 5/26/2022] azithromycin  500 mg Oral Once    [START ON 5/26/2022] cefTRIAXone (ROCEPHIN) IVPB  2 g Intravenous Q24H    enoxaparin  40 mg Subcutaneous Daily    [START ON 5/26/2022] EScitalopram oxalate  10 mg Oral Daily    mirtazapine  7.5 mg Oral QHS     PRN Meds:dextrose 10%, dextrose 10%, glucagon (human  recombinant), glucose, glucose, morphine, naloxone, sodium chloride 0.9%     Review of patient's allergies indicates:   Allergen Reactions    Zoster vaccine live Rash    Lisinopril Swelling        Past Medical History:   Diagnosis Date    Anemia     Anxiety     Cataract     CLL (chronic lymphocytic leukemia) 2/5/2022    Depression     Diabetes mellitus     Hyperlipidemia     Hypertension     Kidney stone     Macular degeneration     Osteoarthritis of left hip     Osteoporosis     Recurrent nephrolithiasis     Type 2 diabetes mellitus     Type 2 diabetes mellitus with ophthalmic manifestations      Past Surgical History:   Procedure Laterality Date    BLEPHAROPLASTY, QUAD      GANGLION CYST EXCISION      HYSTERECTOMY      INTRACAPSULAR CATARACT EXTRACTION      left eye    THORACENTESIS N/A 5/17/2022    Procedure: THORACENTESIS;  Surgeon: Jose Wang MD;  Location: [facility], [place], operated by [facility] CATH LAB;  Service: Radiology;  Laterality: N/A;     Family History       Problem Relation (Age of Onset)    Aneurysm Sister    Arthritis Daughter    Cancer Mother, Son    Cataracts Father    Colon cancer Mother    Diabetes Father, Brother    Heart disease Father    Hypertension Daughter, Brother, Daughter, Brother    No Known Problems Daughter    Pancreatic cancer Sister    Strabismus Daughter    Transient ischemic attack Daughter          Tobacco Use    Smoking status: Never Smoker    Smokeless tobacco: Never Used   Substance and Sexual Activity    Alcohol use: No    Drug use: No    Sexual activity: Never     Partners: Male       Review of Systems   Constitutional:  Positive for fatigue. Negative for chills and fever.   Respiratory:  Positive for cough and shortness of breath. Negative for wheezing.    Cardiovascular:  Negative for chest pain and leg swelling.   Gastrointestinal:  Positive for abdominal pain and constipation. Negative for nausea and vomiting.   Genitourinary:  Negative for difficulty urinating and  dysuria.   Musculoskeletal:  Positive for back pain.   Neurological:  Negative for dizziness, facial asymmetry and weakness.   Psychiatric/Behavioral:  Positive for confusion.    Objective:     Vital Signs (Most Recent):  Temp: 98.2 °F (36.8 °C) (05/25/22 1210)  Pulse: 86 (05/25/22 2217)  Resp: 17 (05/25/22 2217)  BP: (!) 143/68 (05/25/22 2217)  SpO2: (!) 93 % (05/25/22 2217)   Vital Signs (24h Range):  Temp:  [98.2 °F (36.8 °C)] 98.2 °F (36.8 °C)  Pulse:  [85-94] 86  Resp:  [15-19] 17  SpO2:  [93 %-99 %] 93 %  BP: (124-173)/(56-88) 143/68     Weight: 57.2 kg (126 lb)  Body mass index is 21.63 kg/m².  Body surface area is 1.61 meters squared.      Intake/Output Summary (Last 24 hours) at 5/25/2022 2308  Last data filed at 5/25/2022 1936  Gross per 24 hour   Intake 98.73 ml   Output --   Net 98.73 ml       Physical Exam  Constitutional:       Appearance: She is ill-appearing.   Cardiovascular:      Rate and Rhythm: Normal rate and regular rhythm.   Pulmonary:      Breath sounds: Rales present. No wheezing.      Comments: Decreased breath sound in the LL field   Abdominal:      General: There is distension.      Palpations: Abdomen is soft.      Tenderness: There is abdominal tenderness.   Musculoskeletal:         General: No swelling.      Right lower leg: No edema.      Left lower leg: No edema.   Skin:     Coloration: Skin is not jaundiced.   Neurological:      Mental Status: She is alert.      Cranial Nerves: No cranial nerve deficit.      Motor: No weakness.       Significant Labs:   All pertinent labs from the last 24 hours have been reviewed.    Diagnostic Results:  I have reviewed and interpreted all pertinent imaging results/findings within the past 24 hours.    Assessment/Plan:     Acute hypoxic respiratory failure  Patient with Hypoxic Respiratory failure which is Acute.  she is not on home oxygen. Supplemental oxygen was provided and noted-  .   Signs/symptoms of respiratory failure include- tachypnea and  low SpO2 70s. Contributing diagnoses includes - Pleural effusion and Pneumonia Labs and images were reviewed. Patient Has not had a recent ABG. Will treat underlying causes and adjust management of respiratory failure as follows-     -- Thoracentesis done 5/17 for left pleural effusion that yield 800 mL of clear reddish pleural fluid. Cytology with lymphocytes but no malignant cells, no GS or culture were sent. no serum protein or LDH were collected, so uncleare whether transudate or exudate  -- IR consulted for thoracentesis with fluid analysis, and repeating cytology   -- Start CTX and Azithro for CAP, low threshold to broaden abx with MRSA coverage for recent hospitalization    -- continue supplemental O2 for SpO2 > 93%, wean as tolerated   -- consider pulm consult in the setting of recurrent pleural effusion and needing multiple therapeutic para     Anemia in neoplastic disease  -- Hg 8.6 on admission   -- Daily CBC   -- Keep Hg >7    Type 2 diabetes mellitus with hyperglycemia, with long-term current use of insulin  -- at home taking Januvia, and had to take insuline before when she was on steroid  -- ISS  -- Diabetic diet   -- POCT glucose Q6     CLL (chronic lymphocytic leukemia)  She had CT on 1/2022 for flank pain and incidentally showed abd LAD. Mesenteric and retroperitoneal LAD. Multiple para-aortic lymph nodes. Largest lymph node is near the left renal hilu, 3.2 cm in greatest dimension. Flow cytometry Consistent with CLL and started on acalabrutinib 2/2022. 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     Chronic pain  -- resume home regimen with MS contin 15 mg Q6 PRN   -- Bowel regimen for constipation     Major depressive disorder, single episode, in full remission  -- resume home Lexapro     Hyperlipidemia  -- resume home amlodipine         Brandyn Garza MD  Hematology/Oncology  [facility]

## 2022-05-26 NOTE — ASSESSMENT & PLAN NOTE
-- at home taking Januvia, and had to take insuline before when she was on steroid  -- ISS  -- Diabetic diet   -- POCT glucose Q6

## 2022-05-26 NOTE — SUBJECTIVE & OBJECTIVE
Oncology Treatment Plan:   [Could not find a treatment plan. This SmartLink may be configured incorrectly. Contact a  for help.]    Medications:  Continuous Infusions:  Scheduled Meds:   [START ON 5/26/2022] aspirin  81 mg Oral Daily    [START ON 5/26/2022] atorvastatin  20 mg Oral Daily    [START ON 5/27/2022] azithromycin  250 mg Oral Daily    [START ON 5/26/2022] azithromycin  500 mg Oral Once    [START ON 5/26/2022] cefTRIAXone (ROCEPHIN) IVPB  2 g Intravenous Q24H    enoxaparin  40 mg Subcutaneous Daily    [START ON 5/26/2022] EScitalopram oxalate  10 mg Oral Daily    mirtazapine  7.5 mg Oral QHS     PRN Meds:dextrose 10%, dextrose 10%, glucagon (human recombinant), glucose, glucose, morphine, naloxone, sodium chloride 0.9%     Review of patient's allergies indicates:   Allergen Reactions    Zoster vaccine live Rash    Lisinopril Swelling        Past Medical History:   Diagnosis Date    Anemia     Anxiety     Cataract     CLL (chronic lymphocytic leukemia) 2/5/2022    Depression     Diabetes mellitus     Hyperlipidemia     Hypertension     Kidney stone     Macular degeneration     Osteoarthritis of left hip     Osteoporosis     Recurrent nephrolithiasis     Type 2 diabetes mellitus     Type 2 diabetes mellitus with ophthalmic manifestations      Past Surgical History:   Procedure Laterality Date    BLEPHAROPLASTY, QUAD      GANGLION CYST EXCISION      HYSTERECTOMY      INTRACAPSULAR CATARACT EXTRACTION      left eye    THORACENTESIS N/A 5/17/2022    Procedure: THORACENTESIS;  Surgeon: Jose Wang MD;  Location: [facility] CATH LAB;  Service: Radiology;  Laterality: N/A;     Family History       Problem Relation (Age of Onset)    Aneurysm Sister    Arthritis Daughter    Cancer Mother, Son    Cataracts Father    Colon cancer Mother    Diabetes Father, Brother    Heart disease Father    Hypertension Daughter, Brother, Daughter, Brother    No Known Problems Daughter    Pancreatic cancer Sister     Strabismus Daughter    Transient ischemic attack Daughter          Tobacco Use    Smoking status: Never Smoker    Smokeless tobacco: Never Used   Substance and Sexual Activity    Alcohol use: No    Drug use: No    Sexual activity: Never     Partners: Male       Review of Systems   Constitutional:  Positive for fatigue. Negative for chills and fever.   Respiratory:  Positive for cough and shortness of breath. Negative for wheezing.    Cardiovascular:  Negative for chest pain and leg swelling.   Gastrointestinal:  Positive for abdominal pain and constipation. Negative for nausea and vomiting.   Genitourinary:  Negative for difficulty urinating and dysuria.   Musculoskeletal:  Positive for back pain.   Neurological:  Negative for dizziness, facial asymmetry and weakness.   Psychiatric/Behavioral:  Positive for confusion.    Objective:     Vital Signs (Most Recent):  Temp: 98.2 °F (36.8 °C) (05/25/22 1210)  Pulse: 86 (05/25/22 2217)  Resp: 17 (05/25/22 2217)  BP: (!) 143/68 (05/25/22 2217)  SpO2: (!) 93 % (05/25/22 2217)   Vital Signs (24h Range):  Temp:  [98.2 °F (36.8 °C)] 98.2 °F (36.8 °C)  Pulse:  [85-94] 86  Resp:  [15-19] 17  SpO2:  [93 %-99 %] 93 %  BP: (124-173)/(56-88) 143/68     Weight: 57.2 kg (126 lb)  Body mass index is 21.63 kg/m².  Body surface area is 1.61 meters squared.      Intake/Output Summary (Last 24 hours) at 5/25/2022 2304  Last data filed at 5/25/2022 1936  Gross per 24 hour   Intake 98.73 ml   Output --   Net 98.73 ml       Physical Exam  Constitutional:       Appearance: She is ill-appearing.   Cardiovascular:      Rate and Rhythm: Normal rate and regular rhythm.   Pulmonary:      Breath sounds: Rales present. No wheezing.      Comments: Decreased breath sound in the LL field   Abdominal:      General: There is distension.      Palpations: Abdomen is soft.      Tenderness: There is abdominal tenderness.   Musculoskeletal:         General: No swelling.      Right lower leg: No edema.      Left  lower leg: No edema.   Skin:     Coloration: Skin is not jaundiced.   Neurological:      Mental Status: She is alert.      Cranial Nerves: No cranial nerve deficit.      Motor: No weakness.       Significant Labs:   All pertinent labs from the last 24 hours have been reviewed.    Diagnostic Results:  I have reviewed and interpreted all pertinent imaging results/findings within the past 24 hours.

## 2022-05-26 NOTE — HPI
Ms. Middleton is a 87 y/o lady with hx of HTN, Depression, T2DM, CLL ( on acalabrutinib ). Present to ED with worsening SOB and hypoxia. History was taken from the daughter at bedside. She has been having cough with white sputum in the last couple of days and feeling more SOB and sating joy 80s to 90s. She was working with PT yesterday when she felt SOB and desat to 70s. Denies fever, chills, chest pain, hemoptysis,  LLE. She has also bee confused and slow in response in the last 2 days. Denies abdominal pain, N/V, dysuria or urinary sx. She had thoracentesis and a biopsy of retroperitoneal mass done last week. She has fatigue and poor PO with weight loss  since she was started on chemo 2/2022.     In ED, she was A&O x3 but slow in answering questions. She was afebrile, SpO2 94% on 2L BP 140s/80s, otherwise HDS. Lab with elevated lactate but no leukocytosis. CXR with worsening left pleural effusion size.

## 2022-05-26 NOTE — PROCEDURES
Radiology Post-Procedure Note    Pre Op Diagnosis: pleural effusion  Post Op Diagnosis: Same    Procedure: ultrasound guided thoracentesis, left    Procedure performed by: Aby Macias PA-C    Written Informed Consent Obtained: Yes  Specimen Removed: YES cloudy, yellow  Estimated Blood Loss: Minimal    Findings:   Moderate pleural effusion.  Successful ultrasound guided thoracentesis.      Patient tolerated procedure well.    Aby Macias PA-C  Interventional Radiology  Clinic [phone]

## 2022-05-27 NOTE — PLAN OF CARE
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    Code Status: DNAR    Allergies:   Review of patient's allergies indicates:   Allergen Reactions    Zoster vaccine live Rash    Lisinopril Swelling       Diet: regular    Activities: As tolerated    Nursing: Per Hospice Routine.    Routine Skin for Bedridden Patients: Apply moisture barrier cream to all skin folds and   wet areas in perineal area daily and after baths and all bowel movements.    Oxygen: 3-4 L/min    Medications:        Medication List      START taking these medications    amoxicillin-clavulanate 875-125mg 875-125 mg per tablet  Commonly known as: AUGMENTIN  Take 1 tablet by mouth 2 (two) times daily. for 7 days        CONTINUE taking these medications    ACCU-CHEK GUIDE GLUCOSE METER Misc  Generic drug: blood-glucose meter  Use to test blood  glucose 2 (two) times daily with meals.     acetaminophen 325 MG tablet  Commonly known as: TYLENOL  Take 2 tablets (650 mg total) by mouth every 4 (four) hours as needed.     amLODIPine 10 MG tablet  Commonly known as: NORVASC  Take 1 tablet (10 mg total) by mouth once daily.     BAQSIMI 3 mg/actuation Spry  Generic drug: glucagon  Use in case of severe hypoglycemia     cholecalciferol (vitamin D3) 25 mcg (1,000 unit) capsule  Commonly known as: VITAMIN D3  Take 1,000 Units by mouth once daily.     cyproheptadine 4 mg tablet  Commonly known as: PERIACTIN  TAKE ONE TABLET BY MOUTH TWICE DAILY WITH FOOD     diclofenac sodium 1 % Gel  Commonly known as: VOLTAREN  Apply 2 g topically daily as needed.     EScitalopram oxalate 10 MG tablet  Commonly known as: LEXAPRO  Take 1 tablet (10 mg total) by mouth once daily.     FREESTYLE NIMCO 14 DAY READER Misc  Generic drug: flash glucose scanning reader  Use as directed     FREESTYLE NIMCO 14 DAY SENSOR Kit  Generic drug: flash glucose sensor  Use one sensor every 2 weeks to check blood sugar ICD-10-CM: E11.9     * lancets [facility]  To check BG 2 times daily, to use with insurance preferred meter     * TRUEPLUS LANCETS 30 gauge Misc  Generic drug: lancets  USE ONE LANCET TWICE DAILY     * ACCU-CHEK SOFTCLIX LANCETS Misc  Generic drug: lancets  Test blood glucose twice daily with meals     losartan 25 MG tablet  Commonly known as: COZAAR  Take 1 tablet (25 mg total) by mouth once daily.     methocarbamoL 500 MG Tab  Commonly known as: ROBAXIN  Take 1 tablet (500 mg total) by mouth 3 (three) times daily as needed (Muscle Spasm.).     methylcellulose 1 % ophthalmic solution  Commonly known as: ARTIFICIAL TEARS  Place 1 drop into both eyes as needed.     mirtazapine 7.5 MG Tab  Commonly known as: REMERON  Take 1 tablet (7.5 mg total) by mouth every evening.     morphine 15 MG tablet  Commonly known as: MSIR  Take 1 tablet (15 mg total) by mouth every 4 (four) hours as needed for  "Pain.     naloxone 4 mg/actuation Spry  Commonly known as: NARCAN  4mg by nasal route as needed for opioid overdose; may repeat every 2-3 minutes in alternating nostrils until medical help arrives. Call 911     ondansetron 4 MG Tbdl  Commonly known as: ZOFRAN-ODT  Dissolve 1 tablet (4 mg total) by mouth every 6 (six) hours as needed.     pen needle, diabetic 32 gauge x 5/32" Ndle  Commonly known as: BD ROWENA 2ND GEN PEN NEEDLE  Uses 4 daily     pramoxine-hydrocortisone cream  Commonly known as: ANALPRAM HC  Apply topically 3 (three) times daily. For Hemorrhoid pain.     SITagliptin 50 MG Tab  Commonly known as: JANUVIA  Take 1 tablet (50 mg total) by mouth once daily.     * TRUE METRIX GLUCOSE TEST STRIP Strp  Generic drug: blood sugar diagnostic  TEST BLOOD SUGAR TWICE A DAY     * ACCU-CHEK GUIDE TEST STRIPS Strp  Generic drug: blood sugar diagnostic  Use to test blood glucose 2 (two) times daily with meals.         * This list has 5 medication(s) that are the same as other medications prescribed for you. Read the directions carefully, and ask your doctor or other care provider to review them with you.            STOP taking these medications    aspirin 81 MG EC tablet  Commonly known as: ECOTRIN     atorvastatin 20 MG tablet  Commonly known as: LIPITOR     CALQUENCE 100 mg Cap  Generic drug: acalabrutinib              DIABETES CARE:   Nurse to perform and educate diabetic management with blood glucose monitoring:           Fingerstick blood sugar AC and HS     Fingerstick blood sugar every 6 hours if patient is unable to eat    Report CBG < 60 or > 350 to physician.         Insulin Sliding Scale         Glucose  Novolog Insulin Subcutaneous        0 - 60   Orange juice or glucose tablet      No insulin   201-250  2 units   251-300  4 units   301-350  6 units   351-400  8 units   >400   10 units then call physician      Future Orders:  Hospice Medical Director may dictate new orders for comfortable care " measures & sign death certificate.        _________________________________  Abigail Chase MD  05/27/2022

## 2022-05-27 NOTE — PROGRESS NOTES
Pharmacokinetic Initial Assessment: IV Vancomycin    Assessment/Plan:    Initiate intravenous vancomycin with loading dose of 1250 mg once followed by a maintenance dose of vancomycin 750mg IV every 24 hours  Desired empiric serum trough concentration is 15 to 20 mcg/mL  Draw vancomycin trough level 60 min prior to third dose on 5/28 at approximately 1730  Pharmacy will continue to follow and monitor vancomycin.      Please contact pharmacy at extension [phone] with any questions regarding this assessment.     Thank you for the consult,   Imeldaeverardo Fernández       Patient brief summary:  Cary Middleton is a 86 y.o. female initiated on antimicrobial therapy with IV Vancomycin for treatment of suspected bacteremia    Drug Allergies:   Review of patient's allergies indicates:   Allergen Reactions    Zoster vaccine live Rash    Lisinopril Swelling       Actual Body Weight:   57.2 kg    Renal Function:   Estimated Creatinine Clearance: 43.6 mL/min (based on SCr of 0.8 mg/dL).,     Dialysis Method (if applicable):  N/A    CBC (last 72 hours):  Recent Labs   Lab Result Units 05/25/22  1422 05/26/22  0406   WBC K/uL 10.93 9.79   Hemoglobin g/dL 8.6* 7.3*   Hematocrit % 26.0* 22.6*   Platelets K/uL 413 377   Gran % % 76.9* 78.0*   Lymph % % 12.5* 11.3*   Mono % % 8.1 8.0   Eosinophil % % 1.3 1.6   Basophil % % 0.5 0.6   Differential Method  Automated Automated       Metabolic Panel (last 72 hours):  Recent Labs   Lab Result Units 05/25/22  1440 05/25/22  2258 05/26/22  0406   Sodium mmol/L 137  --  137   Potassium mmol/L 3.9  --  3.6   Chloride mmol/L 102  --  101   CO2 mmol/L 24  --  23   Glucose mg/dL 97  --  107   Glucose, UA   --  Negative  --    BUN mg/dL 16  --  17   Creatinine mg/dL 0.8  --  0.8   Albumin g/dL 2.4*  --  2.3*   Total Bilirubin mg/dL 0.4  --  0.5   Alkaline Phosphatase U/L 88  --  82   AST U/L 17  --  16   ALT U/L 5*  --  <5*   Magnesium mg/dL 1.6  --  1.6   Phosphorus mg/dL  --   --  3.7       Drug levels  (last 3 results):  No results for input(s): VANCOMYCINRA, VANCORANDOM, VANCOMYCINPE, VANCOPEAK, VANCOMYCINTR, VANCOTROUGH in the last 72 hours.    Microbiologic Results:  Microbiology Results (last 7 days)     Procedure Component Value Units Date/Time    Blood culture #1 **CANNOT BE ORDERED STAT** [[id number]] Collected: 05/25/22 1850    Order Status: Completed Specimen: Blood from Peripheral, Forearm, Right Updated: 05/26/22 1705     Blood Culture, Routine Gram stain aer bottle: Gram positive cocci in clusters resembling Staph       Results called to and read back by: Aby Eubanks (NIC)  05/26/2022        17:04    Aerobic culture [[id number]] Collected: 05/26/22 1529    Order Status: Sent Specimen: Pleural Fluid Updated: 05/26/22 1529    Culture, Anaerobic [[id number]] Collected: 05/26/22 1529    Order Status: Sent Specimen: Pleural Fluid Updated: 05/26/22 1529    Aerobic culture [[id number]] Collected: 05/26/22 1529    Order Status: Sent Specimen: Pleural Fluid Updated: 05/26/22 1529    Fungus culture [[id number]] Collected: 05/26/22 1529    Order Status: Sent Specimen: Pleural Fluid Updated: 05/26/22 1529    Culture, Anaerobic [[id number]] Collected: 05/26/22 1529    Order Status: Sent Specimen: Pleural Fluid Updated: 05/26/22 1529    Gram stain [[id number]] Collected: 05/26/22 1529    Order Status: Sent Specimen: Pleural Fluid Updated: 05/26/22 1529    AFB Culture & Smear [[id number]] Collected: 05/26/22 1529    Order Status: Sent Specimen: Pleural Fluid Updated: 05/26/22 1529    Gram stain [[id number]] Collected: 05/26/22 1529    Order Status: Sent Specimen: Pleural Fluid Updated: 05/26/22 1529    Blood culture #2 **CANNOT BE ORDERED STAT** [[id number]] Collected: 05/25/22 1850    Order Status: Completed Specimen: Blood from Peripheral, Forearm, Left Updated: 05/26/22 0515     Blood Culture, Routine No Growth to date    Urine culture [[id number]] Collected: 05/25/22 2258    Order Status: No result Specimen: Urine  Updated: 05/25/22 9559

## 2022-05-27 NOTE — PLAN OF CARE
Plan of care discussed with patient at start of shift. Free from falls and injuries. Resting quietly with eyes closed. Respirations even, unlabored with oxygen at 4 liters via nasal cannula. Skin warm and dry. Denies pain. Denies nausea. Family at bedside. Frequent checks for pain and safety maintained. Bed in lowest position, wheels locked, side rails up x's 2, call light in reach. Instructed to call for assistance as needed, verbalizes understanding. Will continue to monitor.

## 2022-05-27 NOTE — TELEPHONE ENCOUNTER
Spoke with pt's daugther to reschedule appt with Elif. Pt's daughter declined due to her mom possibly being removed off hospice.

## 2022-05-27 NOTE — ACP (ADVANCE CARE PLANNING)
Advance Care Planning     Date: 05/26/2022    [facility]  I engaged the patient and family in a conversation about advance care planning and we specifically addressed what the goals of care would be moving forward, in light of the patient's change in clinical status, specifically recent diagnosis of diffuse large B-cell lymphoma (Solis's transformation).  We did specifically address the patient's likely prognosis, which is poor.  We explored the patient's values and preferences for future care.  The patient and family endorses that what is most important right now is to focus on comfort and QOL , although they wanted more time to discuss with family.    Accordingly, we have decided that the best plan to meet the patient's goals includes focusing on comfort while Ms. Middleton and her family have time to discuss the options.    I did explain the role for hospice care at this stage of the patient's illness, including its ability to help the patient live with the best quality of life possible.  We will not be making a hospice referral yet, but if they agree with hospice, we will make the referral quickly.    I spent a total of 20 minutes engaging the patient in this advance care planning discussion.

## 2022-05-27 NOTE — DISCHARGE SUMMARY
[facility] - On[facility] ([facility])  Hematology  Bone Marrow Transplant  Discharge Summary      Patient Name: Cary Middleton  MRN: [id number]  Admission Date: 5/25/2022  Hospital Length of Stay: 2 days  Discharge Date and Time:  05/27/2022 1:19 PM  Attending Physician: Suhail Goldberg MD   Discharging Provider: Abigail Chase MD  Primary Care Provider: Arlene Costa MD    HPI: Ms. Middleton is an 86-y-o patient of Dr. Lara with CLL on treatment with acalabrutinib. Other medical history includes HTN, DM2, HLD, arthritis, and depression. She has worsening retroperitoneal mass on imaging, and there is some concern for dangelo's transformation. She was referred by Dr. Lara to IR for biopsy of the mass and path consistent with large B-cell lymphoma. LEF 1 immunohistochemical staining and FISH studies sent by pathologist and pending. Sent to ED by Dr. Lara today due to hypoxia, AMS, and fall at home. Given symptoms and bulky disease, admitting inpatient. Dr. Lara would like to consider CHOP vs EPOCH. CT of head performed in ED given fall and showing no evidence of acute hemorrhage.      * No surgery found *     Hospital Course: 05/26/2022 doing slightly better today, still on 2-3 L of oxygen, will give calcitonin/zometa for hypercalcemia and lasix as well. Continue antibiotics with ceftriaxone/azithromycin. Blood cx/urine cx pending. IR thoracentesis for left pleural effusion today.   05/27/2022 Thoracentesis done yesterday , 1 liter was removed. Gram stain negative.   Vitals are stable, still on oxygen 2-3 L, labs showing leukocytosis (neutrophilic predominant) and improved calcium down to 12. two Blood cx 5/25 showing Gram positive cocci in clusters resembling Staph with coag neg staph likely contaminant. Started on vancomycin/ceftriaxone overnight.     On further discussion with family today, family/patient decided to pursue hospice. Will be discharged to home hospice. Will switch abx to augmentin for 7 days.  "        Goals of Care Treatment Preferences:  Code Status: Full Code    Health care agent: Alexei Middleton  Health care agent number: [phone]          What is most important right now is to focus on comfort and QOL .  Accordingly, we have decided that the best plan to meet the patient's goals includes focusing on comfort while Ms. Middleton and her family have time to discuss the options..      Consults (From admission, onward)        Status Ordering Provider     Pharmacy to dose Vancomycin consult  Once        Provider:  (Not yet assigned)   "And" Linked Group Details    Acknowledged TAHIR COOPER            Pending Diagnostic Studies:     Procedure Component Value Units Date/Time    Cytology, Fluid/Wash/Brush [[id number]] Collected: 05/26/22 1529    Order Status: Sent Lab Status: In process Updated: 05/27/22 0948    Specimen: Body Fluid     Cytology, Fluid/Wash/Brush [[id number]] Collected: 05/26/22 1529    Order Status: Sent Lab Status: In process Updated: 05/26/22 1530    Specimen: Body Fluid     IR Thoracentesis with Imaging [[id number]]     Order Status: Sent Lab Status: No result         Final Active Diagnoses:    Diagnosis Date Noted POA    PRINCIPAL PROBLEM:  Hypercalcemia [E83.52] 05/26/2022 Yes    Large cell lymphoma [C85.80] 05/25/2022 Yes    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] 05/25/2022 Yes    Acute hypoxic respiratory failure [J96.01] 05/25/2022 Yes    Type 2 diabetes mellitus with hyperglycemia, with long-term current use of insulin [E11.65, Z79.4] 02/17/2022 Not Applicable    CLL (chronic lymphocytic leukemia) [C91.10] 02/05/2022 Yes    Chronic pain [G89.29] 09/26/2018 Yes    Essential hypertension [I10] 06/29/2016 Yes    Major depressive disorder, single episode, in full remission [F32.5] 06/29/2016 Yes    Hyperlipidemia [E78.5] 07/12/2012 Yes     Chronic      Problems Resolved During this Admission:      Discharged Condition: stable    Disposition: home on home hospice "       Patient Instructions:   No discharge procedures on file.  Medications:  Reconciled Home Medications:      Medication List      START taking these medications    amoxicillin-clavulanate 875-125mg 875-125 mg per tablet  Commonly known as: AUGMENTIN  Take 1 tablet by mouth 2 (two) times daily. for 7 days        CONTINUE taking these medications    ACCU-CHEK GUIDE GLUCOSE METER Misc  Generic drug: blood-glucose meter  Use to test blood glucose 2 (two) times daily with meals.     acetaminophen 325 MG tablet  Commonly known as: TYLENOL  Take 2 tablets (650 mg total) by mouth every 4 (four) hours as needed.     amLODIPine 10 MG tablet  Commonly known as: NORVASC  Take 1 tablet (10 mg total) by mouth once daily.     BAQSIMI 3 mg/actuation Spry  Generic drug: glucagon  Use in case of severe hypoglycemia     cholecalciferol (vitamin D3) 25 mcg (1,000 unit) capsule  Commonly known as: VITAMIN D3  Take 1,000 Units by mouth once daily.     cyproheptadine 4 mg tablet  Commonly known as: PERIACTIN  TAKE ONE TABLET BY MOUTH TWICE DAILY WITH FOOD     diclofenac sodium 1 % Gel  Commonly known as: VOLTAREN  Apply 2 g topically daily as needed.     EScitalopram oxalate 10 MG tablet  Commonly known as: LEXAPRO  Take 1 tablet (10 mg total) by mouth once daily.     FREESTYLE NIMCO 14 DAY READER Misc  Generic drug: flash glucose scanning reader  Use as directed     FREESTYLE NIMCO 14 DAY SENSOR Kit  Generic drug: flash glucose sensor  Use one sensor every 2 weeks to check blood sugar ICD-10-CM: E11.9     * lancets [facility]  To check BG 2 times daily, to use with insurance preferred meter     * TRUEPLUS LANCETS 30 gauge Misc  Generic drug: lancets  USE ONE LANCET TWICE DAILY     * ACCU-CHEK SOFTCLIX LANCETS Misc  Generic drug: lancets  Test blood glucose twice daily with meals     losartan 25 MG tablet  Commonly known as: COZAAR  Take 1 tablet (25 mg total) by mouth once daily.     methocarbamoL 500 MG Tab  Commonly known as:  "ROBAXIN  Take 1 tablet (500 mg total) by mouth 3 (three) times daily as needed (Muscle Spasm.).     methylcellulose 1 % ophthalmic solution  Commonly known as: ARTIFICIAL TEARS  Place 1 drop into both eyes as needed.     mirtazapine 7.5 MG Tab  Commonly known as: REMERON  Take 1 tablet (7.5 mg total) by mouth every evening.     morphine 15 MG tablet  Commonly known as: MSIR  Take 1 tablet (15 mg total) by mouth every 4 (four) hours as needed for Pain.     naloxone 4 mg/actuation Spry  Commonly known as: NARCAN  4mg by nasal route as needed for opioid overdose; may repeat every 2-3 minutes in alternating nostrils until medical help arrives. Call 911     ondansetron 4 MG Tbdl  Commonly known as: ZOFRAN-ODT  Dissolve 1 tablet (4 mg total) by mouth every 6 (six) hours as needed.     pen needle, diabetic 32 gauge x 5/32" Ndle  Commonly known as: BD ROWENA 2ND GEN PEN NEEDLE  Uses 4 daily     pramoxine-hydrocortisone cream  Commonly known as: ANALPRAM HC  Apply topically 3 (three) times daily. For Hemorrhoid pain.     SITagliptin 50 MG Tab  Commonly known as: JANUVIA  Take 1 tablet (50 mg total) by mouth once daily.     * TRUE METRIX GLUCOSE TEST STRIP Strp  Generic drug: blood sugar diagnostic  TEST BLOOD SUGAR TWICE A DAY     * ACCU-CHEK GUIDE TEST STRIPS Strp  Generic drug: blood sugar diagnostic  Use to test blood glucose 2 (two) times daily with meals.         * This list has 5 medication(s) that are the same as other medications prescribed for you. Read the directions carefully, and ask your doctor or other care provider to review them with you.            STOP taking these medications    aspirin 81 MG EC tablet  Commonly known as: ECOTRIN     atorvastatin 20 MG tablet  Commonly known as: LIPITOR     CALQUENCE 100 mg Cap  Generic drug: acalabrutinib            Abigail Chase MD  Bone Marrow Transplant  [facility] - Oncology ([facility])  "

## 2022-05-27 NOTE — PROGRESS NOTES
Admit Assessment    Patient Identification  Cary Middleton   :  1935  Admit Date:  2022  Attending Provider:  Suhail Goldberg MD              Referral:   Pt was admitted to  with a diagnosis of <principal problem not specified>, and was admitted this hospital stay due to Retroperitoneal mass [R19.00]  SOB (shortness of breath) [R06.02]  Recurrent left pleural effusion [J90]  Chest pain [R07.9].   is involved was referred to the Social Work Department via routine referral.  Patient presents as a 86 y.o. year old  female.    Oncology LCSW met with pt and pt's daughter, Rekha Moore, at bedside.     Living Situation:      Resides at [address] [place] [address] [facility] [address], phone: [phone] (home).      (RETIRED) Functional Status Prior  Ambulation Prior: 2-->assistive person  Transferrin-->assistive person  Toiletin-->assistive person  Bathin-->assistive person  Dressin-->assistive person  Eatin-->independent  Communication: understands/communicates without difficulty  Swallowing: swallows foods/liquids without difficulty    Current or Past Agencies and Description of Services/Supplies    MD ordered hospice. Pt and pt's daughter are requesting [facility]. LCSW informed Traci with [facility] of referral.     Nutrition:   PO- oral intake    Outpatient Pharmacy:     [facility] - [facility] [address] [address]  Phone: [phone] Fax: [phone]    [facility] - [place], [place] - [address] [facility] [address]  [address] [address]  Phone: [phone] Fax: [phone]    [id number] - [place], [place] - [address] AT [facility] [place]  [address]  [facility] [address]  Phone: [phone] Fax: [phone]      Patient Preference of agencies include:  [facility]    Patient and pt's daughter informed of right to choose providers or agencies.  Patient provides permission to release any necessary information to [facility] and to Non[facility] agencies as needed to facilitate patient care, treatment planning, and patient discharge planning.  Written and verbal resources provided.      Coping  Pt and pt's daughter are coping well.     Adjustment to Diagnosis and Treatment  Pt is adjusting well.     Emotional/Behavioral/Cognitive Issues  No emotional/behavioral or cognitive issues identified at this time.   History/Current Symptoms of Anxiety/Depression: No  History/Current Substance Use: No  Social History     Tobacco Use    Smoking status: Never Smoker    Smokeless tobacco: Never Used   Substance and Sexual Activity    Alcohol use: No    Drug use: No    Sexual activity: Never     Partners: Male       Indications of Abuse/Neglect: No  Abuse Screen (yes response referral indicated)  Feels Unsafe at Home or Work/School: no  Physical Signs of Abuse Present: no    Financial:  Payer/Plan Subscr  Sex Relation Sub. Ins. ID Effective Group Num   1. HU[employer] MANAGE* GEOVANI DIAZ T 1935 Female Self [id number] 1/[birth year]/05 [id number]                                   [address] [address]   2. EYEMED VISION* GEOVANI DIAZ TA* 1935 Female Self [id number]                                   [address], [facility] [address]       Other identified concerns/needs:  Pt will return home on hospice today with [facility].    Plan:    Interventions/Referrals: [facility]  Patient and pt's daughter, Rekha, engaged in treatment planning process.     providing psychosocial and supportive counseling, resources, education, assistance and discharge planning as appropriate.  Patient/caregiver state understanding of  available resources,  following, remains available.

## 2022-05-27 NOTE — NURSING
Discharged per MD orders.  Patient going home on hospice.  Instructions given on medications,diet, and at home activity.  Daughter at bedside.  Awaiting outpatient pharmacy for medication delivery.

## 2022-05-30 NOTE — TELEPHONE ENCOUNTER
Granddaughter states pt is completely off of Calquence. RX has been discontinued. Routed to MDO to clarify.

## 2022-06-17 ENCOUNTER — EXTERNAL HOME HEALTH (OUTPATIENT)
Dept: HOME HEALTH SERVICES | Facility: HOSPITAL | Age: 87
End: 2022-06-17
Payer: MEDICARE

## 2022-06-28 LAB — FUNGUS SPEC CULT: NORMAL

## 2022-07-15 LAB
ACID FAST MOD KINY STN SPEC: NORMAL
MYCOBACTERIUM SPEC QL CULT: NORMAL

## 2023-02-08 NOTE — ASSESSMENT & PLAN NOTE
She had CT on 1/2022 for flank pain and incidentally showed abd LAD. Mesenteric and retroperitoneal LAD. Multiple para-aortic lymph nodes. Largest lymph node is near the left renal hilu, 3.2 cm in greatest dimension. Flow cytometry Consistent with CLL and started on acalabrutinib 2/2022. 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    No

## (undated) DEVICE — DRESSING LEUKOPLAST FLEX 1X3IN